# Patient Record
Sex: MALE | Race: WHITE | NOT HISPANIC OR LATINO | Employment: FULL TIME | URBAN - METROPOLITAN AREA
[De-identification: names, ages, dates, MRNs, and addresses within clinical notes are randomized per-mention and may not be internally consistent; named-entity substitution may affect disease eponyms.]

---

## 2017-01-06 ENCOUNTER — PATIENT OUTREACH (OUTPATIENT)
Dept: OTHER | Facility: OTHER | Age: 52
End: 2017-01-06

## 2017-01-11 ENCOUNTER — PATIENT MESSAGE (OUTPATIENT)
Dept: ADMINISTRATIVE | Facility: OTHER | Age: 52
End: 2017-01-11

## 2017-01-11 NOTE — PROGRESS NOTES
Mr. Jonas is doing well. He has been off his routine for the last couple of days 2/2 to a . He will get back on routine this week. Dry cough has improved since stopping lisinopril. He is tolerating valsartan without problems. Drinks about 6 16oz bottles of water per day plus/minus 2 diet cokes. He endorses low salt diet    Last 5 Patient Entered Readings                                                               Current 30 Day Average: 137/95     Recent Readings 1/10/2017 2017 2017 2017 2016    Systolic BP (mmHg) 144 137 136 144 127    Diastolic BP (mmHg) 98 95 94 102 93    Pulse 76 74 78 75 94      Systolic BP at goal  Diastolic BP elevated  Patient may be drinking excessive fluid - cut back to about 90oz per day  Continue monitoring

## 2017-01-19 ENCOUNTER — PATIENT MESSAGE (OUTPATIENT)
Dept: ADMINISTRATIVE | Facility: OTHER | Age: 52
End: 2017-01-19

## 2017-01-19 ENCOUNTER — PATIENT MESSAGE (OUTPATIENT)
Dept: CARDIOLOGY | Facility: CLINIC | Age: 52
End: 2017-01-19

## 2017-01-19 ENCOUNTER — PATIENT MESSAGE (OUTPATIENT)
Dept: OTHER | Facility: OTHER | Age: 52
End: 2017-01-19

## 2017-01-20 PROBLEM — R93.1 AGATSTON CAC SCORE 100-199: Status: ACTIVE | Noted: 2017-01-20

## 2017-01-25 ENCOUNTER — PATIENT OUTREACH (OUTPATIENT)
Dept: OTHER | Facility: OTHER | Age: 52
End: 2017-01-25

## 2017-01-25 DIAGNOSIS — I10 ESSENTIAL HYPERTENSION: Primary | ICD-10-CM

## 2017-02-02 RX ORDER — VALSARTAN AND HYDROCHLOROTHIAZIDE 160; 25 MG/1; MG/1
1 TABLET ORAL DAILY
Qty: 90 TABLET | Refills: 2 | Status: SHIPPED | OUTPATIENT
Start: 2017-02-02 | End: 2017-05-04 | Stop reason: DRUGHIGH

## 2017-02-02 NOTE — PROGRESS NOTES
Mr. Jonas is doing well. No questions or concerns. Continues to exercise and watch diet. He would like to refill combination pill. He has been cutting out alcohol Thursday through Sunday and he feels good. He will have a very strict Lent season (no meat, no alcohol). Wants to make sure BP does not get too low.     Last 5 Patient Entered Readings                                                               Current 30 Day Average: 121/83     Recent Readings 2/1/2017 1/26/2017 1/19/2017 1/18/2017 1/17/2017    Systolic BP (mmHg) 119 124 113 117 120    Diastolic BP (mmHg) 77 83 78 74 85    Pulse 83 71 74 67 67      BP at goal  Continue monitoring    Outpatient Hypertension Medications as of 2/2/2017             valsartan-hydrochlorothiazide (DIOVAN-HCT) 160-25 mg per tablet Take 1 tablet by mouth once daily.

## 2017-02-06 ENCOUNTER — PATIENT OUTREACH (OUTPATIENT)
Dept: OTHER | Facility: OTHER | Age: 52
End: 2017-02-06
Payer: COMMERCIAL

## 2017-02-21 RX ORDER — PANTOPRAZOLE SODIUM 40 MG/1
TABLET, DELAYED RELEASE ORAL
Qty: 180 TABLET | Refills: 0 | Status: SHIPPED | OUTPATIENT
Start: 2017-02-21 | End: 2017-03-06 | Stop reason: SDUPTHER

## 2017-02-21 NOTE — TELEPHONE ENCOUNTER
----- Message from Selena Allen sent at 2/21/2017  2:42 PM CST -----  Contact: [facility]/Ayde/ext.[phone]  Ayde said that she is calling in regards to needing to get an okay for a 90 day supply for pt's pantoprazole (PROTONIX) 40 MG tablet. Please call and advise            Thank you

## 2017-03-01 NOTE — PROGRESS NOTES
Last 5 Patient Entered Readings                                                               Current 30 Day Average: 131/86     Recent Readings 2/27/2017 2/26/2017 2/24/2017 2/20/2017 2/19/2017    Systolic BP (mmHg) 136 147 134 125 137    Diastolic BP (mmHg) 89 101 94 76 89    Pulse 82 75 69 90 79        LVM. Trending up.

## 2017-03-06 DIAGNOSIS — J30.9 ALLERGIC RHINITIS, UNSPECIFIED ALLERGIC RHINITIS TRIGGER, UNSPECIFIED RHINITIS SEASONALITY: ICD-10-CM

## 2017-03-06 RX ORDER — AZELASTINE HYDROCHLORIDE 0.5 MG/ML
1 SOLUTION/ DROPS OPHTHALMIC 2 TIMES DAILY
Qty: 18 ML | Refills: 3 | Status: SHIPPED | OUTPATIENT
Start: 2017-03-06 | End: 2023-02-16

## 2017-03-06 RX ORDER — PANTOPRAZOLE SODIUM 40 MG/1
TABLET, DELAYED RELEASE ORAL
Qty: 180 TABLET | Refills: 0 | Status: SHIPPED | OUTPATIENT
Start: 2017-03-06 | End: 2018-02-26 | Stop reason: SDUPTHER

## 2017-03-06 RX ORDER — FLUTICASONE PROPIONATE 50 MCG
2 SPRAY, SUSPENSION (ML) NASAL DAILY
Qty: 48 G | Refills: 4 | Status: SHIPPED | OUTPATIENT
Start: 2017-03-06 | End: 2021-01-07 | Stop reason: SDUPTHER

## 2017-03-06 RX ORDER — AZELASTINE HCL 205.5 UG/1
2 SPRAY NASAL 2 TIMES DAILY PRN
Qty: 30 ML | Refills: 1 | Status: SHIPPED | OUTPATIENT
Start: 2017-03-06 | End: 2018-03-06

## 2017-03-06 NOTE — TELEPHONE ENCOUNTER
Pharmacy would like a 90 day supply on    azelastine (OPTIVAR) 0.05 % ophthalmic solution     azelastine 0.15 % (205.5 mcg) Spry       pantoprazole (PROTONIX) 40 MG tablet

## 2017-03-14 ENCOUNTER — CLINICAL SUPPORT (OUTPATIENT)
Dept: INFECTIOUS DISEASES | Facility: CLINIC | Age: 52
End: 2017-03-14
Payer: COMMERCIAL

## 2017-03-14 PROCEDURE — 90632 HEPA VACCINE ADULT IM: CPT | Mod: PBBFAC

## 2017-03-29 ENCOUNTER — PATIENT OUTREACH (OUTPATIENT)
Dept: OTHER | Facility: OTHER | Age: 52
End: 2017-03-29
Payer: COMMERCIAL

## 2017-03-29 NOTE — PROGRESS NOTES
Last 5 Patient Entered Readings                                                               Current 30 Day Average: 119/79     Recent Readings 3/29/2017 3/28/2017 3/27/2017 3/25/2017 3/21/2017    Systolic BP (mmHg) 112 112 132 113 116    Diastolic BP (mmHg) 69 77 86 76 78    Pulse 105 68 85 73 67

## 2017-04-11 ENCOUNTER — PATIENT MESSAGE (OUTPATIENT)
Dept: OTHER | Facility: OTHER | Age: 52
End: 2017-04-11

## 2017-04-11 ENCOUNTER — PATIENT MESSAGE (OUTPATIENT)
Dept: ADMINISTRATIVE | Facility: OTHER | Age: 52
End: 2017-04-11

## 2017-05-03 ENCOUNTER — PATIENT OUTREACH (OUTPATIENT)
Dept: OTHER | Facility: OTHER | Age: 52
End: 2017-05-03
Payer: COMMERCIAL

## 2017-05-03 DIAGNOSIS — I10 ESSENTIAL HYPERTENSION: Primary | ICD-10-CM

## 2017-05-04 RX ORDER — VALSARTAN AND HYDROCHLOROTHIAZIDE 160; 12.5 MG/1; MG/1
1 TABLET, FILM COATED ORAL DAILY
Qty: 30 TABLET | Refills: 1 | Status: SHIPPED | OUTPATIENT
Start: 2017-05-04 | End: 2017-06-05 | Stop reason: SDUPTHER

## 2017-05-04 RX ORDER — ROSUVASTATIN CALCIUM 20 MG/1
20 TABLET, COATED ORAL NIGHTLY
Qty: 90 TABLET | Refills: 3 | Status: SHIPPED | OUTPATIENT
Start: 2017-05-04 | End: 2018-01-02 | Stop reason: SDUPTHER

## 2017-05-04 NOTE — PROGRESS NOTES
Mr. Jonas is doing well. He continues to exercise and watch diet closely. He has noticed some afternoon fatigue and thinks it may be due to BP medication. Would like to try and reduce dose.     Last 5 Patient Entered Readings                                                               Current 30 Day Average: 123/86     Recent Readings 4/29/2017 4/28/2017 4/27/2017 4/26/2017 4/20/2017    Systolic BP (mmHg) 120 125 126 124 114    Diastolic BP (mmHg) 81 86 92 84 80    Pulse 69 72 68 72 75      BP at goal  Decrease HCTZ to 12.5mg daily to improve fatigue    Hypertension Medications             valsartan-hydrochlorothiazide (DIOVAN-HCT) 160-12.5 mg per tablet Take 1 tablet by mouth once daily.

## 2017-05-05 RX ORDER — ROSUVASTATIN CALCIUM 20 MG/1
TABLET, FILM COATED ORAL
Qty: 90 TABLET | Refills: 0 | Status: SHIPPED | OUTPATIENT
Start: 2017-05-05 | End: 2017-11-02 | Stop reason: SDUPTHER

## 2017-05-08 ENCOUNTER — TELEPHONE (OUTPATIENT)
Dept: ALLERGY | Facility: CLINIC | Age: 52
End: 2017-05-08

## 2017-05-08 NOTE — PROGRESS NOTES
Last 5 Patient Entered Readings                                                               Current 30 Day Average: 124/86     Recent Readings 5/5/2017 4/29/2017 4/28/2017 4/27/2017 4/26/2017    Systolic BP (mmHg) 125 120 125 126 124    Diastolic BP (mmHg) 87 81 86 92 84    Pulse 66 69 72 68 72        Increased exercise but not seeing much weight changes. Would like to be 15lbs lighter. Will log food for 1 week to share and discuss in detail. Has reduced meds per Shaina. Feeling well. Pleased with BP.    Follow up with . Venkat Jonas completed. Patient is maintaining a low sodium diet and continuing his and her exercise regime. Patient did not have any further questions or concerns. I will follow up in a few weeks to see how he is doing and progressing.

## 2017-05-08 NOTE — TELEPHONE ENCOUNTER
----- Message from Laura Em sent at 5/8/2017  3:21 PM CDT -----  Good afternoon,    The prior authorization for Azelastine 0.15 % spray was approved for Mr. Robledo Sumi.  The authorization is effective from 05/06/17 to 05/05/18.    Laura Em, Certified Pharmacy Technician  [facility]  [address] [place] [address] 7012 [phone] (phone)  [phone] (fax)

## 2017-05-08 NOTE — TELEPHONE ENCOUNTER
----- Message from Laura Em sent at 5/8/2017 11:00 AM CDT -----  Good morning,    The prior authorization for Azelastine 0.15 spray was submitted for Mr. Venkat Jonas.  An update will be given once the insurance company has reached a decision.  Thank you.    Laura Em, Certified Pharmacy Technician  [facility]  [address] [place], [place] [id number] [phone] (phone)  [phone] (fax)

## 2017-05-19 ENCOUNTER — PATIENT OUTREACH (OUTPATIENT)
Dept: OTHER | Facility: OTHER | Age: 52
End: 2017-05-19
Payer: COMMERCIAL

## 2017-05-19 DIAGNOSIS — I10 ESSENTIAL HYPERTENSION: ICD-10-CM

## 2017-05-24 ENCOUNTER — PATIENT MESSAGE (OUTPATIENT)
Dept: CARDIOLOGY | Facility: CLINIC | Age: 52
End: 2017-05-24

## 2017-06-02 ENCOUNTER — LAB VISIT (OUTPATIENT)
Dept: LAB | Facility: HOSPITAL | Age: 52
End: 2017-06-02
Attending: INTERNAL MEDICINE
Payer: COMMERCIAL

## 2017-06-02 DIAGNOSIS — Z00.00 ROUTINE CHECK-UP: ICD-10-CM

## 2017-06-02 DIAGNOSIS — E78.2 MIXED HYPERLIPIDEMIA: ICD-10-CM

## 2017-06-02 DIAGNOSIS — I10 ESSENTIAL HYPERTENSION: ICD-10-CM

## 2017-06-02 LAB
CHOLEST/HDLC SERPL: 3.2 {RATIO}
HDL/CHOLESTEROL RATIO: 31.1 %
HDLC SERPL-MCNC: 212 MG/DL
HDLC SERPL-MCNC: 66 MG/DL
LDLC SERPL CALC-MCNC: 132.4 MG/DL
NONHDLC SERPL-MCNC: 146 MG/DL
TRIGL SERPL-MCNC: 68 MG/DL

## 2017-06-02 PROCEDURE — 36415 COLL VENOUS BLD VENIPUNCTURE: CPT

## 2017-06-02 PROCEDURE — 80061 LIPID PANEL: CPT

## 2017-06-05 ENCOUNTER — TELEPHONE (OUTPATIENT)
Dept: CARDIOLOGY | Facility: CLINIC | Age: 52
End: 2017-06-05

## 2017-06-05 ENCOUNTER — PATIENT MESSAGE (OUTPATIENT)
Dept: INTERNAL MEDICINE | Facility: CLINIC | Age: 52
End: 2017-06-05

## 2017-06-05 ENCOUNTER — PATIENT MESSAGE (OUTPATIENT)
Dept: ADMINISTRATIVE | Facility: OTHER | Age: 52
End: 2017-06-05

## 2017-06-05 ENCOUNTER — PATIENT MESSAGE (OUTPATIENT)
Dept: CARDIOLOGY | Facility: CLINIC | Age: 52
End: 2017-06-05

## 2017-06-05 ENCOUNTER — PATIENT OUTREACH (OUTPATIENT)
Dept: OTHER | Facility: OTHER | Age: 52
End: 2017-06-05
Payer: COMMERCIAL

## 2017-06-05 DIAGNOSIS — E78.00 PURE HYPERCHOLESTEROLEMIA: Primary | ICD-10-CM

## 2017-06-05 DIAGNOSIS — I10 ESSENTIAL HYPERTENSION: Chronic | ICD-10-CM

## 2017-06-05 RX ORDER — EZETIMIBE 10 MG/1
10 TABLET ORAL DAILY
Qty: 30 TABLET | Refills: 5 | Status: SHIPPED | OUTPATIENT
Start: 2017-06-05 | End: 2017-12-04 | Stop reason: SDUPTHER

## 2017-06-05 RX ORDER — VALSARTAN AND HYDROCHLOROTHIAZIDE 160; 12.5 MG/1; MG/1
1 TABLET, FILM COATED ORAL DAILY
Qty: 90 TABLET | Refills: 2 | Status: SHIPPED | OUTPATIENT
Start: 2017-06-05 | End: 2017-08-08 | Stop reason: SDUPTHER

## 2017-06-05 NOTE — TELEPHONE ENCOUNTER
Spoke with patient and an appt was scheduled for lab on 8/3/17 at 7:10 am.       MD Aure Garcia Staff 1 hour ago (1:35 PM)      Needs lipids and Chem-20 in 6-8 weeks. (Routing comment)       Vikash Looney MD 1 hour ago (1:30 PM)      LDL improved but still elevated (132).  Taking Crestor 20 mg.     Discussed results; will increase to 40 mg Crestor (two 20 mg tabs) and add Zetia 10 mg.     Recheck lipids in 2 months

## 2017-06-05 NOTE — PROGRESS NOTES
Last 5 Patient Entered Readings                                                               Current 30 Day Average: 129/89     Recent Readings 6/3/2017 6/2/2017 6/1/2017 6/1/2017 6/1/2017    Systolic BP (mmHg) 131 134 125 133 125    Diastolic BP (mmHg) 90 94 83 91 83    Pulse 81 74 69 68 72        Cholesterol levels also improving. Feeling great. Increased exercise. Would like to schedule time to talk more in depth about diet and exercise.    Follow up with Mr. Venkat Jonas completed. Patient is maintaining a low sodium diet and continuing his exercise regime. Patient did not have any further questions or concerns. I will follow up in a few weeks to see how he is doing and progressing.

## 2017-06-27 ENCOUNTER — PATIENT OUTREACH (OUTPATIENT)
Dept: OTHER | Facility: OTHER | Age: 52
End: 2017-06-27

## 2017-06-27 NOTE — PROGRESS NOTES
Called patient to review readings. LVM  BP at goal  Continue monitoring    Last 5 Patient Entered Readings                                                               Current 30 Day Average: 131/89     Recent Readings 6/25/2017 6/24/2017 6/23/2017 6/20/2017 6/16/2017    Systolic BP (mmHg) 126 128 138 150 119    Diastolic BP (mmHg) 83 83 102 98 81    Pulse 80 90 77 72 78

## 2017-07-03 ENCOUNTER — PATIENT OUTREACH (OUTPATIENT)
Dept: OTHER | Facility: OTHER | Age: 52
End: 2017-07-03

## 2017-07-03 NOTE — PROGRESS NOTES
Last 5 Patient Entered Readings                                                               Current 30 Day Average: 132/89     Recent Readings 6/25/2017 6/24/2017 6/23/2017 6/20/2017 6/16/2017    Systolic BP (mmHg) 126 128 138 150 119    Diastolic BP (mmHg) 83 83 102 98 81    Pulse 80 90 77 72 78        Has lost 4lbs.  Feeling great. Keeping up with healthy routines. Increased veggies, decreased starchy carbs.    Follow up with . Venkat Jonas completed. Patient is maintaining a low sodium diet and continuing his exercise regime. Patient did not have any further questions or concerns. I will follow up in a few weeks to see how he is doing and progressing.

## 2017-08-01 ENCOUNTER — PATIENT MESSAGE (OUTPATIENT)
Dept: OTHER | Facility: OTHER | Age: 52
End: 2017-08-01

## 2017-08-02 ENCOUNTER — PATIENT MESSAGE (OUTPATIENT)
Dept: CARDIOLOGY | Facility: CLINIC | Age: 52
End: 2017-08-02

## 2017-08-02 ENCOUNTER — LAB VISIT (OUTPATIENT)
Dept: LAB | Facility: HOSPITAL | Age: 52
End: 2017-08-02
Attending: INTERNAL MEDICINE
Payer: COMMERCIAL

## 2017-08-02 DIAGNOSIS — E78.00 PURE HYPERCHOLESTEROLEMIA: ICD-10-CM

## 2017-08-02 DIAGNOSIS — I10 ESSENTIAL HYPERTENSION: Chronic | ICD-10-CM

## 2017-08-02 LAB
ALBUMIN SERPL BCP-MCNC: 3.7 G/DL
ALP SERPL-CCNC: 81 U/L
ALT SERPL W/O P-5'-P-CCNC: 39 U/L
ANION GAP SERPL CALC-SCNC: 11 MMOL/L
AST SERPL-CCNC: 34 U/L
BILIRUB SERPL-MCNC: 0.4 MG/DL
BUN SERPL-MCNC: 15 MG/DL
CALCIUM SERPL-MCNC: 9.4 MG/DL
CHLORIDE SERPL-SCNC: 101 MMOL/L
CHOLEST/HDLC SERPL: 2.4 {RATIO}
CO2 SERPL-SCNC: 26 MMOL/L
CREAT SERPL-MCNC: 1 MG/DL
EST. GFR  (AFRICAN AMERICAN): >60 ML/MIN/1.73 M^2
EST. GFR  (NON AFRICAN AMERICAN): >60 ML/MIN/1.73 M^2
GLUCOSE SERPL-MCNC: 81 MG/DL
HDL/CHOLESTEROL RATIO: 42.2 %
HDLC SERPL-MCNC: 135 MG/DL
HDLC SERPL-MCNC: 57 MG/DL
LDLC SERPL CALC-MCNC: 65 MG/DL
NONHDLC SERPL-MCNC: 78 MG/DL
POTASSIUM SERPL-SCNC: 4.1 MMOL/L
PROT SERPL-MCNC: 7.3 G/DL
SODIUM SERPL-SCNC: 138 MMOL/L
TRIGL SERPL-MCNC: 65 MG/DL

## 2017-08-02 PROCEDURE — 80061 LIPID PANEL: CPT

## 2017-08-02 PROCEDURE — 36415 COLL VENOUS BLD VENIPUNCTURE: CPT

## 2017-08-02 PROCEDURE — 80053 COMPREHEN METABOLIC PANEL: CPT

## 2017-08-04 ENCOUNTER — PATIENT OUTREACH (OUTPATIENT)
Dept: OTHER | Facility: OTHER | Age: 52
End: 2017-08-04

## 2017-08-04 NOTE — PROGRESS NOTES
"Last 5 Patient Entered Redings Current 30 Day Average: 128/87     Recent Readings 8/1/2017 7/31/2017 7/29/2017 7/27/2017 7/25/2017    Systolic BP (mmHg) 132 128 119 125 123    Diastolic BP (mmHg) 81 89 80 82 89    Pulse 77 78 67 65 77        Mr Jonas would like to work more toward lower body fat percentage and increase muscle mass. Currently follows a Low carb diet, vegetarian some nights. Reviewed diet and exercise in [employer]. Will email suggestions to help reach goals.    Down 8-9lbs in last 4wks. Current weight: 189 and thinks "20-22% body fat" but will check at [facility] using [facility] device / Goal: 175lbs by the holidays. Increase muscle mass / decrease fat    Usually does workout routine at 5:30am. Tries to make breakfast largest meal and dinner smallest.    Ordered The Anatone Diet book rec by Dr Looney.    Recently had blood work done and cholesterol levels are back in a normal range.    Follow up with . Venkat Jonas completed. Patient is maintaining a low sodium diet and continuing his exercise regime. Patient did not have any further questions or concerns. I will follow up in a few weeks to see how he is doing and progressing.      "

## 2017-08-08 ENCOUNTER — PATIENT OUTREACH (OUTPATIENT)
Dept: OTHER | Facility: OTHER | Age: 52
End: 2017-08-08

## 2017-08-08 DIAGNOSIS — I10 ESSENTIAL HYPERTENSION: ICD-10-CM

## 2017-08-08 RX ORDER — VALSARTAN AND HYDROCHLOROTHIAZIDE 160; 12.5 MG/1; MG/1
1 TABLET, FILM COATED ORAL DAILY
Qty: 90 TABLET | Refills: 2 | Status: SHIPPED | OUTPATIENT
Start: 2017-08-08 | End: 2018-06-01 | Stop reason: SDUPTHER

## 2017-08-08 NOTE — PROGRESS NOTES
Reviewed BP readings, at goal.  Continue monitoring  RX refilled    Last 5 Patient Entered Redings Current 30 Day Average: 126/86     Recent Readings 8/5/2017 8/1/2017 7/31/2017 7/29/2017 7/27/2017    Systolic BP (mmHg) 123 132 128 119 125    Diastolic BP (mmHg) 83 81 89 80 82    Pulse 83 77 78 67 65

## 2017-09-01 ENCOUNTER — PATIENT OUTREACH (OUTPATIENT)
Dept: OTHER | Facility: OTHER | Age: 52
End: 2017-09-01

## 2017-09-01 NOTE — PROGRESS NOTES
Last 5 Patient Entered Redings Current 30 Day Average: 134/89     Recent Readings 8/26/2017 8/23/2017 8/19/2017 8/15/2017 8/5/2017    Systolic BP (mmHg) 132 134 150 131 123    Diastolic BP (mmHg) 91 92 87 94 83    Pulse 75 67 82 71 83        Hypertension Digital Medicine (HDMP) Health  Follow Up    LVM to follow up with Mr. Venkat Jonas.    Per 30 day average, blood pressure is well controlled 134/89 mmHg. Encouraged adherence to low sodium diet and physical activity guidelines. Advised patient to call or message with questions or concerns. WCB in 3 weeks.

## 2017-09-11 ENCOUNTER — PATIENT MESSAGE (OUTPATIENT)
Dept: INTERNAL MEDICINE | Facility: CLINIC | Age: 52
End: 2017-09-11

## 2017-09-25 NOTE — PROGRESS NOTES
Last 5 Patient Entered Redings Current 30 Day Average: 133/89     Recent Readings 9/20/2017 9/16/2017 9/15/2017 9/12/2017 9/8/2017    Systolic BP (mmHg) 129 145 145 132 121    Diastolic BP (mmHg) 78 95 99 90 80    Pulse 73 76 64 68 71          Hypertension Digital Medicine (HDMP) Health  Follow Up    LVM to follow up with Mr. Venkat Jonas.    Per 30 day average, blood pressure is  133/89 mmHg. Encouraged adherence to low sodium diet and physical activity guidelines. Advised patient to call or message with questions or concerns. WCB in 4 weeks.

## 2017-10-03 DIAGNOSIS — J45.990 BRONCHOSPASM, EXERCISE-INDUCED: ICD-10-CM

## 2017-10-03 DIAGNOSIS — J45.20 ASTHMA, MILD INTERMITTENT, WELL-CONTROLLED: ICD-10-CM

## 2017-10-03 RX ORDER — BUDESONIDE AND FORMOTEROL FUMARATE DIHYDRATE 80; 4.5 UG/1; UG/1
2 AEROSOL RESPIRATORY (INHALATION) 2 TIMES DAILY
Refills: 0 | COMMUNITY
Start: 2017-07-20 | End: 2017-11-02 | Stop reason: SDUPTHER

## 2017-10-03 RX ORDER — BUDESONIDE AND FORMOTEROL FUMARATE DIHYDRATE 80; 4.5 UG/1; UG/1
2 AEROSOL RESPIRATORY (INHALATION) 2 TIMES DAILY
Qty: 30.6 G | Refills: 3 | Status: SHIPPED | OUTPATIENT
Start: 2017-10-03 | End: 2018-12-09 | Stop reason: SDUPTHER

## 2017-10-05 RX ORDER — ALBUTEROL SULFATE 90 UG/1
2 AEROSOL, METERED RESPIRATORY (INHALATION) EVERY 4 HOURS PRN
Qty: 18 G | Refills: 3 | Status: SHIPPED | OUTPATIENT
Start: 2017-10-05 | End: 2018-12-09 | Stop reason: SDUPTHER

## 2017-10-18 ENCOUNTER — PATIENT OUTREACH (OUTPATIENT)
Dept: OTHER | Facility: OTHER | Age: 52
End: 2017-10-18

## 2017-10-18 NOTE — PROGRESS NOTES
"Venkat is tolerating BP medications without problems. He denies fatigue with lower valsartan-hctz dose. Has been adhering to balanced diet and intense exercise routine. Concerned about monthly BP "spikes". Reports he has been taking readings on the couch with arm sitting on his lap.     Last 5 Patient Entered Redings Current 30 Day Average: 123/82     Recent Readings 10/18/2017 10/17/2017 10/17/2017 10/11/2017 10/3/2017    Systolic BP (mmHg) 117 134 148 119 116    Diastolic BP (mmHg) 75 97 94 84 78    Pulse 88 74 71 78 68        BP at goal  Reviewed BP monitoring technique  Continue monitoring    Hypertension Medications             valsartan-hydrochlorothiazide (DIOVAN-HCT) 160-12.5 mg per tablet Take 1 tablet by mouth once daily.            "

## 2017-10-23 NOTE — PROGRESS NOTES
Last 5 Patient Entered Redings Current 30 Day Average: 122/82     Recent Readings 10/20/2017 10/18/2017 10/17/2017 10/17/2017 10/11/2017    Systolic BP (mmHg) 125 117 134 148 119    Diastolic BP (mmHg) 76 75 97 94 84    Pulse 79 88 74 71 78        LVM to follow up. Also sending [employer] message to check in.

## 2017-10-31 ENCOUNTER — OFFICE VISIT (OUTPATIENT)
Dept: OPTOMETRY | Facility: CLINIC | Age: 52
End: 2017-10-31
Payer: COMMERCIAL

## 2017-10-31 DIAGNOSIS — H52.03 HYPERMETROPIA OF BOTH EYES: ICD-10-CM

## 2017-10-31 DIAGNOSIS — H52.4 PRESBYOPIA OF BOTH EYES: ICD-10-CM

## 2017-10-31 DIAGNOSIS — Z01.00 EXAMINATION OF EYES AND VISION: Primary | ICD-10-CM

## 2017-10-31 PROCEDURE — 92004 COMPRE OPH EXAM NEW PT 1/>: CPT | Mod: S$GLB,,, | Performed by: OPTOMETRIST

## 2017-10-31 PROCEDURE — 99999 PR PBB SHADOW E&M-EST. PATIENT-LVL II: CPT | Mod: PBBFAC,,, | Performed by: OPTOMETRIST

## 2017-10-31 PROCEDURE — 92015 DETERMINE REFRACTIVE STATE: CPT | Mod: S$GLB,,, | Performed by: OPTOMETRIST

## 2017-10-31 NOTE — PATIENT INSTRUCTIONS
Good ocular health in both eyes.  Family history of glaucoma.  No evidence of glaucoma in either eye.     Slight hyperopia in each eye, with good correctable VA.  Presbyopia consistent with age.  New spectacle lens Rx issued for use as desired, but okay to continue with +1375 or +2.00 over the counter reading glasses if happy with near vision with those glasses, and if happy with unaided distance VA.    Recheck in two years - or prior if any problems noted in the interim.

## 2017-10-31 NOTE — PROGRESS NOTES
HPI     Concerns About Ocular Health    Additional comments: Eye exam - annual general eye examination.  Uses OTC reading glasses.  Using Optivar and artificial tears as needed - lubricatiing eye drops at   bedtime.  Family history of glaucoma: M. Grandmother.            Comments   Patient's age: 52 y.o. WM   Occupation: [employer]   Approximate date of last eye examination:  10/11/12  Name of last eye doctor seen: Dr. Pinky Olivares   City/State: [facility]   Wears glasses? Yes      If yes, wears  Full-time or part-time?  Part Time   Present glasses are: Bifocal, SV Distance, SV Reading?  otc readers   Approximate age of present glasses:     Got new glasses following last exam, or subsequently?:     Any problem with VA with glasses?  No  Wears CLs?: No  Headaches?  No  Eye pain/discomfort?  Dry eye occasionally                                                                                  Flashes?  No  Floaters?  No  Diplopia/Double vision?  No  Patient's Ocular History:         Any eye surgeries? No         Any eye injury?  No         Any treatment for eye disease?  No  Family history of eye disease?  Maternal grandmother- Glaucoma   Significant patient medical history:         1. Diabetes?  No       If yes, IDDM or NIDDM?    2. HBP?  Yes, managed with medication                 ! OTC eyedrops currently using:  Lubricant eye drop   ! Prescription eye meds currently using:  No   ! Any history of allergy/adverse reaction to any eye meds used   previously?  No    ! Any history of allergy/adverse reaction to eyedrops used during prior   eye exam(s)? No    ! Any history of allergy/adverse reaction to Novacaine or similar meds?   No   ! Any history of allergy/adverse reaction to Epinephrine or similar meds?   No    ! Patient okay with use of anesthetic eyedrops to check eye pressure?    Yes         ! Patient okay with use of eyedrops to dilate pupils today?  Yes    !  Allergies/Medications/Medical History/Family History reviewed  "today?    Yes       PD =   61/57  Desired reading distance =  15.25"                                                                    Last edited by Michael Doan, OD on 10/31/2017  4:31 PM. (History)            Assessment /Plan     For exam results, see Encounter Report.    1. Examination of eyes and vision     2. Hypermetropia of both eyes     3. Presbyopia of both eyes                Good ocular health in both eyes.  Family history of glaucoma.  No evidence of glaucoma in either eye.     Slight hyperopia in each eye, with good correctable VA.  Presbyopia consistent with age.  New spectacle lens Rx issued for use as desired, but okay to continue with +1375 or +2.00 over the counter reading glasses if happy with near vision with those glasses, and if happy with unaided distance VA.    Recheck in two years - or prior if any problems noted in the interim      "

## 2017-11-02 ENCOUNTER — TELEPHONE (OUTPATIENT)
Dept: INTERNAL MEDICINE | Facility: CLINIC | Age: 52
End: 2017-11-02

## 2017-11-02 DIAGNOSIS — Z00.00 ROUTINE GENERAL MEDICAL EXAMINATION AT A HEALTH CARE FACILITY: Primary | ICD-10-CM

## 2017-11-07 ENCOUNTER — OFFICE VISIT (OUTPATIENT)
Dept: DERMATOLOGY | Facility: CLINIC | Age: 52
End: 2017-11-07
Payer: COMMERCIAL

## 2017-11-07 DIAGNOSIS — L82.1 SK (SEBORRHEIC KERATOSIS): ICD-10-CM

## 2017-11-07 DIAGNOSIS — L82.0 INFLAMED SEBORRHEIC KERATOSIS: Primary | ICD-10-CM

## 2017-11-07 DIAGNOSIS — B07.8 OTHER VIRAL WARTS: ICD-10-CM

## 2017-11-07 DIAGNOSIS — L57.0 AK (ACTINIC KERATOSIS): ICD-10-CM

## 2017-11-07 PROCEDURE — 17110 DESTRUCTION B9 LES UP TO 14: CPT | Mod: S$GLB,,, | Performed by: DERMATOLOGY

## 2017-11-07 PROCEDURE — 99213 OFFICE O/P EST LOW 20 MIN: CPT | Mod: 25,S$GLB,, | Performed by: DERMATOLOGY

## 2017-11-07 PROCEDURE — 17000 DESTRUCT PREMALG LESION: CPT | Mod: 59,S$GLB,, | Performed by: DERMATOLOGY

## 2017-11-07 PROCEDURE — 99999 PR PBB SHADOW E&M-EST. PATIENT-LVL II: CPT | Mod: PBBFAC,,, | Performed by: DERMATOLOGY

## 2017-11-07 NOTE — PATIENT INSTRUCTIONS

## 2017-11-07 NOTE — PROGRESS NOTES
Subjective:       Patient ID:  Venkat Jonas is a 52 y.o. male who presents for   Chief Complaint   Patient presents with    Warts     left hand x few months, asymptomatic no prev tx      Noticed red scaling spots on right cheek x 2 months.     C/o red irritated growth on right neck x 1 month + irritated from shirt collar      Warts  - Initial  Affected locations: left hand  Duration: 3 months  Signs / symptoms: asymptomatic and spreading  Aggravated by: nothing  Relieving factors/Treatments tried: nothing        Review of Systems   Skin: Negative for itching and rash.   Hematologic/Lymphatic: Does not bruise/bleed easily.        Objective:    Physical Exam   Constitutional: He appears well-developed and well-nourished. No distress.   Neurological: He is alert and oriented to person, place, and time. He is not disoriented.   Psychiatric: He has a normal mood and affect.   Skin:   Areas Examined (abnormalities noted in diagram):   Head / Face Inspection Performed  Neck Inspection Performed  RUE Inspected  Nails and Digits Inspection Performed                  Diagram Legend     Erythematous scaling macule/papule c/w actinic keratosis       Vascular papule c/w angioma      Pigmented verrucoid papule/plaque c/w seborrheic keratosis      Yellow umbilicated papule c/w sebaceous hyperplasia      Irregularly shaped tan macule c/w lentigo     1-2 mm smooth white papules consistent with Milia      Movable subcutaneous cyst with punctum c/w epidermal inclusion cyst      Subcutaneous movable cyst c/w pilar cyst      Firm pink to brown papule c/w dermatofibroma      Pedunculated fleshy papule(s) c/w skin tag(s)      Evenly pigmented macule c/w junctional nevus     Mildly variegated pigmented, slightly irregular-bordered macule c/w mildly atypical nevus      Flesh colored to evenly pigmented papule c/w intradermal nevus       Pink pearly papule/plaque c/w basal cell carcinoma      Erythematous hyperkeratotic cursted  plaque c/w SCC      Surgical scar with no sign of skin cancer recurrence      Open and closed comedones      Inflammatory papules and pustules      Verrucoid papule consistent consistent with wart     Erythematous eczematous patches and plaques     Dystrophic onycholytic nail with subungual debris c/w onychomycosis     Umbilicated papule    Erythematous-base heme-crusted tan verrucoid plaque consistent with inflamed seborrheic keratosis     Erythematous Silvery Scaling Plaque c/w Psoriasis     See annotation      Assessment / Plan:        Inflamed seborrheic keratosis - right neck  Procedure note for destruction via hyfrecation and curettage:    Verbal consent obtained. Risks of recurrence and scarring discussed.   1 lesions cleaned with alcohol and anesthetized with 1% lidocaine with epinephrine. Areas then lightly hyfrecated and curetted to remove gross lesion. Hemostasis achieved with aluminum chloride application. No complications.   Areas dressed with Vaseline jelly and bandage. Wound care discussed.      SK (seborrheic keratosis) - left hand  Reassurance given to patient. No treatment is necessary.       Other viral warts - left hand  Procedure note for destruction via shave debulking:    Discussed risks of procedure including but not limited to infection, persistence of lesion, recurrence of lesion, and scar. Verbal consent obtained. Area cleaned with alcohol and anesthetized with 1% lidocaine with epinephrine. 1 lesion(s) shaved with sharp razor then base destroyed with hyfrecation. No complications.      AK (actinic keratosis)  Cryosurgery Procedure Note    Verbal consent from the patient is obtained and the patient is aware of the precancerous quality and need for treatment of these lesions. Liquid nitrogen cryosurgery is applied to the 1 actinic keratoses, as detailed in the physical exam, to produce a freeze injury. The patient is aware that blisters may form and is instructed on wound care with gentle  cleansing and use of vaseline ointment to keep moist until healed. The patient is supplied a handout on cryosurgery and is instructed to call if lesions do not completely resolve.             Return if symptoms worsen or fail to improve.

## 2017-11-16 ENCOUNTER — LAB VISIT (OUTPATIENT)
Dept: LAB | Facility: HOSPITAL | Age: 52
End: 2017-11-16
Payer: COMMERCIAL

## 2017-11-16 DIAGNOSIS — Z00.00 ROUTINE GENERAL MEDICAL EXAMINATION AT A HEALTH CARE FACILITY: ICD-10-CM

## 2017-11-16 LAB
BASOPHILS # BLD AUTO: 0.04 K/UL
BASOPHILS NFR BLD: 0.7 %
COMPLEXED PSA SERPL-MCNC: 0.59 NG/ML
DIFFERENTIAL METHOD: ABNORMAL
EOSINOPHIL # BLD AUTO: 0.2 K/UL
EOSINOPHIL NFR BLD: 2.7 %
ERYTHROCYTE [DISTWIDTH] IN BLOOD BY AUTOMATED COUNT: 12.3 %
HCT VFR BLD AUTO: 45.8 %
HGB BLD-MCNC: 15.5 G/DL
IMM GRANULOCYTES # BLD AUTO: 0.02 K/UL
IMM GRANULOCYTES NFR BLD AUTO: 0.3 %
LYMPHOCYTES # BLD AUTO: 1.2 K/UL
LYMPHOCYTES NFR BLD: 20.2 %
MCH RBC QN AUTO: 30.5 PG
MCHC RBC AUTO-ENTMCNC: 33.8 G/DL
MCV RBC AUTO: 90 FL
MONOCYTES # BLD AUTO: 0.4 K/UL
MONOCYTES NFR BLD: 6.5 %
NEUTROPHILS # BLD AUTO: 4.2 K/UL
NEUTROPHILS NFR BLD: 69.6 %
NRBC BLD-RTO: 0 /100 WBC
PLATELET # BLD AUTO: 277 K/UL
PMV BLD AUTO: 8.8 FL
RBC # BLD AUTO: 5.08 M/UL
WBC # BLD AUTO: 5.99 K/UL

## 2017-11-16 PROCEDURE — 85025 COMPLETE CBC W/AUTO DIFF WBC: CPT

## 2017-11-16 PROCEDURE — 36415 COLL VENOUS BLD VENIPUNCTURE: CPT

## 2017-11-16 PROCEDURE — 84153 ASSAY OF PSA TOTAL: CPT

## 2017-11-20 ENCOUNTER — OFFICE VISIT (OUTPATIENT)
Dept: INTERNAL MEDICINE | Facility: CLINIC | Age: 52
End: 2017-11-20
Payer: COMMERCIAL

## 2017-11-20 ENCOUNTER — PATIENT OUTREACH (OUTPATIENT)
Dept: OTHER | Facility: OTHER | Age: 52
End: 2017-11-20

## 2017-11-20 VITALS
WEIGHT: 199.31 LBS | OXYGEN SATURATION: 97 % | BODY MASS INDEX: 27.9 KG/M2 | HEART RATE: 85 BPM | SYSTOLIC BLOOD PRESSURE: 138 MMHG | HEIGHT: 71 IN | DIASTOLIC BLOOD PRESSURE: 90 MMHG

## 2017-11-20 DIAGNOSIS — Z00.00 ROUTINE GENERAL MEDICAL EXAMINATION AT A HEALTH CARE FACILITY: Primary | ICD-10-CM

## 2017-11-20 DIAGNOSIS — E78.00 PURE HYPERCHOLESTEROLEMIA: ICD-10-CM

## 2017-11-20 DIAGNOSIS — I10 ESSENTIAL HYPERTENSION: Chronic | ICD-10-CM

## 2017-11-20 PROCEDURE — 99999 PR PBB SHADOW E&M-EST. PATIENT-LVL III: CPT | Mod: PBBFAC,,, | Performed by: INTERNAL MEDICINE

## 2017-11-20 PROCEDURE — 99396 PREV VISIT EST AGE 40-64: CPT | Mod: S$GLB,,, | Performed by: INTERNAL MEDICINE

## 2017-11-20 NOTE — PROGRESS NOTES
Last 5 Patient Entered Readings Current 30 Day Average: 134/84     Recent Readings 11/18/2017 11/17/2017 11/16/2017 11/7/2017 11/3/2017    Systolic BP (mmHg) 130 137 147 128 126    Diastolic BP (mmHg) 80 84 99 84 76    Pulse 78 89 80 81 60          Hypertension Digital Medicine (HDMP) Health  Follow Up    LVM to follow up with Mr. Venkat Jonas.    Per 30 day average, blood pressure is 134/84 mmHg. Encouraged adherence to low sodium diet and physical activity guidelines. Advised patient to call or message with questions or concerns. WCB in 3 weeks.

## 2017-11-27 NOTE — PROGRESS NOTES
Subjective:       Patient ID: Venkat Jonas is a 52 y.o. male.    Chief Complaint: Annual Exam    Hypertension   This is a chronic problem. The problem has been gradually improving since onset. The problem is controlled. Pertinent negatives include no chest pain or shortness of breath. There are no known risk factors for coronary artery disease. The current treatment provides significant improvement. There are no compliance problems.    Hyperlipidemia   This is a chronic problem. Recent lipid tests were reviewed and are normal. Pertinent negatives include no chest pain or shortness of breath. Current antihyperlipidemic treatment includes statins. The current treatment provides significant improvement of lipids.     Review of Systems   Respiratory: Negative for shortness of breath.    Cardiovascular: Negative for chest pain.       Objective:      Physical Exam   Constitutional: He is oriented to person, place, and time. He appears well-developed and well-nourished. No distress.   HENT:   Head: Normocephalic and atraumatic.   Mouth/Throat: Oropharynx is clear and moist.   Eyes: Conjunctivae are normal. Pupils are equal, round, and reactive to light.   Neck: Normal range of motion. Neck supple.   Cardiovascular: Normal rate, regular rhythm and normal heart sounds.    Pulmonary/Chest: Effort normal and breath sounds normal. He has no wheezes.   Abdominal: Soft. Bowel sounds are normal. There is no tenderness.   Musculoskeletal: Normal range of motion. He exhibits no edema or tenderness.   Neurological: He is alert and oriented to person, place, and time. No cranial nerve deficit.   Skin: No erythema.   Psychiatric: He has a normal mood and affect.   Vitals reviewed.      Assessment:       1. Routine general medical examination at a health care facility    2. Essential hypertension    3. Pure hypercholesterolemia        Plan:       Venkat was seen today for annual exam.    Diagnoses and all orders for this  visit:    Routine general medical examination at a health care facility    Essential hypertension    Pure hypercholesterolemia    Discussed lifestyle and preventive issues    Return in about 1 year (around 11/20/2018) for PHYSICAL EXAM, WITH LAB BEFORE.

## 2017-12-04 DIAGNOSIS — E78.00 PURE HYPERCHOLESTEROLEMIA: ICD-10-CM

## 2017-12-04 RX ORDER — EZETIMIBE 10 MG/1
10 TABLET ORAL DAILY
Qty: 30 TABLET | Refills: 6 | Status: SHIPPED | OUTPATIENT
Start: 2017-12-04 | End: 2018-06-30 | Stop reason: SDUPTHER

## 2017-12-11 NOTE — PROGRESS NOTES
Last 5 Patient Entered Readings Current 30 Day Average: 133/85     Recent Readings 12/6/2017 12/1/2017 11/29/2017 11/18/2017 11/17/2017    Systolic BP (mmHg) 135 122 128 130 137    Diastolic BP (mmHg) 84 75 85 80 84    Pulse 68 86 82 78 89        Hypertension Digital Medicine (HDMP) Health  Follow Up    LVM to follow up with Mr. Venkat Jonas.    Per 30 day average, blood pressure is 133/85 mmHg. Encouraged adherence to low sodium diet and physical activity guidelines. Advised patient to call or message with questions or concerns. WCB in 4 weeks. Advised of new BP guideline goal.

## 2018-01-02 RX ORDER — ROSUVASTATIN CALCIUM 20 MG/1
20 TABLET, COATED ORAL NIGHTLY
Qty: 90 TABLET | Refills: 3 | Status: CANCELLED | OUTPATIENT
Start: 2018-01-02

## 2018-01-03 RX ORDER — ROSUVASTATIN CALCIUM 40 MG/1
40 TABLET, COATED ORAL NIGHTLY
Qty: 30 TABLET | Refills: 6 | Status: SHIPPED | OUTPATIENT
Start: 2018-01-03 | End: 2018-01-05 | Stop reason: SDUPTHER

## 2018-01-05 RX ORDER — ROSUVASTATIN CALCIUM 40 MG/1
40 TABLET, COATED ORAL NIGHTLY
Qty: 30 TABLET | Refills: 11 | Status: SHIPPED | OUTPATIENT
Start: 2018-01-05 | End: 2018-04-04 | Stop reason: SDUPTHER

## 2018-01-08 ENCOUNTER — PATIENT OUTREACH (OUTPATIENT)
Dept: OTHER | Facility: OTHER | Age: 53
End: 2018-01-08

## 2018-01-08 NOTE — PROGRESS NOTES
Last 5 Patient Entered Readings Current 30 Day Average: 131/88     Recent Readings 1/6/2018 1/4/2018 1/3/2018 1/3/2018 12/20/2017    SBP (mmHg) 128 135 136 146 135    DBP (mmHg) 83 84 103 93 87    Pulse 75 73 71 72 73        Hypertension Digital Medicine (HDMP) Health  Follow Up    LVM to follow up with  Venkat Ferrari Sumi.    Per 30 day average, blood pressure is 131/88 mmHg. Encouraged adherence to low sodium diet and physical activity guidelines. Advised patient to call or message with questions or concerns. WCB in 4 weeks.

## 2018-01-16 ENCOUNTER — PATIENT MESSAGE (OUTPATIENT)
Dept: OTHER | Facility: OTHER | Age: 53
End: 2018-01-16

## 2018-02-01 DIAGNOSIS — B00.9 HSV INFECTION: ICD-10-CM

## 2018-02-01 RX ORDER — VALACYCLOVIR HYDROCHLORIDE 1 G/1
TABLET, FILM COATED ORAL
Qty: 30 TABLET | Refills: 1 | Status: SHIPPED | OUTPATIENT
Start: 2018-02-01 | End: 2021-07-13 | Stop reason: SDUPTHER

## 2018-02-05 NOTE — PROGRESS NOTES
Last 5 Patient Entered Readings                                      Current 30 Day Average: 127/84     Recent Readings 1/31/2018 1/31/2018 1/24/2018 1/16/2018 1/16/2018    SBP (mmHg) 132 143 127 127 127    DBP (mmHg) 86 94 85 82 78    Pulse 77 77 92 68 68        Hypertension Digital Medicine (HDMP) Health  Follow Up    LVM to follow up with Mr. Venkat Ferrari Sumi.    Per 30 day average, blood pressure is well controlled 127/84 mmHg. Encouraged adherence to low sodium diet and physical activity guidelines. Advised patient to call or message with questions or concerns. WCB in 4 weeks.

## 2018-02-09 ENCOUNTER — PATIENT OUTREACH (OUTPATIENT)
Dept: OTHER | Facility: OTHER | Age: 53
End: 2018-02-09

## 2018-02-09 NOTE — PROGRESS NOTES
Called patient to review readings. Working with HC, monitoring diet. LVM.  Diastolic BP slightly above goal  Continue monitoring    Last 5 Patient Entered Readings                                      Current 30 Day Average: 126/84     Recent Readings 1/31/2018 1/31/2018 1/24/2018 1/16/2018 1/16/2018    SBP (mmHg) 132 143 127 127 127    DBP (mmHg) 86 94 85 82 78    Pulse 77 77 92 68 68

## 2018-02-23 ENCOUNTER — TELEPHONE (OUTPATIENT)
Dept: ALLERGY | Facility: CLINIC | Age: 53
End: 2018-02-23

## 2018-02-24 ENCOUNTER — PATIENT MESSAGE (OUTPATIENT)
Dept: INTERNAL MEDICINE | Facility: CLINIC | Age: 53
End: 2018-02-24

## 2018-02-26 ENCOUNTER — TELEPHONE (OUTPATIENT)
Dept: INTERNAL MEDICINE | Facility: CLINIC | Age: 53
End: 2018-02-26

## 2018-02-26 ENCOUNTER — PATIENT MESSAGE (OUTPATIENT)
Dept: INTERNAL MEDICINE | Facility: CLINIC | Age: 53
End: 2018-02-26

## 2018-02-26 RX ORDER — PANTOPRAZOLE SODIUM 40 MG/1
TABLET, DELAYED RELEASE ORAL
Qty: 60 TABLET | Refills: 12 | Status: SHIPPED | OUTPATIENT
Start: 2018-02-26 | End: 2019-02-28

## 2018-02-26 RX ORDER — PANTOPRAZOLE SODIUM 40 MG/1
TABLET, DELAYED RELEASE ORAL
Qty: 60 TABLET | Refills: 0 | Status: SHIPPED | OUTPATIENT
Start: 2018-02-26 | End: 2018-02-26 | Stop reason: SDUPTHER

## 2018-03-05 NOTE — PROGRESS NOTES
Last 5 Patient Entered Readings                                      Current 30 Day Average: 125/80     Recent Readings 2/28/2018 2/28/2018 2/27/2018 2/27/2018 2/26/2018    SBP (mmHg) 120 134 128 136 119    DBP (mmHg) 75 81 81 85 78    Pulse 69 68 65 63 74        Hypertension Digital Medicine (HDMP) Health  Follow Up    LVM to follow up with Mr. Venkat Jonas.    Per 30 day average, blood pressure is well controlled 125/80 mmHg. Encouraged adherence to low sodium diet and physical activity guidelines. Advised patient to call or message with questions or concerns. WCB in 4 weeks.

## 2018-03-18 ENCOUNTER — PATIENT MESSAGE (OUTPATIENT)
Dept: CARDIOLOGY | Facility: CLINIC | Age: 53
End: 2018-03-18

## 2018-03-19 DIAGNOSIS — R93.1 AGATSTON CAC SCORE 100-199: ICD-10-CM

## 2018-03-19 DIAGNOSIS — I10 ESSENTIAL HYPERTENSION: Chronic | ICD-10-CM

## 2018-03-19 DIAGNOSIS — E78.00 PURE HYPERCHOLESTEROLEMIA: Primary | ICD-10-CM

## 2018-03-28 ENCOUNTER — LAB VISIT (OUTPATIENT)
Dept: LAB | Facility: HOSPITAL | Age: 53
End: 2018-03-28
Attending: INTERNAL MEDICINE
Payer: COMMERCIAL

## 2018-03-28 DIAGNOSIS — I10 ESSENTIAL HYPERTENSION: Chronic | ICD-10-CM

## 2018-03-28 DIAGNOSIS — R93.1 AGATSTON CAC SCORE 100-199: ICD-10-CM

## 2018-03-28 DIAGNOSIS — E78.00 PURE HYPERCHOLESTEROLEMIA: ICD-10-CM

## 2018-03-28 LAB
ALBUMIN SERPL BCP-MCNC: 3.8 G/DL
ALP SERPL-CCNC: 87 U/L
ALT SERPL W/O P-5'-P-CCNC: 42 U/L
ANION GAP SERPL CALC-SCNC: 7 MMOL/L
AST SERPL-CCNC: 27 U/L
BILIRUB SERPL-MCNC: 0.5 MG/DL
BUN SERPL-MCNC: 12 MG/DL
CALCIUM SERPL-MCNC: 9.6 MG/DL
CHLORIDE SERPL-SCNC: 105 MMOL/L
CHOLEST SERPL-MCNC: 124 MG/DL
CHOLEST/HDLC SERPL: 2.6 {RATIO}
CO2 SERPL-SCNC: 28 MMOL/L
CREAT SERPL-MCNC: 1 MG/DL
EST. GFR  (AFRICAN AMERICAN): >60 ML/MIN/1.73 M^2
EST. GFR  (NON AFRICAN AMERICAN): >60 ML/MIN/1.73 M^2
GLUCOSE SERPL-MCNC: 99 MG/DL
HDLC SERPL-MCNC: 47 MG/DL
HDLC SERPL: 37.9 %
LDLC SERPL CALC-MCNC: 67.2 MG/DL
NONHDLC SERPL-MCNC: 77 MG/DL
POTASSIUM SERPL-SCNC: 3.9 MMOL/L
PROT SERPL-MCNC: 7.4 G/DL
SODIUM SERPL-SCNC: 140 MMOL/L
TRIGL SERPL-MCNC: 49 MG/DL

## 2018-03-28 PROCEDURE — 36415 COLL VENOUS BLD VENIPUNCTURE: CPT

## 2018-03-28 PROCEDURE — 80061 LIPID PANEL: CPT

## 2018-03-28 PROCEDURE — 80053 COMPREHEN METABOLIC PANEL: CPT

## 2018-04-02 NOTE — PROGRESS NOTES
Called patient to review readings and labs. LVM  BP at goal, <130/80 mmHg  BMP WNL  Continue monitoring    BMP  Lab Results   Component Value Date     03/28/2018    K 3.9 03/28/2018     03/28/2018    CO2 28 03/28/2018    BUN 12 03/28/2018    CREATININE 1.0 03/28/2018    CALCIUM 9.6 03/28/2018    ANIONGAP 7 (L) 03/28/2018    ESTGFRAFRICA >60.0 03/28/2018    EGFRNONAA >60.0 03/28/2018         Last 5 Patient Entered Readings                                      Current 30 Day Average: 119/78     Recent Readings 3/28/2018 3/28/2018 3/27/2018 3/27/2018 3/23/2018    SBP (mmHg) 121 103 106 107 112    DBP (mmHg) 75 71 75 74 78    Pulse 67 70 66 70 71          Hypertension Medications             valsartan-hydrochlorothiazide (DIOVAN-HCT) 160-12.5 mg per tablet Take 1 tablet by mouth once daily.

## 2018-04-02 NOTE — PROGRESS NOTES
Last 5 Patient Entered Readings                                      Current 30 Day Average: 119/78     Recent Readings 3/28/2018 3/28/2018 3/27/2018 3/27/2018 3/23/2018    SBP (mmHg) 121 103 106 107 112    DBP (mmHg) 75 71 75 74 78    Pulse 67 70 66 70 71        Hypertension Digital Medicine (HDMP) Health  Follow Up    LVM to follow up with Mr. Venkat Jonas.    Per 30 day average, blood pressure is well controlled 119/78 mmHg. Encouraged adherence to low sodium diet and physical activity guidelines. Advised patient to call or message with questions or concerns. WCB in 3 weeks.

## 2018-04-04 RX ORDER — ROSUVASTATIN CALCIUM 40 MG/1
40 TABLET, COATED ORAL NIGHTLY
Qty: 30 TABLET | Refills: 11 | Status: SHIPPED | OUTPATIENT
Start: 2018-04-04 | End: 2018-12-26 | Stop reason: SDUPTHER

## 2018-04-23 ENCOUNTER — PATIENT OUTREACH (OUTPATIENT)
Dept: OTHER | Facility: OTHER | Age: 53
End: 2018-04-23

## 2018-04-23 NOTE — PROGRESS NOTES
Last 5 Patient Entered Readings                                      Current 30 Day Average: 123/79     Recent Readings 4/20/2018 4/20/2018 4/15/2018 4/12/2018 4/3/2018    SBP (mmHg) 133 131 126 125 126    DBP (mmHg) 88 81 78 81 76    Pulse 76 77 74 70 76        Hypertension Digital Medicine (HDMP) Health  Follow Up    LVM to follow up with Mr. Venkat Jonas.    Per 30 day average, blood pressure is well controlled 123/79 mmHg. Encouraged adherence to low sodium diet and physical activity guidelines. Advised patient to call or message with questions or concerns. WCB in 3-4 weeks.

## 2018-05-10 NOTE — PROGRESS NOTES
Called patient to review readings. LVM.   BP at goal, <130/80 mmHg  Continue monitoring      Last 5 Patient Entered Readings                                      Current 30 Day Average: 122/81     Recent Readings 5/3/2018 4/26/2018 4/20/2018 4/20/2018 4/15/2018    SBP (mmHg) 114 112 133 131 126    DBP (mmHg) 76 84 88 81 78    Pulse 71 78 76 77 74

## 2018-05-21 NOTE — PROGRESS NOTES
Last 5 Patient Entered Readings                                      Current 30 Day Average: 117/79     Recent Readings 5/18/2018 5/18/2018 5/12/2018 5/12/2018 5/11/2018    SBP (mmHg) 110 127 131 133 116    DBP (mmHg) 77 82 81 92 77    Pulse 72 73 78 78 74          Digital Medicine: Health  Follow Up    Left voicemail to follow up with Mr. Venkat Jonas.  Current BP average 117/79 mmHg is at goal.

## 2018-06-01 DIAGNOSIS — I10 ESSENTIAL HYPERTENSION: ICD-10-CM

## 2018-06-01 RX ORDER — VALSARTAN AND HYDROCHLOROTHIAZIDE 160; 12.5 MG/1; MG/1
1 TABLET, FILM COATED ORAL DAILY
Qty: 90 TABLET | Refills: 3 | Status: SHIPPED | OUTPATIENT
Start: 2018-06-01 | End: 2019-03-11

## 2018-06-18 NOTE — PROGRESS NOTES
Last 5 Patient Entered Readings                                      Current 30 Day Average: 119/76     Recent Readings 6/16/2018 6/15/2018 6/13/2018 6/7/2018 6/6/2018    SBP (mmHg) 120 117 127 120 114    DBP (mmHg) 81 78 81 78 75    Pulse 71 71 80 66 66          Digital Medicine: Health  Follow Up    Left voicemail to follow up with Mr. Venkat Jonas.  Current BP average 119/76 mmHg is at goal.

## 2018-06-29 NOTE — PROGRESS NOTES
Reviewed BP readings. BP actively monitored, goal <130/80 mmHg  Continue monitoring      Last 5 Patient Entered Readings                                      Current 30 Day Average: 118/75     Recent Readings 6/29/2018 6/26/2018 6/26/2018 6/26/2018 6/22/2018    SBP (mmHg) 118 114 116 100 119    DBP (mmHg) 67 65 80 69 78    Pulse 64 63 74 68 63

## 2018-07-02 RX ORDER — FLUTICASONE PROPIONATE 50 MCG
2 SPRAY, SUSPENSION (ML) NASAL DAILY
Qty: 48 G | Refills: 4 | Status: CANCELLED | OUTPATIENT
Start: 2018-07-02

## 2018-07-03 RX ORDER — FLUTICASONE PROPIONATE 50 MCG
SPRAY, SUSPENSION (ML) NASAL DAILY
Qty: 16 G | Refills: 4 | Status: SHIPPED | OUTPATIENT
Start: 2018-07-03 | End: 2018-12-10

## 2018-07-16 NOTE — PROGRESS NOTES
Last 5 Patient Entered Readings                                      Current 30 Day Average: 120/75     Recent Readings 7/14/2018 6/29/2018 6/26/2018 6/26/2018 6/26/2018    SBP (mmHg) 129 118 114 116 100    DBP (mmHg) 76 67 65 80 69    Pulse 79 64 63 74 68          Digital Medicine: Health  Follow Up    Unable to leave voicemail to follow up with Mr. Venkat Jonas.  Current BP average 120/75 mmHg is at goal.  Sending Qustreet message.

## 2018-08-13 NOTE — PROGRESS NOTES
Last 5 Patient Entered Readings                                      Current 30 Day Average: 119/77     Recent Readings 8/10/2018 8/9/2018 8/8/2018 8/7/2018 8/1/2018    SBP (mmHg) 108 118 117 121 111    DBP (mmHg) 83 72 81 77 67    Pulse 64 66 77 67 68          Digital Medicine: Health  Follow Up    Left voicemail to follow up with Mr. Venkat Jonas.  Current BP average 119/77 mmHg is at goal.

## 2018-09-01 RX ORDER — PREDNISONE 20 MG/1
20 TABLET ORAL DAILY
Qty: 3 TABLET | Refills: 0 | Status: SHIPPED | OUTPATIENT
Start: 2018-09-01 | End: 2018-09-05

## 2018-09-01 RX ORDER — AZITHROMYCIN 250 MG/1
TABLET, FILM COATED ORAL
Qty: 6 TABLET | Refills: 0 | Status: SHIPPED | OUTPATIENT
Start: 2018-09-01 | End: 2018-09-05

## 2018-09-05 ENCOUNTER — OFFICE VISIT (OUTPATIENT)
Dept: DERMATOLOGY | Facility: CLINIC | Age: 53
End: 2018-09-05
Payer: COMMERCIAL

## 2018-09-05 DIAGNOSIS — L57.0 AK (ACTINIC KERATOSIS): Primary | ICD-10-CM

## 2018-09-05 DIAGNOSIS — L73.8 SEBACEOUS GLAND HYPERPLASIA: ICD-10-CM

## 2018-09-05 DIAGNOSIS — L81.4 LENTIGO: ICD-10-CM

## 2018-09-05 PROCEDURE — 99999 PR PBB SHADOW E&M-EST. PATIENT-LVL II: CPT | Mod: PBBFAC,,, | Performed by: DERMATOLOGY

## 2018-09-05 PROCEDURE — 99213 OFFICE O/P EST LOW 20 MIN: CPT | Mod: S$GLB,,, | Performed by: DERMATOLOGY

## 2018-09-05 NOTE — PATIENT INSTRUCTIONS

## 2018-09-05 NOTE — PROGRESS NOTES
"  Subjective:       Patient ID:  Venkat Jonas is a 53 y.o. male who presents for   Chief Complaint   Patient presents with    Lesion     follow up efudex     Also with concerns of dark spots on face  Uses unknown retin a qam - make face "greasy"      Actinic Keratosis  - Follow-up  Symptom course: resolved  Currently usin2017 efudex bid x 2 weeks right upper cheek.  Affected locations: currently clear  Signs / symptoms: asymptomatic        Review of Systems   Skin: Positive for activity-related sunscreen use. Negative for itching, rash, daily sunscreen use and recent sunburn.   Hematologic/Lymphatic: Does not bruise/bleed easily.        Objective:    Physical Exam   Constitutional: He appears well-developed and well-nourished. No distress.   Neurological: He is alert and oriented to person, place, and time. He is not disoriented.   Psychiatric: He has a normal mood and affect.   Skin:   Areas Examined (abnormalities noted in diagram):   Head / Face Inspection Performed              Diagram Legend     Erythematous scaling macule/papule c/w actinic keratosis       Vascular papule c/w angioma      Pigmented verrucoid papule/plaque c/w seborrheic keratosis      Yellow umbilicated papule c/w sebaceous hyperplasia      Irregularly shaped tan macule c/w lentigo     1-2 mm smooth white papules consistent with Milia      Movable subcutaneous cyst with punctum c/w epidermal inclusion cyst      Subcutaneous movable cyst c/w pilar cyst      Firm pink to brown papule c/w dermatofibroma      Pedunculated fleshy papule(s) c/w skin tag(s)      Evenly pigmented macule c/w junctional nevus     Mildly variegated pigmented, slightly irregular-bordered macule c/w mildly atypical nevus      Flesh colored to evenly pigmented papule c/w intradermal nevus       Pink pearly papule/plaque c/w basal cell carcinoma      Erythematous hyperkeratotic cursted plaque c/w SCC      Surgical scar with no sign of skin cancer recurrence    "   Open and closed comedones      Inflammatory papules and pustules      Verrucoid papule consistent consistent with wart     Erythematous eczematous patches and plaques     Dystrophic onycholytic nail with subungual debris c/w onychomycosis     Umbilicated papule    Erythematous-base heme-crusted tan verrucoid plaque consistent with inflamed seborrheic keratosis     Erythematous Silvery Scaling Plaque c/w Psoriasis     See annotation      Assessment / Plan:        Sebaceous gland hyperplasia  This is a common condition representing benign enlargement of the sebaceous lobule. It typically occurs in adulthood. Reassurance given to patient.   Discussed therapeutic option of laser treatment.       Lentigo  This is a benign hyperpigmented sun induced lesion. Daily sun protection will reduce the number of new lesions. Treatment of these benign lesions are considered cosmetic.  Discussed therapeutic option of laser treatment.         AK (actinic keratosis)  Today's Plan:      Looks great. Rec daily sun protection - elta md uv clear      Follow-up in about 1 year (around 9/5/2019).

## 2018-09-10 ENCOUNTER — PATIENT OUTREACH (OUTPATIENT)
Dept: OTHER | Facility: OTHER | Age: 53
End: 2018-09-10

## 2018-09-10 NOTE — PROGRESS NOTES
Last 5 Patient Entered Readings                                      Current 30 Day Average: 119/75     Recent Readings 9/7/2018 9/6/2018 8/31/2018 8/30/2018 8/25/2018    SBP (mmHg) 127 129 112 123 113    DBP (mmHg) 79 76 72 78 69    Pulse 80 71 76 79 65        Digital Medicine: Health  Follow Up    Lifestyle Modifications:    1.Dietary Modifications (Sodium intake <2,000mg/day, food labels, dining out): continuing to work on weight loss goals. Goal is to get to 175lbs. He's giving himself 12 weeks to accomplish his goals. Has a body mass analysis done in the fitness center.    2.Physical Activity: working out 5x/wk and states he burns 700+ kcal/session.    3.Medication Therapy: Patient has been compliant with the medication regimen.    4.Patient has the following medication side effects/concerns:   (Frequency/Alleviating factors/Precipitating factors, etc.)     Follow up with Mr. Venkat Jonas completed. No further questions or concerns. Will continue follow up to achieve health goals.

## 2018-09-20 ENCOUNTER — PATIENT OUTREACH (OUTPATIENT)
Dept: OTHER | Facility: OTHER | Age: 53
End: 2018-09-20

## 2018-10-10 ENCOUNTER — TELEPHONE (OUTPATIENT)
Dept: INTERNAL MEDICINE | Facility: CLINIC | Age: 53
End: 2018-10-10

## 2018-10-10 DIAGNOSIS — Z12.5 SCREENING FOR PROSTATE CANCER: Primary | ICD-10-CM

## 2018-10-30 ENCOUNTER — PATIENT OUTREACH (OUTPATIENT)
Dept: OTHER | Facility: OTHER | Age: 53
End: 2018-10-30

## 2018-10-31 ENCOUNTER — OFFICE VISIT (OUTPATIENT)
Dept: INTERNAL MEDICINE | Facility: CLINIC | Age: 53
End: 2018-10-31
Payer: COMMERCIAL

## 2018-10-31 VITALS
SYSTOLIC BLOOD PRESSURE: 126 MMHG | HEIGHT: 71 IN | HEART RATE: 80 BPM | WEIGHT: 199.31 LBS | DIASTOLIC BLOOD PRESSURE: 84 MMHG | BODY MASS INDEX: 27.9 KG/M2

## 2018-10-31 DIAGNOSIS — I10 ESSENTIAL HYPERTENSION: Primary | Chronic | ICD-10-CM

## 2018-10-31 DIAGNOSIS — E78.00 PURE HYPERCHOLESTEROLEMIA: ICD-10-CM

## 2018-10-31 DIAGNOSIS — Z12.5 SCREENING FOR PROSTATE CANCER: ICD-10-CM

## 2018-10-31 PROCEDURE — 99396 PREV VISIT EST AGE 40-64: CPT | Mod: 25,S$GLB,, | Performed by: INTERNAL MEDICINE

## 2018-10-31 PROCEDURE — 99999 PR PBB SHADOW E&M-EST. PATIENT-LVL III: CPT | Mod: PBBFAC,,, | Performed by: INTERNAL MEDICINE

## 2018-10-31 PROCEDURE — 3079F DIAST BP 80-89 MM HG: CPT | Mod: CPTII,S$GLB,, | Performed by: INTERNAL MEDICINE

## 2018-10-31 PROCEDURE — 3008F BODY MASS INDEX DOCD: CPT | Mod: CPTII,S$GLB,, | Performed by: INTERNAL MEDICINE

## 2018-10-31 PROCEDURE — 82270 OCCULT BLOOD FECES: CPT | Mod: S$GLB,,, | Performed by: INTERNAL MEDICINE

## 2018-10-31 PROCEDURE — 3074F SYST BP LT 130 MM HG: CPT | Mod: CPTII,S$GLB,, | Performed by: INTERNAL MEDICINE

## 2018-11-05 NOTE — PROGRESS NOTES
Subjective:       Patient ID: Venkat Jonas is a 53 y.o. male.    Chief Complaint: Annual Exam    Hypertension   This is a chronic problem. The problem is unchanged. The problem is controlled. Pertinent negatives include no chest pain, headaches, neck pain, palpitations or shortness of breath. Past treatments include lifestyle changes. The current treatment provides significant improvement. There are no compliance problems.      Review of Systems   Constitutional: Negative for activity change and unexpected weight change.   HENT: Negative for hearing loss, rhinorrhea and trouble swallowing.    Eyes: Negative for discharge and visual disturbance.   Respiratory: Negative for chest tightness, shortness of breath and wheezing.    Cardiovascular: Negative for chest pain and palpitations.   Gastrointestinal: Negative for blood in stool, constipation, diarrhea and vomiting.   Endocrine: Negative for polydipsia and polyuria.   Genitourinary: Negative for difficulty urinating, hematuria and urgency.   Musculoskeletal: Positive for arthralgias (toe pain). Negative for joint swelling and neck pain.   Neurological: Negative for weakness and headaches.   Psychiatric/Behavioral: Negative for confusion and dysphoric mood.       Objective:      Physical Exam   Constitutional: He is oriented to person, place, and time. He appears well-developed and well-nourished. No distress.   HENT:   Head: Normocephalic and atraumatic.   Mouth/Throat: Oropharynx is clear and moist.   Eyes: Conjunctivae are normal. Pupils are equal, round, and reactive to light.   Neck: Normal range of motion. Neck supple.   Cardiovascular: Normal rate, regular rhythm and normal heart sounds.   Pulmonary/Chest: Effort normal and breath sounds normal. He has no wheezes.   Abdominal: Soft. Bowel sounds are normal. There is no tenderness.   Musculoskeletal: Normal range of motion. He exhibits no edema or tenderness.   Neurological: He is alert and oriented to  person, place, and time. No cranial nerve deficit.   Skin: No erythema.   Psychiatric: He has a normal mood and affect.   Vitals reviewed.      Assessment:       1. Essential hypertension    2. Pure hypercholesterolemia    3. Screening for prostate cancer        Plan:       Venkat was seen today for annual exam.    Diagnoses and all orders for this visit:    Essential hypertension  -     CBC auto differential; Future  -     Comprehensive metabolic panel; Future  -     Lipid panel; Future    Pure hypercholesterolemia    Screening for prostate cancer  -     PSA, Screening; Future        Follow-up in about 6 months (around 4/30/2019) for F/U APPOINTMENT WITH ME, WITH LAB BEFORE, PHYSICAL EXAM.

## 2018-11-25 NOTE — PROGRESS NOTES
Reviewed BP readings. BP well managed, goal <130/80 mmHg  Continue monitoring      Last 5 Patient Entered Readings                                      Current 30 Day Average: 120/76     Recent Readings 11/14/2018 11/9/2018 11/7/2018 11/1/2018 10/31/2018    SBP (mmHg) 114 123 125 114 124    DBP (mmHg) 70 79 77 74 81    Pulse 64 73 78 81 71          Hypertension Medications             valsartan-hydrochlorothiazide (DIOVAN-HCT) 160-12.5 mg per tablet Take 1 tablet by mouth once daily.

## 2018-12-09 DIAGNOSIS — J45.20 ASTHMA, MILD INTERMITTENT, WELL-CONTROLLED: ICD-10-CM

## 2018-12-09 DIAGNOSIS — J45.990 BRONCHOSPASM, EXERCISE-INDUCED: ICD-10-CM

## 2018-12-10 RX ORDER — FLUTICASONE PROPIONATE 50 MCG
SPRAY, SUSPENSION (ML) NASAL DAILY
Qty: 16 G | Refills: 4 | Status: SHIPPED | OUTPATIENT
Start: 2018-12-10 | End: 2019-05-17 | Stop reason: CLARIF

## 2018-12-10 RX ORDER — ALBUTEROL SULFATE 90 UG/1
2 AEROSOL, METERED RESPIRATORY (INHALATION) EVERY 4 HOURS PRN
Qty: 18 G | Refills: 3 | Status: SHIPPED | OUTPATIENT
Start: 2018-12-10 | End: 2021-03-15 | Stop reason: SDUPTHER

## 2018-12-10 RX ORDER — BUDESONIDE AND FORMOTEROL FUMARATE DIHYDRATE 80; 4.5 UG/1; UG/1
2 AEROSOL RESPIRATORY (INHALATION) 2 TIMES DAILY
Qty: 30.6 G | Refills: 3 | Status: SHIPPED | OUTPATIENT
Start: 2018-12-10 | End: 2021-01-25 | Stop reason: SDUPTHER

## 2018-12-11 ENCOUNTER — PATIENT OUTREACH (OUTPATIENT)
Dept: OTHER | Facility: OTHER | Age: 53
End: 2018-12-11

## 2018-12-11 NOTE — PROGRESS NOTES
Last 5 Patient Entered Readings                                      Current 30 Day Average: 119/75     Recent Readings 12/4/2018 12/1/2018 11/14/2018 11/9/2018 11/7/2018    SBP (mmHg) 119 123 114 123 125    DBP (mmHg) 78 78 70 79 77    Pulse 65 66 64 73 78        Digital Medicine: Health  Follow Up    Left voicemail to follow up with Mr. Venkat Jonas.  Current BP average 119/75 mmHg is at goal.

## 2018-12-28 RX ORDER — ROSUVASTATIN CALCIUM 40 MG/1
40 TABLET, COATED ORAL NIGHTLY
Qty: 30 TABLET | Refills: 11 | Status: SHIPPED | OUTPATIENT
Start: 2018-12-28 | End: 2019-12-02 | Stop reason: SDUPTHER

## 2019-01-18 DIAGNOSIS — B00.9 HSV INFECTION: ICD-10-CM

## 2019-01-21 RX ORDER — VALACYCLOVIR HYDROCHLORIDE 1 G/1
TABLET, FILM COATED ORAL
Qty: 30 TABLET | Refills: 1 | Status: SHIPPED | OUTPATIENT
Start: 2019-01-21 | End: 2020-09-30 | Stop reason: CLARIF

## 2019-01-22 NOTE — PROGRESS NOTES
Reviewed BP readings. BP well managed, goal <130/80 mmHg  Continue monitoring      Last 5 Patient Entered Readings                                      Current 30 Day Average: 124/82     Recent Readings 1/18/2019 1/17/2019 1/9/2019 1/9/2019 1/9/2019    SBP (mmHg) 119 116 122 112 133    DBP (mmHg) 77 72 84 70 87    Pulse 71 71 63 64 63

## 2019-02-07 NOTE — PROGRESS NOTES
Last 5 Patient Entered Readings                                      Current 30 Day Average: 120/78     Recent Readings 2/7/2019 1/18/2019 1/17/2019 1/9/2019 1/9/2019    SBP (mmHg) 121 119 116 122 112    DBP (mmHg) 75 77 72 84 70    Pulse 75 71 71 63 64        Digital Medicine: Health  Follow Up    Left voicemail to follow up with Mr. Venkat Jonas.  Current BP average 120/78 mmHg is at goal.    Has not read [employer] message.

## 2019-02-28 RX ORDER — PANTOPRAZOLE SODIUM 40 MG/1
TABLET, DELAYED RELEASE ORAL
Qty: 60 TABLET | Refills: 12 | Status: SHIPPED | OUTPATIENT
Start: 2019-02-28 | End: 2020-07-30 | Stop reason: SDUPTHER

## 2019-03-11 ENCOUNTER — PATIENT MESSAGE (OUTPATIENT)
Dept: CARDIOLOGY | Facility: CLINIC | Age: 54
End: 2019-03-11

## 2019-03-11 DIAGNOSIS — I10 ESSENTIAL HYPERTENSION: ICD-10-CM

## 2019-03-11 RX ORDER — VALSARTAN AND HYDROCHLOROTHIAZIDE 160; 12.5 MG/1; MG/1
1 TABLET, FILM COATED ORAL DAILY
Qty: 90 TABLET | Refills: 3 | Status: SHIPPED | OUTPATIENT
Start: 2019-03-11 | End: 2020-05-22 | Stop reason: SDUPTHER

## 2019-03-11 RX ORDER — EZETIMIBE 10 MG/1
10 TABLET ORAL DAILY
Qty: 90 TABLET | Refills: 3 | Status: CANCELLED | OUTPATIENT
Start: 2019-03-11

## 2019-03-11 RX ORDER — VALSARTAN AND HYDROCHLOROTHIAZIDE 160; 12.5 MG/1; MG/1
1 TABLET, FILM COATED ORAL DAILY
Qty: 90 TABLET | Refills: 3 | Status: CANCELLED | OUTPATIENT
Start: 2019-03-11 | End: 2020-03-10

## 2019-03-11 RX ORDER — EZETIMIBE 10 MG/1
10 TABLET ORAL DAILY
Qty: 90 TABLET | Refills: 3 | Status: SHIPPED | OUTPATIENT
Start: 2019-03-11 | End: 2020-05-22 | Stop reason: SDUPTHER

## 2019-03-12 ENCOUNTER — TELEPHONE (OUTPATIENT)
Dept: INTERNAL MEDICINE | Facility: CLINIC | Age: 54
End: 2019-03-12

## 2019-03-12 RX ORDER — CODEINE PHOSPHATE AND GUAIFENESIN 10; 100 MG/5ML; MG/5ML
5 SOLUTION ORAL 3 TIMES DAILY PRN
Qty: 118 ML | Refills: 0 | Status: SHIPPED | OUTPATIENT
Start: 2019-03-12 | End: 2019-03-22

## 2019-03-15 RX ORDER — PREDNISONE 20 MG/1
20 TABLET ORAL DAILY
Qty: 10 TABLET | Refills: 0 | Status: SHIPPED | OUTPATIENT
Start: 2019-03-15 | End: 2019-03-25

## 2019-03-15 RX ORDER — AZITHROMYCIN 250 MG/1
TABLET, FILM COATED ORAL
Qty: 6 TABLET | Refills: 0 | Status: SHIPPED | OUTPATIENT
Start: 2019-03-15 | End: 2019-03-20

## 2019-04-11 NOTE — PROGRESS NOTES
Last 5 Patient Entered Readings                                      Current 30 Day Average: 119/78     Recent Readings 4/9/2019 4/8/2019 4/5/2019 3/31/2019 3/30/2019    SBP (mmHg) 116 118 129 123 107    DBP (mmHg) 77 80 73 75 76    Pulse 67 90 71 68 62        Digital Medicine: Health  Follow Up    Feeling well.    Lifestyle Modifications:    1.Dietary Modifications: Started MediWeight Loss program on Monday, 8wks. Goal is 20lbs. Week one is protein focused, week 2 can incorporate more fresh produce. Monitoring sodium intake. Encouraged him to add blueberries to diet once he's able based on recent study.    2.Physical Activity: [facility] performance training 3x/wk and after burn class a couple other days.    3.Medication Therapy: Patient has been compliant with the medication regimen.    4.Patient has the following medication side effects/concerns:   (Frequency/Alleviating factors/Precipitating factors, etc.)     Follow up with Mr. Venkat Jonas completed. No further questions or concerns. Will continue to follow up to achieve health goals.

## 2019-04-26 ENCOUNTER — TELEPHONE (OUTPATIENT)
Dept: INTERNAL MEDICINE | Facility: CLINIC | Age: 54
End: 2019-04-26

## 2019-04-26 DIAGNOSIS — F51.02 ADJUSTMENT INSOMNIA: Primary | ICD-10-CM

## 2019-04-26 RX ORDER — ZOLPIDEM TARTRATE 10 MG/1
10 TABLET ORAL NIGHTLY PRN
Qty: 30 TABLET | Refills: 3 | Status: SHIPPED | OUTPATIENT
Start: 2019-04-26 | End: 2020-01-31 | Stop reason: SDUPTHER

## 2019-05-06 DIAGNOSIS — I10 ESSENTIAL HYPERTENSION: Primary | ICD-10-CM

## 2019-05-08 ENCOUNTER — PATIENT MESSAGE (OUTPATIENT)
Dept: CARDIOLOGY | Facility: CLINIC | Age: 54
End: 2019-05-08

## 2019-05-17 ENCOUNTER — OFFICE VISIT (OUTPATIENT)
Dept: PODIATRY | Facility: CLINIC | Age: 54
End: 2019-05-17
Payer: COMMERCIAL

## 2019-05-17 VITALS
SYSTOLIC BLOOD PRESSURE: 130 MMHG | BODY MASS INDEX: 27.8 KG/M2 | HEART RATE: 80 BPM | HEIGHT: 71 IN | DIASTOLIC BLOOD PRESSURE: 84 MMHG

## 2019-05-17 DIAGNOSIS — M77.8 TOE TENDONITIS: Primary | ICD-10-CM

## 2019-05-17 PROCEDURE — 99999 PR PBB SHADOW E&M-EST. PATIENT-LVL III: ICD-10-PCS | Mod: PBBFAC,,, | Performed by: PODIATRIST

## 2019-05-17 PROCEDURE — 3008F BODY MASS INDEX DOCD: CPT | Mod: CPTII,S$GLB,, | Performed by: PODIATRIST

## 2019-05-17 PROCEDURE — 3075F SYST BP GE 130 - 139MM HG: CPT | Mod: CPTII,S$GLB,, | Performed by: PODIATRIST

## 2019-05-17 PROCEDURE — 99999 PR PBB SHADOW E&M-EST. PATIENT-LVL III: CPT | Mod: PBBFAC,,, | Performed by: PODIATRIST

## 2019-05-17 PROCEDURE — 3079F PR MOST RECENT DIASTOLIC BLOOD PRESSURE 80-89 MM HG: ICD-10-PCS | Mod: CPTII,S$GLB,, | Performed by: PODIATRIST

## 2019-05-17 PROCEDURE — 3075F PR MOST RECENT SYSTOLIC BLOOD PRESS GE 130-139MM HG: ICD-10-PCS | Mod: CPTII,S$GLB,, | Performed by: PODIATRIST

## 2019-05-17 PROCEDURE — 99203 PR OFFICE/OUTPT VISIT, NEW, LEVL III, 30-44 MIN: ICD-10-PCS | Mod: S$GLB,,, | Performed by: PODIATRIST

## 2019-05-17 PROCEDURE — 3008F PR BODY MASS INDEX (BMI) DOCUMENTED: ICD-10-PCS | Mod: CPTII,S$GLB,, | Performed by: PODIATRIST

## 2019-05-17 PROCEDURE — 3079F DIAST BP 80-89 MM HG: CPT | Mod: CPTII,S$GLB,, | Performed by: PODIATRIST

## 2019-05-17 PROCEDURE — 99203 OFFICE O/P NEW LOW 30 MIN: CPT | Mod: S$GLB,,, | Performed by: PODIATRIST

## 2019-05-17 RX ORDER — DICLOFENAC SODIUM 10 MG/G
2 GEL TOPICAL 4 TIMES DAILY
Qty: 1 TUBE | Refills: 2 | Status: SHIPPED | OUTPATIENT
Start: 2019-05-17 | End: 2020-09-30 | Stop reason: CLARIF

## 2019-05-17 NOTE — LETTER
May 17, 2019      Latoya Rogers DPM  [address]  [facility] [address]           [facility]  [address]  [facility] [address]  Phone: [phone]          Patient: Venkat Jonas   MR Number: [id number]   YOB: 1965   Date of Visit: 5/17/2019       Dear Dr. Latoya Rogers:    Thank you for referring Venkat Jonas to me for evaluation. Attached you will find relevant portions of my assessment and plan of care.    If you have questions, please do not hesitate to call me. I look forward to following Venkat Jonas along with you.    Sincerely,    Roderick Glass, SPENCER    Enclosure  CC:  No Recipients    If you would like to receive this communication electronically, please contact [email or web address] or [phone] to request more information on [employer] access.    For providers and/or their staff who would like to refer a patient to [facility], please contact us through our one-stop-shop provider referral line, [facility], at [phone].    If you feel you have received this communication in error or would no longer like to receive these types of communications, please e-mail [email or web address]

## 2019-05-20 NOTE — PROGRESS NOTES
Subjective:      Patient ID: Venkat Jonas is a 53 y.o. male.    Chief Complaint: Foot Problem (right ft., 4th toe) and Toe Pain    Venkat is a 53 y.o. male who presents to the podiatry clinic  with complaint of  right foot pain. Onset of the symptoms was several weeks ago. Precipitating event: inversion injury to foot. Current symptoms include: ability to bear weight, but with some pain. Aggravating factors: any weight bearing. Symptoms have stabilized. Patient has had no prior foot problems. Evaluation to date: none. Treatment to date: none. Patients rates pain 3/10 on pain scale.        Review of Systems   Constitution: Negative for chills and fever.   HENT: Negative for congestion and tinnitus.    Eyes: Negative for double vision and visual disturbance.   Cardiovascular: Negative for chest pain and claudication.   Respiratory: Negative for hemoptysis and shortness of breath.    Endocrine: Negative for cold intolerance and heat intolerance.   Hematologic/Lymphatic: Negative for adenopathy and bleeding problem.   Skin: Negative for color change and nail changes.   Musculoskeletal: Positive for joint pain. Negative for myalgias and stiffness.   Gastrointestinal: Negative for nausea and vomiting.   Genitourinary: Negative for dysuria and hematuria.   Neurological: Negative for numbness and paresthesias.   Psychiatric/Behavioral: Negative for altered mental status and suicidal ideas.   Allergic/Immunologic: Negative for environmental allergies and persistent infections.           Objective:      Physical Exam   Constitutional: He is oriented to person, place, and time. Vital signs are normal. He appears well-developed and well-nourished.   Cardiovascular:   Pulses:       Dorsalis pedis pulses are 2+ on the right side, and 2+ on the left side.        Posterior tibial pulses are 2+ on the right side, and 2+ on the left side.   Musculoskeletal:        Right foot: There is normal range of motion and no deformity.         Left foot: There is normal range of motion and no deformity.   Inspection and palpation of the muscles joints and bones of both lower extremities reveal that muscle strength for the anterior, lateral, and posterior muscle groups and intrinsic muscle groups of the foot are all 5 over 5 symmetrical. Ankle, subtalar, midtarsal, and digital joint range of motion  are within normal limits, nonpainful, without crepitus or effusion. Patient exhibits a normal angle and base of gait. Palpation of the tendons reveal no defects.  Minimal tenderness noted to the right 4th flexor tendon slip which is elicited with flexion.     Feet:   Right Foot:   Skin Integrity: Negative for skin breakdown or erythema.   Left Foot:   Skin Integrity: Negative for skin breakdown or erythema.   Neurological: He is oriented to person, place, and time. He has normal strength. No sensory deficit.   Reflex Scores:       Patellar reflexes are 2+ on the right side and 2+ on the left side.       Achilles reflexes are 2+ on the right side and 2+ on the left side.  Sharp, dull, light touch, vibratory, and proprioceptive sensation are intact bilaterally. Deep tendon reflexes to patellar and Achilles tendon are symmetrical, 2/4 bilaterally. No ankle clonus or Babinski reflexes noted bilaterally. Coordination is normal to both feet and lower extremities.   Skin: Skin is warm, dry and intact. No cyanosis. Nails show no clubbing.   Skin turgor is normal bilaterally. Skin texture is well hydrated to both lower extremities. No lesions or rashes or wounds appreciated bilaterally. Nail plates 1 through 5 bilaterally are within normal limits for length and thickness. No nail clubbing or incurvation noted.             Assessment:       Encounter Diagnosis   Name Primary?    Toe tendonitis Yes         Plan:       Venkat was seen today for foot problem and toe pain.    Diagnoses and all orders for this visit:    Toe tendonitis    Other orders  -     diclofenac  sodium (VOLTAREN) 1 % Gel; Apply 2 g topically 4 (four) times daily.      I counseled the patient on his conditions, their implications and medical management.      We discussed this most likely being a overuse tissue/flexor tendinitis.  We discussed rest, topical anti-inflammatory medication, appropriate shoes and inserts as well as possible cross-training to avoid certain activities that will exacerbate this issue.  He will follow up as needed.  .

## 2019-05-20 NOTE — PROGRESS NOTES
Subjective:      Patient ID: Venkat Jonas is a 53 y.o. male.    Chief Complaint: Foot Problem (right ft., 4th toe) and Toe Pain    {POD HPI BLOCKS:98719}    ROS        Objective:      Physical Exam          Assessment:       Encounter Diagnosis   Name Primary?    Toe tendonitis Yes         Plan:       Venkat was seen today for foot problem and toe pain.    Diagnoses and all orders for this visit:    Toe tendonitis    Other orders  -     diclofenac sodium (VOLTAREN) 1 % Gel; Apply 2 g topically 4 (four) times daily.      I counseled the patient on his conditions, their implications and medical management.        {PROCEDURES:78865}

## 2019-05-24 ENCOUNTER — LAB VISIT (OUTPATIENT)
Dept: LAB | Facility: HOSPITAL | Age: 54
End: 2019-05-24
Attending: INTERNAL MEDICINE
Payer: COMMERCIAL

## 2019-05-24 DIAGNOSIS — I10 ESSENTIAL HYPERTENSION: ICD-10-CM

## 2019-05-24 LAB
ALBUMIN SERPL BCP-MCNC: 4.2 G/DL (ref 3.5–5.2)
ALP SERPL-CCNC: 106 U/L (ref 55–135)
ALT SERPL W/O P-5'-P-CCNC: 30 U/L (ref 10–44)
ANION GAP SERPL CALC-SCNC: 10 MMOL/L (ref 8–16)
AST SERPL-CCNC: 30 U/L (ref 10–40)
BILIRUB SERPL-MCNC: 0.5 MG/DL (ref 0.1–1)
BUN SERPL-MCNC: 20 MG/DL (ref 6–20)
CALCIUM SERPL-MCNC: 10.7 MG/DL (ref 8.7–10.5)
CHLORIDE SERPL-SCNC: 103 MMOL/L (ref 95–110)
CHOLEST SERPL-MCNC: 148 MG/DL (ref 120–199)
CHOLEST/HDLC SERPL: 2.5 {RATIO} (ref 2–5)
CO2 SERPL-SCNC: 29 MMOL/L (ref 23–29)
CREAT SERPL-MCNC: 1 MG/DL (ref 0.5–1.4)
EST. GFR  (AFRICAN AMERICAN): >60 ML/MIN/1.73 M^2
EST. GFR  (NON AFRICAN AMERICAN): >60 ML/MIN/1.73 M^2
GLUCOSE SERPL-MCNC: 101 MG/DL (ref 70–110)
HDLC SERPL-MCNC: 60 MG/DL (ref 40–75)
HDLC SERPL: 40.5 % (ref 20–50)
LDLC SERPL CALC-MCNC: 78.2 MG/DL (ref 63–159)
NONHDLC SERPL-MCNC: 88 MG/DL
POTASSIUM SERPL-SCNC: 4.9 MMOL/L (ref 3.5–5.1)
PROT SERPL-MCNC: 8.1 G/DL (ref 6–8.4)
SODIUM SERPL-SCNC: 142 MMOL/L (ref 136–145)
TRIGL SERPL-MCNC: 49 MG/DL (ref 30–150)

## 2019-05-24 PROCEDURE — 36415 COLL VENOUS BLD VENIPUNCTURE: CPT

## 2019-05-24 PROCEDURE — 80061 LIPID PANEL: CPT

## 2019-05-24 PROCEDURE — 80053 COMPREHEN METABOLIC PANEL: CPT

## 2019-05-26 ENCOUNTER — PATIENT MESSAGE (OUTPATIENT)
Dept: CARDIOLOGY | Facility: CLINIC | Age: 54
End: 2019-05-26

## 2019-05-26 DIAGNOSIS — E83.52 HYPERCALCEMIA: Primary | ICD-10-CM

## 2019-05-27 ENCOUNTER — PATIENT MESSAGE (OUTPATIENT)
Dept: CARDIOLOGY | Facility: CLINIC | Age: 54
End: 2019-05-27

## 2019-05-27 ENCOUNTER — TELEPHONE (OUTPATIENT)
Dept: CARDIOLOGY | Facility: CLINIC | Age: 54
End: 2019-05-27

## 2019-05-27 NOTE — TELEPHONE ENCOUNTER
----- Message from Vikash Looney MD sent at 5/26/2019  4:41 PM CDT -----  Let's get a non-fasting calcium in a week.

## 2019-05-27 NOTE — TELEPHONE ENCOUNTER
[facility]

## 2019-06-06 ENCOUNTER — PATIENT OUTREACH (OUTPATIENT)
Dept: OTHER | Facility: OTHER | Age: 54
End: 2019-06-06

## 2019-06-06 NOTE — PROGRESS NOTES
Last 5 Patient Entered Readings                                      Current 30 Day Average: 120/80     Recent Readings 5/28/2019 5/28/2019 5/28/2019 5/28/2019 5/23/2019    SBP (mmHg) 130 136 134 125 126    DBP (mmHg) 82 82 64 80 77    Pulse 71 75 85 80 70        Digital Medicine: Health  Follow Up    Left voicemail to follow up with Mr. Venkat Jonas.  Current BP average 120/80 mmHg is at goal.

## 2019-06-24 NOTE — PROGRESS NOTES
Last 5 Patient Entered Readings                                      Current 30 Day Average: 128/77     Recent Readings 6/15/2019 5/28/2019 5/28/2019 5/28/2019 5/28/2019    SBP (mmHg) 125 130 136 134 125    DBP (mmHg) 75 82 82 64 80    Pulse 81 71 75 85 80          Digital Medicine: Health  Follow Up    Sending [employer] message to follow up with Mr. Venkat Jonas.  Current BP average 128/77 mmHg is at goal.

## 2019-07-23 NOTE — PROGRESS NOTES
Last 5 Patient Entered Readings                                      Current 30 Day Average: 116/77     Recent Readings 7/23/2019 7/23/2019 7/23/2019 7/19/2019 7/19/2019    SBP (mmHg) 116 117 113 107 120    DBP (mmHg) 70 78 79 76 80    Pulse 68 72 68 63 65          Digital Medicine: Health  Follow Up    Left voicemail to follow up with Mr. Venkat Jonas.  Current BP average 116/77 mmHg is at goal.

## 2019-10-01 RX ORDER — FLUTICASONE PROPIONATE 50 MCG
2 SPRAY, SUSPENSION (ML) NASAL DAILY
Qty: 48 G | Refills: 4 | Status: CANCELLED | OUTPATIENT
Start: 2019-10-01

## 2019-11-21 ENCOUNTER — PATIENT MESSAGE (OUTPATIENT)
Dept: CARDIOLOGY | Facility: CLINIC | Age: 54
End: 2019-11-21

## 2019-11-21 ENCOUNTER — LAB VISIT (OUTPATIENT)
Dept: LAB | Facility: HOSPITAL | Age: 54
End: 2019-11-21
Attending: INTERNAL MEDICINE
Payer: COMMERCIAL

## 2019-11-21 DIAGNOSIS — I10 ESSENTIAL HYPERTENSION: Chronic | ICD-10-CM

## 2019-11-21 DIAGNOSIS — Z12.5 SCREENING FOR PROSTATE CANCER: ICD-10-CM

## 2019-11-21 LAB
ALBUMIN SERPL BCP-MCNC: 3.9 G/DL (ref 3.5–5.2)
ALP SERPL-CCNC: 99 U/L (ref 55–135)
ALT SERPL W/O P-5'-P-CCNC: 29 U/L (ref 10–44)
ANION GAP SERPL CALC-SCNC: 9 MMOL/L (ref 8–16)
AST SERPL-CCNC: 25 U/L (ref 10–40)
BASOPHILS # BLD AUTO: 0.05 K/UL (ref 0–0.2)
BASOPHILS NFR BLD: 0.9 % (ref 0–1.9)
BILIRUB SERPL-MCNC: 0.6 MG/DL (ref 0.1–1)
BUN SERPL-MCNC: 14 MG/DL (ref 6–20)
CALCIUM SERPL-MCNC: 10 MG/DL (ref 8.7–10.5)
CHLORIDE SERPL-SCNC: 102 MMOL/L (ref 95–110)
CHOLEST SERPL-MCNC: 156 MG/DL (ref 120–199)
CHOLEST/HDLC SERPL: 2.4 {RATIO} (ref 2–5)
CO2 SERPL-SCNC: 30 MMOL/L (ref 23–29)
COMPLEXED PSA SERPL-MCNC: 1.3 NG/ML (ref 0–4)
CREAT SERPL-MCNC: 1.1 MG/DL (ref 0.5–1.4)
DIFFERENTIAL METHOD: ABNORMAL
EOSINOPHIL # BLD AUTO: 0.3 K/UL (ref 0–0.5)
EOSINOPHIL NFR BLD: 5 % (ref 0–8)
ERYTHROCYTE [DISTWIDTH] IN BLOOD BY AUTOMATED COUNT: 12.4 % (ref 11.5–14.5)
EST. GFR  (AFRICAN AMERICAN): >60 ML/MIN/1.73 M^2
EST. GFR  (NON AFRICAN AMERICAN): >60 ML/MIN/1.73 M^2
GLUCOSE SERPL-MCNC: 98 MG/DL (ref 70–110)
HCT VFR BLD AUTO: 48.1 % (ref 40–54)
HDLC SERPL-MCNC: 66 MG/DL (ref 40–75)
HDLC SERPL: 42.3 % (ref 20–50)
HGB BLD-MCNC: 15.6 G/DL (ref 14–18)
IMM GRANULOCYTES # BLD AUTO: 0.02 K/UL (ref 0–0.04)
IMM GRANULOCYTES NFR BLD AUTO: 0.4 % (ref 0–0.5)
LDLC SERPL CALC-MCNC: 76.8 MG/DL (ref 63–159)
LYMPHOCYTES # BLD AUTO: 1.4 K/UL (ref 1–4.8)
LYMPHOCYTES NFR BLD: 26.4 % (ref 18–48)
MCH RBC QN AUTO: 29.7 PG (ref 27–31)
MCHC RBC AUTO-ENTMCNC: 32.4 G/DL (ref 32–36)
MCV RBC AUTO: 92 FL (ref 82–98)
MONOCYTES # BLD AUTO: 0.4 K/UL (ref 0.3–1)
MONOCYTES NFR BLD: 7.8 % (ref 4–15)
NEUTROPHILS # BLD AUTO: 3.2 K/UL (ref 1.8–7.7)
NEUTROPHILS NFR BLD: 59.5 % (ref 38–73)
NONHDLC SERPL-MCNC: 90 MG/DL
NRBC BLD-RTO: 0 /100 WBC
PLATELET # BLD AUTO: 313 K/UL (ref 150–350)
PMV BLD AUTO: 8.8 FL (ref 9.2–12.9)
POTASSIUM SERPL-SCNC: 4 MMOL/L (ref 3.5–5.1)
PROT SERPL-MCNC: 8 G/DL (ref 6–8.4)
RBC # BLD AUTO: 5.25 M/UL (ref 4.6–6.2)
SODIUM SERPL-SCNC: 141 MMOL/L (ref 136–145)
TRIGL SERPL-MCNC: 66 MG/DL (ref 30–150)
WBC # BLD AUTO: 5.37 K/UL (ref 3.9–12.7)

## 2019-11-21 PROCEDURE — 80061 LIPID PANEL: CPT

## 2019-11-21 PROCEDURE — 36415 COLL VENOUS BLD VENIPUNCTURE: CPT

## 2019-11-21 PROCEDURE — 80053 COMPREHEN METABOLIC PANEL: CPT

## 2019-11-21 PROCEDURE — 84153 ASSAY OF PSA TOTAL: CPT

## 2019-11-21 PROCEDURE — 85025 COMPLETE CBC W/AUTO DIFF WBC: CPT

## 2019-11-25 ENCOUNTER — PATIENT MESSAGE (OUTPATIENT)
Dept: OTHER | Facility: OTHER | Age: 54
End: 2019-11-25

## 2019-11-26 ENCOUNTER — OFFICE VISIT (OUTPATIENT)
Dept: INTERNAL MEDICINE | Facility: CLINIC | Age: 54
End: 2019-11-26
Payer: COMMERCIAL

## 2019-11-26 VITALS
DIASTOLIC BLOOD PRESSURE: 86 MMHG | HEIGHT: 71 IN | HEART RATE: 80 BPM | SYSTOLIC BLOOD PRESSURE: 134 MMHG | WEIGHT: 194 LBS | BODY MASS INDEX: 27.16 KG/M2

## 2019-11-26 DIAGNOSIS — Z12.5 SCREENING FOR PROSTATE CANCER: ICD-10-CM

## 2019-11-26 DIAGNOSIS — E78.00 PURE HYPERCHOLESTEROLEMIA: ICD-10-CM

## 2019-11-26 DIAGNOSIS — I10 ESSENTIAL HYPERTENSION: Primary | Chronic | ICD-10-CM

## 2019-11-26 DIAGNOSIS — R97.20 INCREASED PROSTATE SPECIFIC ANTIGEN (PSA) VELOCITY: ICD-10-CM

## 2019-11-26 PROCEDURE — 99999 PR PBB SHADOW E&M-EST. PATIENT-LVL III: ICD-10-PCS | Mod: PBBFAC,,, | Performed by: INTERNAL MEDICINE

## 2019-11-26 PROCEDURE — 99999 PR PBB SHADOW E&M-EST. PATIENT-LVL III: CPT | Mod: PBBFAC,,, | Performed by: INTERNAL MEDICINE

## 2019-11-26 PROCEDURE — 99396 PR PREVENTIVE VISIT,EST,40-64: ICD-10-PCS | Mod: S$GLB,,, | Performed by: INTERNAL MEDICINE

## 2019-11-26 PROCEDURE — 3079F DIAST BP 80-89 MM HG: CPT | Mod: CPTII,S$GLB,, | Performed by: INTERNAL MEDICINE

## 2019-11-26 PROCEDURE — 3079F PR MOST RECENT DIASTOLIC BLOOD PRESSURE 80-89 MM HG: ICD-10-PCS | Mod: CPTII,S$GLB,, | Performed by: INTERNAL MEDICINE

## 2019-11-26 PROCEDURE — 3008F PR BODY MASS INDEX (BMI) DOCUMENTED: ICD-10-PCS | Mod: CPTII,S$GLB,, | Performed by: INTERNAL MEDICINE

## 2019-11-26 PROCEDURE — 99396 PREV VISIT EST AGE 40-64: CPT | Mod: S$GLB,,, | Performed by: INTERNAL MEDICINE

## 2019-11-26 PROCEDURE — 3075F PR MOST RECENT SYSTOLIC BLOOD PRESS GE 130-139MM HG: ICD-10-PCS | Mod: CPTII,S$GLB,, | Performed by: INTERNAL MEDICINE

## 2019-11-26 PROCEDURE — 3075F SYST BP GE 130 - 139MM HG: CPT | Mod: CPTII,S$GLB,, | Performed by: INTERNAL MEDICINE

## 2019-11-26 PROCEDURE — 3008F BODY MASS INDEX DOCD: CPT | Mod: CPTII,S$GLB,, | Performed by: INTERNAL MEDICINE

## 2019-11-29 RX ORDER — ROSUVASTATIN CALCIUM 40 MG/1
40 TABLET, COATED ORAL NIGHTLY
Qty: 30 TABLET | Refills: 11 | Status: CANCELLED | OUTPATIENT
Start: 2019-11-29

## 2019-12-02 NOTE — TELEPHONE ENCOUNTER
----- Message from Alexandre Griffiths MD sent at 12/2/2019  3:00 PM CST -----  pls arrange PSA at 6 mos

## 2019-12-02 NOTE — PROGRESS NOTES
Subjective:       Patient ID: Venkat Jonas is a 54 y.o. male.    Chief Complaint: Annual Exam    Hypertension   This is a chronic problem. The problem is unchanged. The problem is controlled. Pertinent negatives include no chest pain, palpitations or shortness of breath. The current treatment provides significant improvement. There are no compliance problems.      Review of Systems   Constitutional: Negative for fatigue.   HENT: Negative for sore throat.    Eyes: Negative for visual disturbance.   Respiratory: Negative for shortness of breath.    Cardiovascular: Negative for chest pain and palpitations.   Gastrointestinal: Negative for abdominal pain.   Genitourinary: Negative for dysuria, frequency and hematuria.   Musculoskeletal: Negative for joint swelling.   Skin: Negative for rash.   Neurological: Negative for speech difficulty and weakness.   Psychiatric/Behavioral: Negative for dysphoric mood.       Objective:      Physical Exam   Constitutional: He is oriented to person, place, and time. He appears well-developed and well-nourished. No distress.   HENT:   Head: Normocephalic and atraumatic.   Mouth/Throat: Oropharynx is clear and moist.   Eyes: Pupils are equal, round, and reactive to light. Conjunctivae are normal.   Neck: Normal range of motion. Neck supple.   Cardiovascular: Normal rate, regular rhythm and normal heart sounds.   Pulmonary/Chest: Effort normal and breath sounds normal. He has no wheezes.   Abdominal: Soft. Bowel sounds are normal. There is no tenderness.   Genitourinary: Prostate normal. Rectal exam shows guaiac negative stool.   Musculoskeletal: Normal range of motion. He exhibits no edema or tenderness.   Neurological: He is alert and oriented to person, place, and time. No cranial nerve deficit.   Skin: No erythema.   Psychiatric: He has a normal mood and affect.   Vitals reviewed.      Assessment:       1. Essential hypertension    2. Pure hypercholesterolemia    3. Screening  for prostate cancer    4. Increased prostate specific antigen (PSA) velocity        Plan:       Venkat was seen today for annual exam.    Diagnoses and all orders for this visit:    Essential hypertension  -     CBC auto differential; Future  -     Comprehensive metabolic panel; Future  -     Lipid panel; Future    Pure hypercholesterolemia    Screening for prostate cancer  -     PSA, Screening; Future    Increased prostate specific antigen (PSA) velocity  Comments:  will recheck at 6 mos to assure plateau        Follow up in about 1 year (around 11/26/2020) for PHYSICAL EXAM, WITH LAB BEFORE.

## 2019-12-04 NOTE — PROGRESS NOTES
Refill Authorization Note     is requesting a refill authorization.    Brief assessment and rationale for refill: APPROVE: prr  Name and strength of medication: rosuvastatin (CRESTOR) 40 MG Tab            Medication reconciliation completed: No              How patient will take medication: t1t po qd           Comments:   Requested Prescriptions   Pending Prescriptions Disp Refills    rosuvastatin (CRESTOR) 40 MG Tab 90 tablet 3     Sig: Take 1 tablet (40 mg total) by mouth every evening.       Cardiovascular:  Antilipid - Statins Passed - 12/4/2019  4:20 PM        Passed - Patient is at least 18 years old        Passed - Office visit in past 12 months or future 90 days     Recent Outpatient Visits            1 week ago Essential hypertension    [facility] - Internal Medicine Alexandre Griffiths MD    6 months ago Toe tendonitis    [facility] - Podiatry Roderick Glass DPM    1 year ago Essential hypertension    [facility] - Internal Medicine Alexandre Griffiths MD    1 year ago AK (actinic keratosis)    [facility] - Dermatology Noni Ríos MD    2 years ago Routine general medical examination at a health care facility    [facility] Medicine Alexandre Griffiths MD                    Passed - Lipid Panel completed in last 360 days     Lab Results   Component Value Date    CHOL 156 11/21/2019    HDL 66 11/21/2019    LDLCALC 76.8 11/21/2019    TRIG 66 11/21/2019             Passed - ALT is 94 or below and within 360 days     ALT   Date Value Ref Range Status   11/21/2019 29 10 - 44 U/L Final   05/24/2019 30 10 - 44 U/L Final   03/28/2018 42 10 - 44 U/L Final              Passed - AST is 54 or below and within 360 days     AST   Date Value Ref Range Status   11/21/2019 25 10 - 40 U/L Final   05/24/2019 30 10 - 40 U/L Final   03/28/2018 27 10 - 40 U/L Final                Appointments  past 18m or future 3m with PCP    Date Provider   Last Visit   11/26/2019 Alexandre Griffiths MD   Next Visit   Visit date not found Alexandre  MD Aye

## 2019-12-05 RX ORDER — ROSUVASTATIN CALCIUM 40 MG/1
40 TABLET, COATED ORAL NIGHTLY
Qty: 90 TABLET | Refills: 3 | Status: SHIPPED | OUTPATIENT
Start: 2019-12-05 | End: 2020-11-23 | Stop reason: SDUPTHER

## 2019-12-05 NOTE — TELEPHONE ENCOUNTER
Please see the following assessment. This refill request still requires some action on your part.     PCP listed as Dr. Ellis in Epic. Appears pt is following with you. Rosuvastatin has not previously been prescribed by you - outside refill center protocol to renew. Have pended a 12-month supply for your review. Defer refill request to you.      Thank you.

## 2020-01-06 ENCOUNTER — PATIENT OUTREACH (OUTPATIENT)
Dept: OTHER | Facility: OTHER | Age: 55
End: 2020-01-06

## 2020-01-06 NOTE — PROGRESS NOTES
Digital Medicine: Health  Follow-Up    Has been travelling lately so has missed readings. Saw Dr at end of November and Dr said everything was good. His BP then was 134/86. Avg BP as of Jan 6, 2020 is 115/85. Stated he needs to get back into his diet and exercise routine but that everything was going well. Will follow up in 8 weeks. He stated he may call me in a week or so to discuss some diets.     The history is provided by the patient.     Follow Up  Follow-up reason(s): routine education and goal follow-up          INTERVENTION(S)  encouragement/support    PLAN  patient verbalizes understanding      There are no preventive care reminders to display for this patient.    Last 5 Patient Entered Readings                                      Current 30 Day Average: 115/85     Recent Readings 12/13/2019 12/13/2019 12/13/2019 11/26/2019 11/26/2019    SBP (mmHg) 115 126 131 126 128    DBP (mmHg) 84 85 86 81 86    Pulse 76 78 87 70 70                      Diet Screening   No change to diet.      Physical Activity Screening   No change to exercise routine.          SDOH

## 2020-01-31 DIAGNOSIS — F51.02 ADJUSTMENT INSOMNIA: ICD-10-CM

## 2020-01-31 RX ORDER — ZOLPIDEM TARTRATE 10 MG/1
10 TABLET ORAL NIGHTLY PRN
Qty: 30 TABLET | Refills: 5 | Status: CANCELLED | OUTPATIENT
Start: 2020-01-31 | End: 2020-03-01

## 2020-01-31 RX ORDER — ZOLPIDEM TARTRATE 10 MG/1
10 TABLET ORAL NIGHTLY PRN
Qty: 30 TABLET | Refills: 5 | Status: SHIPPED | OUTPATIENT
Start: 2020-01-31 | End: 2020-09-30 | Stop reason: SDUPTHER

## 2020-01-31 RX ORDER — ZOLPIDEM TARTRATE 10 MG/1
10 TABLET ORAL NIGHTLY PRN
Qty: 30 TABLET | Refills: 5 | OUTPATIENT
Start: 2020-01-31 | End: 2020-03-01

## 2020-02-12 ENCOUNTER — OFFICE VISIT (OUTPATIENT)
Dept: URGENT CARE | Facility: CLINIC | Age: 55
End: 2020-02-12
Payer: COMMERCIAL

## 2020-02-12 VITALS
HEART RATE: 98 BPM | OXYGEN SATURATION: 99 % | HEIGHT: 70 IN | SYSTOLIC BLOOD PRESSURE: 122 MMHG | TEMPERATURE: 98 F | DIASTOLIC BLOOD PRESSURE: 93 MMHG | RESPIRATION RATE: 19 BRPM | WEIGHT: 190 LBS | BODY MASS INDEX: 27.2 KG/M2

## 2020-02-12 DIAGNOSIS — J20.9 ACUTE INFECTIVE TRACHEOBRONCHITIS: ICD-10-CM

## 2020-02-12 DIAGNOSIS — J04.0 ACUTE LARYNGITIS: Primary | ICD-10-CM

## 2020-02-12 DIAGNOSIS — J98.01 ACUTE BRONCHOSPASM: ICD-10-CM

## 2020-02-12 PROCEDURE — 99214 OFFICE O/P EST MOD 30 MIN: CPT | Mod: 25,S$GLB,, | Performed by: INTERNAL MEDICINE

## 2020-02-12 PROCEDURE — 96372 PR INJECTION,THERAP/PROPH/DIAG2ST, IM OR SUBCUT: ICD-10-PCS | Mod: S$GLB,,, | Performed by: INTERNAL MEDICINE

## 2020-02-12 PROCEDURE — 99214 PR OFFICE/OUTPT VISIT, EST, LEVL IV, 30-39 MIN: ICD-10-PCS | Mod: 25,S$GLB,, | Performed by: INTERNAL MEDICINE

## 2020-02-12 PROCEDURE — 96372 THER/PROPH/DIAG INJ SC/IM: CPT | Mod: S$GLB,,, | Performed by: INTERNAL MEDICINE

## 2020-02-12 RX ORDER — BETAMETHASONE SODIUM PHOSPHATE AND BETAMETHASONE ACETATE 3; 3 MG/ML; MG/ML
9 INJECTION, SUSPENSION INTRA-ARTICULAR; INTRALESIONAL; INTRAMUSCULAR; SOFT TISSUE ONCE
Status: COMPLETED | OUTPATIENT
Start: 2020-02-12 | End: 2020-02-12

## 2020-02-12 RX ORDER — BENZONATATE 100 MG/1
100 CAPSULE ORAL EVERY 6 HOURS PRN
Qty: 30 CAPSULE | Refills: 1 | Status: SHIPPED | OUTPATIENT
Start: 2020-02-12 | End: 2020-09-30 | Stop reason: CLARIF

## 2020-02-12 RX ORDER — AZITHROMYCIN 250 MG/1
TABLET, FILM COATED ORAL
Qty: 6 TABLET | Refills: 0 | Status: SHIPPED | OUTPATIENT
Start: 2020-02-12 | End: 2020-09-30 | Stop reason: CLARIF

## 2020-02-12 RX ORDER — CODEINE PHOSPHATE AND GUAIFENESIN 10; 100 MG/5ML; MG/5ML
5 SOLUTION ORAL EVERY 4 HOURS PRN
Qty: 118 ML | Refills: 0 | Status: SHIPPED | OUTPATIENT
Start: 2020-02-12 | End: 2020-02-22

## 2020-02-12 RX ADMIN — BETAMETHASONE SODIUM PHOSPHATE AND BETAMETHASONE ACETATE 9 MG: 3; 3 INJECTION, SUSPENSION INTRA-ARTICULAR; INTRALESIONAL; INTRAMUSCULAR; SOFT TISSUE at 09:02

## 2020-02-12 NOTE — PROGRESS NOTES
"       Patient ID: Venkat Jonas is a 54 y.o. male.    Vitals:  height is 5' 10" (1.778 m) and weight is 86.2 kg (190 lb). His oral temperature is 97.8 °F (36.6 °C). His blood pressure is 122/93 (abnormal) and his pulse is 98. His respiration is 19 and oxygen saturation is 99%.     Chief Complaint: URI    URI    This is a new problem. The current episode started in the past 7 days (saturday ). The problem has been gradually worsening. There has been no fever. Associated symptoms include congestion, coughing, headaches, rhinorrhea, sinus pain and a sore throat. Pertinent negatives include no abdominal pain, chest pain, diarrhea, dysuria, ear pain, joint pain, joint swelling, nausea, neck pain, plugged ear sensation, rash, sneezing, swollen glands, vomiting or wheezing. Treatments tried: nasal sparay, rinses, mucinex-d. The treatment provided no relief.       Constitution: Negative for chills, sweating, fatigue and fever.   HENT: Positive for congestion, sinus pain, sinus pressure and sore throat. Negative for ear pain and voice change.    Neck: Negative for neck pain and painful lymph nodes.   Cardiovascular: Negative for chest pain.   Eyes: Negative for eye redness.   Respiratory: Positive for cough. Negative for chest tightness, sputum production, bloody sputum, COPD, shortness of breath, stridor, wheezing and asthma.    Gastrointestinal: Negative for abdominal pain, nausea, vomiting and diarrhea.   Genitourinary: Negative for dysuria.   Musculoskeletal: Negative for muscle ache.   Skin: Negative for rash.   Allergic/Immunologic: Negative for seasonal allergies, asthma and sneezing.   Neurological: Positive for headaches.   Hematologic/Lymphatic: Negative for swollen lymph nodes.       Objective:      Physical Exam   Constitutional: He appears well-developed and well-nourished.   HENT:   Head: Normocephalic and atraumatic.   Eyes: Pupils are equal, round, and reactive to light. Conjunctivae and EOM are " normal.   Neck: Normal range of motion. Neck supple.   Hoarseness of voice   Cardiovascular: Normal rate and regular rhythm.   Pulmonary/Chest: Effort normal. He has wheezes.   Nursing note and vitals reviewed.        Assessment:       1. Acute laryngitis    2. Acute bronchospasm    3. Acute infective tracheobronchitis        Plan:         Acute laryngitis    Acute bronchospasm  -     betamethasone acetate-betamethasone sodium phosphate injection 9 mg    Acute infective tracheobronchitis  -     azithromycin (ZITHROMAX Z-HALI) 250 MG tablet; Take 2 tablets (500 mg) on  Day 1,  followed by 1 tablet (250 mg) once daily on Days 2 through 5.  Dispense: 6 tablet; Refill: 0  -     benzonatate (TESSALON PERLES) 100 MG capsule; Take 1 capsule (100 mg total) by mouth every 6 (six) hours as needed for Cough.  Dispense: 30 capsule; Refill: 1  -     guaifenesin-codeine 100-10 mg/5 ml (CHERATUSSIN AC)  mg/5 mL syrup; Take 5 mLs by mouth every 4 (four) hours as needed for Cough.  Dispense: 118 mL; Refill: 0

## 2020-03-02 ENCOUNTER — PATIENT OUTREACH (OUTPATIENT)
Dept: OTHER | Facility: OTHER | Age: 55
End: 2020-03-02

## 2020-03-02 NOTE — PROGRESS NOTES
Digital Medicine: Health  Follow-Up    Stated everything was going well. Started a running and SPIN program back up. Going pescatarian for Lent. Not travelling as much with the coronavirus which puts him back in charge of his diet and exercise.     The history is provided by the patient.     Follow Up  Follow-up reason(s): routine education and goal follow-up      Routine Education Topics: eating patterns and physical activity        INTERVENTION(S)  encouragement/support, goal setting, denied resources and denied questions    PLAN  patient verbalizes understanding      There are no preventive care reminders to display for this patient.    Last 5 Patient Entered Readings                                      Current 30 Day Average:      Recent Readings 12/13/2019 12/13/2019 12/13/2019 11/26/2019 11/26/2019    SBP (mmHg) 115 126 131 126 128    DBP (mmHg) 84 85 86 81 86    Pulse 76 78 87 70 70                      Diet Screening   He has the following dietary restrictions: low sodium diet and vegetarian    Hasn't been travelling as much for work lately so is taking more time to get back on track and get control of diet. Going pescatarian for Lent.     Assigning the following patient goals: maintain low sodium diet    Physical Activity Screening   When asked if exercising, patient responded: yes    Patient participates in the following activities: running and biking    Got back into his running and Spin routine now that Deonte Bird is over.     Intervention(s): goal setting and goal tracking     Assigning the following patient goal(s): 150 minutes of exercise per week and participate in exercise weekly      SDOH

## 2020-03-13 RX ORDER — FLUTICASONE PROPIONATE 44 UG/1
1 AEROSOL, METERED RESPIRATORY (INHALATION) 2 TIMES DAILY
Qty: 10.6 G | Refills: 1 | Status: CANCELLED | OUTPATIENT
Start: 2020-03-13 | End: 2021-03-13

## 2020-03-13 RX ORDER — FLUTICASONE PROPIONATE 44 UG/1
1 AEROSOL, METERED RESPIRATORY (INHALATION) 2 TIMES DAILY
Qty: 10.6 G | Refills: 1 | Status: SHIPPED | OUTPATIENT
Start: 2020-03-13 | End: 2020-09-30 | Stop reason: CLARIF

## 2020-03-16 ENCOUNTER — TELEPHONE (OUTPATIENT)
Dept: INTERNAL MEDICINE | Facility: CLINIC | Age: 55
End: 2020-03-16

## 2020-03-16 RX ORDER — PREDNISONE 20 MG/1
20 TABLET ORAL DAILY
Qty: 7 TABLET | Refills: 0
Start: 2020-03-16 | End: 2020-03-23

## 2020-04-20 DIAGNOSIS — Z00.00 PREVENTATIVE HEALTH CARE: Primary | ICD-10-CM

## 2020-04-21 ENCOUNTER — LAB VISIT (OUTPATIENT)
Dept: LAB | Facility: HOSPITAL | Age: 55
End: 2020-04-21
Payer: COMMERCIAL

## 2020-04-21 ENCOUNTER — PATIENT MESSAGE (OUTPATIENT)
Dept: ADMINISTRATIVE | Facility: OTHER | Age: 55
End: 2020-04-21

## 2020-04-21 DIAGNOSIS — R97.20 INCREASED PROSTATE SPECIFIC ANTIGEN (PSA) VELOCITY: ICD-10-CM

## 2020-04-21 LAB — COMPLEXED PSA SERPL-MCNC: 1.6 NG/ML (ref 0–4)

## 2020-04-21 PROCEDURE — 84153 ASSAY OF PSA TOTAL: CPT

## 2020-04-27 ENCOUNTER — PATIENT OUTREACH (OUTPATIENT)
Dept: OTHER | Facility: OTHER | Age: 55
End: 2020-04-27

## 2020-05-22 DIAGNOSIS — I10 ESSENTIAL HYPERTENSION: ICD-10-CM

## 2020-05-22 RX ORDER — VALSARTAN AND HYDROCHLOROTHIAZIDE 160; 12.5 MG/1; MG/1
1 TABLET, FILM COATED ORAL DAILY
Qty: 90 TABLET | Refills: 0 | Status: SHIPPED | OUTPATIENT
Start: 2020-05-22 | End: 2020-07-31

## 2020-05-22 RX ORDER — EZETIMIBE 10 MG/1
10 TABLET ORAL DAILY
Qty: 90 TABLET | Refills: 0 | Status: SHIPPED | OUTPATIENT
Start: 2020-05-22 | End: 2020-07-31

## 2020-06-25 ENCOUNTER — PATIENT OUTREACH (OUTPATIENT)
Dept: OTHER | Facility: OTHER | Age: 55
End: 2020-06-25

## 2020-07-15 DIAGNOSIS — Z03.818 ENCOUNTER FOR OBSERVATION FOR SUSPECTED EXPOSURE TO OTHER BIOLOGICAL AGENTS RULED OUT: ICD-10-CM

## 2020-07-22 DIAGNOSIS — Z03.818 ENCOUNTER FOR OBSERVATION FOR SUSPECTED EXPOSURE TO OTHER BIOLOGICAL AGENTS RULED OUT: ICD-10-CM

## 2020-07-26 RX ORDER — PANTOPRAZOLE SODIUM 40 MG/1
TABLET, DELAYED RELEASE ORAL
Qty: 60 TABLET | Refills: 12 | Status: CANCELLED | OUTPATIENT
Start: 2020-07-26

## 2020-07-30 DIAGNOSIS — I10 ESSENTIAL HYPERTENSION: ICD-10-CM

## 2020-07-30 RX ORDER — PANTOPRAZOLE SODIUM 40 MG/1
TABLET, DELAYED RELEASE ORAL
Qty: 60 TABLET | Refills: 3 | Status: SHIPPED | OUTPATIENT
Start: 2020-07-30 | End: 2021-03-15 | Stop reason: SDUPTHER

## 2020-07-30 RX ORDER — PANTOPRAZOLE SODIUM 40 MG/1
TABLET, DELAYED RELEASE ORAL
Qty: 60 TABLET | Refills: 12 | Status: CANCELLED | OUTPATIENT
Start: 2020-07-30

## 2020-07-30 NOTE — TELEPHONE ENCOUNTER
Care Due:                  Date            Visit Type   Department     Provider  --------------------------------------------------------------------------------                                PHYSICAL -                              ESTABLISHED   [facility]  Last Visit: 11-      PATIENT      MEDICINE       Alexandre TAM  Next Visit: None Scheduled  None         None Found                                                            Last  Test          Frequency    Reason                     Performed    Due Date  --------------------------------------------------------------------------------    Cr..........  6 months...  valsartan-hydrochlorothia  11- 05-                             ivande.....................    K...........  6 months...  valsartan-hydrochlorothia  11- 05-                             ivande.....................    Na..........  6 months...  valsartan-hydrochlorothia  11- 05-                             herber.....................    Powered by [employer]. Reference number: [id number]. 7/30/2020 7:52:52 AM CDT

## 2020-07-30 NOTE — TELEPHONE ENCOUNTER
No new care gaps identified.  Powered by "[employer] Reference number: [id number]. 7/30/2020 8:09:00 AM CDT

## 2020-07-31 DIAGNOSIS — I10 ESSENTIAL HYPERTENSION: ICD-10-CM

## 2020-07-31 RX ORDER — EZETIMIBE 10 MG/1
10 TABLET ORAL DAILY
Qty: 90 TABLET | Refills: 1 | Status: CANCELLED | OUTPATIENT
Start: 2020-07-31

## 2020-07-31 RX ORDER — EZETIMIBE 10 MG/1
10 TABLET ORAL DAILY
Qty: 90 TABLET | Refills: 0 | Status: CANCELLED | OUTPATIENT
Start: 2020-07-31

## 2020-07-31 RX ORDER — VALSARTAN AND HYDROCHLOROTHIAZIDE 160; 12.5 MG/1; MG/1
1 TABLET, FILM COATED ORAL DAILY
Qty: 90 TABLET | Refills: 0 | Status: CANCELLED | OUTPATIENT
Start: 2020-07-31 | End: 2021-07-31

## 2020-07-31 NOTE — PROGRESS NOTES
Encounter details (provider/department) have been updated by OR staff.   Of note, HF details may not display.     As of this time CDM: Does not populate or display     Updated: Provider    Will resend refill request encounter to P Centralized Refill Staff Pool.     [facility]     Note composed:10:57 AM 07/31/2020

## 2020-07-31 NOTE — TELEPHONE ENCOUNTER
----- Message from Ganesh Shultz sent at 7/31/2020 10:18 AM CDT -----  Contact: [facility]- [phone] (Phone)  Prescription refill request.  RX name and strength:   1.valsartan-hydrochlorothiazide (DIOVAN-HCT) 160-12.5 mg per tablet  2.ezetimibe (ZETIA) 10 mg tablet    Directions:   1.Take 1 tablet by mouth once daily. - Oral  2. Take 1 tablet (10 mg total) by mouth once daily. - Oral    Is this a 30 day or 90 day RX:  30 day     Local pharmacy or mail order pharmacy:  Local     Pharmacy name and phone #: [facility]   [phone] (Phone)  [phone] (Fax)    Additional information:   Pharmacist stated that they sent over a request yesterday but today the pt will run out. Please call to advise.

## 2020-07-31 NOTE — TELEPHONE ENCOUNTER
No new care gaps identified.  Powered by [employer]. Reference number: [id number]. 7/31/2020 1:37:20 PM CDT

## 2020-07-31 NOTE — PROGRESS NOTES
Refill Routing Note   Medication(s) are not appropriate for processing by [facility]:       - Patient has been seen in the Emergency Room/Department since the last PCP visit     Medication-related problems identified: Requires labs  Medication Therapy Plan: CDMF. NTBO(SCr/Na/K); Recent ED visit for Covid-19 Virus; Defer to you   Medication reconciliation completed: No      Automatic Epic Generated Protocol Data:    Orders Placed This Encounter    Creatinine, serum    Potassium    Sodium      Requested Prescriptions   Pending Prescriptions Disp Refills    ezetimibe (ZETIA) 10 mg tablet 90 tablet 1     Sig: Take 1 tablet (10 mg total) by mouth once daily.       Cardiovascular:  Antilipid - Sterol Transport Inhibitors Passed - 7/31/2020 10:57 AM        Passed - Patient is at least 18 years old        Passed - Office visit in past 12 months or future 90 days.     Recent Outpatient Visits            5 months ago Acute laryngitis    [facility] - Raisa Dominguez MD    8 months ago Essential hypertension    [facility] - Internal Medicine Alexandre Griffiths MD    1 year ago Toe tendonitis    [facility] - Podiatry Roderick Glass DPM    1 year ago Essential hypertension    [facility] - Internal Medicine Alexandre Griffiths MD    1 year ago AK (actinic keratosis)    [facility] - Dermatology Noni Ríos MD          Future Appointments              In 3 days SPECIMEN, [facility]                Passed - Lipid Panel completed in last 360 days     Lab Results   Component Value Date    CHOL 156 11/21/2019    HDL 66 11/21/2019    LDLCALC 76.8 11/21/2019    TRIG 66 11/21/2019               valsartan-hydrochlorothiazide (DIOVAN-HCT) 160-12.5 mg per tablet 90 tablet 0     Sig: Take 1 tablet by mouth once daily.       There is no refill protocol information for this order           Appointments  past 12m or future 3m with PCP    Date Provider   Last Visit    11/26/2019 Alexandre Griffiths MD   Next Visit   Visit date not found Alexandre Griffiths MD   ED visits in past 90 days: 1     Note composed:11:14 AM 07/31/2020

## 2020-09-11 ENCOUNTER — PATIENT OUTREACH (OUTPATIENT)
Dept: OTHER | Facility: OTHER | Age: 55
End: 2020-09-11

## 2020-09-27 DIAGNOSIS — C68.9 MALIGNANT NEOPLASM OF URINARY ORGAN, UNSPECIFIED: ICD-10-CM

## 2020-09-28 ENCOUNTER — OFFICE VISIT (OUTPATIENT)
Dept: UROLOGY | Facility: CLINIC | Age: 55
End: 2020-09-28
Payer: COMMERCIAL

## 2020-09-28 ENCOUNTER — HOSPITAL ENCOUNTER (OUTPATIENT)
Dept: RADIOLOGY | Facility: HOSPITAL | Age: 55
Discharge: HOME | End: 2020-09-28
Attending: UROLOGY
Payer: COMMERCIAL

## 2020-09-28 VITALS — HEART RATE: 92 BPM | DIASTOLIC BLOOD PRESSURE: 102 MMHG | SYSTOLIC BLOOD PRESSURE: 184 MMHG

## 2020-09-28 DIAGNOSIS — N28.89 LEFT RENAL MASS: Primary | ICD-10-CM

## 2020-09-28 DIAGNOSIS — N28.89 LEFT RENAL MASS: ICD-10-CM

## 2020-09-28 DIAGNOSIS — C68.9 MALIGNANT NEOPLASM OF URINARY ORGAN, UNSPECIFIED: Primary | ICD-10-CM

## 2020-09-28 DIAGNOSIS — Z12.5 SCREENING PSA (PROSTATE SPECIFIC ANTIGEN): ICD-10-CM

## 2020-09-28 LAB
BACTERIA #/AREA URNS AUTO: ABNORMAL /HPF
BILIRUB UR QL STRIP: NEGATIVE
CLARITY UR REFRACT.AUTO: CLEAR
COLOR UR AUTO: YELLOW
GLUCOSE UR QL STRIP: NEGATIVE
HGB UR QL STRIP: ABNORMAL
KETONES UR QL STRIP: NEGATIVE
LEUKOCYTE ESTERASE UR QL STRIP: NEGATIVE
MICROSCOPIC COMMENT: ABNORMAL
NITRITE UR QL STRIP: NEGATIVE
PH UR STRIP: 6 [PH] (ref 5–8)
PROT UR QL STRIP: NEGATIVE
RBC #/AREA URNS AUTO: >100 /HPF (ref 0–4)
SP GR UR STRIP: >1.03 (ref 1–1.03)
URN SPEC COLLECT METH UR: ABNORMAL

## 2020-09-28 PROCEDURE — 88112 CYTOPATH CELL ENHANCE TECH: CPT | Performed by: PATHOLOGY

## 2020-09-28 PROCEDURE — 74178 CT ABDOMEN PELVIS W WO CONTRAST: ICD-10-PCS | Mod: 26,,, | Performed by: RADIOLOGY

## 2020-09-28 PROCEDURE — 3080F DIAST BP >= 90 MM HG: CPT | Mod: CPTII,S$GLB,, | Performed by: UROLOGY

## 2020-09-28 PROCEDURE — 71250 CT THORAX DX C-: CPT | Mod: TC

## 2020-09-28 PROCEDURE — 99999 PR PBB SHADOW E&M-EST. PATIENT-LVL IV: CPT | Mod: PBBFAC,,, | Performed by: UROLOGY

## 2020-09-28 PROCEDURE — 3080F PR MOST RECENT DIASTOLIC BLOOD PRESSURE >= 90 MM HG: ICD-10-PCS | Mod: CPTII,S$GLB,, | Performed by: UROLOGY

## 2020-09-28 PROCEDURE — 81001 URINALYSIS AUTO W/SCOPE: CPT

## 2020-09-28 PROCEDURE — 71250 CT CHEST WITHOUT CONTRAST: ICD-10-PCS | Mod: 26,,, | Performed by: RADIOLOGY

## 2020-09-28 PROCEDURE — 99205 PR OFFICE/OUTPT VISIT, NEW, LEVL V, 60-74 MIN: ICD-10-PCS | Mod: S$GLB,,, | Performed by: UROLOGY

## 2020-09-28 PROCEDURE — 99999 PR PBB SHADOW E&M-EST. PATIENT-LVL IV: ICD-10-PCS | Mod: PBBFAC,,, | Performed by: UROLOGY

## 2020-09-28 PROCEDURE — 3077F SYST BP >= 140 MM HG: CPT | Mod: CPTII,S$GLB,, | Performed by: UROLOGY

## 2020-09-28 PROCEDURE — 99205 OFFICE O/P NEW HI 60 MIN: CPT | Mod: S$GLB,,, | Performed by: UROLOGY

## 2020-09-28 PROCEDURE — 88112 PR  CYTOPATH, CELL ENHANCE TECH: ICD-10-PCS | Mod: 26,,, | Performed by: PATHOLOGY

## 2020-09-28 PROCEDURE — 25500020 PHARM REV CODE 255: Performed by: UROLOGY

## 2020-09-28 PROCEDURE — 88112 CYTOPATH CELL ENHANCE TECH: CPT | Mod: 26,,, | Performed by: PATHOLOGY

## 2020-09-28 PROCEDURE — 74178 CT ABD&PLV WO CNTR FLWD CNTR: CPT | Mod: TC

## 2020-09-28 PROCEDURE — 74178 CT ABD&PLV WO CNTR FLWD CNTR: CPT | Mod: 26,,, | Performed by: RADIOLOGY

## 2020-09-28 PROCEDURE — 71250 CT THORAX DX C-: CPT | Mod: 26,,, | Performed by: RADIOLOGY

## 2020-09-28 PROCEDURE — 87086 URINE CULTURE/COLONY COUNT: CPT

## 2020-09-28 PROCEDURE — 3077F PR MOST RECENT SYSTOLIC BLOOD PRESSURE >= 140 MM HG: ICD-10-PCS | Mod: CPTII,S$GLB,, | Performed by: UROLOGY

## 2020-09-28 RX ORDER — OXYCODONE AND ACETAMINOPHEN 5; 325 MG/1; MG/1
1 TABLET ORAL EVERY 4 HOURS PRN
Qty: 20 TABLET | Refills: 0 | Status: SHIPPED | OUTPATIENT
Start: 2020-11-28 | End: 2020-11-18

## 2020-09-28 RX ORDER — OXYCODONE AND ACETAMINOPHEN 5; 325 MG/1; MG/1
TABLET ORAL
Status: ON HOLD | COMMUNITY
Start: 2020-09-27 | End: 2020-10-01 | Stop reason: SDUPTHER

## 2020-09-28 RX ADMIN — IOHEXOL 75 ML: 350 INJECTION, SOLUTION INTRAVENOUS at 03:09

## 2020-09-28 NOTE — H&P (VIEW-ONLY)
Subjective:       Patient ID: Venkat Jonas is a 55 y.o. male.    Chief Complaint:  Renal mass.      History of Present Illness  HPI  Patient is a 55 y.o. male who is new to our clinic and referred by their PCP, Dr. Griffiths for evaluation of a left renal mass.     The patient was out of town on vacation when he experienced acute onset of left-sided flank pain.  Shortly after flank pain, the patient developed gross hematuria with passage of blood clots.  He has never had this issue before.  Since that time he has had intermittent gross hematuria of varying severity.  The patient was advised by his primary care physician to seek emergency care in the ED.   With the presumptive diagnosis of kidney stones, the patient underwent a CT renal stone study without IV contrast.  This showed a large left-sided renal mass.    The patient returns today having had repeat imaging including a CT chest abdomen and pelvis with and without IV contrast.  He currently is not in pain.  He has required the Percocet prescribed by the emergency department on multiple occasions but not currently.    Prior to this, there is no other symptoms.  No bone pain or unexpected weight loss.    He has noticed that since the diagnosis, his blood pressure has been elevated.  He does take blood pressure medication but reports a good control normally.        Review of Systems  Review of Systems  All other systems reviewed and negative except pertinent positives noted in HPI.       Objective:     Physical Exam   Constitutional: He is oriented to person, place, and time. He appears well-developed. No distress.   HENT:   Head: Normocephalic and atraumatic.   Eyes: No scleral icterus.   Neck: No tracheal deviation present.   Pulmonary/Chest: Effort normal. No respiratory distress.   Neurological: He is alert and oriented to person, place, and time.   Psychiatric: His behavior is normal. Judgment and thought content normal.       Lab Review  Lab  "Results   Component Value Date    COLORU Yellow 09/08/2014    SPECGRAV 1.020 09/08/2014    PHUR 7.0 09/08/2014    NITRITE Negative 09/08/2014    KETONESU Negative 09/08/2014    UROBILINOGEN Negative 09/08/2014         Assessment:        1. Malignant neoplasm of urinary organ, unspecified            Plan:     Malignant neoplasm of urinary organ, unspecified  -     Ambulatory referral/consult to Urology  -     Cytology, Urine  -     Urinalysis  -     Urine culture    Other orders  -     oxyCODONE-acetaminophen (PERCOCET) 5-325 mg per tablet; Take 1 tablet by mouth every 4 (four) hours as needed for Pain.  Dispense: 20 tablet; Refill: 0    -The patient will be set up for a hematuria workup to include a urine cytology, a cystoscopy with RPG and possible left ureteroscopy (particularly given the CT scan finding of "subtle ureteral enhancement") and a urine culture. The risks and benefits of cystoscopy were discussed with the patient.     -pain medication refilled as a rescue medication in the event of acute clot colic from passage of blood clots  -will review imaging with radiology; labs show no alk phos elevation but there is a question of an 8mm bone lytic lesion---unclear if this is clinically significant, but will review with rads and plan a bone scan if concerning for mets.     -will likely require a radical nephrectomy, robotic likely with possible open conversion.  If ureteral pathology found, would be more concerning for urothelial and would need heme/onc referral for neoadjuvant chemo followed by consolidative nephroureterectomy.  Based on imaging, favor RCC rather than urothelial carcinoma in a young non-smoker.    -HTN---BP today very elevated.  Patient asymptomatic.  Likely related to anxiety of the diagnosis.  I encouraged follow-up and will copy his primary care physician to this note.    I spent 60 minutes with the patient of which more than half was spent in direct consultation with the patient in regards " to our treatment and plan.

## 2020-09-28 NOTE — LETTER
September 28, 2020      Alexandre Griffiths MD  [address]  [facility] [address]           [facility] - Urology 2nd Fl  [address]  [facility] [address]  Phone: [phone]          Patient: Venkat Jonas   MR Number: [id number]   YOB: 1965   Date of Visit: 9/28/2020       Dear Dr. Alexandre Griffiths:    Thank you for referring Venkat Jonas to me for evaluation. Attached you will find relevant portions of my assessment and plan of care.    If you have questions, please do not hesitate to call me. I look forward to following Venkat Jonas along with you.    Sincerely,    Vega Sears MD    Enclosure  CC:  No Recipients    If you would like to receive this communication electronically, please contact [email or web address] or [phone] to request more information on [facility] Link access.    For providers and/or their staff who would like to refer a patient to [facility], please contact us through our one-stop-shop provider referral line, [facility] , at [phone].    If you feel you have received this communication in error or would no longer like to receive these types of communications, please e-mail [email or web address]

## 2020-09-29 ENCOUNTER — TELEPHONE (OUTPATIENT)
Dept: UROLOGY | Facility: CLINIC | Age: 55
End: 2020-09-29

## 2020-09-29 ENCOUNTER — DOCUMENTATION ONLY (OUTPATIENT)
Dept: HEMATOLOGY/ONCOLOGY | Facility: CLINIC | Age: 55
End: 2020-09-29

## 2020-09-29 DIAGNOSIS — N28.89 LEFT RENAL MASS: Primary | ICD-10-CM

## 2020-09-29 LAB — BACTERIA UR CULT: NO GROWTH

## 2020-09-29 NOTE — PROGRESS NOTES
Received referral from Ayde Bowden RN, Navigator, re: patient requesting to speak with Oncology Social Worker. Called patient this afternoon and he asked if we could talk on tomorrow as he has a work meeting this afternoon. Provided him with my direct phone number, as well as my email address, as he also asked about emailing me the times he would be available tomorrow. I will call him at his available time to answer his questions and offer assistance going forward.

## 2020-09-30 ENCOUNTER — TELEPHONE (OUTPATIENT)
Dept: PSYCHIATRY | Facility: CLINIC | Age: 55
End: 2020-09-30

## 2020-09-30 ENCOUNTER — TELEPHONE (OUTPATIENT)
Dept: UROLOGY | Facility: CLINIC | Age: 55
End: 2020-09-30

## 2020-09-30 ENCOUNTER — ANESTHESIA EVENT (OUTPATIENT)
Dept: SURGERY | Facility: HOSPITAL | Age: 55
End: 2020-09-30
Payer: COMMERCIAL

## 2020-09-30 ENCOUNTER — TELEPHONE (OUTPATIENT)
Dept: INTERNAL MEDICINE | Facility: CLINIC | Age: 55
End: 2020-09-30

## 2020-09-30 ENCOUNTER — PATIENT MESSAGE (OUTPATIENT)
Dept: UROLOGY | Facility: CLINIC | Age: 55
End: 2020-09-30

## 2020-09-30 DIAGNOSIS — F51.02 ADJUSTMENT INSOMNIA: ICD-10-CM

## 2020-09-30 DIAGNOSIS — N28.89 LEFT RENAL MASS: Primary | ICD-10-CM

## 2020-09-30 DIAGNOSIS — Z01.818 PRE-OP TESTING: Primary | ICD-10-CM

## 2020-09-30 LAB — FINAL PATHOLOGIC DIAGNOSIS: ABNORMAL

## 2020-09-30 RX ORDER — ZOLPIDEM TARTRATE 10 MG/1
10 TABLET ORAL NIGHTLY PRN
Qty: 30 TABLET | Refills: 5 | Status: SHIPPED | OUTPATIENT
Start: 2020-09-30 | End: 2020-09-30 | Stop reason: SDUPTHER

## 2020-09-30 RX ORDER — ZOLPIDEM TARTRATE 10 MG/1
10 TABLET ORAL NIGHTLY PRN
Qty: 30 TABLET | Refills: 5 | Status: SHIPPED | OUTPATIENT
Start: 2020-09-30 | End: 2021-08-23 | Stop reason: SDUPTHER

## 2020-09-30 NOTE — PRE-PROCEDURE INSTRUCTIONS
Reviewed meds // instructions with patient. Had labs on 9/28 -- covid negative// messaged Dr. Griffiths for clearance for upcoming nephrectomy also messaged regarding  K+ 5.4  Pt. Is planning on flying to [place] for work at 3pm [facility]-- advised to discuss with Dr. Sears.

## 2020-09-30 NOTE — TELEPHONE ENCOUNTER
----- Message from Angela Rosario RN sent at 9/30/2020  3:37 PM CDT -----  Pt states scheduled for Nephrectomy 10/7 with Dr. Sears  Request medical clearance please  Please note labs done 9/28 K+ was 5.4

## 2020-09-30 NOTE — TELEPHONE ENCOUNTER
Pt seen recently.  Medically stable.  Recent labs OK.      Patient is medically stable and should be considered clear for anesthesia and surgery from a medical standpoint.

## 2020-09-30 NOTE — ANESTHESIA PAT ROS NOTE
09/30/2020  Venkat Jonas is a 55 y.o., male.      Pre-op Assessment          Review of Systems  Anesthesia Hx:  No problems with previous Anesthesia  History of prior surgery of interest to airway management or planning: Previous anesthesia: MAC  10/1/2020 Cysto with retrograde pyelogram with MAC.  Procedure performed at an [facility]. Denies Family Hx of Anesthesia complications.   Denies Personal Hx of Anesthesia complications.   Social:  Non-Smoker    Hematology/Oncology:  Hematology Normal   Oncology Normal     EENT/Dental:EENT/Dental Normal   Cardiovascular:   Denies MI.   Denies CABG/stent.   Denies Angina. hyperlipidemia  Functional Capacity good / => 4 METS  Hypertension    Pulmonary:   Asthma mild Denies Shortness of breath.    Renal/:   Left renal mass   Hepatic/GI:  Hepatic/GI Normal    Musculoskeletal:  Musculoskeletal Normal    Neurological:   Denies CVA. Denies Seizures.    Endocrine:   Denies Diabetes.    Dermatological:  Skin Normal    Psych:  Psychiatric Normal              Anesthesia Assessment: Preoperative EQUATION    Planned Procedure: Procedure(s) (LRB):  XI ROBOTIC NEPHRECTOMY (Left)  Requested Anesthesia Type:General  Surgeon: Vega Sears MD  Service: Urology  Known or anticipated Date of Surgery:10/7/2020    Surgeon notes: reviewed    Previous anesthesia records:GETA and No problems   12/2016 Colonoscopy  Airway/Jaw/Neck:  Airway Findings: Mouth Opening: Normal Tongue: Normal  General Airway Assessment: Adult  Mallampati: II  Improves to I with phonation.  TM Distance: Normal, at least 6 cm       Last PCP note: 6-12 months ago , within [facility]   Subspecialty notes: Urology    Other important co-morbidities: Per Epic: HLD and HTN      Tests already available:  Available tests,  within 1 month , within [facility] . 9/28/2020 CMP, CBC             Instructions given.  (See in Nurse's note)    Optimization:  Anesthesia Preop Clinic Assessment  Indicated.        Plan:    Testing:  EKG and T&S   Pre-anesthesia  visit       Visit focus: concerns in complex and/or prolonged anesthesia, position other than supine    Patient  has previously scheduled Medical Appointment: N/A    Navigation: Tests Scheduled.              Consults scheduled.             Results will be tracked by Preop Clinic.    10/5/2020  POC visit waived per Dr. Leopold and GOOD Heck RN.  Lab/EKG changed to AM of surgery.

## 2020-09-30 NOTE — TELEPHONE ENCOUNTER
Brief conversation to discuss coping resources. Patient will contact as needed.    IASTU Sorto, PhD

## 2020-09-30 NOTE — TELEPHONE ENCOUNTER
----- Message from Angela Rosario RN sent at 9/30/2020  3:48 PM CDT -----  We are repeating potassium level on arrival in am for vane

## 2020-10-01 ENCOUNTER — PATIENT MESSAGE (OUTPATIENT)
Dept: HEMATOLOGY/ONCOLOGY | Facility: CLINIC | Age: 55
End: 2020-10-01

## 2020-10-01 ENCOUNTER — HOSPITAL ENCOUNTER (OUTPATIENT)
Facility: HOSPITAL | Age: 55
Discharge: HOME | End: 2020-10-01
Attending: UROLOGY | Admitting: UROLOGY
Payer: COMMERCIAL

## 2020-10-01 ENCOUNTER — ANESTHESIA (OUTPATIENT)
Dept: SURGERY | Facility: HOSPITAL | Age: 55
End: 2020-10-01
Payer: COMMERCIAL

## 2020-10-01 VITALS
BODY MASS INDEX: 26.6 KG/M2 | HEIGHT: 71 IN | RESPIRATION RATE: 18 BRPM | HEART RATE: 75 BPM | SYSTOLIC BLOOD PRESSURE: 131 MMHG | DIASTOLIC BLOOD PRESSURE: 85 MMHG | TEMPERATURE: 98 F | OXYGEN SATURATION: 96 % | WEIGHT: 190 LBS

## 2020-10-01 DIAGNOSIS — Z01.818 PRE-OP TESTING: ICD-10-CM

## 2020-10-01 DIAGNOSIS — N28.89 LEFT RENAL MASS: Primary | ICD-10-CM

## 2020-10-01 DIAGNOSIS — N28.9 KIDNEY DISORDER: ICD-10-CM

## 2020-10-01 LAB — POTASSIUM SERPL-SCNC: 3.8 MMOL/L (ref 3.5–5.1)

## 2020-10-01 PROCEDURE — 63600175 PHARM REV CODE 636 W HCPCS: Performed by: ANESTHESIOLOGY

## 2020-10-01 PROCEDURE — D9220A PRA ANESTHESIA: Mod: ANES,,, | Performed by: ANESTHESIOLOGY

## 2020-10-01 PROCEDURE — 71000044 HC DOSC ROUTINE RECOVERY FIRST HOUR: Performed by: UROLOGY

## 2020-10-01 PROCEDURE — 71000016 HC POSTOP RECOV ADDL HR: Performed by: UROLOGY

## 2020-10-01 PROCEDURE — 36000706: Performed by: UROLOGY

## 2020-10-01 PROCEDURE — 63600175 PHARM REV CODE 636 W HCPCS: Performed by: STUDENT IN AN ORGANIZED HEALTH CARE EDUCATION/TRAINING PROGRAM

## 2020-10-01 PROCEDURE — 63600175 PHARM REV CODE 636 W HCPCS

## 2020-10-01 PROCEDURE — C1758 CATHETER, URETERAL: HCPCS | Performed by: UROLOGY

## 2020-10-01 PROCEDURE — 63600175 PHARM REV CODE 636 W HCPCS: Performed by: NURSE ANESTHETIST, CERTIFIED REGISTERED

## 2020-10-01 PROCEDURE — D9220A PRA ANESTHESIA: ICD-10-PCS | Mod: ANES,,, | Performed by: ANESTHESIOLOGY

## 2020-10-01 PROCEDURE — 74420 UROGRAPHY RTRGR +-KUB: CPT | Mod: 26,,, | Performed by: UROLOGY

## 2020-10-01 PROCEDURE — 37000008 HC ANESTHESIA 1ST 15 MINUTES: Performed by: UROLOGY

## 2020-10-01 PROCEDURE — 84132 ASSAY OF SERUM POTASSIUM: CPT

## 2020-10-01 PROCEDURE — 71000015 HC POSTOP RECOV 1ST HR: Performed by: UROLOGY

## 2020-10-01 PROCEDURE — D9220A PRA ANESTHESIA: ICD-10-PCS | Mod: CRNA,,, | Performed by: NURSE ANESTHETIST, CERTIFIED REGISTERED

## 2020-10-01 PROCEDURE — 36000707: Performed by: UROLOGY

## 2020-10-01 PROCEDURE — 52005 CYSTO W/URTRL CATHJ: CPT | Mod: ,,, | Performed by: UROLOGY

## 2020-10-01 PROCEDURE — 74420 PR  X-RAY RETROGRADE PYELOGRAM: ICD-10-PCS | Mod: 26,,, | Performed by: UROLOGY

## 2020-10-01 PROCEDURE — D9220A PRA ANESTHESIA: Mod: CRNA,,, | Performed by: NURSE ANESTHETIST, CERTIFIED REGISTERED

## 2020-10-01 PROCEDURE — C1769 GUIDE WIRE: HCPCS | Performed by: UROLOGY

## 2020-10-01 PROCEDURE — 37000009 HC ANESTHESIA EA ADD 15 MINS: Performed by: UROLOGY

## 2020-10-01 PROCEDURE — 25500020 PHARM REV CODE 255: Performed by: UROLOGY

## 2020-10-01 PROCEDURE — 52005 PR CYSTOURETHROSCOPY,URETER CATHETER: ICD-10-PCS | Mod: ,,, | Performed by: UROLOGY

## 2020-10-01 PROCEDURE — 25000003 PHARM REV CODE 250: Performed by: STUDENT IN AN ORGANIZED HEALTH CARE EDUCATION/TRAINING PROGRAM

## 2020-10-01 RX ORDER — PROPOFOL 10 MG/ML
VIAL (ML) INTRAVENOUS
Status: DISCONTINUED | OUTPATIENT
Start: 2020-10-01 | End: 2020-10-01

## 2020-10-01 RX ORDER — ONDANSETRON 2 MG/ML
INJECTION INTRAMUSCULAR; INTRAVENOUS
Status: DISCONTINUED | OUTPATIENT
Start: 2020-10-01 | End: 2020-10-01

## 2020-10-01 RX ORDER — OXYCODONE AND ACETAMINOPHEN 5; 325 MG/1; MG/1
1 TABLET ORAL EVERY 4 HOURS PRN
Qty: 18 TABLET | Refills: 0 | Status: SHIPPED | OUTPATIENT
Start: 2020-10-01 | End: 2020-10-04

## 2020-10-01 RX ORDER — ONDANSETRON 2 MG/ML
4 INJECTION INTRAMUSCULAR; INTRAVENOUS ONCE AS NEEDED
Status: DISCONTINUED | OUTPATIENT
Start: 2020-10-01 | End: 2020-10-01 | Stop reason: HOSPADM

## 2020-10-01 RX ORDER — KETOROLAC TROMETHAMINE 30 MG/ML
30 INJECTION, SOLUTION INTRAMUSCULAR; INTRAVENOUS ONCE
Status: COMPLETED | OUTPATIENT
Start: 2020-10-01 | End: 2020-10-01

## 2020-10-01 RX ORDER — LIDOCAINE HYDROCHLORIDE 20 MG/ML
INJECTION INTRAVENOUS
Status: DISCONTINUED | OUTPATIENT
Start: 2020-10-01 | End: 2020-10-01

## 2020-10-01 RX ORDER — TAMSULOSIN HYDROCHLORIDE 0.4 MG/1
0.4 CAPSULE ORAL DAILY
Qty: 30 CAPSULE | Refills: 0 | Status: SHIPPED | OUTPATIENT
Start: 2020-10-01 | End: 2020-10-21 | Stop reason: SDUPTHER

## 2020-10-01 RX ORDER — KETOROLAC TROMETHAMINE 30 MG/ML
INJECTION, SOLUTION INTRAMUSCULAR; INTRAVENOUS
Status: COMPLETED
Start: 2020-10-01 | End: 2020-10-01

## 2020-10-01 RX ORDER — PROPOFOL 10 MG/ML
VIAL (ML) INTRAVENOUS CONTINUOUS PRN
Status: DISCONTINUED | OUTPATIENT
Start: 2020-10-01 | End: 2020-10-01

## 2020-10-01 RX ORDER — FENTANYL CITRATE 50 UG/ML
INJECTION, SOLUTION INTRAMUSCULAR; INTRAVENOUS
Status: COMPLETED
Start: 2020-10-01 | End: 2020-10-01

## 2020-10-01 RX ORDER — CEFAZOLIN SODIUM 1 G/3ML
2 INJECTION, POWDER, FOR SOLUTION INTRAMUSCULAR; INTRAVENOUS
Status: COMPLETED | OUTPATIENT
Start: 2020-10-01 | End: 2020-10-01

## 2020-10-01 RX ORDER — FENTANYL CITRATE 50 UG/ML
25 INJECTION, SOLUTION INTRAMUSCULAR; INTRAVENOUS EVERY 5 MIN PRN
Status: DISCONTINUED | OUTPATIENT
Start: 2020-10-01 | End: 2020-10-01 | Stop reason: HOSPADM

## 2020-10-01 RX ORDER — SENNOSIDES 8.6 MG/1
8.6 TABLET ORAL ONCE
Status: DISCONTINUED | OUTPATIENT
Start: 2020-10-01 | End: 2020-10-01 | Stop reason: HOSPADM

## 2020-10-01 RX ORDER — FENTANYL CITRATE 50 UG/ML
25 INJECTION, SOLUTION INTRAMUSCULAR; INTRAVENOUS EVERY 5 MIN PRN
Status: COMPLETED | OUTPATIENT
Start: 2020-10-01 | End: 2020-10-01

## 2020-10-01 RX ORDER — SODIUM CHLORIDE 9 MG/ML
INJECTION, SOLUTION INTRAVENOUS CONTINUOUS
Status: DISCONTINUED | OUTPATIENT
Start: 2020-10-01 | End: 2020-10-01 | Stop reason: HOSPADM

## 2020-10-01 RX ORDER — MIDAZOLAM HYDROCHLORIDE 1 MG/ML
INJECTION, SOLUTION INTRAMUSCULAR; INTRAVENOUS
Status: DISCONTINUED | OUTPATIENT
Start: 2020-10-01 | End: 2020-10-01

## 2020-10-01 RX ORDER — LIDOCAINE HYDROCHLORIDE 10 MG/ML
1 INJECTION, SOLUTION EPIDURAL; INFILTRATION; INTRACAUDAL; PERINEURAL ONCE
Status: DISCONTINUED | OUTPATIENT
Start: 2020-10-01 | End: 2020-10-01 | Stop reason: HOSPADM

## 2020-10-01 RX ORDER — SODIUM CHLORIDE 0.9 % (FLUSH) 0.9 %
3 SYRINGE (ML) INJECTION
Status: DISCONTINUED | OUTPATIENT
Start: 2020-10-01 | End: 2020-10-01 | Stop reason: HOSPADM

## 2020-10-01 RX ORDER — DEXAMETHASONE SODIUM PHOSPHATE 4 MG/ML
INJECTION, SOLUTION INTRA-ARTICULAR; INTRALESIONAL; INTRAMUSCULAR; INTRAVENOUS; SOFT TISSUE
Status: DISCONTINUED | OUTPATIENT
Start: 2020-10-01 | End: 2020-10-01

## 2020-10-01 RX ORDER — DOCUSATE SODIUM 100 MG/1
100 CAPSULE, LIQUID FILLED ORAL 2 TIMES DAILY
Qty: 60 CAPSULE | Refills: 0 | Status: SHIPPED | OUTPATIENT
Start: 2020-10-01 | End: 2020-10-31

## 2020-10-01 RX ADMIN — ONDANSETRON 4 MG: 2 INJECTION, SOLUTION INTRAMUSCULAR; INTRAVENOUS at 06:10

## 2020-10-01 RX ADMIN — FENTANYL CITRATE 25 MCG: 50 INJECTION, SOLUTION INTRAMUSCULAR; INTRAVENOUS at 08:10

## 2020-10-01 RX ADMIN — DEXAMETHASONE SODIUM PHOSPHATE 8 MG: 4 INJECTION, SOLUTION INTRAMUSCULAR; INTRAVENOUS at 07:10

## 2020-10-01 RX ADMIN — KETOROLAC TROMETHAMINE 30 MG: 30 INJECTION, SOLUTION INTRAMUSCULAR; INTRAVENOUS at 10:10

## 2020-10-01 RX ADMIN — FENTANYL CITRATE 25 MCG: 50 INJECTION INTRAMUSCULAR; INTRAVENOUS at 06:10

## 2020-10-01 RX ADMIN — PROPOFOL 150 MCG/KG/MIN: 10 INJECTION, EMULSION INTRAVENOUS at 07:10

## 2020-10-01 RX ADMIN — SODIUM CHLORIDE: 0.9 INJECTION, SOLUTION INTRAVENOUS at 06:10

## 2020-10-01 RX ADMIN — KETOROLAC TROMETHAMINE 30 MG: 30 INJECTION, SOLUTION INTRAMUSCULAR at 10:10

## 2020-10-01 RX ADMIN — MIDAZOLAM HYDROCHLORIDE 2 MG: 1 INJECTION, SOLUTION INTRAMUSCULAR; INTRAVENOUS at 06:10

## 2020-10-01 RX ADMIN — PROPOFOL 30 MG: 10 INJECTION, EMULSION INTRAVENOUS at 07:10

## 2020-10-01 RX ADMIN — PROPOFOL 20 MG: 10 INJECTION, EMULSION INTRAVENOUS at 07:10

## 2020-10-01 RX ADMIN — FENTANYL CITRATE 25 MCG: 50 INJECTION INTRAMUSCULAR; INTRAVENOUS at 08:10

## 2020-10-01 RX ADMIN — CEFAZOLIN 2 G: 330 INJECTION, POWDER, FOR SOLUTION INTRAMUSCULAR; INTRAVENOUS at 07:10

## 2020-10-01 RX ADMIN — PROPOFOL 50 MG: 10 INJECTION, EMULSION INTRAVENOUS at 07:10

## 2020-10-01 RX ADMIN — LIDOCAINE HYDROCHLORIDE 50 MG: 20 INJECTION, SOLUTION INTRAVENOUS at 07:10

## 2020-10-01 NOTE — ANESTHESIA POSTPROCEDURE EVALUATION
Anesthesia Post Evaluation    Patient: Venkat Jonas    Procedure(s) Performed: Procedure(s) (LRB):  CYSTOSCOPY, WITH RETROGRADE PYELOGRAM (Left)    Final Anesthesia Type: general    Patient location during evaluation: PACU  Patient participation: Yes- Able to Participate  Level of consciousness: awake and alert and oriented  Post-procedure vital signs: reviewed and stable  Pain management: adequate  Airway patency: patent    PONV status at discharge: No PONV  Anesthetic complications: no      Cardiovascular status: blood pressure returned to baseline and hemodynamically stable  Respiratory status: unassisted, spontaneous ventilation and room air  Hydration status: euvolemic  Follow-up not needed.          Vitals Value Taken Time   /87 10/01/20 0831   Temp 36.7 °C (98.1 °F) 10/01/20 0728   Pulse 58 10/01/20 0831   Resp 29 10/01/20 0831   SpO2 97 % 10/01/20 0831   Vitals shown include unvalidated device data.      No case tracking events are documented in the log.      Pain/Laura Score: Pain Rating Prior to Med Admin: 6 (10/1/2020  8:04 AM)  Laura Score: 10 (10/1/2020  8:00 AM)

## 2020-10-01 NOTE — DISCHARGE SUMMARY
[facility]  Discharge Note  Short Stay    Admit Date: 10/1/2020    Discharge Date and Time: 10/01/2020 7:38 AM      Attending Physician: Vega Sears MD     Discharge Provider: Lolis Oliva MD    Diagnoses:  Active Hospital Problems    Diagnosis  POA    *Left renal mass [N28.89]  Yes      Resolved Hospital Problems   No resolved problems to display.       Discharged Condition: stable    Hospital Course: Patient was admitted for cystoscopy and left RPG  and tolerated the procedure well with no complications. He was discharged home in good condition on the same day.       Final Diagnoses: Same as principal problem.    Disposition: Home or Self Care    Follow up/Patient Instructions:    Medications:  Reconciled Home Medications:   Current Discharge Medication List      START taking these medications    Details   docusate sodium (COLACE) 50 MG capsule Take 1 capsule (50 mg total) by mouth 2 (two) times daily as needed for Constipation.  Qty: 30 capsule, Refills: 0      tamsulosin (FLOMAX) 0.4 mg Cap Take 1 capsule (0.4 mg total) by mouth once daily.  Qty: 30 capsule, Refills: 0         CONTINUE these medications which have NOT CHANGED    Details   aspirin 81 MG Chew Take 81 mg by mouth once daily.      ezetimibe (ZETIA) 10 mg tablet Take 1 tablet by mouth daily  Qty: 90 tablet, Refills: 3      !! oxyCODONE-acetaminophen (PERCOCET) 5-325 mg per tablet   1 tabs, Oral, q4hr, as needed for pain, prn acute pain not to exceed 6 tablets/day, X 3 days, # 18 tabs, 0 Refill(s), Pharmacy: [id number]    Comments: Quantity prescribed more than 7 day supply? Press F2 and select one:[id number]        pantoprazole (PROTONIX) 40 MG tablet TAKE ONE TABLET BY MOUTH TWICE A DAY ON AN EMPTY STOMACH  Qty: 60 tablet, Refills: 3      rosuvastatin (CRESTOR) 40 MG Tab Take 1 tablet (40 mg total) by mouth every evening.  Qty: 90 tablet, Refills: 3      valsartan-hydrochlorothiazide (DIOVAN-HCT) 160-12.5 mg per tablet  Take 1 tablet by mouth daily  Qty: 90 tablet, Refills: 3    Comments: .      zolpidem (AMBIEN) 10 mg Tab Take 1 tablet (10 mg total) by mouth nightly as needed.  Qty: 30 tablet, Refills: 5    Associated Diagnoses: Adjustment insomnia      albuterol (PROVENTIL/VENTOLIN HFA) 90 mcg/actuation inhaler Inhale 2 puffs into the lungs every 4 (four) hours as needed.  Qty: 18 g, Refills: 3    Associated Diagnoses: Bronchospasm, exercise-induced      azelastine (OPTIVAR) 0.05 % ophthalmic solution Place 1 drop into both eyes 2 (two) times daily.  Qty: 18 mL, Refills: 3    Associated Diagnoses: Allergic rhinitis, unspecified allergic rhinitis trigger, unspecified rhinitis seasonality      dextromethorphan-guaifenesin (MUCINEX DM) 60-1,200 mg TM12 Take 1 tablet by mouth every 12 (twelve) hours.  Qty: 28 tablet, Refills: 1      fluticasone (FLONASE) 50 mcg/actuation nasal spray 2 sprays by Each Nare route once daily.  Qty: 48 g, Refills: 4      !! oxyCODONE-acetaminophen (PERCOCET) 5-325 mg per tablet Take 1 tablet by mouth every 4 (four) hours as needed for Pain.  Qty: 20 tablet, Refills: 0    Comments: Quantity prescribed more than 7 day supply? No      SYMBICORT 80-4.5 mcg/actuation HFAA Inhale 2 puffs into the lungs 2 (two) times daily.  Qty: 30.6 g, Refills: 3    Associated Diagnoses: Asthma, mild intermittent, well-controlled      varicella-zoster [id number], PF, (SHINGRIX) 50 mcg/0.5 mL injection Inject into the muscle as directed  Qty: 0.5 mL, Refills: 0       !! - Potential duplicate medications found. Please discuss with provider.        Discharge Procedure Orders   Notify your health care provider if you experience any of the following:  persistent nausea and vomiting or diarrhea     Notify your health care provider if you experience any of the following:  severe uncontrolled pain     Notify your health care provider if you experience any of the following:  temperature >100.4     Activity as tolerated

## 2020-10-01 NOTE — DISCHARGE INSTRUCTIONS
Post Cystoscopy Instructions  Do not strain to have a bowel movement  No strenuous exercise x 7 days  No driving while you are on narcotic pain medications or if your pina  catheter is in place    You can expect:  To pass stone fragments if you had a stone procedure  Have pain when you void from your stent if you have a stent in place  See blood in your urine if you have a stent in place    If you have a catheter, please return to the ER if your catheter stops draining or you are having abdominal pain.    Call the doctor if:   Temperature is greater than 101F   Persistent vomiting and inability to keep food down   Inability to void if you do not have a catheter

## 2020-10-01 NOTE — ANESTHESIA PREPROCEDURE EVALUATION
10/01/2020  Venkat Jonas is a 55 y.o., male.    Anesthesia Evaluation    I have reviewed the Patient Summary Reports.     I have reviewed the Nursing Notes. I have reviewed the NPO Status.   I have reviewed the Medications.     Review of Systems  Anesthesia Hx:  No problems with previous Anesthesia Denies Hx of Anesthetic complications  History of prior surgery of interest to airway management or planning:  Denies Personal Hx of Anesthesia complications.   Social:  Non-Smoker    Hematology/Oncology:  Hematology Normal        EENT/Dental:EENT/Dental Normal   Cardiovascular:   Hypertension  Denies Angina. hyperlipidemia        Pulmonary:   Asthma mild Denies Shortness of breath.    Renal/:   Left renal mass   Hepatic/GI:  Hepatic/GI Normal    Musculoskeletal:  Musculoskeletal Normal    Neurological:  Neurology Normal    Endocrine:  Endocrine Normal    Dermatological:  Skin Normal    Psych:  Psychiatric Normal           Physical Exam  General:  Well nourished    Airway/Jaw/Neck:  Airway Findings: Mouth Opening: Normal Tongue: Normal  General Airway Assessment: Adult  Mallampati: II  TM Distance: Normal, at least 6 cm  Jaw/Neck Findings:  Neck ROM: Normal ROM      Dental:  Dental Findings: In tact   Chest/Lungs:  Chest/Lungs Findings: Clear to auscultation, Normal Respiratory Rate     Heart/Vascular:  Heart Findings: Rate: Normal  Rhythm: Regular Rhythm  Sounds: Normal        Mental Status:  Mental Status Findings:  Cooperative, Alert and Oriented         Anesthesia Plan  Type of Anesthesia, risks & benefits discussed:  Anesthesia Type:  general, MAC  Patient's Preference:   Intra-op Monitoring Plan: standard ASA monitors  Intra-op Monitoring Plan Comments:   Post Op Pain Control Plan: multimodal analgesia  Post Op Pain Control Plan Comments:   Induction:   IV  Beta Blocker:  Patient is not currently on  a Beta-Blocker (No further documentation required).       Informed Consent: Patient understands risks and agrees with Anesthesia plan.  Questions answered. Anesthesia consent signed with patient.  ASA Score: 2     Day of Surgery Review of History & Physical:    H&P update referred to the surgeon.         Ready For Surgery From Anesthesia Perspective.

## 2020-10-01 NOTE — TELEPHONE ENCOUNTER
----- Message from Angela Rosario RN sent at 10/1/2020  9:16 AM CDT -----  Potassium 3.8 this am

## 2020-10-01 NOTE — TRANSFER OF CARE
"Anesthesia Transfer of Care Note    Patient: Venkat Jonas    Procedure(s) Performed: Procedure(s) (LRB):  CYSTOSCOPY, WITH RETROGRADE PYELOGRAM (Left)    Patient location: PACU    Anesthesia Type: MAC    Transport from OR: Transported from OR on 6-10 L/min O2 by face mask with adequate spontaneous ventilation    Post pain: adequate analgesia    Post assessment: no apparent anesthetic complications and tolerated procedure well    Post vital signs: stable    Level of consciousness: sedated    Nausea/Vomiting: no nausea/vomiting    Complications: none    Transfer of care protocol was followed      Last vitals:   Visit Vitals  BP (!) 151/88   Pulse 82   Temp 36.5 °C (97.7 °F) (Skin)   Resp 16   Ht 5' 11" (1.803 m)   Wt 86.2 kg (190 lb)   SpO2 97%   BMI 26.50 kg/m²     "

## 2020-10-01 NOTE — PLAN OF CARE
Discharge instructions given to and explained to patient and his spouse. All questions answered and pt and family member verbalized understanding. IV discontinued with cannula intact. Vital signs stable and pt AOx4. Discharge prescriptions delivered by [facility].

## 2020-10-01 NOTE — PROGRESS NOTES
Pt complaining of pain and asking for pain medicine, No consents are done yet. Paged Dr. Sears resident, awaiting call back. Dr. Ayala is not here yet, no one from [facility] available to do consent at this time, when they arrive will come down to get one.

## 2020-10-01 NOTE — INTERVAL H&P NOTE
The patient has been examined and the H&P has been reviewed:    I concur with the findings and no changes have occurred since H&P was written.    Surgery risks, benefits and alternative options discussed and understood by patient/family.    UA negative for all components except blood.       Active Hospital Problems    Diagnosis  POA    Left renal mass [N28.89]  Yes      Resolved Hospital Problems   No resolved problems to display.

## 2020-10-01 NOTE — OP NOTE
[facility]  Operative Note    Date: 10/01/2020    Pre-Op Diagnosis: left renal mass    Patient Active Problem List    Diagnosis Date Noted    Left renal mass 09/28/2020    AK (actinic keratosis) 09/05/2018    Agatston CAC score 100-199 01/20/2017    Essential hypertension 10/26/2016    AR (allergic rhinitis) 02/19/2013    Sinusitis 02/19/2013    Verruca plantaris 10/25/2012    Hyperlipidemia 10/03/2012       Post-Op Diagnosis: same    Procedure(s) Performed:   1.  Cystoscopy with left retrograde pyelogram  2.  Fluoroscopy < 1 hour  3.  Intra-operative interpretation of radiographic images    Specimen(s): none    Staff Surgeon: MD Orion    Assistant Surgeon: Lolis Oliva MD    Anesthesia: Monitored Local Anesthesia with Sedation    Indications: Venkat Jonas is a 55 y.o. male with left renal mass.    Findings:   1. Normal cystourethroscopy  2. Left RPG showing no hydronephrosis or filling defects. Left ureter of normal course and caliber.     Estimated Blood Loss: min    Drains: none    Procedure in Detail:  After risks, benefits and possible complications of cystoscopy were explained, the patient elected to undergo the procedure and informed consent was obtained. All questions were answered in the mauro-operative area. The patient was transferred to the cystoscopy suite and placed in the supine position.  SCDs were applied and working.  MAC anesthesia was administered.  Once adequately sedated, the patient was placed in the dorsal lithotomy position and prepped and draped in the usual sterile fashion.  Time out was performed, mauro-procedural antibiotics were confirmed.     A rigid cystoscope in a 22 Fr sheath was introduced into the bladder per urethra. This passed easily. The entire urethra was visualized which showed no masses or strictures.  The right and left ureteral orifices were identified in the normal anatomic position.  Formal cystoscopy was performed which revealed no  masses or lesions suspicious for malignancy, no trabeculations, no bladder stones and no bladder diverticula.     The left UO was identified and cannulated with a 5 Fr open-ended ureteral catheter. Using fluoroscopy, a RPG was performed which showed the above findings.   The bladder was drained, and the patient was removed from lithotomy.     The patient tolerated the procedure well and was transferred to recovery in stable condition.    Disposition:  The patient will follow up with Dr. Sears next week for a robotic nephrectomy for the left renal mass.    Lolis Oliva MD

## 2020-10-02 ENCOUNTER — PATIENT MESSAGE (OUTPATIENT)
Dept: UROLOGY | Facility: CLINIC | Age: 55
End: 2020-10-02

## 2020-10-06 ENCOUNTER — ANESTHESIA EVENT (OUTPATIENT)
Dept: SURGERY | Facility: HOSPITAL | Age: 55
End: 2020-10-06
Payer: COMMERCIAL

## 2020-10-06 NOTE — ANESTHESIA PREPROCEDURE EVALUATION
[facility]  Anesthesia Pre-Operative Evaluation       Patient Name: Venkat Jonas  YOB: 1965  MRN: [id number]  [facility]: [id number]      Code Status: No Order   Date of Procedure: 10/7/2020  Anesthesia: General Procedure: Procedure(s) (LRB):  XI ROBOTIC NEPHRECTOMY (Left)  Pre-Operative Diagnosis: Left renal mass [N28.89]  Proceduralist: Surgeon(s) and Role:     * Vega Sears MD - Primary        SUBJECTIVE:   Venkat Jonas is a 55 y.o. male who  has a past medical history of Allergic sinusitis, Exercise-induced asthma, Hyperlipidemia, and Hypertension.  Previous anesthesia: MAC  10/1/2020 Cysto with retrograde pyelogram with MAC.  Procedure performed at an [facility]. Denies Family Hx of Anesthesia complications.   Denies Personal Hx of Anesthesia complications.   Revised cardiac risk index (RCRI) score is 0.    he has a current medication list which includes the following long-term medication(s): aspirin, azelastine, ezetimibe, fluticasone propionate, pantoprazole, rosuvastatin, symbicort, tamsulosin, valsartan-hydrochlorothiazide, and zolpidem.     ALLERGIES:   Review of patient's allergies indicates:  No Known Allergies  LDA:      Lines/Drains/Airways     None                Anesthesia Evaluation      No history of anesthetic complications   Airway   Mallampati: II  TM distance: Normal, at least 6 cm  Neck ROM: Normal ROM  Dental    (+) In tact    Pulmonary    (+) asthma,   (-) shortness of breath  Cardiovascular   (+) hypertension,   (-) angina, IRWIN    Rate: Normal    Neuro/Psych      GI/Hepatic/Renal      Endo/Other    Abdominal                     MEDICATIONS:     Antibiotics (From admission, onward)    None        VTE Risk Mitigation (From admission, onward)    None        No current facility-administered medications for this encounter.      Current Outpatient Medications   Medication Sig Dispense Refill    albuterol  (PROVENTIL/VENTOLIN HFA) 90 mcg/actuation inhaler Inhale 2 puffs into the lungs every 4 (four) hours as needed. 18 g 3    aspirin 81 MG Chew Take 81 mg by mouth once daily.      azelastine (OPTIVAR) 0.05 % ophthalmic solution Place 1 drop into both eyes 2 (two) times daily. (Patient taking differently: Place 1 drop into both eyes 2 (two) times daily. prn) 18 mL 3    dextromethorphan-guaifenesin (MUCINEX DM) 60-1,200 mg TM12 Take 1 tablet by mouth every 12 (twelve) hours. (Patient taking differently: Take 1 tablet by mouth every 12 (twelve) hours. Not using at present) 28 tablet 1    docusate sodium (COLACE) 100 MG capsule Take 1 capsule (100 mg total) by mouth 2 (two) times daily. 60 capsule 0    ezetimibe (ZETIA) 10 mg tablet Take 1 tablet by mouth daily 90 tablet 3    fluticasone (FLONASE) 50 mcg/actuation nasal spray 2 sprays by Each Nare route once daily. (Patient taking differently: 2 sprays by Each Nostril route once daily. prn) 48 g 4    [START ON 11/28/2020] oxyCODONE-acetaminophen (PERCOCET) 5-325 mg per tablet Take 1 tablet by mouth every 4 (four) hours as needed for Pain. 20 tablet 0    pantoprazole (PROTONIX) 40 MG tablet TAKE ONE TABLET BY MOUTH TWICE A DAY ON AN EMPTY STOMACH 60 tablet 3    rosuvastatin (CRESTOR) 40 MG Tab Take 1 tablet (40 mg total) by mouth every evening. (Patient taking differently: Take 40 mg by mouth once daily. ) 90 tablet 3    SYMBICORT 80-4.5 mcg/actuation HFAA Inhale 2 puffs into the lungs 2 (two) times daily. (Patient taking differently: Inhale 2 puffs into the lungs 2 (two) times daily. prn) 30.6 g 3    tamsulosin (FLOMAX) 0.4 mg Cap Take 1 capsule (0.4 mg total) by mouth once daily. 30 capsule 0    valsartan-hydrochlorothiazide (DIOVAN-HCT) 160-12.5 mg per tablet Take 1 tablet by mouth daily 90 tablet 3    varicella-zoster gE-AS01B, PF, (SHINGRIX) 50 mcg/0.5 mL injection Inject into the muscle as directed 0.5 mL 0    zolpidem (AMBIEN) 10 mg Tab Take 1 tablet  (10 mg total) by mouth nightly as needed. 30 tablet 5          History:   There are no hospital problems to display for this patient.    Surgical History:    has a past surgical history that includes Sinus surgery; Colonoscopy (N/A, 12/5/2016); and Cystoscopy w/ retrogrades (Left, 10/1/2020).   Social History:    has no history on file for sexual activity.  reports that he has never smoked. He has never used smokeless tobacco. He reports current alcohol use of about 4.0 standard drinks of alcohol per week. He reports that he does not use drugs.     OBJECTIVE:     Vital Signs (Most Recent):    Vital Signs Range (Last 24H):          There is no height or weight on file to calculate BMI.   Wt Readings from Last 4 Encounters:   10/01/20 86.2 kg (190 lb)   02/12/20 86.2 kg (190 lb)   11/26/19 88 kg (194 lb)   10/31/18 90.4 kg (199 lb 4.7 oz)       Significant Labs:  Lab Results   Component Value Date    WBC 6.95 09/28/2020    HGB 13.9 (L) 09/28/2020    HCT 45.7 09/28/2020     09/28/2020     09/28/2020    K 3.8 10/01/2020     09/28/2020    CREATININE 1.1 09/28/2020    BUN 12 09/28/2020    CO2 30 (H) 09/28/2020    GLU 98 09/28/2020    CALCIUM 9.8 09/28/2020    ALKPHOS 104 09/28/2020    ALT 24 09/28/2020    AST 38 09/28/2020    ALBUMIN 3.6 09/28/2020    HGBA1C 5.2 05/24/2019     Recent Results (from the past 72 hour(s))   COVID-19 Routine Screening    Collection Time: 10/05/20  8:30 AM   Result Value Ref Range    SARS-CoV2 (COVID-19) Qualitative PCR Not Detected Not Detected     EKG:   No results found for this or any previous visit.    TTE:  No results found for this or any previous visit.     Stress Test  EKG Conclusions:     1. The EKG portion of this study is negative for ischemia at a high workload, and peak heart rate of 171 bpm (100% of predicted).   2. Exercise capacity is excellent.   3. Blood pressure response to exercise was hypertensive (Presenting BP: 154/92 Peak BP: 215/89).   4. No  significant arrhythmias were present.   5. There were no symptoms of chest discomfort or significant dyspnea throughout the protocol.   6. The Duke treadmill score was 12 suggesting a low probability for future cardiovascular events.     This document has been electronically    SIGNED BY: Robin Alas MD On: 09/08/2014     Impression:     8.5 cm left upper pole renal solid mass which abuts the spleen.  1.2 cm left lower pole hypodense renal mass.  Few surrounding subcentimeter lymph nodes.  These findings are concerning for malignant process.  There is mild dilatation of the left renal collecting system with subtle enhancement of the ureter wall as well as delayed excretion of contrast.  These findings could be related to a ureteral lesion or abnormal left renal function.     0.8 cm lucency within the right iliac bone which is nonspecific.     Electronically signed by resident: Elizabeth Dodson MD  Date:                                            09/28/2020  Time:                                           15:51    ASSESSMENT/PLAN:       Pre-op Assessment    I have reviewed the Patient Summary Reports.    I have reviewed the Nursing Notes. I have reviewed the NPO Status.   I have reviewed the Medications.     Review of Systems  Anesthesia Hx:  No problems with previous Anesthesia Denies Hx of Anesthetic complications  History of prior surgery of interest to airway management or planning:  Denies Personal Hx of Anesthesia complications.   Social:  Non-Smoker    Hematology/Oncology:  Hematology Normal        EENT/Dental:EENT/Dental Normal   Cardiovascular:   Hypertension  Denies Angina. hyperlipidemia        Pulmonary:   Asthma mild Denies Shortness of breath.    Renal/:   Left renal mass   Hepatic/GI:  Hepatic/GI Normal    Musculoskeletal:  Musculoskeletal Normal    Neurological:  Neurology Normal    Endocrine:  Endocrine Normal    Dermatological:  Skin Normal    Psych:  Psychiatric Normal           Physical  Exam  General:  Well nourished    Airway/Jaw/Neck:  Airway Findings: Mouth Opening: Normal Tongue: Normal  General Airway Assessment: Adult  Mallampati: II  TM Distance: Normal, at least 6 cm  Jaw/Neck Findings:  Neck ROM: Normal ROM      Dental:  Dental Findings: In tact   Chest/Lungs:  Chest/Lungs Findings: Clear to auscultation, Normal Respiratory Rate     Heart/Vascular:  Heart Findings: Rate: Normal  Rhythm: Regular Rhythm  Sounds: Normal        Mental Status:  Mental Status Findings:  Cooperative, Alert and Oriented         Anesthesia Plan  Type of Anesthesia, risks & benefits discussed:  Anesthesia Type:  general, MAC, regional  Patient's Preference:   Intra-op Monitoring Plan: standard ASA monitors  Intra-op Monitoring Plan Comments:   Post Op Pain Control Plan: multimodal analgesia  Post Op Pain Control Plan Comments:   Induction:   IV  Beta Blocker:  Patient is not currently on a Beta-Blocker (No further documentation required).       Informed Consent: Patient understands risks and agrees with Anesthesia plan.  Questions answered. Anesthesia consent signed with patient.  ASA Score: 2     Day of Surgery Review of History & Physical:    H&P update referred to the surgeon.         Ready For Surgery From Anesthesia Perspective.

## 2020-10-07 ENCOUNTER — HOSPITAL ENCOUNTER (OUTPATIENT)
Facility: HOSPITAL | Age: 55
LOS: 1 days | Discharge: HOME | End: 2020-10-08
Attending: UROLOGY | Admitting: UROLOGY
Payer: COMMERCIAL

## 2020-10-07 ENCOUNTER — ANESTHESIA (OUTPATIENT)
Dept: SURGERY | Facility: HOSPITAL | Age: 55
End: 2020-10-07
Payer: COMMERCIAL

## 2020-10-07 DIAGNOSIS — N28.89 LEFT RENAL MASS: Primary | ICD-10-CM

## 2020-10-07 DIAGNOSIS — I10 HYPERTENSION: ICD-10-CM

## 2020-10-07 DIAGNOSIS — Z01.818 PREOPERATIVE TESTING: ICD-10-CM

## 2020-10-07 LAB
ABO + RH BLD: NORMAL
ANION GAP SERPL CALC-SCNC: 9 MMOL/L (ref 8–16)
BASOPHILS # BLD AUTO: 0.02 K/UL (ref 0–0.2)
BASOPHILS NFR BLD: 0.2 % (ref 0–1.9)
BLD GP AB SCN CELLS X3 SERPL QL: NORMAL
BUN SERPL-MCNC: 16 MG/DL (ref 6–20)
CALCIUM SERPL-MCNC: 7.1 MG/DL (ref 8.7–10.5)
CHLORIDE SERPL-SCNC: 104 MMOL/L (ref 95–110)
CO2 SERPL-SCNC: 23 MMOL/L (ref 23–29)
CREAT SERPL-MCNC: 0.9 MG/DL (ref 0.5–1.4)
DIFFERENTIAL METHOD: ABNORMAL
EOSINOPHIL # BLD AUTO: 0.1 K/UL (ref 0–0.5)
EOSINOPHIL NFR BLD: 0.5 % (ref 0–8)
ERYTHROCYTE [DISTWIDTH] IN BLOOD BY AUTOMATED COUNT: 13.2 % (ref 11.5–14.5)
EST. GFR  (AFRICAN AMERICAN): >60 ML/MIN/1.73 M^2
EST. GFR  (NON AFRICAN AMERICAN): >60 ML/MIN/1.73 M^2
GLUCOSE SERPL-MCNC: 197 MG/DL (ref 70–110)
HCT VFR BLD AUTO: 35.1 % (ref 40–54)
HGB BLD-MCNC: 11.1 G/DL (ref 14–18)
IMM GRANULOCYTES # BLD AUTO: 0.04 K/UL (ref 0–0.04)
IMM GRANULOCYTES NFR BLD AUTO: 0.4 % (ref 0–0.5)
LYMPHOCYTES # BLD AUTO: 0.6 K/UL (ref 1–4.8)
LYMPHOCYTES NFR BLD: 6.8 % (ref 18–48)
MCH RBC QN AUTO: 28.5 PG (ref 27–31)
MCHC RBC AUTO-ENTMCNC: 31.6 G/DL (ref 32–36)
MCV RBC AUTO: 90 FL (ref 82–98)
MONOCYTES # BLD AUTO: 0.2 K/UL (ref 0.3–1)
MONOCYTES NFR BLD: 2.4 % (ref 4–15)
NEUTROPHILS # BLD AUTO: 8.4 K/UL (ref 1.8–7.7)
NEUTROPHILS NFR BLD: 89.7 % (ref 38–73)
NRBC BLD-RTO: 0 /100 WBC
PLATELET # BLD AUTO: 302 K/UL (ref 150–350)
PMV BLD AUTO: 8.9 FL (ref 9.2–12.9)
POTASSIUM SERPL-SCNC: 5.2 MMOL/L (ref 3.5–5.1)
RBC # BLD AUTO: 3.89 M/UL (ref 4.6–6.2)
SODIUM SERPL-SCNC: 136 MMOL/L (ref 136–145)
WBC # BLD AUTO: 9.41 K/UL (ref 3.9–12.7)

## 2020-10-07 PROCEDURE — 71000016 HC POSTOP RECOV ADDL HR: Performed by: UROLOGY

## 2020-10-07 PROCEDURE — 63600175 PHARM REV CODE 636 W HCPCS: Performed by: ANESTHESIOLOGY

## 2020-10-07 PROCEDURE — 36000713 HC OR TIME LEV V EA ADD 15 MIN: Performed by: UROLOGY

## 2020-10-07 PROCEDURE — 88305 TISSUE EXAM BY PATHOLOGIST: CPT | Mod: 26,,, | Performed by: PATHOLOGY

## 2020-10-07 PROCEDURE — 25000003 PHARM REV CODE 250: Performed by: STUDENT IN AN ORGANIZED HEALTH CARE EDUCATION/TRAINING PROGRAM

## 2020-10-07 PROCEDURE — 36000712 HC OR TIME LEV V 1ST 15 MIN: Performed by: UROLOGY

## 2020-10-07 PROCEDURE — 88305 TISSUE EXAM BY PATHOLOGIST: CPT | Performed by: PATHOLOGY

## 2020-10-07 PROCEDURE — 64461: ICD-10-PCS | Mod: 59,LT,, | Performed by: ANESTHESIOLOGY

## 2020-10-07 PROCEDURE — 36620 ARTERIAL: ICD-10-PCS | Mod: 59,,, | Performed by: ANESTHESIOLOGY

## 2020-10-07 PROCEDURE — 64461 PVB THORACIC SINGLE INJ SITE: CPT | Performed by: STUDENT IN AN ORGANIZED HEALTH CARE EDUCATION/TRAINING PROGRAM

## 2020-10-07 PROCEDURE — 88341 IMHCHEM/IMCYTCHM EA ADD ANTB: CPT | Mod: 59 | Performed by: PATHOLOGY

## 2020-10-07 PROCEDURE — 88307 TISSUE EXAM BY PATHOLOGIST: CPT | Performed by: PATHOLOGY

## 2020-10-07 PROCEDURE — D9220A PRA ANESTHESIA: Mod: ,,, | Performed by: ANESTHESIOLOGY

## 2020-10-07 PROCEDURE — 63600175 PHARM REV CODE 636 W HCPCS: Performed by: STUDENT IN AN ORGANIZED HEALTH CARE EDUCATION/TRAINING PROGRAM

## 2020-10-07 PROCEDURE — 88341 PR IHC OR ICC EACH ADD'L SINGLE ANTIBODY  STAINPR: ICD-10-PCS | Mod: 26,,, | Performed by: PATHOLOGY

## 2020-10-07 PROCEDURE — 88307 TISSUE EXAM BY PATHOLOGIST: CPT | Mod: 26,,, | Performed by: PATHOLOGY

## 2020-10-07 PROCEDURE — 88342 IMHCHEM/IMCYTCHM 1ST ANTB: CPT | Mod: 91 | Performed by: PATHOLOGY

## 2020-10-07 PROCEDURE — 50545 PR LAP, RADICAL NEPHRECTOMY: ICD-10-PCS | Mod: LT,,, | Performed by: UROLOGY

## 2020-10-07 PROCEDURE — D9220A PRA ANESTHESIA: ICD-10-PCS | Mod: ,,, | Performed by: ANESTHESIOLOGY

## 2020-10-07 PROCEDURE — 37000009 HC ANESTHESIA EA ADD 15 MINS: Performed by: UROLOGY

## 2020-10-07 PROCEDURE — 93010 ELECTROCARDIOGRAM REPORT: CPT | Mod: ,,, | Performed by: INTERNAL MEDICINE

## 2020-10-07 PROCEDURE — 88342 CHG IMMUNOCYTOCHEMISTRY: ICD-10-PCS | Mod: 26,,, | Performed by: PATHOLOGY

## 2020-10-07 PROCEDURE — 71000015 HC POSTOP RECOV 1ST HR: Performed by: UROLOGY

## 2020-10-07 PROCEDURE — 93005 ELECTROCARDIOGRAM TRACING: CPT

## 2020-10-07 PROCEDURE — 76942 ECHO GUIDE FOR BIOPSY: CPT | Performed by: STUDENT IN AN ORGANIZED HEALTH CARE EDUCATION/TRAINING PROGRAM

## 2020-10-07 PROCEDURE — 27201423 OPTIME MED/SURG SUP & DEVICES STERILE SUPPLY: Performed by: UROLOGY

## 2020-10-07 PROCEDURE — 94761 N-INVAS EAR/PLS OXIMETRY MLT: CPT

## 2020-10-07 PROCEDURE — 88342 IMHCHEM/IMCYTCHM 1ST ANTB: CPT | Performed by: PATHOLOGY

## 2020-10-07 PROCEDURE — 88307 PR  SURG PATH,LEVEL V: ICD-10-PCS | Mod: 26,,, | Performed by: PATHOLOGY

## 2020-10-07 PROCEDURE — 85025 COMPLETE CBC W/AUTO DIFF WBC: CPT

## 2020-10-07 PROCEDURE — 50545 LAPARO RADICAL NEPHRECTOMY: CPT | Mod: LT,,, | Performed by: UROLOGY

## 2020-10-07 PROCEDURE — 27200750 HC INSULATED NEEDLE/ STIMUPLEX: Performed by: STUDENT IN AN ORGANIZED HEALTH CARE EDUCATION/TRAINING PROGRAM

## 2020-10-07 PROCEDURE — 36415 COLL VENOUS BLD VENIPUNCTURE: CPT

## 2020-10-07 PROCEDURE — 64461 PVB THORACIC SINGLE INJ SITE: CPT | Mod: 59,LT,, | Performed by: ANESTHESIOLOGY

## 2020-10-07 PROCEDURE — 86850 RBC ANTIBODY SCREEN: CPT

## 2020-10-07 PROCEDURE — 88271 CYTOGENETICS DNA PROBE: CPT | Performed by: PATHOLOGY

## 2020-10-07 PROCEDURE — 80048 BASIC METABOLIC PNL TOTAL CA: CPT

## 2020-10-07 PROCEDURE — 71000033 HC RECOVERY, INTIAL HOUR: Performed by: UROLOGY

## 2020-10-07 PROCEDURE — 37000008 HC ANESTHESIA 1ST 15 MINUTES: Performed by: UROLOGY

## 2020-10-07 PROCEDURE — 36620 INSERTION CATHETER ARTERY: CPT | Mod: 59,,, | Performed by: ANESTHESIOLOGY

## 2020-10-07 PROCEDURE — 93010 EKG 12-LEAD: ICD-10-PCS | Mod: ,,, | Performed by: INTERNAL MEDICINE

## 2020-10-07 PROCEDURE — 25000003 PHARM REV CODE 250: Performed by: ANESTHESIOLOGY

## 2020-10-07 PROCEDURE — 88305 TISSUE EXAM BY PATHOLOGIST: ICD-10-PCS | Mod: 26,,, | Performed by: PATHOLOGY

## 2020-10-07 PROCEDURE — 27201037 HC PRESSURE MONITORING SET UP

## 2020-10-07 PROCEDURE — 88341 IMHCHEM/IMCYTCHM EA ADD ANTB: CPT | Mod: 26,,, | Performed by: PATHOLOGY

## 2020-10-07 PROCEDURE — 88342 IMHCHEM/IMCYTCHM 1ST ANTB: CPT | Mod: 26,,, | Performed by: PATHOLOGY

## 2020-10-07 RX ORDER — FENTANYL CITRATE 50 UG/ML
25 INJECTION, SOLUTION INTRAMUSCULAR; INTRAVENOUS EVERY 5 MIN PRN
Status: DISCONTINUED | OUTPATIENT
Start: 2020-10-07 | End: 2020-10-07 | Stop reason: HOSPADM

## 2020-10-07 RX ORDER — LIDOCAINE HYDROCHLORIDE 10 MG/ML
1 INJECTION, SOLUTION EPIDURAL; INFILTRATION; INTRACAUDAL; PERINEURAL ONCE
Status: COMPLETED | OUTPATIENT
Start: 2020-10-07 | End: 2020-10-07

## 2020-10-07 RX ORDER — SODIUM CHLORIDE 9 MG/ML
INJECTION, SOLUTION INTRAVENOUS CONTINUOUS
Status: ACTIVE | OUTPATIENT
Start: 2020-10-07 | End: 2020-10-08

## 2020-10-07 RX ORDER — KETOROLAC TROMETHAMINE 30 MG/ML
15 INJECTION, SOLUTION INTRAMUSCULAR; INTRAVENOUS EVERY 8 HOURS PRN
Status: DISCONTINUED | OUTPATIENT
Start: 2020-10-07 | End: 2020-10-07 | Stop reason: HOSPADM

## 2020-10-07 RX ORDER — OXYCODONE AND ACETAMINOPHEN 5; 325 MG/1; MG/1
1 TABLET ORAL
Status: DISCONTINUED | OUTPATIENT
Start: 2020-10-07 | End: 2020-10-07 | Stop reason: HOSPADM

## 2020-10-07 RX ORDER — DEXMEDETOMIDINE HYDROCHLORIDE 100 UG/ML
INJECTION, SOLUTION INTRAVENOUS
Status: DISCONTINUED | OUTPATIENT
Start: 2020-10-07 | End: 2020-10-07

## 2020-10-07 RX ORDER — CEFAZOLIN SODIUM 1 G/3ML
2 INJECTION, POWDER, FOR SOLUTION INTRAMUSCULAR; INTRAVENOUS
Status: COMPLETED | OUTPATIENT
Start: 2020-10-07 | End: 2020-10-07

## 2020-10-07 RX ORDER — ERYTHROMYCIN 5 MG/G
OINTMENT OPHTHALMIC EVERY 4 HOURS
Status: DISCONTINUED | OUTPATIENT
Start: 2020-10-07 | End: 2020-10-08 | Stop reason: HOSPADM

## 2020-10-07 RX ORDER — ONDANSETRON 2 MG/ML
INJECTION INTRAMUSCULAR; INTRAVENOUS
Status: DISCONTINUED | OUTPATIENT
Start: 2020-10-07 | End: 2020-10-07

## 2020-10-07 RX ORDER — ACETAMINOPHEN 500 MG
1000 TABLET ORAL
Status: COMPLETED | OUTPATIENT
Start: 2020-10-07 | End: 2020-10-07

## 2020-10-07 RX ORDER — LIDOCAINE HYDROCHLORIDE 20 MG/ML
INJECTION, SOLUTION EPIDURAL; INFILTRATION; INTRACAUDAL; PERINEURAL
Status: DISCONTINUED | OUTPATIENT
Start: 2020-10-07 | End: 2020-10-07

## 2020-10-07 RX ORDER — PREGABALIN 150 MG/1
150 CAPSULE ORAL NIGHTLY
Status: DISCONTINUED | OUTPATIENT
Start: 2020-10-07 | End: 2020-10-08 | Stop reason: HOSPADM

## 2020-10-07 RX ORDER — ONDANSETRON 2 MG/ML
8 INJECTION INTRAMUSCULAR; INTRAVENOUS EVERY 6 HOURS PRN
Status: DISCONTINUED | OUTPATIENT
Start: 2020-10-07 | End: 2020-10-08 | Stop reason: HOSPADM

## 2020-10-07 RX ORDER — NEOSTIGMINE METHYLSULFATE 0.5 MG/ML
INJECTION, SOLUTION INTRAVENOUS
Status: DISCONTINUED | OUTPATIENT
Start: 2020-10-07 | End: 2020-10-07

## 2020-10-07 RX ORDER — ROSUVASTATIN CALCIUM 10 MG/1
40 TABLET, COATED ORAL NIGHTLY
Status: DISCONTINUED | OUTPATIENT
Start: 2020-10-07 | End: 2020-10-08 | Stop reason: HOSPADM

## 2020-10-07 RX ORDER — HEPARIN SODIUM 5000 [USP'U]/ML
5000 INJECTION, SOLUTION INTRAVENOUS; SUBCUTANEOUS EVERY 8 HOURS
Status: DISCONTINUED | OUTPATIENT
Start: 2020-10-07 | End: 2020-10-07

## 2020-10-07 RX ORDER — ONDANSETRON 2 MG/ML
4 INJECTION INTRAMUSCULAR; INTRAVENOUS ONCE AS NEEDED
Status: DISCONTINUED | OUTPATIENT
Start: 2020-10-07 | End: 2020-10-07 | Stop reason: HOSPADM

## 2020-10-07 RX ORDER — OXYCODONE HYDROCHLORIDE 10 MG/1
10 TABLET ORAL EVERY 4 HOURS PRN
Status: DISCONTINUED | OUTPATIENT
Start: 2020-10-07 | End: 2020-10-08 | Stop reason: HOSPADM

## 2020-10-07 RX ORDER — PROPOFOL 10 MG/ML
VIAL (ML) INTRAVENOUS
Status: DISCONTINUED | OUTPATIENT
Start: 2020-10-07 | End: 2020-10-07

## 2020-10-07 RX ORDER — DEXAMETHASONE SODIUM PHOSPHATE 4 MG/ML
INJECTION, SOLUTION INTRA-ARTICULAR; INTRALESIONAL; INTRAMUSCULAR; INTRAVENOUS; SOFT TISSUE
Status: DISCONTINUED | OUTPATIENT
Start: 2020-10-07 | End: 2020-10-07

## 2020-10-07 RX ORDER — KETOROLAC TROMETHAMINE 30 MG/ML
15 INJECTION, SOLUTION INTRAMUSCULAR; INTRAVENOUS
Status: COMPLETED | OUTPATIENT
Start: 2020-10-07 | End: 2020-10-07

## 2020-10-07 RX ORDER — MIDAZOLAM HYDROCHLORIDE 1 MG/ML
0.5 INJECTION INTRAMUSCULAR; INTRAVENOUS
Status: DISCONTINUED | OUTPATIENT
Start: 2020-10-07 | End: 2020-10-07

## 2020-10-07 RX ORDER — KETAMINE HCL IN 0.9 % NACL 50 MG/5 ML
SYRINGE (ML) INTRAVENOUS
Status: DISCONTINUED | OUTPATIENT
Start: 2020-10-07 | End: 2020-10-07

## 2020-10-07 RX ORDER — ZOLPIDEM TARTRATE 5 MG/1
10 TABLET ORAL NIGHTLY PRN
Status: DISCONTINUED | OUTPATIENT
Start: 2020-10-07 | End: 2020-10-08 | Stop reason: HOSPADM

## 2020-10-07 RX ORDER — ENOXAPARIN SODIUM 100 MG/ML
40 INJECTION SUBCUTANEOUS EVERY 24 HOURS
Status: DISCONTINUED | OUTPATIENT
Start: 2020-10-07 | End: 2020-10-08 | Stop reason: HOSPADM

## 2020-10-07 RX ORDER — ROCURONIUM BROMIDE 10 MG/ML
INJECTION, SOLUTION INTRAVENOUS
Status: DISCONTINUED | OUTPATIENT
Start: 2020-10-07 | End: 2020-10-07

## 2020-10-07 RX ORDER — PHENYLEPHRINE HCL IN 0.9% NACL 1 MG/10 ML
SYRINGE (ML) INTRAVENOUS
Status: DISCONTINUED | OUTPATIENT
Start: 2020-10-07 | End: 2020-10-07

## 2020-10-07 RX ORDER — HALOPERIDOL 5 MG/ML
0.5 INJECTION INTRAMUSCULAR
Status: DISCONTINUED | OUTPATIENT
Start: 2020-10-07 | End: 2020-10-07

## 2020-10-07 RX ORDER — PREGABALIN 150 MG/1
150 CAPSULE ORAL
Status: COMPLETED | OUTPATIENT
Start: 2020-10-07 | End: 2020-10-07

## 2020-10-07 RX ORDER — ACETAMINOPHEN 500 MG
1000 TABLET ORAL EVERY 6 HOURS
Status: DISCONTINUED | OUTPATIENT
Start: 2020-10-07 | End: 2020-10-08 | Stop reason: HOSPADM

## 2020-10-07 RX ORDER — OXYCODONE HYDROCHLORIDE 5 MG/1
5 TABLET ORAL EVERY 4 HOURS PRN
Status: DISCONTINUED | OUTPATIENT
Start: 2020-10-07 | End: 2020-10-08 | Stop reason: HOSPADM

## 2020-10-07 RX ORDER — PANTOPRAZOLE SODIUM 40 MG/1
40 TABLET, DELAYED RELEASE ORAL DAILY
Status: DISCONTINUED | OUTPATIENT
Start: 2020-10-08 | End: 2020-10-08 | Stop reason: HOSPADM

## 2020-10-07 RX ORDER — ALBUTEROL SULFATE 90 UG/1
2 AEROSOL, METERED RESPIRATORY (INHALATION) EVERY 4 HOURS PRN
Status: DISCONTINUED | OUTPATIENT
Start: 2020-10-07 | End: 2020-10-08 | Stop reason: HOSPADM

## 2020-10-07 RX ORDER — VASOPRESSIN 20 [USP'U]/ML
INJECTION, SOLUTION INTRAMUSCULAR; SUBCUTANEOUS
Status: DISCONTINUED | OUTPATIENT
Start: 2020-10-07 | End: 2020-10-07

## 2020-10-07 RX ORDER — TAMSULOSIN HYDROCHLORIDE 0.4 MG/1
0.4 CAPSULE ORAL DAILY
Status: DISCONTINUED | OUTPATIENT
Start: 2020-10-07 | End: 2020-10-08 | Stop reason: HOSPADM

## 2020-10-07 RX ORDER — HYDROMORPHONE HYDROCHLORIDE 1 MG/ML
0.2 INJECTION, SOLUTION INTRAMUSCULAR; INTRAVENOUS; SUBCUTANEOUS EVERY 5 MIN PRN
Status: DISCONTINUED | OUTPATIENT
Start: 2020-10-07 | End: 2020-10-07 | Stop reason: HOSPADM

## 2020-10-07 RX ADMIN — VASOPRESSIN 1 UNITS: 20 INJECTION INTRAVENOUS at 11:10

## 2020-10-07 RX ADMIN — OXYCODONE 10 MG: 5 TABLET ORAL at 01:10

## 2020-10-07 RX ADMIN — PROPOFOL 200 MG: 10 INJECTION, EMULSION INTRAVENOUS at 08:10

## 2020-10-07 RX ADMIN — HYDROMORPHONE HYDROCHLORIDE 0.2 MG: 1 INJECTION, SOLUTION INTRAMUSCULAR; INTRAVENOUS; SUBCUTANEOUS at 01:10

## 2020-10-07 RX ADMIN — GLYCOPYRROLATE 0.2 MG: 0.2 INJECTION INTRAMUSCULAR; INTRAVENOUS at 12:10

## 2020-10-07 RX ADMIN — Medication 200 MCG: at 11:10

## 2020-10-07 RX ADMIN — KETOROLAC TROMETHAMINE 15 MG: 30 INJECTION, SOLUTION INTRAMUSCULAR at 06:10

## 2020-10-07 RX ADMIN — TAMSULOSIN HYDROCHLORIDE 0.4 MG: 0.4 CAPSULE ORAL at 01:10

## 2020-10-07 RX ADMIN — LIDOCAINE HYDROCHLORIDE 100 MG: 20 INJECTION, SOLUTION EPIDURAL; INFILTRATION; INTRACAUDAL at 08:10

## 2020-10-07 RX ADMIN — CALCIUM GLUCONATE 1000 MG: 98 INJECTION, SOLUTION INTRAVENOUS at 05:10

## 2020-10-07 RX ADMIN — DEXMEDETOMIDINE HYDROCHLORIDE 12 MCG: 100 INJECTION, SOLUTION INTRAVENOUS at 11:10

## 2020-10-07 RX ADMIN — ENOXAPARIN SODIUM 40 MG: 40 INJECTION SUBCUTANEOUS at 05:10

## 2020-10-07 RX ADMIN — PREGABALIN 150 MG: 150 CAPSULE ORAL at 09:10

## 2020-10-07 RX ADMIN — HYPROMELLOSE 2910 1 DROP: 5 SOLUTION OPHTHALMIC at 06:10

## 2020-10-07 RX ADMIN — SODIUM CHLORIDE, SODIUM GLUCONATE, SODIUM ACETATE, POTASSIUM CHLORIDE, MAGNESIUM CHLORIDE, SODIUM PHOSPHATE, DIBASIC, AND POTASSIUM PHOSPHATE: .53; .5; .37; .037; .03; .012; .00082 INJECTION, SOLUTION INTRAVENOUS at 08:10

## 2020-10-07 RX ADMIN — HEPARIN SODIUM 5000 UNITS: 5000 INJECTION INTRAVENOUS; SUBCUTANEOUS at 06:10

## 2020-10-07 RX ADMIN — FENTANYL CITRATE 100 MCG: 50 INJECTION INTRAMUSCULAR; INTRAVENOUS at 07:10

## 2020-10-07 RX ADMIN — VASOPRESSIN 2 UNITS: 20 INJECTION INTRAVENOUS at 11:10

## 2020-10-07 RX ADMIN — Medication 50 MCG: at 11:10

## 2020-10-07 RX ADMIN — ERYTHROMYCIN: 5 OINTMENT OPHTHALMIC at 10:10

## 2020-10-07 RX ADMIN — DEXAMETHASONE SODIUM PHOSPHATE 8 MG: 4 INJECTION, SOLUTION INTRA-ARTICULAR; INTRALESIONAL; INTRAMUSCULAR; INTRAVENOUS; SOFT TISSUE at 10:10

## 2020-10-07 RX ADMIN — VASOPRESSIN 1 UNITS: 20 INJECTION INTRAVENOUS at 08:10

## 2020-10-07 RX ADMIN — LIDOCAINE HYDROCHLORIDE 10 MG: 10 INJECTION, SOLUTION EPIDURAL; INFILTRATION; INTRACAUDAL at 06:10

## 2020-10-07 RX ADMIN — FENTANYL CITRATE 25 MCG: 50 INJECTION INTRAMUSCULAR; INTRAVENOUS at 11:10

## 2020-10-07 RX ADMIN — GLYCOPYRROLATE 0.2 MG: 0.2 INJECTION INTRAMUSCULAR; INTRAVENOUS at 11:10

## 2020-10-07 RX ADMIN — Medication 100 MCG: at 08:10

## 2020-10-07 RX ADMIN — MIDAZOLAM 2 MG: 1 INJECTION INTRAMUSCULAR; INTRAVENOUS at 07:10

## 2020-10-07 RX ADMIN — ACETAMINOPHEN 1000 MG: 500 TABLET ORAL at 06:10

## 2020-10-07 RX ADMIN — GLYCOPYRROLATE 0.4 MG: 0.2 INJECTION INTRAMUSCULAR; INTRAVENOUS at 12:10

## 2020-10-07 RX ADMIN — ROCURONIUM BROMIDE 30 MG: 10 INJECTION, SOLUTION INTRAVENOUS at 09:10

## 2020-10-07 RX ADMIN — ROCURONIUM BROMIDE 50 MG: 10 INJECTION, SOLUTION INTRAVENOUS at 08:10

## 2020-10-07 RX ADMIN — Medication 100 MCG: at 11:10

## 2020-10-07 RX ADMIN — PREGABALIN 150 MG: 150 CAPSULE ORAL at 06:10

## 2020-10-07 RX ADMIN — ACETAMINOPHEN 1000 MG: 500 TABLET ORAL at 05:10

## 2020-10-07 RX ADMIN — ROCURONIUM BROMIDE 30 MG: 10 INJECTION, SOLUTION INTRAVENOUS at 10:10

## 2020-10-07 RX ADMIN — ACETAMINOPHEN 1000 MG: 500 TABLET ORAL at 01:10

## 2020-10-07 RX ADMIN — SODIUM CHLORIDE: 0.9 INJECTION, SOLUTION INTRAVENOUS at 12:10

## 2020-10-07 RX ADMIN — DEXMEDETOMIDINE HYDROCHLORIDE 12 MCG: 100 INJECTION, SOLUTION INTRAVENOUS at 12:10

## 2020-10-07 RX ADMIN — Medication 10 MG: at 09:10

## 2020-10-07 RX ADMIN — Medication 30 MG: at 08:10

## 2020-10-07 RX ADMIN — CEFAZOLIN 2 G: 330 INJECTION, POWDER, FOR SOLUTION INTRAMUSCULAR; INTRAVENOUS at 08:10

## 2020-10-07 RX ADMIN — NEOSTIGMINE METHYLSULFATE 5 MG: 0.5 INJECTION INTRAVENOUS at 12:10

## 2020-10-07 RX ADMIN — ONDANSETRON 4 MG: 2 INJECTION, SOLUTION INTRAMUSCULAR; INTRAVENOUS at 11:10

## 2020-10-07 RX ADMIN — Medication 10 MG: at 10:10

## 2020-10-07 RX ADMIN — ERYTHROMYCIN: 5 OINTMENT OPHTHALMIC at 07:10

## 2020-10-07 NOTE — NURSING TRANSFER
Nursing Transfer Note      10/7/2020     Transfer To: 556A    Transfer via stretcher    Transfer with IVF infussing    Transported by Pt transport    Medicines sent: None    Chart send with patient: Yes    Notified: spouse    Patient reassessed at: 10/7/20 1330    Upon arrival to floor: patient oriented to room, call bell in reach and bed in lowest position

## 2020-10-07 NOTE — ANESTHESIA PROCEDURE NOTES
Erector Spinae Plane Single Injection    Patient location during procedure: pre-op   Block not for primary anesthetic.  Reason for block: at surgeon's request and post-op pain management   Post-op Pain Location: Left abdominal wall pain  Start time: 10/7/2020 7:10 AM  Timeout: 10/7/2020 7:10 AM   End time: 10/7/2020 7:20 AM    Staffing  Authorizing Provider: Forrest Solorzano MD  Performing Provider: Dennis Farias MD    Preanesthetic Checklist  Completed: patient identified, site marked, surgical consent, pre-op evaluation, timeout performed, IV checked, risks and benefits discussed and monitors and equipment checked  Peripheral Block  Patient position: supine  Prep: ChloraPrep  Patient monitoring: cardiac monitor, heart rate, continuous pulse ox, continuous capnometry and frequent blood pressure checks  Block type: erector spinae plane  Laterality: left  Injection technique: single shot  Location: T7-8  Needle  Needle type: Stimuplex   Needle gauge: 22 G  Needle length: 4 in  Needle localization: ultrasound guidance   -ultrasound image captured on disc.  Assessment  Injection assessment: negative aspiration, negative parasthesia and local visualized surrounding nerve  Paresthesia pain: none  Heart rate change: no  Slow fractionated injection: yes  Additional Notes  VSS.  [facility] RN monitoring vitals throughout procedure.  Patient tolerated procedure well.    30 cc's of 0.375 % bupiv w/ epi and additives administered under ultrasound guidance.

## 2020-10-07 NOTE — TRANSFER OF CARE
"Anesthesia Transfer of Care Note    Patient: Venkat Jonas    Procedure(s) Performed: Procedure(s) (LRB):  XI ROBOTIC NEPHRECTOMY (Left)    Patient location: PACU    Anesthesia Type: general    Transport from OR: Transported from OR on 6-10 L/min O2 by face mask with adequate spontaneous ventilation    Post pain: adequate analgesia    Post assessment: no apparent anesthetic complications    Post vital signs: stable    Level of consciousness: awake and oriented    Nausea/Vomiting: no nausea/vomiting    Complications: none    Transfer of care protocol was followed      Last vitals:   Visit Vitals  BP (!) 105/58 (BP Location: Right arm, Patient Position: Lying)   Pulse (!) 57   Temp 36.1 °C (97 °F) (Temporal)   Resp 20   Ht 5' 11" (1.803 m)   Wt 86.2 kg (190 lb)   SpO2 100%   BMI 26.50 kg/m²     "

## 2020-10-07 NOTE — OP NOTE
[facility] Operative Note    [facility]  Operative Note    Date: 10/07/2020    Pre-Op Diagnosis:   1. left renal masses suspicious for renal cell carcinoma  2. Retroperitoneal lymphadenopathy (para-aortic)  Patient Active Problem List    Diagnosis Date Noted    Left renal mass 09/28/2020    AK (actinic keratosis) 09/05/2018    Agatston CAC score 100-199 01/20/2017    Essential hypertension 10/26/2016    AR (allergic rhinitis) 02/19/2013    Sinusitis 02/19/2013    Verruca plantaris 10/25/2012    Hyperlipidemia 10/03/2012         Post-Op Diagnosis: same    Procedure(s) Performed:   1. Robotic left radical nephrectomy  2. Regional retroperitoneal lymph node dissection    Specimen(s):   1. Left kidney and adrenal gland  2. Para-aortic lymph nodes    Staff Surgeon: Vega Sears MD    Assistant Surgeon: James Guadarrama MD    Anesthesia: General endotracheal anesthesia    Indications: Venkat Jonas is a 55 y.o. male with an 8.5cm left upper pole renal mass as well as a 1.2cm left lower pole mass concerning for renal cell carcinoma.  In addition, on preoperative imaging studies, the patient had some questionably enlarged lymph nodes in the para-aortic distribution.      Findings:   -left kidney and adrenal gland removed en bloc  -para-aortic lymph nodes packet removed      Estimated Blood Loss: min (20cc)    Drains: 16fr pina catheter      OPERATIVE PROCEDURE:The patient was brought to the operating room, and after identification by name and number, was placed supine on the operating table. General endotracheal anesthesia was administered, and a Pina catheter was placed, and clear yellow urine was left to gravity drainage. The patient was then moved into the modified right lateral decubitus position in order to allow access to the left flank. The patient was prepped and draped in the usual sterile manner. A Veress needle was used to obtain entry into the peritoneum, and the  peritoneal cavity was insufflated without difficulty. Four robotic ports were then placed in a straight line/mid-clavicular configuration. A 12 mm assistant port was placed in the midline .     Dissection was begun by reflecting the colon medially. The gonadal vein was then  away from the lateral fascial tissue, and the ureter was identified. It was followed up into the renal pelvis. The renal hilum was visualized and dissected from its fascia. 1 artery and 1 vein was noted.  There was a lumbar vein draped over the renal artery which was dissected away and ligated with a robotic hemolock clip as well as a silk tie.  In addition, the gonadal vein was ligated with silk ties to allow access to the takeoff of the renal artery from the aorta.    The robotic stapling device was used to 1st transect the renal artery.  Following this, the renal vein appeared to collapse completely indicating that this was the sole blood supply to the kidney.  This was consistent with preoperative imaging.  Subsequently a separate robotic stapler load was used to ligate the renal vein.  There was excellent hemostasis.    The dissection was carried superiorly and posteriorly such that the adrenal gland was taken along with the kidney.  In addition, the posterior and superior dissection was carried out along the aorta and up along the left zhen of the diaphragm.  The spleen was  away from the medial aspect of the kidney and upper pole tumor.  There was no adherence or direct invasion into the spleen noted.    Subsequently the lateral attachments were divided as was the ureter after hemolock clips were applied.  This freed the kidney completely.  Our attention was then turned towards the lymph node dissection.    Just inferior to the renal artery stump, the aorta was identified and the tissue lateral and posterior to the aorta for approximately 5 cm caudad was  and bipolar cautery as well as hemo lock clips were used  to occlude or ligate any lymphatic channels.  The anterior and lateral aspects of the aorta were completely skeletonized in the area in which preoperative imaging suggested an abnormal lymph node.  The lymph nodes were removed through the assistant port.  Again the nephrectomy bed was inspected and there appeared to be good hemostasis.    In addition, given the dissection was intimate with the left zhen of the diaphragm, the left upper quadrant was filled with irrigant and a deep breath was delivered by anesthesia in order to evaluate for any diaphragmatic injury.  There was none identified.    The kidney was then placed in an Endo-Catch bag.  The robot was undocked.   Removal of the kidney was then accomplished by removing the endocatch bag directly through the assistant port in the midline.  The assistant port had to be extended to approximately 4-5 cm in order to facilitate specimen extraction. It was sent to pathology for further analysis.     The extraction site was closed in 3 layers with PDS suture for the fascia.  Vicryl was used in the sub cutaneous layers and a 400 running subcuticular Monocryl was used to close the skin incisions.  The remaining incisions were closed at the skin level with subcuticular 4-0 monocryl suture.  Dermabond was applied.     The patient was returned to the supine position and emerged from general anesthesia without incident. The patient was taken to the PACU in stable condition.    Disposition: The patient will remain on the urology service overnight for observation.

## 2020-10-07 NOTE — ANESTHESIA PROCEDURE NOTES
Arterial    Diagnosis: Kidney Mass    Patient location during procedure: done in OR  Procedure start time: 10/7/2020 8:21 AM  Timeout: 10/7/2020 8:21 AM  Procedure end time: 10/7/2020 8:24 AM    Staffing  Authorizing Provider: Raina El MD  Performing Provider: Raina El MD    Anesthesiologist was present at the time of the procedure.    Preanesthetic Checklist  Completed: patient identified, site marked, surgical consent, pre-op evaluation, timeout performed, IV checked, risks and benefits discussed, monitors and equipment checked and anesthesia consent givenArterial  Skin Prep: chlorhexidine gluconate  Local Infiltration: none  Orientation: left  Location: radial  Catheter Size: 20 G  Catheter placement by Anatomical landmarks. Heme positive aspiration all ports.Insertion Attempts: 1  Assessment  Dressing: secured with tape and tegaderm  Patient: Tolerated well

## 2020-10-07 NOTE — INTERVAL H&P NOTE
The patient has been examined and the H&P has been reviewed:    I concur with the findings and no changes have occurred since H&P was written.    Surgery risks, benefits and alternative options discussed and understood by patient/family.    -I have explained the risk, benefits, and alternatives of the procedure in detail.  The risks including but not limited to bleeding, injury to adjacent structures including the spleen, liver, lung, pancreas, colon and intestines, blood vessels in the abdomen including the renal artery or renal vein, or need for conversion to open nephrectomy were explained to the patient in depth. The patient voices understanding and all questions have been answered. The patient agrees to proceed as planned with a left robotic nephrectomy, possible conversion to open, possible splenectomy.            Active Hospital Problems    Diagnosis  POA    Left renal mass [N28.89]  Yes      Resolved Hospital Problems   No resolved problems to display.

## 2020-10-07 NOTE — PROGRESS NOTES
Pt c/o eye pain to right eye. Pt has tried Systane lubricating eye drops and Blink tears. Neither have given him any relief. Pt would like MD's to be aware. Team paged.    MD Israel placed an order for artifical tears. Will admin med and follow up.

## 2020-10-07 NOTE — PLAN OF CARE
Patient oriented to new room. VSS. IV fluids infusing. Incision site CDI, closed with dermabond. Pain controlled with PRN pain meds. IS at bedside and teaching completed. SCDs on. Will admit pt and continue to monitor.

## 2020-10-07 NOTE — ANESTHESIA POSTPROCEDURE EVALUATION
Anesthesia Post Evaluation    Patient: Venkat Jonas    Procedure(s) Performed: Procedure(s) (LRB):  XI ROBOTIC NEPHRECTOMY (Left)    Final Anesthesia Type: general    Patient location during evaluation: PACU  Patient participation: Yes- Able to Participate  Level of consciousness: awake  Post-procedure vital signs: reviewed and stable  Pain management: adequate  Airway patency: patent    PONV status at discharge: No PONV  Anesthetic complications: no      Cardiovascular status: blood pressure returned to baseline  Respiratory status: unassisted  Hydration status: euvolemic  Follow-up not needed.          Vitals Value Taken Time   /56 10/07/20 1300   Temp 36.1 °C (97 °F) 10/07/20 1234   Pulse 49 10/07/20 1306   Resp 10 10/07/20 1306   SpO2 100 % 10/07/20 1306   Vitals shown include unvalidated device data.      No case tracking events are documented in the log.      Pain/Laura Score: Pain Rating Prior to Med Admin: 0 (10/7/2020  7:45 AM)  Pain Rating Post Med Admin: 0 (10/7/2020  7:45 AM)  Laura Score: 8 (10/7/2020 12:34 PM)

## 2020-10-07 NOTE — ANESTHESIA PROCEDURE NOTES
Intubation  Performed by: Raina El MD  Authorized by: Raina El MD     Intubation:     Induction:  Intravenous    Intubated:  Postinduction    Mask Ventilation:  Easy with oral airway    Attempts:  2    Attempted By:  Staff anesthesiologist    Method of Intubation:  Direct    Blade:  Bryant 2    Laryngeal View Grade: Grade III - only epiglottis visible      Attempted By (2nd Attempt):  Staff anesthesiologist    Method of Intubation (2nd Attempt):  Direct    Blade (2nd Attempt):  Homa 3    Laryngeal View Grade (2nd Attempt): Grade IIb - only the arytenoids and epiglottis seen      Difficult Airway Encountered?: No      Complications:  None    Airway Device:  Oral endotracheal tube    Airway Device Size:  7.5    Style/Cuff Inflation:  Cuffed    Inflation Amount (mL):  9    Tube secured:  22    Secured at:  The lips    Placement Verified By:  Capnometry    Complicating Factors:  Anterior larynx    Findings Post-Intubation:  BS equal bilateral

## 2020-10-08 VITALS
HEART RATE: 70 BPM | HEIGHT: 71 IN | TEMPERATURE: 97 F | SYSTOLIC BLOOD PRESSURE: 142 MMHG | WEIGHT: 190 LBS | BODY MASS INDEX: 26.6 KG/M2 | OXYGEN SATURATION: 95 % | DIASTOLIC BLOOD PRESSURE: 74 MMHG | RESPIRATION RATE: 17 BRPM

## 2020-10-08 LAB
ANION GAP SERPL CALC-SCNC: 10 MMOL/L (ref 8–16)
ANION GAP SERPL CALC-SCNC: 11 MMOL/L (ref 8–16)
BASOPHILS # BLD AUTO: 0.02 K/UL (ref 0–0.2)
BASOPHILS # BLD AUTO: 0.02 K/UL (ref 0–0.2)
BASOPHILS NFR BLD: 0.2 % (ref 0–1.9)
BASOPHILS NFR BLD: 0.2 % (ref 0–1.9)
BUN SERPL-MCNC: 15 MG/DL (ref 6–20)
BUN SERPL-MCNC: 16 MG/DL (ref 6–20)
CALCIUM SERPL-MCNC: 8 MG/DL (ref 8.7–10.5)
CALCIUM SERPL-MCNC: 8.1 MG/DL (ref 8.7–10.5)
CHLORIDE SERPL-SCNC: 105 MMOL/L (ref 95–110)
CHLORIDE SERPL-SCNC: 106 MMOL/L (ref 95–110)
CO2 SERPL-SCNC: 21 MMOL/L (ref 23–29)
CO2 SERPL-SCNC: 22 MMOL/L (ref 23–29)
CREAT SERPL-MCNC: 1.3 MG/DL (ref 0.5–1.4)
CREAT SERPL-MCNC: 1.3 MG/DL (ref 0.5–1.4)
DIFFERENTIAL METHOD: ABNORMAL
DIFFERENTIAL METHOD: ABNORMAL
EOSINOPHIL # BLD AUTO: 0 K/UL (ref 0–0.5)
EOSINOPHIL # BLD AUTO: 0 K/UL (ref 0–0.5)
EOSINOPHIL NFR BLD: 0.1 % (ref 0–8)
EOSINOPHIL NFR BLD: 0.2 % (ref 0–8)
ERYTHROCYTE [DISTWIDTH] IN BLOOD BY AUTOMATED COUNT: 13 % (ref 11.5–14.5)
ERYTHROCYTE [DISTWIDTH] IN BLOOD BY AUTOMATED COUNT: 13 % (ref 11.5–14.5)
EST. GFR  (AFRICAN AMERICAN): >60 ML/MIN/1.73 M^2
EST. GFR  (AFRICAN AMERICAN): >60 ML/MIN/1.73 M^2
EST. GFR  (NON AFRICAN AMERICAN): >60 ML/MIN/1.73 M^2
EST. GFR  (NON AFRICAN AMERICAN): >60 ML/MIN/1.73 M^2
GLUCOSE SERPL-MCNC: 129 MG/DL (ref 70–110)
GLUCOSE SERPL-MCNC: 132 MG/DL (ref 70–110)
HCT VFR BLD AUTO: 36.9 % (ref 40–54)
HCT VFR BLD AUTO: 36.9 % (ref 40–54)
HGB BLD-MCNC: 11.7 G/DL (ref 14–18)
HGB BLD-MCNC: 11.7 G/DL (ref 14–18)
IMM GRANULOCYTES # BLD AUTO: 0.05 K/UL (ref 0–0.04)
IMM GRANULOCYTES # BLD AUTO: 0.06 K/UL (ref 0–0.04)
IMM GRANULOCYTES NFR BLD AUTO: 0.5 % (ref 0–0.5)
IMM GRANULOCYTES NFR BLD AUTO: 0.6 % (ref 0–0.5)
LYMPHOCYTES # BLD AUTO: 1.3 K/UL (ref 1–4.8)
LYMPHOCYTES # BLD AUTO: 1.3 K/UL (ref 1–4.8)
LYMPHOCYTES NFR BLD: 12 % (ref 18–48)
LYMPHOCYTES NFR BLD: 12.7 % (ref 18–48)
MCH RBC QN AUTO: 28.7 PG (ref 27–31)
MCH RBC QN AUTO: 28.7 PG (ref 27–31)
MCHC RBC AUTO-ENTMCNC: 31.7 G/DL (ref 32–36)
MCHC RBC AUTO-ENTMCNC: 31.7 G/DL (ref 32–36)
MCV RBC AUTO: 91 FL (ref 82–98)
MCV RBC AUTO: 91 FL (ref 82–98)
MONOCYTES # BLD AUTO: 0.7 K/UL (ref 0.3–1)
MONOCYTES # BLD AUTO: 0.7 K/UL (ref 0.3–1)
MONOCYTES NFR BLD: 6.6 % (ref 4–15)
MONOCYTES NFR BLD: 6.9 % (ref 4–15)
NEUTROPHILS # BLD AUTO: 8.4 K/UL (ref 1.8–7.7)
NEUTROPHILS # BLD AUTO: 8.4 K/UL (ref 1.8–7.7)
NEUTROPHILS NFR BLD: 79.4 % (ref 38–73)
NEUTROPHILS NFR BLD: 80.6 % (ref 38–73)
NRBC BLD-RTO: 0 /100 WBC
NRBC BLD-RTO: 0 /100 WBC
PLATELET # BLD AUTO: 316 K/UL (ref 150–350)
PLATELET # BLD AUTO: 317 K/UL (ref 150–350)
PMV BLD AUTO: 9 FL (ref 9.2–12.9)
PMV BLD AUTO: 9.1 FL (ref 9.2–12.9)
POTASSIUM SERPL-SCNC: 3.9 MMOL/L (ref 3.5–5.1)
POTASSIUM SERPL-SCNC: 4 MMOL/L (ref 3.5–5.1)
RBC # BLD AUTO: 4.07 M/UL (ref 4.6–6.2)
RBC # BLD AUTO: 4.07 M/UL (ref 4.6–6.2)
SODIUM SERPL-SCNC: 137 MMOL/L (ref 136–145)
SODIUM SERPL-SCNC: 138 MMOL/L (ref 136–145)
WBC # BLD AUTO: 10.39 K/UL (ref 3.9–12.7)
WBC # BLD AUTO: 10.5 K/UL (ref 3.9–12.7)

## 2020-10-08 PROCEDURE — 85025 COMPLETE CBC W/AUTO DIFF WBC: CPT | Mod: 91

## 2020-10-08 PROCEDURE — 25000003 PHARM REV CODE 250: Performed by: STUDENT IN AN ORGANIZED HEALTH CARE EDUCATION/TRAINING PROGRAM

## 2020-10-08 PROCEDURE — 80048 BASIC METABOLIC PNL TOTAL CA: CPT

## 2020-10-08 PROCEDURE — 36415 COLL VENOUS BLD VENIPUNCTURE: CPT

## 2020-10-08 RX ORDER — ERYTHROMYCIN 5 MG/G
OINTMENT OPHTHALMIC 3 TIMES DAILY
Qty: 1 BOTTLE | Refills: 0 | Status: SHIPPED | OUTPATIENT
Start: 2020-10-08 | End: 2020-11-18

## 2020-10-08 RX ORDER — OXYCODONE HYDROCHLORIDE 5 MG/1
5 TABLET ORAL EVERY 4 HOURS PRN
Qty: 10 TABLET | Refills: 0 | Status: SHIPPED | OUTPATIENT
Start: 2020-10-08 | End: 2020-11-18

## 2020-10-08 RX ORDER — ACETAMINOPHEN 500 MG
500 TABLET ORAL EVERY 6 HOURS
Qty: 28 TABLET | Refills: 0 | Status: SHIPPED | OUTPATIENT
Start: 2020-10-08 | End: 2020-10-15

## 2020-10-08 RX ORDER — POLYETHYLENE GLYCOL 3350 17 G/17G
17 POWDER, FOR SOLUTION ORAL DAILY
Qty: 14 EACH | Refills: 0 | Status: SHIPPED | OUTPATIENT
Start: 2020-10-08 | End: 2020-10-22

## 2020-10-08 RX ORDER — IBUPROFEN 600 MG/1
600 TABLET ORAL 3 TIMES DAILY
Qty: 30 TABLET | Refills: 0 | Status: SHIPPED | OUTPATIENT
Start: 2020-10-08 | End: 2020-11-18

## 2020-10-08 RX ADMIN — ACETAMINOPHEN 1000 MG: 500 TABLET ORAL at 12:10

## 2020-10-08 RX ADMIN — ACETAMINOPHEN 1000 MG: 500 TABLET ORAL at 06:10

## 2020-10-08 RX ADMIN — ERYTHROMYCIN 1 INCH: 5 OINTMENT OPHTHALMIC at 06:10

## 2020-10-08 NOTE — PROGRESS NOTES
[facility]  Urology  Progress Note    Patient Name: Venkat Jonas  MRN: [id number]  Admission Date: 10/7/2020  Hospital Length of Stay: 1 days  Code Status: No Order   Attending Provider: Vega Sears MD   Primary Care Physician: Alexandre Griffiths MD    Subjective:     HPI:  56 yo male with left renal mass now s/p left nephrectomy on 10/7/20. Doing well.      Interval History: NAEON. AFVSS.  Some eye irritation that was relieved with eye drops.  Pain controlled.  Tolerating diet without N/V.  Ambulated halls well.  Pina with clear yellow urine. Good UOP.  Awaiting AM labs.    Objective:     Temp:  [97 °F (36.1 °C)-98.8 °F (37.1 °C)] 98 °F (36.7 °C)  Pulse:  [50-83] 68  Resp:  [10-20] 18  SpO2:  [94 %-100 %] 97 %  BP: ()/(50-86) 108/64     Body mass index is 26.5 kg/m².           Drains     Drain                 Urethral Catheter 10/07/20 0819 Non-latex;Straight-tip 16 Fr. less than 1 day                Physical Exam   Constitutional: He is oriented to person, place, and time. He appears well-developed. No distress.   Cardiovascular: Normal rate.    Pulmonary/Chest: Effort normal. No respiratory distress.   Abdominal: Soft. He exhibits no distension. There is no abdominal tenderness.   Incisions c/d/i with dermabond.   Genitourinary:    Genitourinary Comments: 16 fr pina with clear yellow urine.     Musculoskeletal: Normal range of motion. No tenderness.   Neurological: He is alert and oriented to person, place, and time.   Skin: Skin is warm and dry. No rash noted.     Psychiatric: Judgment normal.       Significant Labs:    BMP:  Recent Labs   Lab 10/07/20  1240      K 5.2*      CO2 23   BUN 16   CREATININE 0.9   CALCIUM 7.1*       CBC:   Recent Labs   Lab 10/07/20  1240 10/08/20  0530   WBC 9.41 10.50  10.39   HGB 11.1* 11.7*  11.7*   HCT 35.1* 36.9*  36.9*    316  317       Significant Imaging:  All pertinent imaging results/findings from the past 24 hours  have been reviewed.                  Assessment/Plan:     * Left renal mass  S/p left nephrectomy on 10/7. Doing well.    -Diet: regular  -Encourage ambulation  -Pain controlled  -Eye drops with good relief of eye pain  -Gardner removed on AM rounds, f/u VT  -f/u AM labs    Dispo: Plan for home today after VT and labs result        VTE Risk Mitigation (From admission, onward)         Ordered     enoxaparin injection 40 mg  Every 24 hours      10/07/20 1319     Place sequential compression device  Until discontinued      10/07/20 1234                Sher Velasquez MD  Urology  [facility]

## 2020-10-08 NOTE — SUBJECTIVE & OBJECTIVE
Interval History: NAEON. AFVSS.  Some eye irritation that was relieved with eye drops.  Pain controlled.  Tolerating diet without N/V.  Ambulated halls well.  Pina with clear yellow urine. Good UOP.  Awaiting AM labs.    Objective:     Temp:  [97 °F (36.1 °C)-98.8 °F (37.1 °C)] 98 °F (36.7 °C)  Pulse:  [50-83] 68  Resp:  [10-20] 18  SpO2:  [94 %-100 %] 97 %  BP: ()/(50-86) 108/64     Body mass index is 26.5 kg/m².           Drains     Drain                 Urethral Catheter 10/07/20 0819 Non-latex;Straight-tip 16 Fr. less than 1 day                Physical Exam   Constitutional: He is oriented to person, place, and time. He appears well-developed. No distress.   Cardiovascular: Normal rate.    Pulmonary/Chest: Effort normal. No respiratory distress.   Abdominal: Soft. He exhibits no distension. There is no abdominal tenderness.   Incisions c/d/i with dermabond.   Genitourinary:    Genitourinary Comments: 16 fr pina with clear yellow urine.     Musculoskeletal: Normal range of motion. No tenderness.   Neurological: He is alert and oriented to person, place, and time.   Skin: Skin is warm and dry. No rash noted.     Psychiatric: Judgment normal.       Significant Labs:    BMP:  Recent Labs   Lab 10/07/20  1240      K 5.2*      CO2 23   BUN 16   CREATININE 0.9   CALCIUM 7.1*       CBC:   Recent Labs   Lab 10/07/20  1240 10/08/20  0530   WBC 9.41 10.50  10.39   HGB 11.1* 11.7*  11.7*   HCT 35.1* 36.9*  36.9*    316  317       Significant Imaging:  All pertinent imaging results/findings from the past 24 hours have been reviewed.

## 2020-10-08 NOTE — DISCHARGE SUMMARY
[facility]  Urology  Discharge Summary      Patient Name: Francoise Mae  MRN: [id number]  Admission Date: 10/7/2020  Hospital Length of Stay: 1 days  Discharge Date and Time:  10/08/2020 6:58 AM  Attending Physician: Vega Sears MD   Discharging Provider: Sher Velasquez MD  Primary Care Physician: Alexandre Griffiths MD    HPI:   54 yo male with left renal mass now s/p left nephrectomy on 10/7/20. Doing well.      Procedure(s) (LRB):  XI ROBOTIC NEPHRECTOMY (Left)     Indwelling Lines/Drains at time of discharge:   Lines/Drains/Airways     Drain                 Urethral Catheter 10/07/20 0819 Non-latex;Straight-tip 16 Fr. less than 1 day          Arterial Line            Arterial Line 10/07/20 0821 Left Radial less than 1 day                Hospital Course: FRANCOISE MAE 55 y.o.male underwent: Procedure(s) (LRB):  XI ROBOTIC NEPHRECTOMY (Left). The patient tolerated the procedure well, was transferred to recovery post-op, and then transferred to the floor for continuation of medical care. The patient's clinical condition progressively improved. By the time of discharge, he was tolerating a diet without nausea or vomiting, pain was well controlled with oral medications, and he was ambulating without difficulty. On POD 1 the patient was discharged to home. On discharge, the patient's incisions were c/d/i and the surgical site was soft and appropriately tender to palpation. The patient will follow up in [facility] clinic in 2 weeks.        Consults:     Significant Diagnostic Studies:    Pending Diagnostic Studies:     Procedure Component Value Units Date/Time    Basic Metabolic Panel [[id number]] Collected: 10/08/20 0530    Order Status: Sent Lab Status: In process Updated: 10/08/20 0607    Specimen: Blood     Specimen to Pathology, Surgery Urology [[id number]] Collected: 10/07/20 1152    Order Status: Sent Lab Status: In process Updated: 10/07/20 1415          Final Active Diagnoses:     Diagnosis Date Noted POA    PRINCIPAL PROBLEM:  Left renal mass [N28.89] 09/28/2020 Yes    Essential hypertension [I10] 10/26/2016 Yes     Chronic    Hyperlipidemia [E78.5] 10/03/2012 Yes      Problems Resolved During this Admission:         Discharged Condition: good    Disposition: Home or Self Care    Follow Up:  Follow-up Information     Vega Sears MD On 10/28/2020.    Specialty: Urology  Why: post op nephrectomy  Contact information:  [address]  [facility] [address]  [phone]                 Patient Instructions:      Diet Adult Regular     Notify your health care provider if you experience any of the following:  temperature >100.4     Notify your health care provider if you experience any of the following:  persistent nausea and vomiting or diarrhea     Notify your health care provider if you experience any of the following:  severe uncontrolled pain     Notify your health care provider if you experience any of the following:  redness, tenderness, or signs of infection (pain, swelling, redness, odor or green/yellow discharge around incision site)     Notify your health care provider if you experience any of the following:  difficulty breathing or increased cough     Notify your health care provider if you experience any of the following:  severe persistent headache     Notify your health care provider if you experience any of the following:  persistent dizziness, light-headedness, or visual disturbances     Notify your health care provider if you experience any of the following:  increased confusion or weakness     EKG 12-lead   Standing Status: Future Standing Exp. Date: 09/30/21     Order Specific Question Answer Comments   Diagnosis Hypertension [616788]      Type & Screen   Standing Status: Future Standing Exp. Date: 11/29/21     Activity as tolerated     Medications:  Reconciled Home Medications:      Medication List      START taking these medications    acetaminophen 500 MG  tablet  Commonly known as: TYLENOL  Take 1 tablet (500 mg total) by mouth every 6 (six) hours. for 7 days     erythromycin ophthalmic ointment  Commonly known as: ROMYCIN  Place into both eyes 3 (three) times daily.     ibuprofen 600 MG tablet  Commonly known as: ADVIL,MOTRIN  Take 1 tablet (600 mg total) by mouth 3 (three) times daily.     oxyCODONE 5 MG immediate release tablet  Commonly known as: ROXICODONE  Take 1 tablet (5 mg total) by mouth every 4 (four) hours as needed for Pain.     polyethylene glycol 17 gram Pwpk  Commonly known as: GLYCOLAX  Take 17 g by mouth once daily. for 14 days        CHANGE how you take these medications    azelastine 0.05 % ophthalmic solution  Commonly known as: OPTIVAR  Place 1 drop into both eyes 2 (two) times daily.  What changed: additional instructions     fluticasone propionate 50 mcg/actuation nasal spray  Commonly known as: FLONASE  2 sprays by Each Nare route once daily.  What changed: additional instructions     rosuvastatin 40 MG Tab  Commonly known as: CRESTOR  Take 1 tablet (40 mg total) by mouth every evening.  What changed: when to take this     SYMBICORT 80-4.5 mcg/actuation Hfaa  Generic drug: budesonide-formoterol 80-4.5 mcg  Inhale 2 puffs into the lungs 2 (two) times daily.  What changed: additional instructions        CONTINUE taking these medications    aspirin 81 MG Chew  Take 81 mg by mouth once daily.     ezetimibe 10 mg tablet  Commonly known as: ZETIA  Take 1 tablet by mouth daily     oxyCODONE-acetaminophen 5-325 mg per tablet  Commonly known as: PERCOCET  Take 1 tablet by mouth every 4 (four) hours as needed for Pain.  Start taking on: November 28, 2020     pantoprazole 40 MG tablet  Commonly known as: PROTONIX  TAKE ONE TABLET BY MOUTH TWICE A DAY ON AN EMPTY STOMACH     SHINGRIX (PF) 50 mcg/0.5 mL injection  Generic drug: varicella-zoster gE-AS01B (PF)  Inject into the muscle as directed     STOOL SOFTENER 100 MG capsule  Generic drug: docusate  sodium  Take 1 capsule (100 mg total) by mouth 2 (two) times daily.     tamsulosin 0.4 mg Cap  Commonly known as: FLOMAX  Take 1 capsule (0.4 mg total) by mouth once daily.     valsartan-hydrochlorothiazide 160-12.5 mg per tablet  Commonly known as: DIOVAN-HCT  Take 1 tablet by mouth daily     VENTOLIN HFA 90 mcg/actuation inhaler  Generic drug: albuterol  Inhale 2 puffs into the lungs every 4 (four) hours as needed.     zolpidem 10 mg Tab  Commonly known as: AMBIEN  Take 1 tablet (10 mg total) by mouth nightly as needed.            Time spent on the discharge of patient: 15 minutes    Sher Velasquez MD  Urology  [facility]

## 2020-10-08 NOTE — ASSESSMENT & PLAN NOTE
S/p left nephrectomy on 10/7. Doing well.    -Diet: regular  -Encourage ambulation  -Pain controlled  -Eye drops with good relief of eye pain  -Gardner removed on AM rounds, f/u VT  -f/u AM labs    Dispo: Plan for home today after VT and labs result

## 2020-10-08 NOTE — PLAN OF CARE
Future Appointments   Date Time Provider Department Center   10/12/2020  7:05 AM SPE[facility]IMEN[facility] SPECLAB [facility]   10/15/2020  9:00 AM Chandana Carrasco MD [facility] HEMPCTP [address] [facility]   10/19/2020  7:05 AM [facility]   10/26/2020  7:05 AM [facility], [facility]   10/28/2020  8:30 AM Vega Sears MD [facility] UROLOGC [address]   11/20/2020  1:00 PM Alexandre Griffiths MD [facility] IM [address] [facility]     Patient discharged home to care of self on 10/8/20.   10/08/20 1152   Final Note   Assessment Type Final Discharge Note   Anticipated Discharge Disposition Home   What phone number can be called within the next 1-3 days to see how you are doing after discharge?   ([phone])   Hospital Follow Up  Appt(s) scheduled? Yes   Discharge plans and expectations educations in teach back method with documentation complete? Yes   Right Care Referral Info   Post Acute Recommendation No Care

## 2020-10-08 NOTE — PLAN OF CARE
Problem: Adult Inpatient Plan of Care  Goal: Absence of Hospital-Acquired Illness or Injury  Intervention: Identify and Manage Fall Risk  Flowsheets (Taken 10/8/2020 0622)  Safety Promotion/Fall Prevention:   assistive device/personal item within reach   Fall Risk reviewed with patient/family   family to remain at bedside   medications reviewed   lighting adjusted   nonskid shoes/socks when out of bed   side rails raised x 2   instructed to call staff for mobility  Intervention: Prevent VTE (venous thromboembolism)  Flowsheets (Taken 10/8/2020 0622)  VTE Prevention/Management:   remove, assess skin and reapply sequential compression device   fluids promoted   dorsiflexion/plantar flexion performed   remove, assess skin and reapply compression stockings  Goal: Optimal Comfort and Wellbeing  Intervention: Provide Person-Centered Care  Flowsheets (Taken 10/8/2020 0622)  Trust Relationship/Rapport:   care explained   choices provided   emotional support provided   empathic listening provided   questions answered   questions encouraged   thoughts/feelings acknowledged   reassurance provided     Problem: Fall Injury Risk  Goal: Absence of Fall and Fall-Related Injury  Intervention: Identify and Manage Contributors to Fall Injury Risk  Flowsheets (Taken 10/8/2020 0622)  Medication Review/Management: medications reviewed  Intervention: Promote Injury-Free Environment  Flowsheets (Taken 10/8/2020 0622)  Safety Promotion/Fall Prevention:   assistive device/personal item within reach   Fall Risk reviewed with patient/family   family to remain at bedside   medications reviewed   lighting adjusted   nonskid shoes/socks when out of bed   side rails raised x 2   instructed to call staff for mobility  Environmental Safety Modification:   assistive device/personal items within reach   lighting adjusted   clutter free environment maintained   room organization consistent     Problem: Pain Acute  Goal: Optimal Pain  Control  Intervention: Develop Pain Management Plan  Flowsheets (Taken 10/8/2020 0622)  Pain Management Interventions:   breathing exercises   care clustered   medication offered but refused   pain management plan reviewed with patient/caregiver   pillow support provided   quiet environment facilitated   prescribed exercises encouraged  Intervention: Prevent or Manage Pain  Flowsheets (Taken 10/8/2020 0622)  Sleep/Rest Enhancement:   awakenings minimized   noise level reduced   family presence promoted  Intervention: Optimize Psychosocial Wellbeing  Flowsheets (Taken 10/8/2020 0622)  Supportive Measures:   positive reinforcement provided   active listening utilized   counseling provided   decision-making supported   Pt AAOX4, vital signs documented. room air, no complaints of pain, Scds in place, ivs intact patent, saline locked. Gardner in place, out put documented. Pt ambulated in hallway did extremely well. Family at bedside. Safety measures in place, call bell with in reach. No falls at this time.

## 2020-10-08 NOTE — PLAN OF CARE
10/08/20 0933   Discharge Assessment   Assessment Type Discharge Planning Assessment   Confirmed/corrected address and phone number on facesheet? Yes   Assessment information obtained from? Patient   Expected Length of Stay (days) 1   Discharge Plan A Home with family   Discharge Plan B Home with family   DME Needed Upon Discharge  none   Patient/Family in Agreement with Plan yes

## 2020-10-08 NOTE — NURSING
Patient discharged home via wheelchair with wife.  Medication education, meds, aftercare instructions and follow up appointment provided.  Pt verbalized understanding.

## 2020-10-09 ENCOUNTER — PATIENT MESSAGE (OUTPATIENT)
Dept: CARDIOLOGY | Facility: CLINIC | Age: 55
End: 2020-10-09

## 2020-10-09 ENCOUNTER — PATIENT MESSAGE (OUTPATIENT)
Dept: ADMINISTRATIVE | Facility: OTHER | Age: 55
End: 2020-10-09

## 2020-10-09 NOTE — PROGRESS NOTES
Digital Medicine: Health  Follow-Up    10/9/20- Patient is recovering from surgery, will defer outreach 2 weeks    The history is provided by the patient.             Reason for review: Blood pressure at goal        Topics Covered on Call: physical activity, Diet and medical    Additional Follow-up details: Avg BP as of Oct 20, 2020 is 115/84.     Had a recent dx of kidney cancer (stage 3), left kidney was removed. Think all of the cancer is removed as of now.     He is watching his BP closely. They want him to relax for a couple weeks and not focus so much on BP though. He is wondering why his DBP is higher than he would like.     Eliminated red meat, limited amount of chicken, primarily fish and vegetables. Lost weight in the past few weeks. Has been paying attention more to eliminating more Na in foods. At home it's easier for him, at work it's a lot harder.     Had a question about vitamins. Having some slight stomach cramps. Thinks it from increase in fruit intake. Also asked about lowering cholesterol            Diet-Change      Dietary Improvements:Has eliminated red meat. Limiting his chicken intake. Doing a lot more fish, vegetables, and fruit now.               Physical Activity-Change      Additional physical activity details: Trying to walk more. Walked 3 miles this morning.       Medication Adherence-Medication adherence was assessed.        Substance, Sleep, Stress-change  stress-assessed  Details:stress on the body from recent surgery and medical dx  Intervention(s):    Sleep-  Details:  Intervention(s):    Alcohol -  Details:  Intervention(s):    Tobacco-  Details:  Intervention(s):          Additional monitoring needed.  Continue current diet/physical activity routine.  Provided patient education. Dietary resources       Patient verbalizes understanding.      Explained the importance of self-monitoring and medication adherence. Encouraged the patient to communicate with their health  for  lifestyle modifications to help improve or maintain a healthy lifestyle.               There are no preventive care reminders to display for this patient.      Last 5 Patient Entered Readings                                      Current 30 Day Average: 116/84     Recent Readings 10/6/2020 10/6/2020 10/6/2020 10/5/2020 10/5/2020    SBP (mmHg) 113 107 114 119 127    DBP (mmHg) 84 75 94 87 85    Pulse 86 78 77 70 72

## 2020-10-14 ENCOUNTER — OFFICE VISIT (OUTPATIENT)
Dept: HEMATOLOGY/ONCOLOGY | Facility: CLINIC | Age: 55
End: 2020-10-14
Payer: COMMERCIAL

## 2020-10-14 VITALS
RESPIRATION RATE: 18 BRPM | BODY MASS INDEX: 26.89 KG/M2 | HEART RATE: 83 BPM | TEMPERATURE: 98 F | DIASTOLIC BLOOD PRESSURE: 79 MMHG | HEIGHT: 70 IN | SYSTOLIC BLOOD PRESSURE: 115 MMHG | WEIGHT: 187.81 LBS

## 2020-10-14 DIAGNOSIS — C64.9 RENAL CELL CARCINOMA, UNSPECIFIED LATERALITY: Primary | ICD-10-CM

## 2020-10-14 PROCEDURE — 3008F BODY MASS INDEX DOCD: CPT | Mod: CPTII,S$GLB,, | Performed by: INTERNAL MEDICINE

## 2020-10-14 PROCEDURE — 3078F DIAST BP <80 MM HG: CPT | Mod: CPTII,S$GLB,, | Performed by: INTERNAL MEDICINE

## 2020-10-14 PROCEDURE — 99204 PR OFFICE/OUTPT VISIT, NEW, LEVL IV, 45-59 MIN: ICD-10-PCS | Mod: S$GLB,,, | Performed by: INTERNAL MEDICINE

## 2020-10-14 PROCEDURE — 3074F PR MOST RECENT SYSTOLIC BLOOD PRESSURE < 130 MM HG: ICD-10-PCS | Mod: CPTII,S$GLB,, | Performed by: INTERNAL MEDICINE

## 2020-10-14 PROCEDURE — 3078F PR MOST RECENT DIASTOLIC BLOOD PRESSURE < 80 MM HG: ICD-10-PCS | Mod: CPTII,S$GLB,, | Performed by: INTERNAL MEDICINE

## 2020-10-14 PROCEDURE — 99204 OFFICE O/P NEW MOD 45 MIN: CPT | Mod: S$GLB,,, | Performed by: INTERNAL MEDICINE

## 2020-10-14 PROCEDURE — 3074F SYST BP LT 130 MM HG: CPT | Mod: CPTII,S$GLB,, | Performed by: INTERNAL MEDICINE

## 2020-10-14 PROCEDURE — 3008F PR BODY MASS INDEX (BMI) DOCUMENTED: ICD-10-PCS | Mod: CPTII,S$GLB,, | Performed by: INTERNAL MEDICINE

## 2020-10-14 PROCEDURE — 99999 PR PBB SHADOW E&M-EST. PATIENT-LVL IV: ICD-10-PCS | Mod: PBBFAC,,, | Performed by: INTERNAL MEDICINE

## 2020-10-14 PROCEDURE — 99999 PR PBB SHADOW E&M-EST. PATIENT-LVL IV: CPT | Mod: PBBFAC,,, | Performed by: INTERNAL MEDICINE

## 2020-10-14 NOTE — LETTER
October 16, 2020      Vega Sears MD  [address]  [facility] [address]           [facility]  [address]  [facility] [address]  Phone: [phone]  Fax: [phone]          Patient: Venkat Jonas   MR Number: [id number]   YOB: 1965   Date of Visit: 10/14/2020       Dear Dr. Vega Sears:    Thank you for referring Venkat Jonas to me for evaluation. Attached you will find relevant portions of my assessment and plan of care.    If you have questions, please do not hesitate to call me. I look forward to following Venkat Jonas along with you.    Sincerely,    Chandana Carrasco MD    Enclosure  CC:  No Recipients    If you would like to receive this communication electronically, please contact [email or web address] or [phone] to request more information on [employer] access.    For providers and/or their staff who would like to refer a patient to [facility], please contact us through our one-stop-shop provider referral line, [facility], at [phone].    If you feel you have received this communication in error or would no longer like to receive these types of communications, please e-mail [email or web address]

## 2020-10-15 ENCOUNTER — OFFICE VISIT (OUTPATIENT)
Dept: PSYCHIATRY | Facility: CLINIC | Age: 55
End: 2020-10-15
Payer: COMMERCIAL

## 2020-10-15 DIAGNOSIS — R45.89 ANXIETY ABOUT HEALTH: Primary | ICD-10-CM

## 2020-10-15 PROCEDURE — 90791 PR PSYCHIATRIC DIAGNOSTIC EVALUATION: ICD-10-PCS | Mod: S$GLB,,, | Performed by: PSYCHOLOGIST

## 2020-10-15 PROCEDURE — 99999 PR PBB SHADOW E&M-EST. PATIENT-LVL II: ICD-10-PCS | Mod: PBBFAC,,, | Performed by: PSYCHOLOGIST

## 2020-10-15 PROCEDURE — 99999 PR PBB SHADOW E&M-EST. PATIENT-LVL II: CPT | Mod: PBBFAC,,, | Performed by: PSYCHOLOGIST

## 2020-10-15 PROCEDURE — 90791 PSYCH DIAGNOSTIC EVALUATION: CPT | Mod: S$GLB,,, | Performed by: PSYCHOLOGIST

## 2020-10-15 NOTE — LETTER
October 18, 2020        Chandana Carrasco MD  [address] [facility] [address]             [facility]  [address] [facility] [address]  Phone: [phone]  Fax: [phone]   Patient: Venkat Jonas   MR Number: [id number]   YOB: 1965   Date of Visit: 10/15/2020       Dear Dr. Carrasco:    Thank you for referring Venkat Jonas to me for evaluation. Below are the relevant portions of my assessment and plan of care.     Venkat Jonas is a 55 y.o. male self-referred for psychological evaluation and treatment.  Mr. Jonas appears to be coping with understandable anxiety due to his diagnosis and surgery for RCC. Cancer coping strategies were discussed. He was encouraged to continue engaging in his many pro-health habits.  Patient will alert medical team if mood/anxiety symptoms develop. There are no recommendations for psychological treatment at this time. The patient is aware of resources available should  his needs change in the future. He was given information about family support services.     If you have questions, please do not hesitate to call me. I look forward to following Venkat along with you.    Sincerely,      Al Sorto, PhD           CC  No Recipients

## 2020-10-15 NOTE — PROGRESS NOTES
Subjective:       Patient ID: Venkat Jonas    Chief Complaint: renal cell carcinoma (consult)    HPI     Venkat Jonas is a 55 y.o. male, referred by Vega Sears MD, to clinic for evaluation and management of RCC    Oncologic History:    Developed hematuria recently and on imaging was found to have a renal mass c/w RCC.  Questionable bone lesion.  Recently underwent nephrectomy with below pathology.  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       Recovering from surgery remarkably well.  No major issues.      PATHOLOGY:    1. Lymph nodes, aortic (regional resection):  - 3 lymph nodes negative for tumor (0/3)    2. Left kidney and adrenal gland (radical nephrectomy):  - Renal cell carcinoma, see synoptic report below  - Additional ancillary testing for tumor classification is ordered and results will be issued in a supplemental report    Kidney carcinoma synoptic report  - Procedure: Radical nephrectomy  - Specimen laterality: Left  - Tumor site: Upper pole  - Tumor size: 8.9 x 8.1 x 7.7 cm  - Tumor focality: Renal cell carcinoma, unclassified (see microscopic section)  - Sarcomatoid features: Not identified  - Rhabdoid features: Present  - Histologic grade: WHO/ISUP Nuclear grade 4  - Tumor necrosis: Present, approximately 20% of the tumor  - Tumor extension: Tumor extends into renal vein  - Margins:  - Uninvolved by invasive carcinoma  - Regional lymph nodes:  - Number of lymph nodes involved: 0  - Number of lymph nodes examined: 4 (specimen 1 and specimen 2)  - Pathologic staging: pT3a N0 MX  - Other findings: Separate sclerotic and calcified nodule  - Tumor block for potential studies: 2D, 2E, 2F 2H, 2I, 2J, 2K  - Nontumor block: 2N  - Immunostain panel in microscopic section  - MMR IHC: No loss of expression (see microscopic section)  Sections show an eosinophilic renal cell carcinoma with nuclear inclusions ,cytoplasmic  inclusions , and  extracellular eosinophillic material. Several renal cell carcinoma subtypes are in the differential including  high grade chromophobe, eosinophilic clear cell, SDH deficient, and translocation associated. Subtyping  will be attempted with ancillary testing and results issued in a supplemental report. Selected slides  reviewed by additional pathologists.    Immunostain results:  Positive stains: AMACR, keratin AE1/AE3, CD10,  (weak), vimentin  Negative stains: Keratin 7, keratin 20, HMB45, Mart 1, desmin    Immunohistochemistry (IHC) Testing for Mismatch Repair (MMR) Proteins:  MLH1 - Intact nuclear expression  MSH2 - Intact nuclear expression  MSH6 - Intact nuclear expression  PMS2 - Intact nuclear expression    Review of Systems   Constitutional: Negative for activity change, appetite change, chills, fatigue, fever and unexpected weight change.   HENT: Negative for congestion, hearing loss, mouth sores, sore throat and trouble swallowing.    Eyes: Negative for pain and visual disturbance.   Respiratory: Negative for cough, shortness of breath and wheezing.    Cardiovascular: Negative for chest pain, palpitations and leg swelling.   Gastrointestinal: Negative for abdominal pain, constipation, diarrhea, nausea and vomiting.   Endocrine: Negative for cold intolerance and heat intolerance.   Genitourinary: Negative for difficulty urinating, discharge, dysuria, enuresis, frequency, hematuria, scrotal swelling and testicular pain.   Musculoskeletal: Negative for arthralgias and myalgias.   Skin: Negative for color change, rash and wound.   Allergic/Immunologic: Negative for environmental allergies and food allergies.   Neurological: Negative for weakness, numbness and headaches.   Hematological: Negative for adenopathy. Does not bruise/bleed easily.   Psychiatric/Behavioral: Negative for confusion, hallucinations and sleep disturbance. The patient is not nervous/anxious.    All other systems  reviewed and are negative.        Allergies:  Review of patient's allergies indicates:  No Known Allergies    Medications:  Current Outpatient Medications   Medication Sig Dispense Refill    ezetimibe (ZETIA) 10 mg tablet Take 1 tablet by mouth daily 90 tablet 3    pantoprazole (PROTONIX) 40 MG tablet TAKE ONE TABLET BY MOUTH TWICE A DAY ON AN EMPTY STOMACH 60 tablet 3    rosuvastatin (CRESTOR) 40 MG Tab Take 1 tablet (40 mg total) by mouth every evening. (Patient taking differently: Take 40 mg by mouth once daily. ) 90 tablet 3    valsartan-hydrochlorothiazide (DIOVAN-HCT) 160-12.5 mg per tablet Take 1 tablet by mouth daily 90 tablet 3    acetaminophen (TYLENOL) 500 MG tablet Take 1 tablet (500 mg total) by mouth every 6 (six) hours. for 7 days (Patient not taking: Reported on 10/14/2020) 28 tablet 0    albuterol (PROVENTIL/VENTOLIN HFA) 90 mcg/actuation inhaler Inhale 2 puffs into the lungs every 4 (four) hours as needed. (Patient not taking: Reported on 10/14/2020) 18 g 3    aspirin 81 MG Chew Take 81 mg by mouth once daily.      azelastine (OPTIVAR) 0.05 % ophthalmic solution Place 1 drop into both eyes 2 (two) times daily. (Patient not taking: Reported on 10/14/2020) 18 mL 3    docusate sodium (COLACE) 100 MG capsule Take 1 capsule (100 mg total) by mouth 2 (two) times daily. 60 capsule 0    erythromycin (ROMYCIN) ophthalmic ointment Place into both eyes 3 (three) times daily. 1 Bottle 0    fluticasone (FLONASE) 50 mcg/actuation nasal spray 2 sprays by Each Nare route once daily. (Patient not taking: Reported on 10/14/2020) 48 g 4    ibuprofen (ADVIL,MOTRIN) 600 MG tablet Take 1 tablet (600 mg total) by mouth 3 (three) times daily. 30 tablet 0    oxyCODONE (ROXICODONE) 5 MG immediate release tablet Take 1 tablet (5 mg total) by mouth every 4 (four) hours as needed for Pain. (Patient not taking: Reported on 10/14/2020) 10 tablet 0    [START ON 11/28/2020] oxyCODONE-acetaminophen (PERCOCET) 5-325 mg  per tablet Take 1 tablet by mouth every 4 (four) hours as needed for Pain. (Patient not taking: Reported on 10/14/2020) 20 tablet 0    polyethylene glycol (GLYCOLAX) 17 gram PwPk Mix 1 packet (17 g) with liquid and take by mouth once daily. for 14 days 14 each 0    SYMBICORT 80-4.5 mcg/actuation HFAA Inhale 2 puffs into the lungs 2 (two) times daily. (Patient taking differently: Inhale 2 puffs into the lungs 2 (two) times daily. prn) 30.6 g 3    tamsulosin (FLOMAX) 0.4 mg Cap Take 1 capsule (0.4 mg total) by mouth once daily. 30 capsule 0    varicella-zoster gE-AS01B, PF, (SHINGRIX) 50 mcg/0.5 mL injection Inject into the muscle as directed 0.5 mL 0    zolpidem (AMBIEN) 10 mg Tab Take 1 tablet (10 mg total) by mouth nightly as needed. (Patient not taking: Reported on 10/14/2020) 30 tablet 5     No current facility-administered medications for this visit.        PMH:  Past Medical History:   Diagnosis Date    Allergic sinusitis     Exercise-induced asthma     Hyperlipidemia     Hypertension     Renal cell carcinoma 10/14/2020       PSH:  Past Surgical History:   Procedure Laterality Date    COLONOSCOPY N/A 12/5/2016    Procedure: COLONOSCOPY;  Surgeon: Toni Rivera MD;  [facility]  Service: Endoscopy;  Laterality: N/A;  VIP    CYSTOSCOPY W/ RETROGRADES Left 10/1/2020    Procedure: CYSTOSCOPY, WITH RETROGRADE PYELOGRAM;  Surgeon: Vega Sears MD;  [address];  Service: Urology;  Laterality: Left;    LAPAROSCOPIC ROBOT-ASSISTED SURGICAL REMOVAL OF KIDNEY USING DA SONIDO XI Left 10/7/2020    Procedure: XI ROBOTIC NEPHRECTOMY;  Surgeon: Vega Sears MD;  [facility] 2ND FLR;  Service: Urology;  Laterality: Left;  gen with regional/ 5hrs    SINUS SURGERY         FamHx:  Family History   Problem Relation Age of Onset    Hyperlipidemia Father     Hyperlipidemia Brother     Heart attack Maternal Grandmother     Cancer Maternal Grandfather          throat    Melanoma Neg Hx     Psoriasis Neg Hx     Lupus Neg Hx     Eczema Neg Hx     Acne Neg Hx     Heart disease Neg Hx        SocHx:  Social History     Socioeconomic History    Marital status:      Spouse name: Not on file    Number of children: Not on file    Years of education: Not on file    Highest education level: Not on file   Occupational History    Occupation:  Marketing     Employer: [employer]   Social Needs    Financial resource strain: Not on file    Food insecurity     Worry: Not on file     Inability: Not on file    Transportation needs     Medical: Not on file     Non-medical: Not on file   Tobacco Use    Smoking status: Never Smoker    Smokeless tobacco: Never Used    Tobacco comment: The patient exercises regularly at [employer].  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   Substance and Sexual Activity    Alcohol use: Yes     Alcohol/week: 4.0 standard drinks     Types: 4 Shots of liquor per week    Drug use: No    Sexual activity: Not on file   Lifestyle    Physical activity     Days per week: Not on file     Minutes per session: Not on file    Stress: Not on file   Relationships    Social connections     Talks on phone: Not on file     Gets together: Not on file     Attends Judaism service: Not on file     Active member of club or organization: Not on file     Attends meetings of clubs or organizations: Not on file     Relationship status: Not on file   Other Topics Concern    Not on file   Social History Narrative    Not on file       Distress Score    Distress Score: 2        Practical Problems Physical Problems   : No Appearance: No   Housing: No Bathing / Dressing: No   Insurance / Financial: No Breathing: No    Transportation: No  Changes in Urination: No    Work / School: No  Constipation: No   Treatment Decisions: Yes  Diarrhea: No     Eating: No    Family Problems Fatigue: No    Dealing with  Children: No Feeling Swollen: No    Dealing with Partner: No Fevers: No    Ability to Have Children: No  Getting Around: No    Family Health Issues: No  Indigestion: No     Memory / Concentration: No   Emotional Problems Mouth Sores: No    Depression: No  Nausea: No    Fears: No  Nose Dry / Congested: No    Nervousness: No  Pain: No    Sadness: No Sexual: No    Worry: Yes Skin Dry / Itchy: No    Loss of Interest in Usual Activities: No Sleep: No     Substance Abuse: No    Spiritual/Religions Concerns Tingling in Hands / Feet: No   Spritual / Amish Concerns: No         Other Problems                Objective:      Physical Exam  Constitutional:       General: He is not in acute distress.     Appearance: Normal appearance. He is normal weight. He is not ill-appearing, toxic-appearing or diaphoretic.   HENT:      Head: Normocephalic and atraumatic.      Nose: Nose normal.   Eyes:      General: No scleral icterus.        Right eye: No discharge.         Left eye: No discharge.      Conjunctiva/sclera: Conjunctivae normal.   Skin:     General: Skin is warm and dry.      Coloration: Skin is not jaundiced or pale.   Neurological:      General: No focal deficit present.      Mental Status: He is alert and oriented to person, place, and time. Mental status is at baseline.   Psychiatric:         Mood and Affect: Mood normal.         Behavior: Behavior normal.         Thought Content: Thought content normal.         Judgment: Judgment normal.           LABS:  WBC   Date Value Ref Range Status   10/08/2020 10.50 3.90 - 12.70 K/uL Final   10/08/2020 10.39 3.90 - 12.70 K/uL Final     Hemoglobin   Date Value Ref Range Status   10/08/2020 11.7 (L) 14.0 - 18.0 g/dL Final   10/08/2020 11.7 (L) 14.0 - 18.0 g/dL Final     Hematocrit   Date Value Ref Range Status   10/08/2020 36.9 (L) 40.0 - 54.0 % Final   10/08/2020 36.9 (L) 40.0 - 54.0 % Final     Platelets   Date Value Ref Range Status   10/08/2020 316 150 - 350 K/uL Final    10/08/2020 317 150 - 350 K/uL Final       Chemistry        Component Value Date/Time     10/08/2020 0530     10/08/2020 0530    K 3.9 10/08/2020 0530    K 4.0 10/08/2020 0530     10/08/2020 0530     10/08/2020 0530    CO2 22 (L) 10/08/2020 0530    CO2 21 (L) 10/08/2020 0530    BUN 15 10/08/2020 0530    BUN 16 10/08/2020 0530    CREATININE 1.3 10/08/2020 0530    CREATININE 1.3 10/08/2020 0530     (H) 10/08/2020 0530     (H) 10/08/2020 0530        Component Value Date/Time    CALCIUM 8.0 (L) 10/08/2020 0530    CALCIUM 8.1 (L) 10/08/2020 0530    ALKPHOS 104 09/28/2020 1039    AST 38 09/28/2020 1039    ALT 24 09/28/2020 1039    BILITOT 0.5 09/28/2020 1039    ESTGFRAFRICA >60.0 10/08/2020 0530    ESTGFRAFRICA >60.0 10/08/2020 0530    EGFRNONAA >60.0 10/08/2020 0530    EGFRNONAA >60.0 10/08/2020 0530              Assessment:       1. Renal cell carcinoma, unspecified laterality          Plan:         1. RCC:    Reviewed pathology.  I am happy that lymph nodes were clear, and margins were negative.  The fact that there was renal vein involvement, pushes this to a stage III.  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    We are assuming that the questionable bone lesion is benign, but will need to watch this carefully.      At this point, we could monitor this closely or could consider adjuvant Sutent.  I reviewed poss    RTC 2 mos with CT CAP, CBC, CMP and to see me.     The patient agrees with the plan, and all questions have been answered to their satisfaction.      More than 45 mins were spent during this encounter, greater than 50% was spent in direct counseling and/or coordination of care.     Chandana Carrasco M.D., M.S., F.A.C.P.  Hematology and Oncology Attending  [facility]

## 2020-10-18 ENCOUNTER — PATIENT MESSAGE (OUTPATIENT)
Dept: ADMINISTRATIVE | Facility: OTHER | Age: 55
End: 2020-10-18

## 2020-10-18 PROBLEM — F41.8 ANXIETY ABOUT HEALTH: Status: ACTIVE | Noted: 2020-10-18

## 2020-10-18 PROBLEM — R45.89 ANXIETY ABOUT HEALTH: Status: ACTIVE | Noted: 2020-10-18

## 2020-10-18 NOTE — PROGRESS NOTES
PSYCHO-ONCOLOGY INTAKE    Diagnostic Interview - CPT 59420    Date: 10/15/2020  Site: [facility]     Evaluation Length (direct face-to-face time):  1 hour     Referral Source: Oncologist: [place]   PCP: Alexandre Griffiths MD    Clinical status of patient: Outpatient    Venkat Jonas, a 55 y.o. male, seen for initial evaluation visit.  Met with patient.    Chief complaint/reason for encounter: adjustment to illness, anxiety    Medical/Surgical History:    Patient Active Problem List   Diagnosis    Hyperlipidemia    Verruca plantaris    AR (allergic rhinitis)    Sinusitis    Essential hypertension    Agatston CAC score 100-199    AK (actinic keratosis)    Left renal mass    Anxiety about health       Health Behaviors:       ETOH Use: Yes (social and within NIAAA healthy use limits for age and gender)       Tobacco Use: No   Illicit Drug Use:  No     Prescription Misuse:No   Caffeine: minimal   Exercise:The patient maintains a regular, healthy exercise program.     Past Psychiatric History:   Inpatient treatment: No      Suicide Attempts: No     Psychotropic Medications:  Current: Ambien       Past: none    Current medications as per below, allergies reviewed in chart.    Current Outpatient Medications   Medication    albuterol (PROVENTIL/VENTOLIN HFA) 90 mcg/actuation inhaler    aspirin 81 MG Chew    azelastine (OPTIVAR) 0.05 % ophthalmic solution    docusate sodium (COLACE) 100 MG capsule    erythromycin (ROMYCIN) ophthalmic ointment    ezetimibe (ZETIA) 10 mg tablet    fluticasone (FLONASE) 50 mcg/actuation nasal spray    ibuprofen (ADVIL,MOTRIN) 600 MG tablet    oxyCODONE (ROXICODONE) 5 MG immediate release tablet    [START ON 11/28/2020] oxyCODONE-acetaminophen (PERCOCET) 5-325 mg per tablet    pantoprazole (PROTONIX) 40 MG tablet    polyethylene glycol (GLYCOLAX) 17 gram PwPk    rosuvastatin (CRESTOR) 40 MG Tab    SYMBICORT 80-4.5 mcg/actuation HFAA    tamsulosin (FLOMAX) 0.4 mg  Cap    valsartan-hydrochlorothiazide (DIOVAN-HCT) 160-12.5 mg per tablet    varicella-zoster gE-AS01B, PF, (SHINGRIX) 50 mcg/0.5 mL injection    zolpidem (AMBIEN) 10 mg Tab     No current facility-administered medications for this visit.         CAM Therapies: None     Screening: Depression: Denies  (Standardized depression screening used- PHQ-9= does not meet screener)     Ina: Denies Psychosis: Denies    Generalized anxiety: Denies        Social situation/Stressors: Venkat Jonas lives with his family in [place], [place].  He is a full-time  at [employer] [place]  He has been in his job for 10 years. He previously worked in private [employer]. 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  He feels very supported by his peers and supervisees.  Venkat Jonas has been  1x (Kamila) and has 3 children. His oldest son lives elsewhere (, age 23). His younger son and daughter still live at home (18, 17). His spouse is coping well with his illness.   The patient reports excellent social support from many close friends and contacts.  Venkat Jonas is an active member of the [employer].  Venkat Jonas's hobbies include riding Peloton daily.  Additional stressors: none reported    Strengths:Steady employment, financial stability, Housing stability, Able to vocalize needs, Values and traditions, Motivation, readiness for change, Setting and pursuing goals, hopes, dreams, aspirations, Resources - social, interpersonal, monetary, Vocational interests, hobbies and/or talents, Interpersonal relationships and supports available - family, relatives, friends and Cultural/spiritual/Zoroastrian and community involvement  Liabilities: Complicated medical illness    Current Evaluation:     Mental Status Exam: Venkat Jonas arrived promptly for the assessment session. The patient was fully cooperative throughout the interview and was an adequate historian    Appearance: age appropriate, casually  dressed, well groomed  Behavior/Cooperation: friendly and cooperative  Speech: normal in rate, volume, and tone and appropriate quality, quantity and organization of sentences  Mood: anxious  Affect: anxious  Thought Process: goal-directed, logical  Thought Content: normal, no suicidality, no homicidality, delusions, or paranoia;did not appear to be responding to internal stimuli during the interview.   Orientation: grossly intact  Memory: Grossly intact  Attention Span/Concentration: Attends to interview without distraction; reports no difficulty  Fund of Knowledge: average  Estimate of Intelligence: average from verbal skills and history  Cognition: grossly intact  Insight: patient has awareness of illness; good insight into own behavior and behavior of others  Judgment: the patient's behavior is adequate to circumstances    Distress Score    Distress Score: 5        Practical Problems Physical Problems                                                   Family Problems                                         Emotional Problems              Fears: Yes                      Worry: Yes                   Spiritual/Religions Concerns               Other Problems            MMSE:     Pain: 0    History of present illness:  As per Dr. Carrasco:    Developed hematuria recently and on imaging was found to have a renal mass c/w RCC.  Questionable bone lesion.  Recently underwent nephrectomy with below pathology.  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        Recovering from surgery remarkably well.  No major issues.       PATHOLOGY:     1. Lymph nodes, aortic (regional resection):  - 3 lymph nodes negative for tumor (0/3)     2. Left kidney and adrenal gland (radical nephrectomy):  - Renal cell carcinoma, see synoptic report below  - Additional ancillary testing for tumor classification is ordered and results will be  "issued in a supplemental report     Kidney carcinoma synoptic report  - Procedure: Radical nephrectomy  - Specimen laterality: Left  - Tumor site: Upper pole  - Tumor size: 8.9 x 8.1 x 7.7 cm  - Tumor focality: Renal cell carcinoma, unclassified (see microscopic section)  - Sarcomatoid features: Not identified  - Rhabdoid features: Present  - Histologic grade: WHO/ISUP Nuclear grade 4  - Tumor necrosis: Present, approximately 20% of the tumor  - Tumor extension: Tumor extends into renal vein  - Margins:  - Uninvolved by invasive carcinoma  - Regional lymph nodes:  - Number of lymph nodes involved: 0  - Number of lymph nodes examined: 4 (specimen 1 and specimen 2)  - Pathologic staging: pT3a N0 MX      Venkat Jonas has adjusted to illness fairly well primarily through active coping strategies, focus on alternative activities, focus on work, focus on family, prayer and exercise.  He is somewhat overwhelmed by the speed of illness to diagnosis to surgery transition. He feels "fortunate and blessed" by his process and prognosis, so far. His major concerns are recurrence fears and survival rates. He finds himself wondering if he will "make it 5 years."  Most of the time he is feeling confident and optimistic, largely based on his wily ("I know I will win.").  He reports having been "at peace" prior to surgery ("one of my finer moments").  He has engaged in appropriate information gathering.  The patient has excellent family/friend support.  His support system is coping very well with the diagnosis/treatment/prognosis. He reports no illness-related psychosocial stressors. The patient has an excellent partnership with [place] [facility]. The patient reports the following barriers to cancer care:none.   Symptoms:   · Mood: depressed mood, insomnia and poor concentration;  no prior; no SI/HI; PHQ-9=3  · Anxiety: Feeling nervous, anxious, or on edge, Uncontrollable worry (about health, prognosis), " "Difficulty relaxing, Irritability and Fear of unknown; no prior;  MISA-7=8  · Substance abuse: denied  · Cognitive functioning: denied  · Health behaviors: noncontributory  · Sleep: trouble sleeping night before cystoscope, hx of occasional insomnia, meditation prior to bed "is working better than Ambien,"restful sleep  and no concerns , no sleep onset difficulty  and no sleep maintenance difficulty, no EDS  and no reported apneic events   · Pain: Mr. Jonas reports 0/10 pain.     Assessment - Diagnosis - Goals:       ICD-10-CM ICD-9-CM   1. Anxiety about health  F41.8 300.09         Plan:individual psychotherapy (as needed)    Summary and Recommendations  Venkat Jonas is a 55 y.o. male self-referred for psychological evaluation and treatment.  Mr. Jonas appears to be coping with understandable anxiety due to his diagnosis and surgery for RCC. Cancer coping strategies were discussed. He was encouraged to continue engaging in his many pro-health habits.  Patient will alert medical team if mood/anxiety symptoms develop. There are no recommendations for psychological treatment at this time. The patient is aware of resources available should  his needs change in the future. He was given information about family support services.    GOALS:   Continue exercise  Meditation nightly  PES ("add value" to life)    Al Sprague, PhD  Clinical Psychologist  [place] License #[id number]          "

## 2020-10-19 ENCOUNTER — TELEPHONE (OUTPATIENT)
Dept: UROLOGY | Facility: CLINIC | Age: 55
End: 2020-10-19

## 2020-10-21 ENCOUNTER — PATIENT MESSAGE (OUTPATIENT)
Dept: ADMINISTRATIVE | Facility: OTHER | Age: 55
End: 2020-10-21

## 2020-10-21 RX ORDER — TAMSULOSIN HYDROCHLORIDE 0.4 MG/1
0.4 CAPSULE ORAL DAILY
Qty: 30 CAPSULE | Refills: 0 | Status: SHIPPED | OUTPATIENT
Start: 2020-10-21 | End: 2020-11-18

## 2020-10-22 ENCOUNTER — PATIENT MESSAGE (OUTPATIENT)
Dept: GASTROENTEROLOGY | Facility: HOSPITAL | Age: 55
End: 2020-10-22

## 2020-10-27 ENCOUNTER — TELEPHONE (OUTPATIENT)
Dept: ENDOSCOPY | Facility: HOSPITAL | Age: 55
End: 2020-10-27

## 2020-10-27 NOTE — TELEPHONE ENCOUNTER
Contacted patient to schedule EGD and colonoscopy.  Offered next and only available date through the end of the year: 12/24/20.  He did not want to schedule on that date.  He informed me that he would speak to his physician during his appointment this week to see if it could be postponed until January.  Direct line phone number provided for any questions.

## 2020-10-28 ENCOUNTER — PATIENT MESSAGE (OUTPATIENT)
Dept: ADMINISTRATIVE | Facility: OTHER | Age: 55
End: 2020-10-28

## 2020-10-28 ENCOUNTER — OFFICE VISIT (OUTPATIENT)
Dept: UROLOGY | Facility: CLINIC | Age: 55
End: 2020-10-28
Payer: COMMERCIAL

## 2020-10-28 VITALS
WEIGHT: 187.81 LBS | DIASTOLIC BLOOD PRESSURE: 86 MMHG | BODY MASS INDEX: 26.89 KG/M2 | HEIGHT: 70 IN | HEART RATE: 83 BPM | SYSTOLIC BLOOD PRESSURE: 121 MMHG

## 2020-10-28 DIAGNOSIS — C64.2 RENAL CELL CARCINOMA OF LEFT KIDNEY: Primary | ICD-10-CM

## 2020-10-28 PROCEDURE — 99024 PR POST-OP FOLLOW-UP VISIT: ICD-10-PCS | Mod: S$GLB,,, | Performed by: UROLOGY

## 2020-10-28 PROCEDURE — 99024 POSTOP FOLLOW-UP VISIT: CPT | Mod: S$GLB,,, | Performed by: UROLOGY

## 2020-10-28 PROCEDURE — 99999 PR PBB SHADOW E&M-EST. PATIENT-LVL IV: CPT | Mod: PBBFAC,,, | Performed by: UROLOGY

## 2020-10-28 PROCEDURE — 99999 PR PBB SHADOW E&M-EST. PATIENT-LVL IV: ICD-10-PCS | Mod: PBBFAC,,, | Performed by: UROLOGY

## 2020-10-28 NOTE — PROGRESS NOTES
Subjective:       Patient ID: Venkat Jonas is a 55 y.o. male.    Chief Complaint:  Renal mass.      History of Present Illness  HPI  Patient is a 55 y.o. male who is established to our clinic and referred by their PCP, Dr. Griffiths for evaluation of a left renal mass.     The patient was out of town on vacation when he experienced acute onset of left-sided flank pain.  Shortly after flank pain, the patient developed gross hematuria with passage of blood clots.  He underwent CT scan showing a large left renal mass.  Subsequent cystoscopy and left retrograde pyelogram revealed likely renal origin of the mass and not urothelial.    He underwent a left robotic nephrectomy with para aortic lymph node dissection on October 7, 2020.    Pathology shown below.  He is recovering well.      Kidney carcinoma synoptic report   - Procedure:  Radical nephrectomy   - Specimen laterality:  Left   - Tumor site: Upper pole   - Tumor size: 8.9 x 8.1 x 7.7 cm   - Tumor focality:  Renal cell carcinoma, unclassified (see microscopic   section)   - Sarcomatoid features:  Not identified   - Rhabdoid features:  Present   - Histologic grade: WHO/ISUP Nuclear grade 4   - Tumor necrosis:  Present, approximately 20% of the tumor   - Tumor extension:  Tumor extends into  renal vein   - Margins:   - Uninvolved by invasive carcinoma   - Regional lymph nodes:   - Number of lymph nodes involved:  0   - Number of lymph nodes examined:  4 (specimen 1 and specimen 2)   - Pathologic staging:  pT3a N0 MX   - Other findings: Separate sclerotic and calcified nodule   - Tumor block for potential studies:  2D, 2E,  2F 2H, 2I, 2J,  2K   - Nontumor block:  2N   - Immunostain panel in microscopic section   - MMR IHC: No loss of expression (see microscopic section)       Review of Systems  Review of Systems  All other systems reviewed and negative except pertinent positives noted in HPI.       Objective:     Physical Exam  Constitutional:       General:  He is not in acute distress.     Appearance: He is well-developed.   HENT:      Head: Normocephalic and atraumatic.   Eyes:      General: No scleral icterus.  Neck:      Trachea: No tracheal deviation.   Pulmonary:      Effort: Pulmonary effort is normal. No respiratory distress.   Neurological:      Mental Status: He is alert and oriented to person, place, and time.   Psychiatric:         Behavior: Behavior normal.         Thought Content: Thought content normal.         Judgment: Judgment normal.         Lab Review  Lab Results   Component Value Date    COLORU Yellow 09/28/2020    SPECGRAV >1.030 (A) 09/28/2020    PHUR 6.0 09/28/2020    NITRITE Negative 09/28/2020    KETONESU Negative 09/28/2020    UROBILINOGEN Negative 09/08/2014         Assessment:        1. Renal cell carcinoma of left kidney            Plan:     Renal cell carcinoma of left kidney    -pathology reviewed extensively.  Still waiting on FISH analysis.  Discussed the case with Dr. Castillo yesterday.   -patient has met with Dr. Carrasco and discussed adjuvant Sutent.  At this time, the patient is leaning towards observation.  He has plans for postop imaging in December for close surveillance.    Awaiting Mullen confirmation and sub typing of renal cell carcinoma pathology

## 2020-10-29 DIAGNOSIS — Z12.11 SPECIAL SCREENING FOR MALIGNANT NEOPLASMS, COLON: Primary | ICD-10-CM

## 2020-10-29 RX ORDER — SODIUM, POTASSIUM,MAG SULFATES 17.5-3.13G
1 SOLUTION, RECONSTITUTED, ORAL ORAL DAILY
Qty: 1 KIT | Refills: 0 | Status: SHIPPED | OUTPATIENT
Start: 2020-10-29 | End: 2020-11-04

## 2020-11-03 ENCOUNTER — HOSPITAL ENCOUNTER (OUTPATIENT)
Facility: HOSPITAL | Age: 55
Discharge: HOME | End: 2020-11-03
Attending: INTERNAL MEDICINE | Admitting: INTERNAL MEDICINE
Payer: COMMERCIAL

## 2020-11-03 ENCOUNTER — ANESTHESIA (OUTPATIENT)
Dept: ENDOSCOPY | Facility: HOSPITAL | Age: 55
End: 2020-11-03
Payer: COMMERCIAL

## 2020-11-03 ENCOUNTER — ANESTHESIA EVENT (OUTPATIENT)
Dept: ENDOSCOPY | Facility: HOSPITAL | Age: 55
End: 2020-11-03
Payer: COMMERCIAL

## 2020-11-03 VITALS
DIASTOLIC BLOOD PRESSURE: 71 MMHG | OXYGEN SATURATION: 99 % | BODY MASS INDEX: 26.2 KG/M2 | RESPIRATION RATE: 16 BRPM | WEIGHT: 183 LBS | HEART RATE: 71 BPM | TEMPERATURE: 98 F | HEIGHT: 70 IN | SYSTOLIC BLOOD PRESSURE: 103 MMHG

## 2020-11-03 DIAGNOSIS — D12.6 COLON ADENOMAS: ICD-10-CM

## 2020-11-03 PROCEDURE — D9220A PRA ANESTHESIA: Mod: 33,CRNA,, | Performed by: NURSE ANESTHETIST, CERTIFIED REGISTERED

## 2020-11-03 PROCEDURE — 27201012 HC FORCEPS, HOT/COLD, DISP: Performed by: INTERNAL MEDICINE

## 2020-11-03 PROCEDURE — 88342 IMHCHEM/IMCYTCHM 1ST ANTB: CPT | Performed by: PATHOLOGY

## 2020-11-03 PROCEDURE — 88342 IMHCHEM/IMCYTCHM 1ST ANTB: CPT | Mod: 26,,, | Performed by: PATHOLOGY

## 2020-11-03 PROCEDURE — 43239 EGD BIOPSY SINGLE/MULTIPLE: CPT | Mod: 51,,, | Performed by: INTERNAL MEDICINE

## 2020-11-03 PROCEDURE — 43239 EGD BIOPSY SINGLE/MULTIPLE: CPT | Performed by: INTERNAL MEDICINE

## 2020-11-03 PROCEDURE — G0105 COLORECTAL SCRN; HI RISK IND: ICD-10-PCS | Mod: ,,, | Performed by: INTERNAL MEDICINE

## 2020-11-03 PROCEDURE — 43239 PR EGD, FLEX, W/BIOPSY, SGL/MULTI: ICD-10-PCS | Mod: 51,,, | Performed by: INTERNAL MEDICINE

## 2020-11-03 PROCEDURE — 37000009 HC ANESTHESIA EA ADD 15 MINS: Performed by: INTERNAL MEDICINE

## 2020-11-03 PROCEDURE — 88342 CHG IMMUNOCYTOCHEMISTRY: ICD-10-PCS | Mod: 26,,, | Performed by: PATHOLOGY

## 2020-11-03 PROCEDURE — 25000003 PHARM REV CODE 250: Performed by: NURSE ANESTHETIST, CERTIFIED REGISTERED

## 2020-11-03 PROCEDURE — 88305 TISSUE EXAM BY PATHOLOGIST: CPT | Mod: 59 | Performed by: PATHOLOGY

## 2020-11-03 PROCEDURE — 88305 TISSUE EXAM BY PATHOLOGIST: ICD-10-PCS | Mod: 26,,, | Performed by: PATHOLOGY

## 2020-11-03 PROCEDURE — G0105 COLORECTAL SCRN; HI RISK IND: HCPCS | Mod: ,,, | Performed by: INTERNAL MEDICINE

## 2020-11-03 PROCEDURE — D9220A PRA ANESTHESIA: ICD-10-PCS | Mod: 33,ANES,, | Performed by: ANESTHESIOLOGY

## 2020-11-03 PROCEDURE — D9220A PRA ANESTHESIA: ICD-10-PCS | Mod: 33,CRNA,, | Performed by: NURSE ANESTHETIST, CERTIFIED REGISTERED

## 2020-11-03 PROCEDURE — 88305 TISSUE EXAM BY PATHOLOGIST: CPT | Mod: 26,,, | Performed by: PATHOLOGY

## 2020-11-03 PROCEDURE — G0105 COLORECTAL SCRN; HI RISK IND: HCPCS | Performed by: INTERNAL MEDICINE

## 2020-11-03 PROCEDURE — 63600175 PHARM REV CODE 636 W HCPCS: Performed by: NURSE ANESTHETIST, CERTIFIED REGISTERED

## 2020-11-03 PROCEDURE — D9220A PRA ANESTHESIA: Mod: 33,ANES,, | Performed by: ANESTHESIOLOGY

## 2020-11-03 PROCEDURE — 37000008 HC ANESTHESIA 1ST 15 MINUTES: Performed by: INTERNAL MEDICINE

## 2020-11-03 RX ORDER — PROPOFOL 10 MG/ML
VIAL (ML) INTRAVENOUS CONTINUOUS PRN
Status: DISCONTINUED | OUTPATIENT
Start: 2020-11-03 | End: 2020-11-03

## 2020-11-03 RX ORDER — PROPOFOL 10 MG/ML
INJECTION, EMULSION INTRAVENOUS
Status: COMPLETED
Start: 2020-11-03 | End: 2020-11-03

## 2020-11-03 RX ORDER — GLYCOPYRROLATE 0.2 MG/ML
INJECTION INTRAMUSCULAR; INTRAVENOUS
Status: COMPLETED
Start: 2020-11-03 | End: 2020-11-03

## 2020-11-03 RX ORDER — LIDOCAINE HYDROCHLORIDE 20 MG/ML
INJECTION INTRAVENOUS
Status: DISCONTINUED | OUTPATIENT
Start: 2020-11-03 | End: 2020-11-03

## 2020-11-03 RX ORDER — PHENYLEPHRINE HYDROCHLORIDE 10 MG/ML
INJECTION INTRAVENOUS
Status: DISCONTINUED | OUTPATIENT
Start: 2020-11-03 | End: 2020-11-03

## 2020-11-03 RX ORDER — SODIUM CHLORIDE 0.9 % (FLUSH) 0.9 %
10 SYRINGE (ML) INJECTION
Status: DISCONTINUED | OUTPATIENT
Start: 2020-11-03 | End: 2020-11-03 | Stop reason: HOSPADM

## 2020-11-03 RX ORDER — FENTANYL CITRATE 50 UG/ML
INJECTION, SOLUTION INTRAMUSCULAR; INTRAVENOUS
Status: DISCONTINUED | OUTPATIENT
Start: 2020-11-03 | End: 2020-11-03

## 2020-11-03 RX ORDER — PROPOFOL 10 MG/ML
VIAL (ML) INTRAVENOUS
Status: DISCONTINUED | OUTPATIENT
Start: 2020-11-03 | End: 2020-11-03

## 2020-11-03 RX ORDER — SODIUM CHLORIDE 9 MG/ML
INJECTION, SOLUTION INTRAVENOUS CONTINUOUS PRN
Status: DISCONTINUED | OUTPATIENT
Start: 2020-11-03 | End: 2020-11-03

## 2020-11-03 RX ORDER — SODIUM CHLORIDE 9 MG/ML
INJECTION, SOLUTION INTRAVENOUS CONTINUOUS
Status: DISCONTINUED | OUTPATIENT
Start: 2020-11-03 | End: 2020-11-03 | Stop reason: HOSPADM

## 2020-11-03 RX ORDER — FENTANYL CITRATE 50 UG/ML
INJECTION, SOLUTION INTRAMUSCULAR; INTRAVENOUS
Status: COMPLETED
Start: 2020-11-03 | End: 2020-11-03

## 2020-11-03 RX ORDER — LIDOCAINE HYDROCHLORIDE 20 MG/ML
INJECTION, SOLUTION EPIDURAL; INFILTRATION; INTRACAUDAL; PERINEURAL
Status: DISCONTINUED
Start: 2020-11-03 | End: 2020-11-03 | Stop reason: HOSPADM

## 2020-11-03 RX ADMIN — PROPOFOL 200 MCG/KG/MIN: 10 INJECTION, EMULSION INTRAVENOUS at 11:11

## 2020-11-03 RX ADMIN — FENTANYL CITRATE 25 MCG: 50 INJECTION, SOLUTION INTRAMUSCULAR; INTRAVENOUS at 11:11

## 2020-11-03 RX ADMIN — SODIUM CHLORIDE: 9 INJECTION, SOLUTION INTRAVENOUS at 11:11

## 2020-11-03 RX ADMIN — PHENYLEPHRINE HYDROCHLORIDE 100 MCG: 10 INJECTION INTRAVENOUS at 11:11

## 2020-11-03 RX ADMIN — LIDOCAINE HYDROCHLORIDE 100 MG: 20 INJECTION, SOLUTION INTRAVENOUS at 11:11

## 2020-11-03 RX ADMIN — PROPOFOL 100 MG: 10 INJECTION, EMULSION INTRAVENOUS at 11:11

## 2020-11-03 RX ADMIN — PROPOFOL 50 MG: 10 INJECTION, EMULSION INTRAVENOUS at 11:11

## 2020-11-03 NOTE — PROVATION PATIENT INSTRUCTIONS
Discharge Summary/Instructions after an Endoscopic Procedure  Patient Name: Venkat Jonas  Patient MRN: [id number]  Patient YOB: 1965  Tuesday, November 3, 2020  Toni Rivera MD  RESTRICTIONS:  During your procedure today, you received medications for sedation.  These   medications may affect your judgment, balance and coordination.  Therefore,   for 24 hours, you have the following restrictions:   - DO NOT drive a car, operate machinery, make legal/financial decisions,   sign important papers or drink alcohol.    ACTIVITY:  Today: no heavy lifting, straining or running due to procedural   sedation/anesthesia.  The following day: return to full activity including work.  DIET:  Eat and drink normally unless instructed otherwise.     TREATMENT FOR COMMON SIDE EFFECTS:  - Mild abdominal pain, nausea, belching, bloating or excessive gas:  rest,   eat lightly and use a heating pad.  - Sore Throat: treat with throat lozenges and/or gargle with warm salt   water.  - Because air was used during the procedure, expelling large amounts of air   from your rectum or belching is normal.  - If a bowel prep was taken, you may not have a bowel movement for 1-3 days.    This is normal.  SYMPTOMS TO WATCH FOR AND REPORT TO YOUR PHYSICIAN:  1. Abdominal pain or bloating, other than gas cramps.  2. Chest pain.  3. Back pain.  4. Signs of infection such as: chills or fever occurring within 24 hours   after the procedure.  5. Rectal bleeding, which would show as bright red, maroon, or black stools.   (A tablespoon of blood from the rectum is not serious, especially if   hemorrhoids are present.)  6. Vomiting.  7. Weakness or dizziness.  GO DIRECTLY TO THE NEAREST EMERGENCY ROOM IF YOU HAVE ANY OF THE FOLLOWING:      Difficulty breathing              Chills and/or fever over 101 F   Persistent vomiting and/or vomiting blood   Severe abdominal pain   Severe chest pain   Black, tarry stools   Bleeding- more than one  tablespoon   Any other symptom or condition that you feel may need urgent attention  Your doctor recommends these additional instructions:  If any biopsies were taken, your doctors clinic will contact you in 1 to 2   weeks with any results.  - Discharge patient to home.   - Follow an antireflux regimen.   - Await pathology results.   - Telephone endoscopist for pathology results in 2 weeks.   - Repeat upper endoscopy in 3 years for surveillance based on pathology   results.   - Return to primary care physician.   - The findings and recommendations were discussed with the patient.  For questions, problems or results please call your physician - Toni Rivera MD at Work:  [phone].  [facility], EMERGENCY ROOM PHONE NUMBER: [phone]  IF A COMPLICATION OR EMERGENCY SITUATION ARISES AND YOU ARE UNABLE TO REACH   YOUR PHYSICIAN - GO DIRECTLY TO THE EMERGENCY ROOM.  Toni Rivera MD  11/3/2020 12:20:01 PM  This report has been verified and signed electronically.  [employer]

## 2020-11-03 NOTE — PROVATION PATIENT INSTRUCTIONS
Discharge Summary/Instructions after an Endoscopic Procedure  Patient Name: Venkat Jonas  Patient MRN: [id number]  Patient YOB: 1965  Tuesday, November 3, 2020  Toni Rivera MD  RESTRICTIONS:  During your procedure today, you received medications for sedation.  These   medications may affect your judgment, balance and coordination.  Therefore,   for 24 hours, you have the following restrictions:   - DO NOT drive a car, operate machinery, make legal/financial decisions,   sign important papers or drink alcohol.    ACTIVITY:  Today: no heavy lifting, straining or running due to procedural   sedation/anesthesia.  The following day: return to full activity including work.  DIET:  Eat and drink normally unless instructed otherwise.     TREATMENT FOR COMMON SIDE EFFECTS:  - Mild abdominal pain, nausea, belching, bloating or excessive gas:  rest,   eat lightly and use a heating pad.  - Sore Throat: treat with throat lozenges and/or gargle with warm salt   water.  - Because air was used during the procedure, expelling large amounts of air   from your rectum or belching is normal.  - If a bowel prep was taken, you may not have a bowel movement for 1-3 days.    This is normal.  SYMPTOMS TO WATCH FOR AND REPORT TO YOUR PHYSICIAN:  1. Abdominal pain or bloating, other than gas cramps.  2. Chest pain.  3. Back pain.  4. Signs of infection such as: chills or fever occurring within 24 hours   after the procedure.  5. Rectal bleeding, which would show as bright red, maroon, or black stools.   (A tablespoon of blood from the rectum is not serious, especially if   hemorrhoids are present.)  6. Vomiting.  7. Weakness or dizziness.  GO DIRECTLY TO THE NEAREST EMERGENCY ROOM IF YOU HAVE ANY OF THE FOLLOWING:      Difficulty breathing              Chills and/or fever over 101 F   Persistent vomiting and/or vomiting blood   Severe abdominal pain   Severe chest pain   Black, tarry stools   Bleeding- more than one  tablespoon   Any other symptom or condition that you feel may need urgent attention  Your doctor recommends these additional instructions:  If any biopsies were taken, your doctors clinic will contact you in 1 to 2   weeks with any results.  - Discharge patient to home.   - Repeat colonoscopy in 5 years for surveillance.   - Return to primary care physician.   - The findings and recommendations were discussed with the patient.  For questions, problems or results please call your physician - Toni Rivera MD at Work:  [phone].  [facility], EMERGENCY ROOM PHONE NUMBER: [phone]  IF A COMPLICATION OR EMERGENCY SITUATION ARISES AND YOU ARE UNABLE TO REACH   YOUR PHYSICIAN - GO DIRECTLY TO THE EMERGENCY ROOM.  Toni Rivera MD  11/3/2020 12:18:59 PM  This report has been verified and signed electronically.  [employer]

## 2020-11-03 NOTE — ANESTHESIA POSTPROCEDURE EVALUATION
Anesthesia Post Evaluation    Patient: Venkat Jonas    Procedure(s) Performed: Procedure(s) (LRB):  EGD (ESOPHAGOGASTRODUODENOSCOPY) (N/A)  COLONOSCOPY (N/A)    Final Anesthesia Type: general    Patient location during evaluation: [facility]  Patient participation: Yes- Able to Participate  Level of consciousness: awake and alert  Post-procedure vital signs: reviewed and stable  Pain management: adequate  Airway patency: patent    PONV status at discharge: No PONV  Anesthetic complications: no      Cardiovascular status: hemodynamically stable  Respiratory status: unassisted, spontaneous ventilation and room air  Hydration status: euvolemic  Follow-up not needed.          Vitals Value Taken Time   /71 11/03/20 1317   Temp 36.7 °C (98 °F) 11/03/20 1315   Pulse 78 11/03/20 1318   Resp 16 11/03/20 1315   SpO2 99 % 11/03/20 1318   Vitals shown include unvalidated device data.      No case tracking events are documented in the log.      Pain/Laura Score: Laura Score: 10 (11/3/2020  1:20 PM)

## 2020-11-03 NOTE — DISCHARGE INSTRUCTIONS

## 2020-11-03 NOTE — H&P
[facility]  History & Physical    Subjective:      Chief Complaint/Reason for Admission:     EGD and colonoscopy    Venkat Jonas is a 55 y.o. male.    Past Medical History:   Diagnosis Date    Allergic sinusitis     Exercise-induced asthma     Hyperlipidemia     Hypertension     Renal cell carcinoma 10/14/2020     Past Surgical History:   Procedure Laterality Date    COLONOSCOPY N/A 12/5/2016    Procedure: COLONOSCOPY;  Surgeon: Toni Rivera MD;  Location: [facility] (4TH FLR);  Service: Endoscopy;  Laterality: N/A;  [facility]    CYSTOSCOPY W/ RETROGRADES Left 10/1/2020    Procedure: CYSTOSCOPY, WITH RETROGRADE PYELOGRAM;  Surgeon: Vega Sears MD;  Location: [facility] OR 1ST FLR;  Service: Urology;  Laterality: Left;    LAPAROSCOPIC ROBOT-ASSISTED SURGICAL REMOVAL OF KIDNEY USING DA SONIDO XI Left 10/7/2020    Procedure: XI ROBOTIC NEPHRECTOMY;  Surgeon: Vega Sears MD;  Location: [facility] OR 2ND FLR;  Service: Urology;  Laterality: Left;  gen with regional/ 5hrs    SINUS SURGERY       Family History   Problem Relation Age of Onset    Hyperlipidemia Father     Hyperlipidemia Brother     Heart attack Maternal Grandmother     Cancer Maternal Grandfather         throat    Melanoma Neg Hx     Psoriasis Neg Hx     Lupus Neg Hx     Eczema Neg Hx     Acne Neg Hx     Heart disease Neg Hx      Social History     Tobacco Use    Smoking status: Never Smoker    Smokeless tobacco: Never Used    Tobacco comment: The patient exercises regularly at [employer].  He works as a  at [employer] over [employer] services.   Substance Use Topics    Alcohol use: Yes     Alcohol/week: 4.0 standard drinks     Types: 4 Shots of liquor per week     Comment: socially    Drug use: No       PTA Medications   Medication Sig    azelastine (OPTIVAR) 0.05 % ophthalmic solution Place 1 drop into both eyes 2 (two) times daily.    erythromycin (ROMYCIN) ophthalmic ointment Place into both eyes 3  (three) times daily.    ezetimibe (ZETIA) 10 mg tablet Take 1 tablet by mouth daily    pantoprazole (PROTONIX) 40 MG tablet TAKE ONE TABLET BY MOUTH TWICE A DAY ON AN EMPTY STOMACH    rosuvastatin (CRESTOR) 40 MG Tab Take 1 tablet (40 mg total) by mouth every evening. (Patient taking differently: Take 40 mg by mouth once daily. )    sodium,potassium,mag sulfates (SUPREP BOWEL PREP KIT) 17.5-3.13-1.6 gram SolR Take 177 mLs by mouth once daily    valsartan-hydrochlorothiazide (DIOVAN-HCT) 160-12.5 mg per tablet Take 1 tablet by mouth daily    albuterol (PROVENTIL/VENTOLIN HFA) 90 mcg/actuation inhaler Inhale 2 puffs into the lungs every 4 (four) hours as needed.    aspirin 81 MG Chew Take 81 mg by mouth once daily.    fluticasone (FLONASE) 50 mcg/actuation nasal spray 2 sprays by Each Nare route once daily.    ibuprofen (ADVIL,MOTRIN) 600 MG tablet Take 1 tablet (600 mg total) by mouth 3 (three) times daily.    oxyCODONE (ROXICODONE) 5 MG immediate release tablet Take 1 tablet (5 mg total) by mouth every 4 (four) hours as needed for Pain. (Patient not taking: Reported on 10/14/2020)    [START ON 11/28/2020] oxyCODONE-acetaminophen (PERCOCET) 5-325 mg per tablet Take 1 tablet by mouth every 4 (four) hours as needed for Pain. (Patient not taking: Reported on 10/14/2020)    SYMBICORT 80-4.5 mcg/actuation HFAA Inhale 2 puffs into the lungs 2 (two) times daily. (Patient taking differently: Inhale 2 puffs into the lungs 2 (two) times daily. prn)    tamsulosin (FLOMAX) 0.4 mg Cap Take 1 capsule (0.4 mg total) by mouth once daily.    varicella-zoster gE-AS01B, PF, (SHINGRIX) 50 mcg/0.5 mL injection Inject into the muscle as directed    zolpidem (AMBIEN) 10 mg Tab Take 1 tablet (10 mg total) by mouth nightly as needed.     Review of patient's allergies indicates:  No Known Allergies     Review of Systems   Constitutional: Negative for chills and fever.   Respiratory: Negative for shortness of breath.     Cardiovascular: Negative for chest pain.   Gastrointestinal: Positive for abdominal pain and heartburn. Negative for blood in stool, constipation, diarrhea and melena.       Objective:      Vital Signs (Most Recent)  Temp: 98.2 °F (36.8 °C) (11/03/20 1047)  Pulse: 99 (11/03/20 1047)  Resp: 16 (11/03/20 1047)  BP: (!) 121/91 (11/03/20 1047)  SpO2: 99 % (11/03/20 1047)    Vital Signs Range (Last 24H):  Temp:  [98.2 °F (36.8 °C)]   Pulse:  [99]   Resp:  [16]   BP: (121)/(91)   SpO2:  [99 %]     Physical Exam  Constitutional:       Appearance: Normal appearance.   Cardiovascular:      Rate and Rhythm: Normal rate.   Pulmonary:      Effort: Pulmonary effort is normal.   Abdominal:      General: There is no distension.   Neurological:      Mental Status: He is alert and oriented to person, place, and time.           Assessment:      Active Hospital Problems    Diagnosis  POA    Colon adenomas [D12.6]  Yes      Resolved Hospital Problems   No resolved problems to display.       Plan:    EGD for slight anemia and suspected GERD symptoms improved on PPI and colonoscopy for history of colon adenoma.

## 2020-11-03 NOTE — ANESTHESIA PREPROCEDURE EVALUATION
11/03/2020  Venkat Jonas is a 55 y.o., male here for screening c-scope.  Past Medical History:   Diagnosis Date    Allergic sinusitis     Exercise-induced asthma     Hyperlipidemia     Hypertension     Renal cell carcinoma 10/14/2020     Past Surgical History:   Procedure Laterality Date    COLONOSCOPY N/A 12/5/2016    Procedure: COLONOSCOPY;  Surgeon: Toni Rivera MD;  Location: [facility] (4TH FLR);  Service: Endoscopy;  Laterality: N/A;  [facility]IP    CYSTOSCOPY W/ RETROGRADES Left 10/1/2020    Procedure: CYSTOSCOPY, WITH RETROGRADE PYELOGRAM;  Surgeon: Vega Sears MD;  Location: [facility] OR 1ST FLR;  Service: Urology;  Laterality: Left;    LAPAROSCOPIC ROBOT-ASSISTED SURGICAL REMOVAL OF KIDNEY USING DA SONIDO XI Left 10/7/2020    Procedure: XI ROBOTIC NEPHRECTOMY;  Surgeon: Vega Sears MD;  Location: [facility] OR 2ND FLR;  Service: Urology;  Laterality: Left;  gen with regional/ 5hrs    SINUS SURGERY           Anesthesia Evaluation    I have reviewed the Patient Summary Reports.    I have reviewed the Nursing Notes. I have reviewed the NPO Status.      Review of Systems  Anesthesia Hx:  No problems with previous Anesthesia  History of prior surgery of interest to airway management or planning: Previous anesthesia: General Denies Family Hx of Anesthesia complications.   Denies Personal Hx of Anesthesia complications.   Social:  Non-Smoker    Cardiovascular:   Exercise tolerance: good Hypertension Denies MI.          Pulmonary:   Asthma Exercise induced asthma, no recent inhaler use   Renal/:   Chronic Renal Disease RCC s/p nephrectomy 10/20   Hepatic/GI:   Bowel Prep. GERD, well controlled    Endocrine:  Endocrine Normal        Physical Exam  General:  Well nourished    Airway/Jaw/Neck:  Airway Findings: Mouth Opening: Normal Tongue: Normal  General Airway Assessment: Adult  Mallampati:  I  TM Distance: 4 - 6 cm      Dental:  Dental Findings: In tact   Chest/Lungs:  Chest/Lungs Findings: Normal Respiratory Rate     Heart/Vascular:  Heart Findings: Rate: Normal  Rhythm: Regular Rhythm        Mental Status:  Mental Status Findings:  Cooperative, Alert and Oriented         Anesthesia Plan  Type of Anesthesia, risks & benefits discussed:  Anesthesia Type:  general  Patient's Preference:   Intra-op Monitoring Plan: standard ASA monitors  Intra-op Monitoring Plan Comments:   Post Op Pain Control Plan:   Post Op Pain Control Plan Comments:   Induction:   IV  Beta Blocker:         Informed Consent: Patient understands risks and agrees with Anesthesia plan.  Questions answered. Anesthesia consent signed with patient.  ASA Score: 3     Day of Surgery Review of History & Physical:    H&P update referred to the surgeon.         Ready For Surgery From Anesthesia Perspective.

## 2020-11-03 NOTE — TRANSFER OF CARE
"Anesthesia Transfer of Care Note    Patient: Venkat Jonas    Procedure(s) Performed: Procedure(s) (LRB):  EGD (ESOPHAGOGASTRODUODENOSCOPY) (N/A)  COLONOSCOPY (N/A)    Patient location: [facility]    Anesthesia Type: general    Transport from OR: Transported from OR on room air with adequate spontaneous ventilation    Post pain: adequate analgesia    Post assessment: no apparent anesthetic complications and tolerated procedure well    Post vital signs: stable    Level of consciousness: awake, alert and oriented    Nausea/Vomiting: no nausea/vomiting    Complications: none    Transfer of care protocol was followed      Last vitals:   Visit Vitals  /62 (BP Location: Left arm, Patient Position: Lying)   Pulse 99   Temp 36.8 °C (98.2 °F) (Temporal)   Resp 16   Ht 5' 10" (1.778 m)   Wt 83 kg (183 lb)   SpO2 99%   BMI 26.26 kg/m²     "

## 2020-11-03 NOTE — PLAN OF CARE
Discharged instructions reviewed with pt at bedside. Understanding verbalized. No complaints of pain reported. Pt able to tolerate po intake. Awaiting MD Rivera to bedside to address findings and POC. To be transported to car by PCT.

## 2020-11-06 ENCOUNTER — OFFICE VISIT (OUTPATIENT)
Dept: HEMATOLOGY/ONCOLOGY | Facility: CLINIC | Age: 55
End: 2020-11-06
Payer: COMMERCIAL

## 2020-11-06 VITALS
HEIGHT: 70 IN | SYSTOLIC BLOOD PRESSURE: 113 MMHG | RESPIRATION RATE: 16 BRPM | DIASTOLIC BLOOD PRESSURE: 76 MMHG | HEART RATE: 91 BPM | WEIGHT: 187.38 LBS | BODY MASS INDEX: 26.82 KG/M2

## 2020-11-06 DIAGNOSIS — C64.9 RENAL CELL CARCINOMA, UNSPECIFIED LATERALITY: Primary | ICD-10-CM

## 2020-11-06 LAB
FINAL PATHOLOGIC DIAGNOSIS: NORMAL
GROSS: NORMAL
Lab: NORMAL

## 2020-11-06 PROCEDURE — 99214 PR OFFICE/OUTPT VISIT, EST, LEVL IV, 30-39 MIN: ICD-10-PCS | Mod: S$GLB,,, | Performed by: INTERNAL MEDICINE

## 2020-11-06 PROCEDURE — 99214 OFFICE O/P EST MOD 30 MIN: CPT | Mod: S$GLB,,, | Performed by: INTERNAL MEDICINE

## 2020-11-06 PROCEDURE — 3078F DIAST BP <80 MM HG: CPT | Mod: CPTII,S$GLB,, | Performed by: INTERNAL MEDICINE

## 2020-11-06 PROCEDURE — 3008F PR BODY MASS INDEX (BMI) DOCUMENTED: ICD-10-PCS | Mod: CPTII,S$GLB,, | Performed by: INTERNAL MEDICINE

## 2020-11-06 PROCEDURE — 3008F BODY MASS INDEX DOCD: CPT | Mod: CPTII,S$GLB,, | Performed by: INTERNAL MEDICINE

## 2020-11-06 PROCEDURE — 3078F PR MOST RECENT DIASTOLIC BLOOD PRESSURE < 80 MM HG: ICD-10-PCS | Mod: CPTII,S$GLB,, | Performed by: INTERNAL MEDICINE

## 2020-11-06 PROCEDURE — 99999 PR PBB SHADOW E&M-EST. PATIENT-LVL IV: CPT | Mod: PBBFAC,,, | Performed by: INTERNAL MEDICINE

## 2020-11-06 PROCEDURE — 99999 PR PBB SHADOW E&M-EST. PATIENT-LVL IV: ICD-10-PCS | Mod: PBBFAC,,, | Performed by: INTERNAL MEDICINE

## 2020-11-06 PROCEDURE — 3074F PR MOST RECENT SYSTOLIC BLOOD PRESSURE < 130 MM HG: ICD-10-PCS | Mod: CPTII,S$GLB,, | Performed by: INTERNAL MEDICINE

## 2020-11-06 PROCEDURE — 3074F SYST BP LT 130 MM HG: CPT | Mod: CPTII,S$GLB,, | Performed by: INTERNAL MEDICINE

## 2020-11-06 NOTE — PROGRESS NOTES
Subjective:       Patient ID: Venkat Jonas    Chief Complaint: Plan of care discussion    HPI     Venkat Jonas is a 55 y.o. male,  to clinic for evaluation and management of RCC    Oncologic History:    Developed hematuria recently and on imaging was found to have a renal mass c/w RCC.  Questionable bone lesion.  Recently underwent nephrectomy with below pathology.  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       Recovering from surgery remarkably well.  No major issues.      PATHOLOGY:    1. Lymph nodes, aortic (regional resection):  - 3 lymph nodes negative for tumor (0/3)    2. Left kidney and adrenal gland (radical nephrectomy):  - Renal cell carcinoma, see synoptic report below  - Additional ancillary testing for tumor classification is ordered and results will be issued in a supplemental report    Kidney carcinoma synoptic report  - Procedure: Radical nephrectomy  - Specimen laterality: Left  - Tumor site: Upper pole  - Tumor size: 8.9 x 8.1 x 7.7 cm  - Tumor focality: Renal cell carcinoma, unclassified (see microscopic section)  - Sarcomatoid features: Not identified  - Rhabdoid features: Present  - Histologic grade: WHO/ISUP Nuclear grade 4  - Tumor necrosis: Present, approximately 20% of the tumor  - Tumor extension: Tumor extends into renal vein  - Margins:  - Uninvolved by invasive carcinoma  - Regional lymph nodes:  - Number of lymph nodes involved: 0  - Number of lymph nodes examined: 4 (specimen 1 and specimen 2)  - Pathologic staging: pT3a N0 MX  - Other findings: Separate sclerotic and calcified nodule  - Tumor block for potential studies: 2D, 2E, 2F 2H, 2I, 2J, 2K  - Nontumor block: 2N  - Immunostain panel in microscopic section  - MMR IHC: No loss of expression (see microscopic section)  Sections show an eosinophilic renal cell carcinoma with nuclear inclusions ,cytoplasmic inclusions , and  extracellular eosinophillic  material. Several renal cell carcinoma subtypes are in the differential including  high grade chromophobe, eosinophilic clear cell, SDH deficient, and translocation associated. Subtyping  will be attempted with ancillary testing and results issued in a supplemental report. Selected slides  reviewed by additional pathologists.    Immunostain results:  Positive stains: AMACR, keratin AE1/AE3, CD10,  (weak), vimentin  Negative stains: Keratin 7, keratin 20, HMB45, Mart 1, desmin    Immunohistochemistry (IHC) Testing for Mismatch Repair (MMR) Proteins:  MLH1 - Intact nuclear expression  MSH2 - Intact nuclear expression  MSH6 - Intact nuclear expression  PMS2 - Intact nuclear expression    Review of Systems   Constitutional: Negative for activity change, appetite change, chills, fatigue, fever and unexpected weight change.   HENT: Negative for congestion, hearing loss, mouth sores, sore throat and trouble swallowing.    Eyes: Negative for pain and visual disturbance.   Respiratory: Negative for cough, shortness of breath and wheezing.    Cardiovascular: Negative for chest pain, palpitations and leg swelling.   Gastrointestinal: Negative for abdominal pain, constipation, diarrhea, nausea and vomiting.   Endocrine: Negative for cold intolerance and heat intolerance.   Genitourinary: Negative for difficulty urinating, discharge, dysuria, enuresis, frequency, hematuria, scrotal swelling and testicular pain.   Musculoskeletal: Negative for arthralgias, back pain and myalgias.   Skin: Negative for color change, rash and wound.   Allergic/Immunologic: Negative for environmental allergies and food allergies.   Neurological: Negative for weakness, numbness and headaches.   Hematological: Negative for adenopathy. Does not bruise/bleed easily.   Psychiatric/Behavioral: Negative for confusion, hallucinations and sleep disturbance. The patient is not nervous/anxious.    All other systems reviewed and are negative.         Allergies:  Review of patient's allergies indicates:  No Known Allergies    Medications:  Current Outpatient Medications   Medication Sig Dispense Refill    aspirin 81 MG Chew Take 81 mg by mouth once daily.      azelastine (OPTIVAR) 0.05 % ophthalmic solution Place 1 drop into both eyes 2 (two) times daily. 18 mL 3    ezetimibe (ZETIA) 10 mg tablet Take 1 tablet by mouth daily 90 tablet 3    fluticasone (FLONASE) 50 mcg/actuation nasal spray 2 sprays by Each Nare route once daily. 48 g 4    pantoprazole (PROTONIX) 40 MG tablet TAKE ONE TABLET BY MOUTH TWICE A DAY ON AN EMPTY STOMACH 60 tablet 3    rosuvastatin (CRESTOR) 40 MG Tab Take 1 tablet (40 mg total) by mouth every evening. (Patient taking differently: Take 40 mg by mouth once daily. ) 90 tablet 3    valsartan-hydrochlorothiazide (DIOVAN-HCT) 160-12.5 mg per tablet Take 1 tablet by mouth daily 90 tablet 3    zolpidem (AMBIEN) 10 mg Tab Take 1 tablet (10 mg total) by mouth nightly as needed. 30 tablet 5    albuterol (PROVENTIL/VENTOLIN HFA) 90 mcg/actuation inhaler Inhale 2 puffs into the lungs every 4 (four) hours as needed. (Patient not taking: Reported on 11/6/2020) 18 g 3    erythromycin (ROMYCIN) ophthalmic ointment Place into both eyes 3 (three) times daily. 1 Bottle 0    ibuprofen (ADVIL,MOTRIN) 600 MG tablet Take 1 tablet (600 mg total) by mouth 3 (three) times daily. 30 tablet 0    oxyCODONE (ROXICODONE) 5 MG immediate release tablet Take 1 tablet (5 mg total) by mouth every 4 (four) hours as needed for Pain. (Patient not taking: Reported on 10/14/2020) 10 tablet 0    [START ON 11/28/2020] oxyCODONE-acetaminophen (PERCOCET) 5-325 mg per tablet Take 1 tablet by mouth every 4 (four) hours as needed for Pain. (Patient not taking: Reported on 10/14/2020) 20 tablet 0    SYMBICORT 80-4.5 mcg/actuation HFAA Inhale 2 puffs into the lungs 2 (two) times daily. (Patient taking differently: Inhale 2 puffs into the lungs 2 (two) times daily.  prn) 30.6 g 3    tamsulosin (FLOMAX) 0.4 mg Cap Take 1 capsule (0.4 mg total) by mouth once daily. 30 capsule 0    varicella-zoster gE-AS01B, PF, (SHINGRIX) 50 mcg/0.5 mL injection Inject into the muscle as directed 0.5 mL 0     No current facility-administered medications for this visit.        PMH:  Past Medical History:   Diagnosis Date    Allergic sinusitis     Exercise-induced asthma     Hyperlipidemia     Hypertension     Renal cell carcinoma 10/14/2020       PSH:  Past Surgical History:   Procedure Laterality Date    COLONOSCOPY N/A 12/5/2016    Procedure: COLONOSCOPY;  Surgeon: Toni Rivera MD;  Location: [facility] (4TH FLR);  Service: Endoscopy;  Laterality: N/A;  [facility]    COLONOSCOPY N/A 11/3/2020    Procedure: COLONOSCOPY;  Surgeon: Toni Rivera MD;  Location: [facility] (2ND FLR);  Service: Endoscopy;  Laterality: N/A;  Schedule patient EGD colonoscopy with me next available but as soon as possible    CYSTOSCOPY W/ RETROGRADES Left 10/1/2020    Procedure: CYSTOSCOPY, WITH RETROGRADE PYELOGRAM;  Surgeon: Vega Sears MD;  Location: [facility] OR 1ST FLR;  Service: Urology;  Laterality: Left;    ESOPHAGOGASTRODUODENOSCOPY N/A 11/3/2020    Procedure: EGD (ESOPHAGOGASTRODUODENOSCOPY);  Surgeon: Toni Rivera MD;  Location: [facility] (2ND FLR);  Service: Endoscopy;  Laterality: N/A;  pt will get rapid on 11/2-MS    LAPAROSCOPIC ROBOT-ASSISTED SURGICAL REMOVAL OF KIDNEY USING DA SONIDO XI Left 10/7/2020    Procedure: XI ROBOTIC NEPHRECTOMY;  Surgeon: Vega Sears MD;  Location: [facility] OR 2ND FLR;  Service: Urology;  Laterality: Left;  gen with regional/ 5hrs    SINUS SURGERY         FamHx:  Family History   Problem Relation Age of Onset    Hyperlipidemia Father     Hyperlipidemia Brother     Heart attack Maternal Grandmother     Cancer Maternal Grandfather         throat    Melanoma Neg Hx     Psoriasis Neg Hx     Lupus Neg Hx     Eczema Neg Hx     Acne Neg Hx     Heart  disease Neg Hx        SocHx:  Social History     Socioeconomic History    Marital status:      Spouse name: Kamila    Number of children: 3    Years of education: Not on file    Highest education level: Not on file   Occupational History    Occupation:  Marketing     Employer: [employer] ([employer])   Social Needs    Financial resource strain: Not on file    Food insecurity     Worry: Not on file     Inability: Not on file    Transportation needs     Medical: Not on file     Non-medical: Not on file   Tobacco Use    Smoking status: Never Smoker    Smokeless tobacco: Never Used    Tobacco comment: The patient exercises regularly at [place].  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   Substance and Sexual Activity    Alcohol use: Yes     Alcohol/week: 4.0 standard drinks     Types: 4 Shots of liquor per week     Comment: socially    Drug use: No    Sexual activity: Yes     Partners: Female   Lifestyle    Physical activity     Days per week: Not on file     Minutes per session: Not on file    Stress: Not on file   Relationships    Social connections     Talks on phone: Not on file     Gets together: Not on file     Attends Cheondoism service: Not on file     Active member of club or organization: Not on file     Attends meetings of clubs or organizations: Not on file     Relationship status: Not on file   Other Topics Concern    Patient feels they ought to cut down on drinking/drug use Not Asked    Patient annoyed by others criticizing their drinking/drug use Not Asked    Patient has felt bad or guilty about drinking/drug use Not Asked    Patient has had a drink/used drugs as an eye opener in the AM Not Asked   Social History Narrative    Administration at [employer]    , 3 kids (23, 18, 17)       Distress Score    Distress Score: 1        Practical Problems Physical Problems   : No Appearance: No   Housing: No Bathing / Dressing: No   Insurance  / Financial: No Breathing: No    Transportation: No  Changes in Urination: No    Work / School: No  Constipation: No   Treatment Decisions: No  Diarrhea: No     Eating: No    Family Problems Fatigue: No    Dealing with Children: No Feeling Swollen: No    Dealing with Partner: No Fevers: No    Ability to Have Children: No  Getting Around: No    Family Health Issues: No  Indigestion: No     Memory / Concentration: No   Emotional Problems Mouth Sores: No    Depression: No  Nausea: No    Fears: No  Nose Dry / Congested: No    Nervousness: No  Pain: No    Sadness: No Sexual: No    Worry: No Skin Dry / Itchy: No    Loss of Interest in Usual Activities: No Sleep: No     Substance Abuse: No    Spiritual/Religions Concerns Tingling in Hands / Feet: No   Spritual / Baptism Concerns: No         Other Problems                Objective:      Physical Exam  Constitutional:       General: He is not in acute distress.     Appearance: Normal appearance. He is normal weight. He is not ill-appearing, toxic-appearing or diaphoretic.   HENT:      Head: Normocephalic and atraumatic.      Nose: Nose normal.   Eyes:      General: No scleral icterus.        Right eye: No discharge.         Left eye: No discharge.      Conjunctiva/sclera: Conjunctivae normal.   Skin:     General: Skin is warm and dry.      Coloration: Skin is not jaundiced or pale.   Neurological:      General: No focal deficit present.      Mental Status: He is alert and oriented to person, place, and time. Mental status is at baseline.   Psychiatric:         Mood and Affect: Mood normal.         Behavior: Behavior normal.         Thought Content: Thought content normal.         Judgment: Judgment normal.           LABS:  WBC   Date Value Ref Range Status   10/08/2020 10.50 3.90 - 12.70 K/uL Final   10/08/2020 10.39 3.90 - 12.70 K/uL Final     Hemoglobin   Date Value Ref Range Status   10/08/2020 11.7 (L) 14.0 - 18.0 g/dL Final   10/08/2020 11.7 (L) 14.0 - 18.0 g/dL Final      Hematocrit   Date Value Ref Range Status   10/08/2020 36.9 (L) 40.0 - 54.0 % Final   10/08/2020 36.9 (L) 40.0 - 54.0 % Final     Platelets   Date Value Ref Range Status   10/08/2020 316 150 - 350 K/uL Final   10/08/2020 317 150 - 350 K/uL Final       Chemistry        Component Value Date/Time     10/08/2020 0530     10/08/2020 0530    K 3.9 10/08/2020 0530    K 4.0 10/08/2020 0530     10/08/2020 0530     10/08/2020 0530    CO2 22 (L) 10/08/2020 0530    CO2 21 (L) 10/08/2020 0530    BUN 15 10/08/2020 0530    BUN 16 10/08/2020 0530    CREATININE 1.3 10/08/2020 0530    CREATININE 1.3 10/08/2020 0530     (H) 10/08/2020 0530     (H) 10/08/2020 0530        Component Value Date/Time    CALCIUM 8.0 (L) 10/08/2020 0530    CALCIUM 8.1 (L) 10/08/2020 0530    ALKPHOS 104 09/28/2020 1039    AST 38 09/28/2020 1039    ALT 24 09/28/2020 1039    BILITOT 0.5 09/28/2020 1039    ESTGFRAFRICA >60.0 10/08/2020 0530    ESTGFRAFRICA >60.0 10/08/2020 0530    EGFRNONAA >60.0 10/08/2020 0530    EGFRNONAA >60.0 10/08/2020 0530              Assessment:       1. Renal cell carcinoma, unspecified laterality          Plan:         1. RCC:    Reviewed pathology.  I am happy that lymph nodes were clear, and margins were negative.  The fact that there was renal vein involvement, pushes this to a stage III.  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    We are assuming that the questionable bone lesion is benign, but will need to watch this carefully.      At this point, we discussed monitor this closely or considering adjuvant Sutent.     After careful consideration, the patient has decided to monitor this through routine surveillance, which I fully support.    He will follow-up as previously scheduled with scans in December.    We discussed the importance of healthy  living, good nutrition, staying physically active, focusing on mind fullness and sleep, and reducing stress as much as possible.  He is interested in beginning a support group for patients on surveillance after surgery.    He knows to call us if there are issues or questions in the meantime.    The patient agrees with the plan, and all questions have been answered to their satisfaction.      More than 25 mins were spent during this encounter, greater than 50% was spent in direct counseling and/or coordination of care.     Chandana Carrasco M.D., M.S., F.A.C.P.  Hematology and Oncology Attending  [facility]

## 2020-11-09 ENCOUNTER — PATIENT MESSAGE (OUTPATIENT)
Dept: GASTROENTEROLOGY | Facility: CLINIC | Age: 55
End: 2020-11-09

## 2020-11-10 ENCOUNTER — PATIENT MESSAGE (OUTPATIENT)
Dept: ADMINISTRATIVE | Facility: OTHER | Age: 55
End: 2020-11-10

## 2020-11-16 ENCOUNTER — PATIENT OUTREACH (OUTPATIENT)
Dept: OTHER | Facility: OTHER | Age: 55
End: 2020-11-16

## 2020-11-16 NOTE — PROGRESS NOTES
Digital Medicine: Health  Follow-Up    The history is provided by the patient.             Reason for review: Blood pressure at goal        Topics Covered on Call: physical activity and Diet    Additional Follow-up details: Really trying to watch his Na intake lately.     Has lost 10-12 lbs and still trying to lose another 7-8 lbs. Fish mostly, some chicken, very few deli meats. A lot more fruit (fuji apples, oranges, bananas), has cut back on some acidic foods.     Not doing a lot of carbs, doing keto waffles. Trying to follow Mediterranean diet. Getting fat from Greek yogurt.     Last week was his first week back at work and he noticed he was unusually hungry. Working out an hour in the morning.     We discussed different higher fats, more filling snack ideas. He's been using My Fitness Pal.             Diet-Change    Patient reports eating or drinking the following: Doing a low carb high protein diet but not too high.       Physical Activity-no change to routine  No change to exercise routine.       Additional physical activity details: Still wokring out one hour in the mornings.       Medication Adherence-Medication Adherence not addressed.        Substance, Sleep, Stress-No change  stress-  Details:  Intervention(s):    Sleep-  Details:  Intervention(s):    Alcohol -  Details:  Intervention(s):    Tobacco-  Details:  Intervention(s):          Continue current diet/physical activity routine.       Patient verbalizes understanding.      Explained the importance of self-monitoring and medication adherence. Encouraged the patient to communicate with their health  for lifestyle modifications to help improve or maintain a healthy lifestyle.               There are no preventive care reminders to display for this patient.      Last 5 Patient Entered Readings                                      Current 30 Day Average: 114/81     Recent Readings 11/13/2020 11/13/2020 11/13/2020 11/12/2020 11/12/2020    SBP  (mmHg) 103 108 107 107 100    DBP (mmHg) 74 80 81 72 70    Pulse 64 65 59 60 60

## 2020-11-20 ENCOUNTER — TELEPHONE (OUTPATIENT)
Dept: INTERNAL MEDICINE | Facility: CLINIC | Age: 55
End: 2020-11-20

## 2020-11-20 ENCOUNTER — OFFICE VISIT (OUTPATIENT)
Dept: INTERNAL MEDICINE | Facility: CLINIC | Age: 55
End: 2020-11-20
Payer: COMMERCIAL

## 2020-11-20 VITALS
WEIGHT: 190.06 LBS | HEIGHT: 70 IN | BODY MASS INDEX: 27.21 KG/M2 | SYSTOLIC BLOOD PRESSURE: 112 MMHG | HEART RATE: 82 BPM | DIASTOLIC BLOOD PRESSURE: 78 MMHG

## 2020-11-20 DIAGNOSIS — Z11.4 SCREENING FOR HIV (HUMAN IMMUNODEFICIENCY VIRUS): Primary | ICD-10-CM

## 2020-11-20 DIAGNOSIS — C64.2 RENAL CELL CANCER, LEFT: ICD-10-CM

## 2020-11-20 DIAGNOSIS — I10 ESSENTIAL HYPERTENSION: Primary | Chronic | ICD-10-CM

## 2020-11-20 DIAGNOSIS — Z90.5 HISTORY OF LEFT NEPHRECTOMY: ICD-10-CM

## 2020-11-20 DIAGNOSIS — E78.00 PURE HYPERCHOLESTEROLEMIA: ICD-10-CM

## 2020-11-20 PROCEDURE — 99214 OFFICE O/P EST MOD 30 MIN: CPT | Mod: S$GLB,,, | Performed by: INTERNAL MEDICINE

## 2020-11-20 PROCEDURE — 1126F AMNT PAIN NOTED NONE PRSNT: CPT | Mod: S$GLB,,, | Performed by: INTERNAL MEDICINE

## 2020-11-20 PROCEDURE — 3078F PR MOST RECENT DIASTOLIC BLOOD PRESSURE < 80 MM HG: ICD-10-PCS | Mod: CPTII,S$GLB,, | Performed by: INTERNAL MEDICINE

## 2020-11-20 PROCEDURE — 1126F PR PAIN SEVERITY QUANTIFIED, NO PAIN PRESENT: ICD-10-PCS | Mod: S$GLB,,, | Performed by: INTERNAL MEDICINE

## 2020-11-20 PROCEDURE — 99999 PR PBB SHADOW E&M-EST. PATIENT-LVL III: ICD-10-PCS | Mod: PBBFAC,,, | Performed by: INTERNAL MEDICINE

## 2020-11-20 PROCEDURE — 3074F SYST BP LT 130 MM HG: CPT | Mod: CPTII,S$GLB,, | Performed by: INTERNAL MEDICINE

## 2020-11-20 PROCEDURE — 3074F PR MOST RECENT SYSTOLIC BLOOD PRESSURE < 130 MM HG: ICD-10-PCS | Mod: CPTII,S$GLB,, | Performed by: INTERNAL MEDICINE

## 2020-11-20 PROCEDURE — 3078F DIAST BP <80 MM HG: CPT | Mod: CPTII,S$GLB,, | Performed by: INTERNAL MEDICINE

## 2020-11-20 PROCEDURE — 3008F PR BODY MASS INDEX (BMI) DOCUMENTED: ICD-10-PCS | Mod: CPTII,S$GLB,, | Performed by: INTERNAL MEDICINE

## 2020-11-20 PROCEDURE — 99999 PR PBB SHADOW E&M-EST. PATIENT-LVL III: CPT | Mod: PBBFAC,,, | Performed by: INTERNAL MEDICINE

## 2020-11-20 PROCEDURE — 99214 PR OFFICE/OUTPT VISIT, EST, LEVL IV, 30-39 MIN: ICD-10-PCS | Mod: S$GLB,,, | Performed by: INTERNAL MEDICINE

## 2020-11-20 PROCEDURE — 3008F BODY MASS INDEX DOCD: CPT | Mod: CPTII,S$GLB,, | Performed by: INTERNAL MEDICINE

## 2020-11-23 PROBLEM — Z90.5 HISTORY OF LEFT NEPHRECTOMY: Status: ACTIVE | Noted: 2020-11-23

## 2020-11-23 PROBLEM — C64.2 RENAL CELL CANCER, LEFT: Status: ACTIVE | Noted: 2020-09-28

## 2020-11-23 RX ORDER — ROSUVASTATIN CALCIUM 40 MG/1
40 TABLET, COATED ORAL NIGHTLY
Qty: 90 TABLET | Refills: 0 | Status: SHIPPED | OUTPATIENT
Start: 2020-11-23 | End: 2021-03-15 | Stop reason: SDUPTHER

## 2020-11-23 NOTE — TELEPHONE ENCOUNTER
No new care gaps identified.  Powered by [employer]. Reference number: [id number]. 11/23/2020 8:16:56 AM   CST

## 2020-11-23 NOTE — PROGRESS NOTES
Refill Authorization Note   Venkat Jonas is requesting a refill authorization.  Brief assessment and rationale for refill: Approve     Medication Therapy Plan: CDMR. FLOS    Medication reconciliation completed: No   Comments:   Orders Placed This Encounter    rosuvastatin (CRESTOR) 40 MG Tab      Requested Prescriptions   Signed Prescriptions Disp Refills    rosuvastatin (CRESTOR) 40 MG Tab 90 tablet 0     Sig: Take 1 tablet (40 mg total) by mouth every evening.       Cardiovascular:  Antilipid - Statins Failed - 11/23/2020  8:16 AM        Failed - Total Cholesterol within 360 days     Cholesterol   Date Value Ref Range Status   11/21/2019 156 120 - 199 mg/dL Final     Comment:     The National Cholesterol Education Program (NCEP) has set the  following guidelines (reference ranges) for Cholesterol:  Optimal.....................<200 mg/dL  Borderline High.............200-239 mg/dL  High........................> or = 240 mg/dL     05/24/2019 148 120 - 199 mg/dL Final     Comment:     The National Cholesterol Education Program (NCEP) has set the  following guidelines (reference ranges) for Cholesterol:  Optimal.....................<200 mg/dL  Borderline High.............200-239 mg/dL  High........................> or = 240 mg/dL     03/28/2018 124 120 - 199 mg/dL Final     Comment:     The National Cholesterol Education Program (NCEP) has set the  following guidelines (reference ranges) for Cholesterol:  Optimal.....................<200 mg/dL  Borderline High.............200-239 mg/dL  High........................> or = 240 mg/dL                Failed - LDL within 360 days     LDL Cholesterol   Date Value Ref Range Status   11/21/2019 76.8 63.0 - 159.0 mg/dL Final     Comment:     The National Cholesterol Education Program (NCEP) has set the  following guidelines (reference values) for LDL Cholesterol:  Optimal.......................<130 mg/dL  Borderline High...............130-159  mg/dL  High..........................160-189 mg/dL  Very High.....................>190 mg/dL              Failed - HDL within 360 days     HDL   Date Value Ref Range Status   11/21/2019 66 40 - 75 mg/dL Final     Comment:     The National Cholesterol Education Program (NCEP) has set the  following guidelines (reference values) for HDL Cholesterol:  Low...............<40 mg/dL  Optimal...........>60 mg/dL              Failed - Triglycerides within 360 days     Triglycerides   Date Value Ref Range Status   11/21/2019 66 30 - 150 mg/dL Final     Comment:     The National Cholesterol Education Program (NCEP) has set the  following guidelines (reference values) for triglycerides:  Normal......................<150 mg/dL  Borderline High.............150-199 mg/dL  High........................200-499 mg/dL     05/24/2019 49 30 - 150 mg/dL Final     Comment:     The National Cholesterol Education Program (NCEP) has set the  following guidelines (reference values) for triglycerides:  Normal......................<150 mg/dL  Borderline High.............150-199 mg/dL  High........................200-499 mg/dL     03/28/2018 49 30 - 150 mg/dL Final     Comment:     The National Cholesterol Education Program (NCEP) has set the  following guidelines (reference values) for triglycerides:  Normal......................<150 mg/dL  Borderline High.............150-199 mg/dL  High........................200-499 mg/dL                Passed - Patient is at least 18 years old        Passed - Office visit in past 12 months or future 90 days     Recent Outpatient Visits            3 days ago Essential hypertension    [facility] Alexandre Griffiths MD    2 weeks ago Renal cell carcinoma, unspecified laterality    [facility] - [facility] 3rd Fl Chanadna Carrasco MD    3 weeks ago Renal cell carcinoma of left kidney    [facility] - Urology 2nd Fl Vega Sears MD    1 month ago Anxiety about health    [facility] 3rd Fl Al Sorto, PhD    1 month ago Renal cell carcinoma, unspecified laterality    [facility] - PCT[facility] 3rd Fl Chandana Carrasco MD          Future Appointments              In 1 week SPECIMEN, [facility]    In 2 weeks SPECIMEN, MAIN CAMPUS [facility]    In 3 weeks [facility] 1st Fl[facility]    In 3 weeks LAB, HEMONC CANCER BLDG [facility] - Lab 3rd Fl, [facility]    In 3 weeks Chandana Carrasco MD [facility] - Hem Onc 3rd Fl, [facility]                Passed - ALT is 94 or below and within 360 days     ALT   Date Value Ref Range Status   09/28/2020 24 10 - 44 U/L Final   11/21/2019 29 10 - 44 U/L Final   05/24/2019 30 10 - 44 U/L Final              Passed - AST is 54 or below and within 360 days     AST   Date Value Ref Range Status   09/28/2020 38 10 - 40 U/L Final   11/21/2019 25 10 - 40 U/L Final   05/24/2019 30 10 - 40 U/L Final                  Appointments  past 12m or future 3m with PCP    Date Provider   Last Visit   11/20/2020 Alexandre Griffiths MD   Next Visit   Visit date not found Alexandre Griffiths MD   ED visits in past 90 days: 0     Note composed:2:37 PM 11/23/2020

## 2020-11-23 NOTE — PROGRESS NOTES
Subjective:       Patient ID: Venkat Jonas is a 55 y.o. male.    Chief Complaint: Annual Exam, Hyperlipidemia, Hypertension, and renal cell carcinoma    Hyperlipidemia  This is a chronic problem. The problem is controlled. Recent lipid tests were reviewed and are normal. Pertinent negatives include no chest pain or shortness of breath. Current antihyperlipidemic treatment includes statins. The current treatment provides significant improvement of lipids.   Hypertension  This is a chronic problem. The problem is unchanged. The problem is controlled. Pertinent negatives include no chest pain, palpitations or shortness of breath.     Review of Systems   Constitutional: Negative for fatigue.   HENT: Negative for sore throat.    Eyes: Negative for visual disturbance.   Respiratory: Negative for shortness of breath.    Cardiovascular: Negative for chest pain and palpitations.   Gastrointestinal: Negative for abdominal pain.   Genitourinary: Negative for dysuria, frequency and hematuria.   Musculoskeletal: Negative for joint swelling.   Skin: Negative for rash.   Neurological: Negative for speech difficulty and weakness.   Psychiatric/Behavioral: Negative for dysphoric mood.       Objective:      Physical Exam  Vitals signs reviewed.   Constitutional:       General: He is not in acute distress.     Appearance: He is well-developed.   HENT:      Head: Normocephalic and atraumatic.   Eyes:      Conjunctiva/sclera: Conjunctivae normal.      Pupils: Pupils are equal, round, and reactive to light.   Neck:      Musculoskeletal: Normal range of motion and neck supple.   Cardiovascular:      Rate and Rhythm: Normal rate and regular rhythm.      Heart sounds: Normal heart sounds.   Pulmonary:      Effort: Pulmonary effort is normal.      Breath sounds: Normal breath sounds. No wheezing.   Abdominal:      General: Bowel sounds are normal.      Palpations: Abdomen is soft.      Tenderness: There is no abdominal tenderness.        Genitourinary:     Scrotum/Testes: Normal.      Prostate: Normal.      Rectum: Guaiac result negative.   Musculoskeletal: Normal range of motion.         General: No tenderness.   Skin:     Findings: No erythema.   Neurological:      Mental Status: He is alert and oriented to person, place, and time.      Cranial Nerves: No cranial nerve deficit.         Assessment:       1. Essential hypertension    2. Renal cell cancer, left    3. History of left nephrectomy    4. Pure hypercholesterolemia        Plan:       Venkat was seen today for annual exam, hyperlipidemia, hypertension and renal cell carcinoma.    Diagnoses and all orders for this visit:    Essential hypertension    Renal cell cancer, left  Comments:  discussed management moving forward    History of left nephrectomy    Pure hypercholesterolemia  Comments:  labs upcoming        Follow up in about 6 months (around 5/20/2021) for F/U APPOINTMENT WITH ME.

## 2020-12-02 LAB
FINAL PATHOLOGIC DIAGNOSIS: ABNORMAL
GROSS: ABNORMAL
Lab: ABNORMAL
MICROSCOPIC EXAM: ABNORMAL
SUPPLEMENTAL DIAGNOSIS: ABNORMAL

## 2020-12-08 ENCOUNTER — PATIENT MESSAGE (OUTPATIENT)
Dept: INTERNAL MEDICINE | Facility: CLINIC | Age: 55
End: 2020-12-08

## 2020-12-11 NOTE — PROGRESS NOTES
Subjective:       Patient ID: Venkat Jonas    Chief Complaint: Renal cell carcinoma, unspecified laterality    HPI     Venkat Jonas is a 55 y.o. male,  to clinic for evaluation and management of RCC    Oncologic History:    Developed hematuria recently and on imaging was found to have a renal mass c/w RCC.  Questionable bone lesion.  Recently underwent nephrectomy with below pathology.  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       Recovering from surgery remarkably well.  Back to work.  Working out regularly. No major issues.      PATHOLOGY:    1. Lymph nodes, aortic (regional resection):  - 3 lymph nodes negative for tumor (0/3)    2. Left kidney and adrenal gland (radical nephrectomy):  - Renal cell carcinoma, see synoptic report below  - Additional ancillary testing for tumor classification is ordered and results will be issued in a supplemental report    Kidney carcinoma synoptic report  - Procedure: Radical nephrectomy  - Specimen laterality: Left  - Tumor site: Upper pole  - Tumor size: 8.9 x 8.1 x 7.7 cm  - Tumor focality: Renal cell carcinoma, unclassified (see microscopic section)  - Sarcomatoid features: Not identified  - Rhabdoid features: Present  - Histologic grade: WHO/ISUP Nuclear grade 4  - Tumor necrosis: Present, approximately 20% of the tumor  - Tumor extension: Tumor extends into renal vein  - Margins:  - Uninvolved by invasive carcinoma  - Regional lymph nodes:  - Number of lymph nodes involved: 0  - Number of lymph nodes examined: 4 (specimen 1 and specimen 2)  - Pathologic staging: pT3a N0 MX  - Other findings: Separate sclerotic and calcified nodule  - Tumor block for potential studies: 2D, 2E, 2F 2H, 2I, 2J, 2K  - Nontumor block: 2N  - Immunostain panel in microscopic section  - MMR IHC: No loss of expression (see microscopic section)  Sections show an eosinophilic renal cell carcinoma with nuclear inclusions ,cytoplasmic inclusions , and  extracellular  eosinophillic material. Several renal cell carcinoma subtypes are in the differential including  high grade chromophobe, eosinophilic clear cell, SDH deficient, and translocation associated. Subtyping  will be attempted with ancillary testing and results issued in a supplemental report. Selected slides  reviewed by additional pathologists.    Immunostain results:  Positive stains: AMACR, keratin AE1/AE3, CD10,  (weak), vimentin  Negative stains: Keratin 7, keratin 20, HMB45, Mart 1, desmin    Immunohistochemistry (IHC) Testing for Mismatch Repair (MMR) Proteins:  MLH1 - Intact nuclear expression  MSH2 - Intact nuclear expression  MSH6 - Intact nuclear expression  PMS2 - Intact nuclear expression    Review of Systems   Constitutional: Negative for activity change, appetite change, chills, fatigue, fever and unexpected weight change.   HENT: Negative for congestion, hearing loss, mouth sores, sore throat and trouble swallowing.    Eyes: Negative for pain and visual disturbance.   Respiratory: Negative for cough, shortness of breath and wheezing.    Cardiovascular: Negative for chest pain, palpitations and leg swelling.   Gastrointestinal: Negative for abdominal pain, constipation, diarrhea, nausea and vomiting.   Endocrine: Negative for cold intolerance and heat intolerance.   Genitourinary: Negative for difficulty urinating, discharge, dysuria, enuresis, frequency, hematuria, scrotal swelling and testicular pain.   Musculoskeletal: Negative for arthralgias, back pain and myalgias.   Skin: Negative for color change, rash and wound.   Allergic/Immunologic: Negative for environmental allergies and food allergies.   Neurological: Negative for weakness, numbness and headaches.   Hematological: Negative for adenopathy. Does not bruise/bleed easily.   Psychiatric/Behavioral: Negative for confusion, hallucinations and sleep disturbance. The patient is not nervous/anxious.    All other systems reviewed and are  negative.        Allergies:  Review of patient's allergies indicates:  No Known Allergies    Medications:  Current Outpatient Medications   Medication Sig Dispense Refill    albuterol (PROVENTIL/VENTOLIN HFA) 90 mcg/actuation inhaler Inhale 2 puffs into the lungs every 4 (four) hours as needed. 18 g 3    aspirin 81 MG Chew Take 81 mg by mouth once daily.      ezetimibe (ZETIA) 10 mg tablet Take 1 tablet by mouth daily 90 tablet 3    pantoprazole (PROTONIX) 40 MG tablet TAKE ONE TABLET BY MOUTH TWICE A DAY ON AN EMPTY STOMACH (Patient taking differently: Take 40 mg by mouth once daily. TAKE ONE TABLET BY MOUTH TWICE A DAY ON AN EMPTY STOMACH) 60 tablet 3    rosuvastatin (CRESTOR) 40 MG Tab Take 1 tablet (40 mg total) by mouth every evening. 90 tablet 0    valsartan-hydrochlorothiazide (DIOVAN-HCT) 160-12.5 mg per tablet Take 1 tablet by mouth daily 90 tablet 3    azelastine (OPTIVAR) 0.05 % ophthalmic solution Place 1 drop into both eyes 2 (two) times daily. (Patient not taking: Reported on 11/20/2020) 18 mL 3    fluticasone (FLONASE) 50 mcg/actuation nasal spray 2 sprays by Each Nare route once daily. (Patient not taking: Reported on 12/16/2020) 48 g 4    SYMBICORT 80-4.5 mcg/actuation HFAA Inhale 2 puffs into the lungs 2 (two) times daily. (Patient not taking: Reported on 12/16/2020) 30.6 g 3    zolpidem (AMBIEN) 10 mg Tab Take 1 tablet (10 mg total) by mouth nightly as needed. (Patient not taking: Reported on 12/16/2020) 30 tablet 5     No current facility-administered medications for this visit.        PMH:  Past Medical History:   Diagnosis Date    Allergic sinusitis     Exercise-induced asthma     Hyperlipidemia     Hypertension     Renal cell carcinoma 10/14/2020       PSH:  Past Surgical History:   Procedure Laterality Date    COLONOSCOPY N/A 12/5/2016    Procedure: COLONOSCOPY;  Surgeon: Toni Rivera MD;  Location: [address];  Service: Endoscopy;  Laterality: N/A;  VIP     COLONOSCOPY N/A 11/3/2020    Procedure: COLONOSCOPY;  Surgeon: Toni Rivera MD;  Location: [facility] (2ND FLR);  Service: Endoscopy;  Laterality: N/A;  Schedule patient EGD colonoscopy with me next available but as soon as possible    CYSTOSCOPY W/ RETROGRADES Left 10/1/2020    Procedure: CYSTOSCOPY, WITH RETROGRADE PYELOGRAM;  Surgeon: Vega Sears MD;  Location: [facility] OR 1ST FLR;  Service: Urology;  Laterality: Left;    ESOPHAGOGASTRODUODENOSCOPY N/A 11/3/2020    Procedure: EGD (ESOPHAGOGASTRODUODENOSCOPY);  Surgeon: Toni Rivera MD;  Location: [facility] (2ND FLR);  Service: Endoscopy;  Laterality: N/A;  pt will get rapid on 11/2-MS    LAPAROSCOPIC ROBOT-ASSISTED SURGICAL REMOVAL OF KIDNEY USING DA SONIDO XI Left 10/7/2020    Procedure: XI ROBOTIC NEPHRECTOMY;  Surgeon: Vega Sears MD;  Location: [facility] OR 2ND FLR;  Service: Urology;  Laterality: Left;  gen with regional/ 5hrs    SINUS SURGERY         FamHx:  Family History   Problem Relation Age of Onset    Hyperlipidemia Father     Hyperlipidemia Brother     Heart attack Maternal Grandmother     Cancer Maternal Grandfather         throat    Melanoma Neg Hx     Psoriasis Neg Hx     Lupus Neg Hx     Eczema Neg Hx     Acne Neg Hx     Heart disease Neg Hx        SocHx:  Social History     Socioeconomic History    Marital status:      Spouse name: Kamila    Number of children: 3    Years of education: Not on file    Highest education level: Not on file   Occupational History    Occupation:  Marketing     Employer: [employer] ([employer])   Social Needs    Financial resource strain: Not on file    Food insecurity     Worry: Not on file     Inability: Not on file    Transportation needs     Medical: Not on file     Non-medical: Not on file   Tobacco Use    Smoking status: Never Smoker    Smokeless tobacco: Never Used    Tobacco comment: The patient exercises regularly at [employer].  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   Substance and Sexual Activity    Alcohol use: Yes     Alcohol/week: 4.0 standard drinks     Types: 4 Shots of liquor per week     Comment: socially    Drug use: No    Sexual activity: Yes     Partners: Female   Lifestyle    Physical activity     Days per week: Not on file     Minutes per session: Not on file    Stress: Not on file   Relationships    Social connections     Talks on phone: Not on file     Gets together: Not on file     Attends Rastafarian service: Not on file     Active member of club or organization: Not on file     Attends meetings of clubs or organizations: Not on file     Relationship status: Not on file   Other Topics Concern    Patient feels they ought to cut down on drinking/drug use Not Asked    Patient annoyed by others criticizing their drinking/drug use Not Asked    Patient has felt bad or guilty about drinking/drug use Not Asked    Patient has had a drink/used drugs as an eye opener in the AM Not Asked   Social History Narrative    Administration at [employer]    , 3 kids (23, 18, 17)       Distress Score    Distress Score: 1        Practical Problems Physical Problems   : (P) No Appearance: (P) No   Housing: (P) No Bathing / Dressing: (P) No   Insurance / Financial: (P) No Breathing: (P) No    Transportation: (P) No  Changes in Urination: (P) No    Work / School: (P) No  Constipation: (P) No   Treatment Decisions: (P) No  Diarrhea: (P) No     Eating: (P) No    Family Problems Fatigue: (P) No    Dealing with Children: (P) No Feeling Swollen: (P) No    Dealing with Partner: (P) No Fevers: (P) No    Ability to Have Children: (P) No  Getting Around: (P) No       Indigestion: (P) No     Memory / Concentration: (P) No   Emotional Problems Mouth Sores: (P) No    Depression: (P) No  Nausea: (P) No    Fears: (P) No  Nose Dry / Congested: (P) No    Nervousness: (P) No  Pain: (P) No    Sadness: (P) No Sexual: (P) No    Worry: (P) No  "Skin Dry / Itchy: (P) No    Loss of Interest in Usual Activities: (P) No Sleep: (P) No     Substance Abuse: (P) No    Spiritual/Religions Concerns Tingling in Hands / Feet: (P) No   Spritual / Restorationism Concerns: (P) No         Other Problems                Objective:         /86 (BP Location: Left arm, Patient Position: Sitting, BP Method: Medium (Automatic))   Pulse 84   Temp 97.8 °F (36.6 °C) (Oral)   Resp 18   Ht 5' 10" (1.778 m)   Wt 88.2 kg (194 lb 7.1 oz)   SpO2 98%   BMI 27.90 kg/m²     Physical Exam  Constitutional:       General: He is not in acute distress.     Appearance: Normal appearance. He is normal weight. He is not ill-appearing, toxic-appearing or diaphoretic.   HENT:      Head: Normocephalic and atraumatic.      Nose: Nose normal.   Eyes:      General: No scleral icterus.        Right eye: No discharge.         Left eye: No discharge.      Conjunctiva/sclera: Conjunctivae normal.   Skin:     General: Skin is warm and dry.      Coloration: Skin is not jaundiced or pale.   Neurological:      General: No focal deficit present.      Mental Status: He is alert and oriented to person, place, and time. Mental status is at baseline.   Psychiatric:         Mood and Affect: Mood normal.         Behavior: Behavior normal.         Thought Content: Thought content normal.         Judgment: Judgment normal.           LABS:  WBC   Date Value Ref Range Status   12/14/2020 4.49 3.90 - 12.70 K/uL Final     Hemoglobin   Date Value Ref Range Status   12/14/2020 13.1 (L) 14.0 - 18.0 g/dL Final     Hematocrit   Date Value Ref Range Status   12/14/2020 41.5 40.0 - 54.0 % Final     Platelets   Date Value Ref Range Status   12/14/2020 259 150 - 350 K/uL Final       Chemistry        Component Value Date/Time     12/14/2020 0736    K 4.1 12/14/2020 0736     12/14/2020 0736    CO2 28 12/14/2020 0736    BUN 26 (H) 12/14/2020 0736    CREATININE 1.6 (H) 12/14/2020 0736    GLU 89 12/14/2020 0736      "   Component Value Date/Time    CALCIUM 9.4 12/14/2020 0736    ALKPHOS 71 12/14/2020 0736    AST 22 12/14/2020 0736    ALT 27 12/14/2020 0736    BILITOT 0.4 12/14/2020 0736    ESTGFRAFRICA 55.2 (A) 12/14/2020 0736    EGFRNONAA 47.8 (A) 12/14/2020 0736              Assessment:       1. Renal cell carcinoma, unspecified laterality          Plan:         1. RCC:    Reviewed pathology.  I am happy that lymph nodes were clear, and margins were negative.  The fact that there was renal vein involvement, pushes this to a stage III.  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    We are assuming that the questionable bone lesion is benign, but will need to watch this carefully.      At this point, we discussed monitor this closely or considering adjuvant Sutent.     After careful consideration, the patient has decided to monitor this through routine surveillance, which I fully support.    Scans now show ELLI    RTC 3 mos with CT CAP, CBC, CMP.      He knows to call us if there are issues or questions in the meantime.    The patient agrees with the plan, and all questions have been answered to their satisfaction.      More than 25 mins were spent during this encounter, greater than 50% was spent in direct counseling and/or coordination of care.     Chandana Carrasco M.D., M.S., F.A.C.P.  Hematology and Oncology Attending  [facility]

## 2020-12-14 ENCOUNTER — HOSPITAL ENCOUNTER (OUTPATIENT)
Dept: RADIOLOGY | Facility: HOSPITAL | Age: 55
Discharge: HOME | End: 2020-12-14
Attending: INTERNAL MEDICINE
Payer: COMMERCIAL

## 2020-12-14 DIAGNOSIS — C64.9 RENAL CELL CARCINOMA, UNSPECIFIED LATERALITY: ICD-10-CM

## 2020-12-14 PROCEDURE — 74177 CT CHEST ABDOMEN PELVIS WITH CONTRAST (XPD): ICD-10-PCS | Mod: 26,,, | Performed by: RADIOLOGY

## 2020-12-14 PROCEDURE — 71260 CT THORAX DX C+: CPT | Mod: 26,,, | Performed by: RADIOLOGY

## 2020-12-14 PROCEDURE — 71260 CT CHEST ABDOMEN PELVIS WITH CONTRAST (XPD): ICD-10-PCS | Mod: 26,,, | Performed by: RADIOLOGY

## 2020-12-14 PROCEDURE — 74177 CT ABD & PELVIS W/CONTRAST: CPT | Mod: TC

## 2020-12-14 PROCEDURE — 25500020 PHARM REV CODE 255: Performed by: INTERNAL MEDICINE

## 2020-12-14 PROCEDURE — 74177 CT ABD & PELVIS W/CONTRAST: CPT | Mod: 26,,, | Performed by: RADIOLOGY

## 2020-12-14 RX ADMIN — IOHEXOL 15 ML: 350 INJECTION, SOLUTION INTRAVENOUS at 08:12

## 2020-12-14 RX ADMIN — IOHEXOL 100 ML: 350 INJECTION, SOLUTION INTRAVENOUS at 10:12

## 2020-12-15 ENCOUNTER — TELEPHONE (OUTPATIENT)
Dept: CARDIOLOGY | Facility: HOSPITAL | Age: 55
End: 2020-12-15

## 2020-12-15 ENCOUNTER — PATIENT MESSAGE (OUTPATIENT)
Dept: ADMINISTRATIVE | Facility: OTHER | Age: 55
End: 2020-12-15

## 2020-12-15 DIAGNOSIS — E78.00 PURE HYPERCHOLESTEROLEMIA: ICD-10-CM

## 2020-12-15 DIAGNOSIS — I10 ESSENTIAL HYPERTENSION: Primary | Chronic | ICD-10-CM

## 2020-12-15 DIAGNOSIS — R93.1 AGATSTON CAC SCORE 100-199: ICD-10-CM

## 2020-12-16 ENCOUNTER — TELEPHONE (OUTPATIENT)
Dept: HEMATOLOGY/ONCOLOGY | Facility: CLINIC | Age: 55
End: 2020-12-16

## 2020-12-16 ENCOUNTER — OFFICE VISIT (OUTPATIENT)
Dept: HEMATOLOGY/ONCOLOGY | Facility: CLINIC | Age: 55
End: 2020-12-16
Payer: COMMERCIAL

## 2020-12-16 VITALS
BODY MASS INDEX: 27.84 KG/M2 | RESPIRATION RATE: 18 BRPM | HEIGHT: 70 IN | HEART RATE: 84 BPM | SYSTOLIC BLOOD PRESSURE: 128 MMHG | OXYGEN SATURATION: 98 % | DIASTOLIC BLOOD PRESSURE: 86 MMHG | WEIGHT: 194.44 LBS | TEMPERATURE: 98 F

## 2020-12-16 DIAGNOSIS — C64.9 RENAL CELL CARCINOMA, UNSPECIFIED LATERALITY: Primary | ICD-10-CM

## 2020-12-16 PROCEDURE — 99999 PR PBB SHADOW E&M-EST. PATIENT-LVL IV: CPT | Mod: PBBFAC,,, | Performed by: INTERNAL MEDICINE

## 2020-12-16 PROCEDURE — 99214 OFFICE O/P EST MOD 30 MIN: CPT | Mod: S$GLB,,, | Performed by: INTERNAL MEDICINE

## 2020-12-16 PROCEDURE — 3008F BODY MASS INDEX DOCD: CPT | Mod: CPTII,S$GLB,, | Performed by: INTERNAL MEDICINE

## 2020-12-16 PROCEDURE — 99214 PR OFFICE/OUTPT VISIT, EST, LEVL IV, 30-39 MIN: ICD-10-PCS | Mod: S$GLB,,, | Performed by: INTERNAL MEDICINE

## 2020-12-16 PROCEDURE — 3079F PR MOST RECENT DIASTOLIC BLOOD PRESSURE 80-89 MM HG: ICD-10-PCS | Mod: CPTII,S$GLB,, | Performed by: INTERNAL MEDICINE

## 2020-12-16 PROCEDURE — 1126F PR PAIN SEVERITY QUANTIFIED, NO PAIN PRESENT: ICD-10-PCS | Mod: S$GLB,,, | Performed by: INTERNAL MEDICINE

## 2020-12-16 PROCEDURE — 3079F DIAST BP 80-89 MM HG: CPT | Mod: CPTII,S$GLB,, | Performed by: INTERNAL MEDICINE

## 2020-12-16 PROCEDURE — 99999 PR PBB SHADOW E&M-EST. PATIENT-LVL IV: ICD-10-PCS | Mod: PBBFAC,,, | Performed by: INTERNAL MEDICINE

## 2020-12-16 PROCEDURE — 3074F SYST BP LT 130 MM HG: CPT | Mod: CPTII,S$GLB,, | Performed by: INTERNAL MEDICINE

## 2020-12-16 PROCEDURE — 3074F PR MOST RECENT SYSTOLIC BLOOD PRESSURE < 130 MM HG: ICD-10-PCS | Mod: CPTII,S$GLB,, | Performed by: INTERNAL MEDICINE

## 2020-12-16 PROCEDURE — 3008F PR BODY MASS INDEX (BMI) DOCUMENTED: ICD-10-PCS | Mod: CPTII,S$GLB,, | Performed by: INTERNAL MEDICINE

## 2020-12-16 PROCEDURE — 1126F AMNT PAIN NOTED NONE PRSNT: CPT | Mod: S$GLB,,, | Performed by: INTERNAL MEDICINE

## 2020-12-16 NOTE — TELEPHONE ENCOUNTER
----- Message from Chandana Carrasco MD sent at 12/16/2020 12:43 PM CST -----  Magno, this patient is [facility]  for [employer] services.  His assistant, Bren, may be reaching out to you to schedule directly.  He will need the following every three months for the next year: RTC 3 mos with CT CAP, CBC, CMP.  Thank you!

## 2020-12-16 NOTE — Clinical Note
Magno, this patient is [employer]  for [employer].  His assistant, Bren, may be reaching out to you to schedule directly.  He will need the following every three months for the next year: RTC 3 mos with CT CAP, CBC, CMP.  Thank you!

## 2020-12-21 ENCOUNTER — TELEPHONE (OUTPATIENT)
Dept: HEMATOLOGY/ONCOLOGY | Facility: CLINIC | Age: 55
End: 2020-12-21

## 2020-12-21 ENCOUNTER — IMMUNIZATION (OUTPATIENT)
Dept: INTERNAL MEDICINE | Facility: CLINIC | Age: 55
End: 2020-12-21
Payer: COMMERCIAL

## 2020-12-21 DIAGNOSIS — Z23 NEED FOR VACCINATION: ICD-10-CM

## 2020-12-21 DIAGNOSIS — C64.2 RENAL CELL CANCER, LEFT: Primary | ICD-10-CM

## 2020-12-21 PROCEDURE — 0001A COVID-19, MRNA, LNP-S, PF, 30 MCG/0.3 ML DOSE VACCINE: ICD-10-PCS | Mod: CV19,,, | Performed by: INTERNAL MEDICINE

## 2020-12-21 PROCEDURE — 91300 COVID-19, MRNA, LNP-S, PF, 30 MCG/0.3 ML DOSE VACCINE: CPT | Mod: ,,, | Performed by: INTERNAL MEDICINE

## 2020-12-21 PROCEDURE — 0001A COVID-19, MRNA, LNP-S, PF, 30 MCG/0.3 ML DOSE VACCINE: CPT | Mod: CV19,,, | Performed by: INTERNAL MEDICINE

## 2020-12-21 PROCEDURE — 91300 COVID-19, MRNA, LNP-S, PF, 30 MCG/0.3 ML DOSE VACCINE: ICD-10-PCS | Mod: ,,, | Performed by: INTERNAL MEDICINE

## 2020-12-22 ENCOUNTER — PATIENT MESSAGE (OUTPATIENT)
Dept: ADMINISTRATIVE | Facility: OTHER | Age: 55
End: 2020-12-22

## 2020-12-23 ENCOUNTER — HOSPITAL ENCOUNTER (OUTPATIENT)
Dept: CARDIOLOGY | Facility: HOSPITAL | Age: 55
Discharge: HOME | End: 2020-12-23
Attending: INTERNAL MEDICINE
Payer: COMMERCIAL

## 2020-12-23 VITALS
HEIGHT: 70 IN | HEART RATE: 70 BPM | WEIGHT: 194 LBS | SYSTOLIC BLOOD PRESSURE: 130 MMHG | BODY MASS INDEX: 27.77 KG/M2 | DIASTOLIC BLOOD PRESSURE: 80 MMHG

## 2020-12-23 DIAGNOSIS — C64.2 RENAL CELL CANCER, LEFT: Primary | ICD-10-CM

## 2020-12-23 DIAGNOSIS — I10 ESSENTIAL HYPERTENSION: ICD-10-CM

## 2020-12-23 DIAGNOSIS — R93.1 AGATSTON CAC SCORE 100-199: ICD-10-CM

## 2020-12-23 DIAGNOSIS — E78.00 PURE HYPERCHOLESTEROLEMIA: ICD-10-CM

## 2020-12-23 LAB
ASCENDING AORTA: 3.6 CM
AV INDEX (PROSTH): 0.59
AV MEAN GRADIENT: 3 MMHG
AV PEAK GRADIENT: 6 MMHG
AV VALVE AREA: 2.25 CM2
AV VELOCITY RATIO: 0.56
BSA FOR ECHO PROCEDURE: 2.08 M2
CV ECHO LV RWT: 0.39 CM
DOP CALC AO PEAK VEL: 1.22 M/S
DOP CALC AO VTI: 21.7 CM
DOP CALC LVOT AREA: 3.8 CM2
DOP CALC LVOT DIAMETER: 2.21 CM
DOP CALC LVOT PEAK VEL: 0.68 M/S
DOP CALC LVOT STROKE VOLUME: 48.81 CM3
DOP CALCLVOT PEAK VEL VTI: 12.73 CM
E WAVE DECELERATION TIME: 196 MSEC
E/A RATIO: 0.67
E/E' RATIO: 5.25 M/S
ECHO LV POSTERIOR WALL: 0.88 CM (ref 0.6–1.1)
FRACTIONAL SHORTENING: 37 % (ref 28–44)
INTERVENTRICULAR SEPTUM: 0.87 CM (ref 0.6–1.1)
IVRT: 102.76 MSEC
LA MAJOR: 4.6 CM
LA MINOR: 4.72 CM
LA WIDTH: 4.08 CM
LEFT ATRIUM SIZE: 4 CM
LEFT ATRIUM VOLUME INDEX MOD: 27 ML/M2
LEFT ATRIUM VOLUME INDEX: 31.4 ML/M2
LEFT ATRIUM VOLUME MOD: 55.54 CM3
LEFT ATRIUM VOLUME: 64.63 CM3
LEFT INTERNAL DIMENSION IN SYSTOLE: 2.85 CM (ref 2.1–4)
LEFT VENTRICLE DIASTOLIC VOLUME INDEX: 45.19 ML/M2
LEFT VENTRICLE DIASTOLIC VOLUME: 93.12 ML
LEFT VENTRICLE MASS INDEX: 62 G/M2
LEFT VENTRICLE SYSTOLIC VOLUME INDEX: 15 ML/M2
LEFT VENTRICLE SYSTOLIC VOLUME: 30.99 ML
LEFT VENTRICULAR INTERNAL DIMENSION IN DIASTOLE: 4.51 CM (ref 3.5–6)
LEFT VENTRICULAR MASS: 128.38 G
LV LATERAL E/E' RATIO: 4.2 M/S
LV SEPTAL E/E' RATIO: 7 M/S
MV A" WAVE DURATION": 19.98 MSEC
MV PEAK A VEL: 0.63 M/S
MV PEAK E VEL: 0.42 M/S
PISA TR MAX VEL: 2.18 M/S
PULM VEIN S/D RATIO: 1.32
PV PEAK D VEL: 0.38 M/S
PV PEAK S VEL: 0.5 M/S
RA MAJOR: 4.05 CM
RA PRESSURE: 3 MMHG
RA WIDTH: 3.06 CM
RIGHT VENTRICULAR END-DIASTOLIC DIMENSION: 3.39 CM
RV TISSUE DOPPLER FREE WALL SYSTOLIC VELOCITY 1 (APICAL 4 CHAMBER VIEW): 12.96 CM/S
SINUS: 3.6 CM
STJ: 3.17 CM
TDI LATERAL: 0.1 M/S
TDI SEPTAL: 0.06 M/S
TDI: 0.08 M/S
TR MAX PG: 19 MMHG
TRICUSPID ANNULAR PLANE SYSTOLIC EXCURSION: 2.26 CM
TV REST PULMONARY ARTERY PRESSURE: 22 MMHG

## 2020-12-23 PROCEDURE — 93306 ECHO (CUPID ONLY): ICD-10-PCS | Mod: 26,,, | Performed by: INTERNAL MEDICINE

## 2020-12-23 PROCEDURE — 93306 TTE W/DOPPLER COMPLETE: CPT | Mod: 26,,, | Performed by: INTERNAL MEDICINE

## 2020-12-23 PROCEDURE — 93306 TTE W/DOPPLER COMPLETE: CPT

## 2020-12-29 ENCOUNTER — PATIENT OUTREACH (OUTPATIENT)
Dept: OTHER | Facility: OTHER | Age: 55
End: 2020-12-29

## 2021-01-05 ENCOUNTER — PATIENT MESSAGE (OUTPATIENT)
Dept: ADMINISTRATIVE | Facility: OTHER | Age: 56
End: 2021-01-05

## 2021-01-07 ENCOUNTER — PATIENT MESSAGE (OUTPATIENT)
Dept: INTERNAL MEDICINE | Facility: CLINIC | Age: 56
End: 2021-01-07

## 2021-01-07 RX ORDER — FLUTICASONE PROPIONATE 50 MCG
2 SPRAY, SUSPENSION (ML) NASAL DAILY
Qty: 48 G | Refills: 6 | Status: CANCELLED | OUTPATIENT
Start: 2021-01-07

## 2021-01-07 RX ORDER — FLUTICASONE PROPIONATE 50 MCG
2 SPRAY, SUSPENSION (ML) NASAL DAILY
Qty: 48 G | Refills: 3 | Status: SHIPPED | OUTPATIENT
Start: 2021-01-07 | End: 2021-07-06

## 2021-01-11 ENCOUNTER — IMMUNIZATION (OUTPATIENT)
Dept: INTERNAL MEDICINE | Facility: CLINIC | Age: 56
End: 2021-01-11
Payer: COMMERCIAL

## 2021-01-11 DIAGNOSIS — Z23 NEED FOR VACCINATION: ICD-10-CM

## 2021-01-11 PROCEDURE — 91300 COVID-19, MRNA, LNP-S, PF, 30 MCG/0.3 ML DOSE VACCINE: CPT | Mod: PBBFAC | Performed by: INTERNAL MEDICINE

## 2021-01-11 PROCEDURE — 0002A COVID-19, MRNA, LNP-S, PF, 30 MCG/0.3 ML DOSE VACCINE: CPT | Mod: PBBFAC | Performed by: INTERNAL MEDICINE

## 2021-01-25 DIAGNOSIS — Z20.822 ENCOUNTER FOR LABORATORY TESTING FOR COVID-19 VIRUS: ICD-10-CM

## 2021-01-25 DIAGNOSIS — J45.20 ASTHMA, MILD INTERMITTENT, WELL-CONTROLLED: ICD-10-CM

## 2021-01-25 RX ORDER — BUDESONIDE AND FORMOTEROL FUMARATE DIHYDRATE 80; 4.5 UG/1; UG/1
2 AEROSOL RESPIRATORY (INHALATION) 2 TIMES DAILY
Qty: 30.6 G | Refills: 3 | Status: SHIPPED | OUTPATIENT
Start: 2021-01-25 | End: 2022-01-25

## 2021-02-12 ENCOUNTER — OFFICE VISIT (OUTPATIENT)
Dept: PRIMARY CARE CLINIC | Facility: CLINIC | Age: 56
End: 2021-02-12
Attending: INTERNAL MEDICINE
Payer: COMMERCIAL

## 2021-02-12 VITALS
HEIGHT: 71 IN | RESPIRATION RATE: 16 BRPM | SYSTOLIC BLOOD PRESSURE: 122 MMHG | DIASTOLIC BLOOD PRESSURE: 80 MMHG | BODY MASS INDEX: 27.26 KG/M2 | HEART RATE: 81 BPM | TEMPERATURE: 97 F | OXYGEN SATURATION: 99 % | WEIGHT: 194.69 LBS

## 2021-02-12 DIAGNOSIS — C64.2 RENAL CELL CANCER, LEFT: ICD-10-CM

## 2021-02-12 DIAGNOSIS — E66.3 OVERWEIGHT (BMI 25.0-29.9): ICD-10-CM

## 2021-02-12 DIAGNOSIS — I10 ESSENTIAL HYPERTENSION: Primary | Chronic | ICD-10-CM

## 2021-02-12 PROCEDURE — 1126F PR PAIN SEVERITY QUANTIFIED, NO PAIN PRESENT: ICD-10-PCS | Mod: S$GLB,,, | Performed by: INTERNAL MEDICINE

## 2021-02-12 PROCEDURE — 3008F PR BODY MASS INDEX (BMI) DOCUMENTED: ICD-10-PCS | Mod: CPTII,S$GLB,, | Performed by: INTERNAL MEDICINE

## 2021-02-12 PROCEDURE — 99499 NO LOS: ICD-10-PCS | Mod: S$GLB,,, | Performed by: INTERNAL MEDICINE

## 2021-02-12 PROCEDURE — 99999 PR PBB SHADOW E&M-EST. PATIENT-LVL IV: CPT | Mod: PBBFAC,,, | Performed by: INTERNAL MEDICINE

## 2021-02-12 PROCEDURE — 3008F BODY MASS INDEX DOCD: CPT | Mod: CPTII,S$GLB,, | Performed by: INTERNAL MEDICINE

## 2021-02-12 PROCEDURE — 1126F AMNT PAIN NOTED NONE PRSNT: CPT | Mod: S$GLB,,, | Performed by: INTERNAL MEDICINE

## 2021-02-12 PROCEDURE — 99999 PR PBB SHADOW E&M-EST. PATIENT-LVL IV: ICD-10-PCS | Mod: PBBFAC,,, | Performed by: INTERNAL MEDICINE

## 2021-02-12 PROCEDURE — 99499 UNLISTED E&M SERVICE: CPT | Mod: S$GLB,,, | Performed by: INTERNAL MEDICINE

## 2021-02-26 ENCOUNTER — NUTRITION (OUTPATIENT)
Dept: PRIMARY CARE CLINIC | Facility: CLINIC | Age: 56
End: 2021-02-26
Payer: COMMERCIAL

## 2021-02-26 ENCOUNTER — CLINICAL SUPPORT (OUTPATIENT)
Dept: PRIMARY CARE CLINIC | Facility: CLINIC | Age: 56
End: 2021-02-26
Payer: COMMERCIAL

## 2021-02-26 VITALS — BODY MASS INDEX: 27.32 KG/M2 | WEIGHT: 195.88 LBS

## 2021-02-26 DIAGNOSIS — I10 ESSENTIAL HYPERTENSION: Primary | ICD-10-CM

## 2021-02-26 DIAGNOSIS — Z71.3 NUTRITIONAL COUNSELING: ICD-10-CM

## 2021-02-26 PROCEDURE — 97802 PR MED NUTR THER, 1ST, INDIV, EA 15 MIN: ICD-10-PCS | Mod: S$GLB,,, | Performed by: DIETITIAN, REGISTERED

## 2021-02-26 PROCEDURE — 99999 PR PBB SHADOW E&M-EST. PATIENT-LVL I: ICD-10-PCS | Mod: PBBFAC,,, | Performed by: DIETITIAN, REGISTERED

## 2021-02-26 PROCEDURE — 99999 PR PBB SHADOW E&M-EST. PATIENT-LVL I: CPT | Mod: PBBFAC,,, | Performed by: DIETITIAN, REGISTERED

## 2021-02-26 PROCEDURE — 97802 MEDICAL NUTRITION INDIV IN: CPT | Mod: S$GLB,,, | Performed by: DIETITIAN, REGISTERED

## 2021-03-01 ENCOUNTER — TELEPHONE (OUTPATIENT)
Dept: HEMATOLOGY/ONCOLOGY | Facility: CLINIC | Age: 56
End: 2021-03-01

## 2021-03-15 ENCOUNTER — HOSPITAL ENCOUNTER (OUTPATIENT)
Dept: RADIOLOGY | Facility: HOSPITAL | Age: 56
Discharge: HOME | End: 2021-03-15
Attending: INTERNAL MEDICINE
Payer: COMMERCIAL

## 2021-03-15 DIAGNOSIS — C64.2 RENAL CELL CANCER, LEFT: ICD-10-CM

## 2021-03-15 DIAGNOSIS — J45.990 BRONCHOSPASM, EXERCISE-INDUCED: ICD-10-CM

## 2021-03-15 PROCEDURE — 74178 CT ABD&PLV WO CNTR FLWD CNTR: CPT | Mod: 26,,, | Performed by: RADIOLOGY

## 2021-03-15 PROCEDURE — 71260 CT CHEST ABDOMEN PELVIS W W/O CONTRAST (XPD): ICD-10-PCS | Mod: 26,,, | Performed by: RADIOLOGY

## 2021-03-15 PROCEDURE — 74178 CT CHEST ABDOMEN PELVIS W W/O CONTRAST (XPD): ICD-10-PCS | Mod: 26,,, | Performed by: RADIOLOGY

## 2021-03-15 PROCEDURE — 71260 CT THORAX DX C+: CPT | Mod: TC

## 2021-03-15 PROCEDURE — 71260 CT THORAX DX C+: CPT | Mod: 26,,, | Performed by: RADIOLOGY

## 2021-03-15 PROCEDURE — 25500020 PHARM REV CODE 255: Performed by: INTERNAL MEDICINE

## 2021-03-15 RX ORDER — ALBUTEROL SULFATE 90 UG/1
2 AEROSOL, METERED RESPIRATORY (INHALATION) EVERY 4 HOURS PRN
Qty: 18 G | Refills: 3 | Status: SHIPPED | OUTPATIENT
Start: 2021-03-15 | End: 2024-02-20 | Stop reason: SDUPTHER

## 2021-03-15 RX ORDER — ROSUVASTATIN CALCIUM 40 MG/1
40 TABLET, COATED ORAL NIGHTLY
Qty: 90 TABLET | Refills: 3 | Status: SHIPPED | OUTPATIENT
Start: 2021-03-15 | End: 2022-03-23 | Stop reason: SDUPTHER

## 2021-03-15 RX ORDER — PANTOPRAZOLE SODIUM 40 MG/1
TABLET, DELAYED RELEASE ORAL
Qty: 180 TABLET | Refills: 3 | Status: SHIPPED | OUTPATIENT
Start: 2021-03-15 | End: 2022-08-16 | Stop reason: SDUPTHER

## 2021-03-15 RX ADMIN — IOHEXOL 100 ML: 350 INJECTION, SOLUTION INTRAVENOUS at 09:03

## 2021-03-16 ENCOUNTER — OFFICE VISIT (OUTPATIENT)
Dept: HEMATOLOGY/ONCOLOGY | Facility: CLINIC | Age: 56
End: 2021-03-16
Payer: COMMERCIAL

## 2021-03-16 VITALS
OXYGEN SATURATION: 98 % | DIASTOLIC BLOOD PRESSURE: 96 MMHG | HEIGHT: 71 IN | SYSTOLIC BLOOD PRESSURE: 149 MMHG | HEART RATE: 76 BPM | BODY MASS INDEX: 27.53 KG/M2 | RESPIRATION RATE: 14 BRPM | WEIGHT: 196.63 LBS | TEMPERATURE: 99 F

## 2021-03-16 DIAGNOSIS — C64.2 RENAL CELL CANCER, LEFT: Primary | ICD-10-CM

## 2021-03-16 PROCEDURE — 1126F AMNT PAIN NOTED NONE PRSNT: CPT | Mod: S$GLB,,, | Performed by: INTERNAL MEDICINE

## 2021-03-16 PROCEDURE — 3077F PR MOST RECENT SYSTOLIC BLOOD PRESSURE >= 140 MM HG: ICD-10-PCS | Mod: CPTII,S$GLB,, | Performed by: INTERNAL MEDICINE

## 2021-03-16 PROCEDURE — 99214 OFFICE O/P EST MOD 30 MIN: CPT | Mod: S$GLB,,, | Performed by: INTERNAL MEDICINE

## 2021-03-16 PROCEDURE — 99999 PR PBB SHADOW E&M-EST. PATIENT-LVL IV: CPT | Mod: PBBFAC,,, | Performed by: INTERNAL MEDICINE

## 2021-03-16 PROCEDURE — 3008F PR BODY MASS INDEX (BMI) DOCUMENTED: ICD-10-PCS | Mod: CPTII,S$GLB,, | Performed by: INTERNAL MEDICINE

## 2021-03-16 PROCEDURE — 3080F PR MOST RECENT DIASTOLIC BLOOD PRESSURE >= 90 MM HG: ICD-10-PCS | Mod: CPTII,S$GLB,, | Performed by: INTERNAL MEDICINE

## 2021-03-16 PROCEDURE — 99999 PR PBB SHADOW E&M-EST. PATIENT-LVL IV: ICD-10-PCS | Mod: PBBFAC,,, | Performed by: INTERNAL MEDICINE

## 2021-03-16 PROCEDURE — 99214 PR OFFICE/OUTPT VISIT, EST, LEVL IV, 30-39 MIN: ICD-10-PCS | Mod: S$GLB,,, | Performed by: INTERNAL MEDICINE

## 2021-03-16 PROCEDURE — 3080F DIAST BP >= 90 MM HG: CPT | Mod: CPTII,S$GLB,, | Performed by: INTERNAL MEDICINE

## 2021-03-16 PROCEDURE — 3077F SYST BP >= 140 MM HG: CPT | Mod: CPTII,S$GLB,, | Performed by: INTERNAL MEDICINE

## 2021-03-16 PROCEDURE — 1126F PR PAIN SEVERITY QUANTIFIED, NO PAIN PRESENT: ICD-10-PCS | Mod: S$GLB,,, | Performed by: INTERNAL MEDICINE

## 2021-03-16 PROCEDURE — 3008F BODY MASS INDEX DOCD: CPT | Mod: CPTII,S$GLB,, | Performed by: INTERNAL MEDICINE

## 2021-04-13 ENCOUNTER — TELEPHONE (OUTPATIENT)
Dept: ORTHOPEDICS | Facility: CLINIC | Age: 56
End: 2021-04-13

## 2021-04-13 ENCOUNTER — PATIENT MESSAGE (OUTPATIENT)
Dept: ORTHOPEDICS | Facility: CLINIC | Age: 56
End: 2021-04-13

## 2021-04-13 DIAGNOSIS — M79.644 PAIN OF RIGHT THUMB: Primary | ICD-10-CM

## 2021-04-14 ENCOUNTER — HOSPITAL ENCOUNTER (OUTPATIENT)
Dept: RADIOLOGY | Facility: OTHER | Age: 56
Discharge: HOME | End: 2021-04-14
Attending: ORTHOPAEDIC SURGERY
Payer: COMMERCIAL

## 2021-04-14 ENCOUNTER — TELEPHONE (OUTPATIENT)
Dept: ORTHOPEDICS | Facility: CLINIC | Age: 56
End: 2021-04-14

## 2021-04-14 DIAGNOSIS — Z87.828 HISTORY OF TRAUMA: ICD-10-CM

## 2021-04-14 DIAGNOSIS — M79.644 PAIN OF RIGHT THUMB: Primary | ICD-10-CM

## 2021-04-14 DIAGNOSIS — M79.644 PAIN OF RIGHT THUMB: ICD-10-CM

## 2021-04-14 PROCEDURE — 73140 X-RAY EXAM OF FINGER(S): CPT | Mod: TC,FY,RT

## 2021-04-14 PROCEDURE — 73140 XR FINGER 2 OR MORE VIEWS RIGHT: ICD-10-PCS | Mod: 26,RT,, | Performed by: RADIOLOGY

## 2021-04-14 PROCEDURE — 73140 X-RAY EXAM OF FINGER(S): CPT | Mod: 26,RT,, | Performed by: RADIOLOGY

## 2021-04-16 ENCOUNTER — HOSPITAL ENCOUNTER (OUTPATIENT)
Dept: RADIOLOGY | Facility: HOSPITAL | Age: 56
Discharge: HOME | End: 2021-04-16
Attending: ORTHOPAEDIC SURGERY
Payer: COMMERCIAL

## 2021-04-16 DIAGNOSIS — Z87.828 HISTORY OF TRAUMA: ICD-10-CM

## 2021-04-16 DIAGNOSIS — M79.644 PAIN OF RIGHT THUMB: ICD-10-CM

## 2021-04-16 PROCEDURE — 73218 MRI UPPER EXTREMITY W/O DYE: CPT | Mod: 26,RT,, | Performed by: RADIOLOGY

## 2021-04-16 PROCEDURE — 73218 MRI THUMB WITHOUT CONTRAST RIGHT: ICD-10-PCS | Mod: 26,RT,, | Performed by: RADIOLOGY

## 2021-04-16 PROCEDURE — 73218 MRI UPPER EXTREMITY W/O DYE: CPT | Mod: TC,RT

## 2021-04-19 ENCOUNTER — TELEPHONE (OUTPATIENT)
Dept: ORTHOPEDICS | Facility: CLINIC | Age: 56
End: 2021-04-19

## 2021-04-19 ENCOUNTER — PATIENT OUTREACH (OUTPATIENT)
Dept: ADMINISTRATIVE | Facility: OTHER | Age: 56
End: 2021-04-19

## 2021-04-20 ENCOUNTER — OFFICE VISIT (OUTPATIENT)
Dept: ORTHOPEDICS | Facility: CLINIC | Age: 56
End: 2021-04-20
Payer: COMMERCIAL

## 2021-04-20 VITALS — SYSTOLIC BLOOD PRESSURE: 139 MMHG | DIASTOLIC BLOOD PRESSURE: 94 MMHG | HEART RATE: 75 BPM

## 2021-04-20 DIAGNOSIS — Z01.818 PRE-OP TESTING: Primary | ICD-10-CM

## 2021-04-20 PROCEDURE — 99204 PR OFFICE/OUTPT VISIT, NEW, LEVL IV, 45-59 MIN: ICD-10-PCS | Mod: S$GLB,,, | Performed by: ORTHOPAEDIC SURGERY

## 2021-04-20 PROCEDURE — 99204 OFFICE O/P NEW MOD 45 MIN: CPT | Mod: S$GLB,,, | Performed by: ORTHOPAEDIC SURGERY

## 2021-04-20 PROCEDURE — 99999 PR PBB SHADOW E&M-EST. PATIENT-LVL III: ICD-10-PCS | Mod: PBBFAC,,, | Performed by: ORTHOPAEDIC SURGERY

## 2021-04-20 PROCEDURE — 99999 PR PBB SHADOW E&M-EST. PATIENT-LVL III: CPT | Mod: PBBFAC,,, | Performed by: ORTHOPAEDIC SURGERY

## 2021-04-21 DIAGNOSIS — Z87.828 HISTORY OF TRAUMA: Primary | ICD-10-CM

## 2021-04-21 DIAGNOSIS — S63.641A RUPTURE OF ULNAR COLLATERAL LIGAMENT OF RIGHT THUMB: ICD-10-CM

## 2021-05-03 ENCOUNTER — ANESTHESIA EVENT (OUTPATIENT)
Dept: SURGERY | Facility: HOSPITAL | Age: 56
End: 2021-05-03
Payer: COMMERCIAL

## 2021-05-11 ENCOUNTER — TELEPHONE (OUTPATIENT)
Dept: INFECTIOUS DISEASES | Facility: CLINIC | Age: 56
End: 2021-05-11

## 2021-05-11 DIAGNOSIS — Z20.822 ENCOUNTER FOR LABORATORY TESTING FOR COVID-19 VIRUS: ICD-10-CM

## 2021-05-13 ENCOUNTER — TELEPHONE (OUTPATIENT)
Dept: ORTHOPEDICS | Facility: CLINIC | Age: 56
End: 2021-05-13

## 2021-05-13 ENCOUNTER — TELEPHONE (OUTPATIENT)
Dept: HEMATOLOGY/ONCOLOGY | Facility: CLINIC | Age: 56
End: 2021-05-13

## 2021-05-13 DIAGNOSIS — S63.649A RUPTURE OF ULNAR COLLATERAL LIGAMENT (UCL) OF THUMB: ICD-10-CM

## 2021-05-13 DIAGNOSIS — C64.2 RENAL CELL CANCER, LEFT: Primary | ICD-10-CM

## 2021-05-13 DIAGNOSIS — Z98.890 POST-OPERATIVE STATE: Primary | ICD-10-CM

## 2021-05-13 RX ORDER — HYDROCODONE BITARTRATE AND ACETAMINOPHEN 5; 325 MG/1; MG/1
1 TABLET ORAL
Qty: 30 TABLET | Refills: 0 | Status: SHIPPED | OUTPATIENT
Start: 2021-05-13 | End: 2021-10-18

## 2021-05-13 RX ORDER — MUPIROCIN 20 MG/G
OINTMENT TOPICAL
Status: CANCELLED | OUTPATIENT
Start: 2021-05-13

## 2021-05-14 ENCOUNTER — ANESTHESIA (OUTPATIENT)
Dept: SURGERY | Facility: HOSPITAL | Age: 56
End: 2021-05-14
Payer: COMMERCIAL

## 2021-05-14 ENCOUNTER — HOSPITAL ENCOUNTER (OUTPATIENT)
Facility: HOSPITAL | Age: 56
Discharge: HOME | End: 2021-05-14
Attending: ORTHOPAEDIC SURGERY | Admitting: ORTHOPAEDIC SURGERY
Payer: COMMERCIAL

## 2021-05-14 VITALS
TEMPERATURE: 98 F | OXYGEN SATURATION: 94 % | DIASTOLIC BLOOD PRESSURE: 61 MMHG | SYSTOLIC BLOOD PRESSURE: 126 MMHG | HEIGHT: 71 IN | BODY MASS INDEX: 26.32 KG/M2 | RESPIRATION RATE: 23 BRPM | WEIGHT: 188 LBS | HEART RATE: 67 BPM

## 2021-05-14 DIAGNOSIS — S63.641D RUPTURE OF ULNAR COLLATERAL LIGAMENT OF RIGHT THUMB, SUBSEQUENT ENCOUNTER: Primary | ICD-10-CM

## 2021-05-14 DIAGNOSIS — S63.641A RUPTURE OF UCL OF RIGHT THUMB: Primary | ICD-10-CM

## 2021-05-14 DIAGNOSIS — S63.649A RUPTURE OF ULNAR COLLATERAL LIGAMENT (UCL) OF THUMB: ICD-10-CM

## 2021-05-14 PROCEDURE — D9220A PRA ANESTHESIA: ICD-10-PCS | Mod: ANES,,, | Performed by: ANESTHESIOLOGY

## 2021-05-14 PROCEDURE — 25000003 PHARM REV CODE 250: Performed by: NURSE ANESTHETIST, CERTIFIED REGISTERED

## 2021-05-14 PROCEDURE — 63600175 PHARM REV CODE 636 W HCPCS: Performed by: NURSE ANESTHETIST, CERTIFIED REGISTERED

## 2021-05-14 PROCEDURE — 99900035 HC TECH TIME PER 15 MIN (STAT)

## 2021-05-14 PROCEDURE — 37000009 HC ANESTHESIA EA ADD 15 MINS: Performed by: ORTHOPAEDIC SURGERY

## 2021-05-14 PROCEDURE — 94761 N-INVAS EAR/PLS OXIMETRY MLT: CPT

## 2021-05-14 PROCEDURE — 63600175 PHARM REV CODE 636 W HCPCS: Performed by: ANESTHESIOLOGY

## 2021-05-14 PROCEDURE — 37000008 HC ANESTHESIA 1ST 15 MINUTES: Performed by: ORTHOPAEDIC SURGERY

## 2021-05-14 PROCEDURE — 36000708 HC OR TIME LEV III 1ST 15 MIN: Performed by: ORTHOPAEDIC SURGERY

## 2021-05-14 PROCEDURE — 76942 SUPRACLAVICULAR BRACHIAL PLEXUS SINGLE INJECTION BLOCK: ICD-10-PCS | Mod: 26,,, | Performed by: ANESTHESIOLOGY

## 2021-05-14 PROCEDURE — 25000003 PHARM REV CODE 250: Performed by: STUDENT IN AN ORGANIZED HEALTH CARE EDUCATION/TRAINING PROGRAM

## 2021-05-14 PROCEDURE — 64415 NJX AA&/STRD BRCH PLXS IMG: CPT | Performed by: ANESTHESIOLOGY

## 2021-05-14 PROCEDURE — 71000015 HC POSTOP RECOV 1ST HR: Performed by: ORTHOPAEDIC SURGERY

## 2021-05-14 PROCEDURE — 26540 REPAIR HAND JOINT: CPT | Mod: F5,,, | Performed by: ORTHOPAEDIC SURGERY

## 2021-05-14 PROCEDURE — D9220A PRA ANESTHESIA: Mod: CRNA,,, | Performed by: NURSE ANESTHETIST, CERTIFIED REGISTERED

## 2021-05-14 PROCEDURE — 64415 SUPRACLAVICULAR BRACHIAL PLEXUS SINGLE INJECTION BLOCK: ICD-10-PCS | Mod: 59,RT,, | Performed by: ANESTHESIOLOGY

## 2021-05-14 PROCEDURE — C1713 ANCHOR/SCREW BN/BN,TIS/BN: HCPCS | Performed by: ORTHOPAEDIC SURGERY

## 2021-05-14 PROCEDURE — D9220A PRA ANESTHESIA: Mod: ANES,,, | Performed by: ANESTHESIOLOGY

## 2021-05-14 PROCEDURE — 36000709 HC OR TIME LEV III EA ADD 15 MIN: Performed by: ORTHOPAEDIC SURGERY

## 2021-05-14 PROCEDURE — 26540 PR FIX COLLAT LIG,MC-P JT,I-P JT: ICD-10-PCS | Mod: F5,,, | Performed by: ORTHOPAEDIC SURGERY

## 2021-05-14 PROCEDURE — 27201423 OPTIME MED/SURG SUP & DEVICES STERILE SUPPLY: Performed by: ORTHOPAEDIC SURGERY

## 2021-05-14 PROCEDURE — 76942 ECHO GUIDE FOR BIOPSY: CPT | Mod: 26,,, | Performed by: ANESTHESIOLOGY

## 2021-05-14 PROCEDURE — D9220A PRA ANESTHESIA: ICD-10-PCS | Mod: CRNA,,, | Performed by: NURSE ANESTHETIST, CERTIFIED REGISTERED

## 2021-05-14 PROCEDURE — 63600175 PHARM REV CODE 636 W HCPCS: Performed by: STUDENT IN AN ORGANIZED HEALTH CARE EDUCATION/TRAINING PROGRAM

## 2021-05-14 PROCEDURE — 71000033 HC RECOVERY, INTIAL HOUR: Performed by: ORTHOPAEDIC SURGERY

## 2021-05-14 PROCEDURE — 25000003 PHARM REV CODE 250: Performed by: ANESTHESIOLOGY

## 2021-05-14 PROCEDURE — 25000003 PHARM REV CODE 250: Performed by: ORTHOPAEDIC SURGERY

## 2021-05-14 DEVICE — IMPLANTABLE DEVICE: Type: IMPLANTABLE DEVICE | Site: THUMB | Status: FUNCTIONAL

## 2021-05-14 RX ORDER — SODIUM CHLORIDE 9 MG/ML
INJECTION, SOLUTION INTRAVENOUS CONTINUOUS
Status: DISCONTINUED | OUTPATIENT
Start: 2021-05-14 | End: 2021-05-14 | Stop reason: HOSPADM

## 2021-05-14 RX ORDER — PROCHLORPERAZINE EDISYLATE 5 MG/ML
5 INJECTION INTRAMUSCULAR; INTRAVENOUS EVERY 30 MIN PRN
Status: DISCONTINUED | OUTPATIENT
Start: 2021-05-14 | End: 2021-05-14 | Stop reason: HOSPADM

## 2021-05-14 RX ORDER — HYDROMORPHONE HYDROCHLORIDE 1 MG/ML
0.2 INJECTION, SOLUTION INTRAMUSCULAR; INTRAVENOUS; SUBCUTANEOUS EVERY 5 MIN PRN
Status: DISCONTINUED | OUTPATIENT
Start: 2021-05-14 | End: 2021-05-14 | Stop reason: HOSPADM

## 2021-05-14 RX ORDER — ONDANSETRON 2 MG/ML
4 INJECTION INTRAMUSCULAR; INTRAVENOUS DAILY PRN
Status: DISCONTINUED | OUTPATIENT
Start: 2021-05-14 | End: 2021-05-14 | Stop reason: HOSPADM

## 2021-05-14 RX ORDER — DEXMEDETOMIDINE HYDROCHLORIDE 100 UG/ML
INJECTION, SOLUTION INTRAVENOUS
Status: DISCONTINUED | OUTPATIENT
Start: 2021-05-14 | End: 2021-05-14

## 2021-05-14 RX ORDER — PROPOFOL 10 MG/ML
VIAL (ML) INTRAVENOUS
Status: DISCONTINUED | OUTPATIENT
Start: 2021-05-14 | End: 2021-05-14

## 2021-05-14 RX ORDER — LIDOCAINE HYDROCHLORIDE 10 MG/ML
INJECTION, SOLUTION INTRAVENOUS
Status: DISCONTINUED | OUTPATIENT
Start: 2021-05-14 | End: 2021-05-14

## 2021-05-14 RX ORDER — CEFAZOLIN SODIUM 1 G/3ML
2 INJECTION, POWDER, FOR SOLUTION INTRAMUSCULAR; INTRAVENOUS
Status: DISCONTINUED | OUTPATIENT
Start: 2021-05-14 | End: 2021-05-14

## 2021-05-14 RX ORDER — CEFAZOLIN SODIUM 1 G/3ML
2 INJECTION, POWDER, FOR SOLUTION INTRAMUSCULAR; INTRAVENOUS
Status: DISCONTINUED | OUTPATIENT
Start: 2021-05-14 | End: 2021-05-14 | Stop reason: HOSPADM

## 2021-05-14 RX ORDER — CARBOXYMETHYLCELLULOSE SODIUM 10 MG/ML
GEL OPHTHALMIC
Status: DISCONTINUED | OUTPATIENT
Start: 2021-05-14 | End: 2021-05-14

## 2021-05-14 RX ORDER — MUPIROCIN 20 MG/G
OINTMENT TOPICAL
Status: DISCONTINUED | OUTPATIENT
Start: 2021-05-14 | End: 2021-05-14 | Stop reason: HOSPADM

## 2021-05-14 RX ORDER — MIDAZOLAM HYDROCHLORIDE 1 MG/ML
0.5 INJECTION INTRAMUSCULAR; INTRAVENOUS
Status: DISCONTINUED | OUTPATIENT
Start: 2021-05-14 | End: 2021-10-18

## 2021-05-14 RX ORDER — ACETAMINOPHEN 500 MG
1000 TABLET ORAL
Status: COMPLETED | OUTPATIENT
Start: 2021-05-14 | End: 2021-05-14

## 2021-05-14 RX ORDER — DEXAMETHASONE SODIUM PHOSPHATE 4 MG/ML
INJECTION, SOLUTION INTRA-ARTICULAR; INTRALESIONAL; INTRAMUSCULAR; INTRAVENOUS; SOFT TISSUE
Status: DISCONTINUED | OUTPATIENT
Start: 2021-05-14 | End: 2021-05-14

## 2021-05-14 RX ORDER — BACITRACIN ZINC 500 UNIT/G
OINTMENT (GRAM) TOPICAL
Status: DISCONTINUED | OUTPATIENT
Start: 2021-05-14 | End: 2021-05-14 | Stop reason: HOSPADM

## 2021-05-14 RX ORDER — OXYCODONE HYDROCHLORIDE 5 MG/1
5 TABLET ORAL
Status: DISCONTINUED | OUTPATIENT
Start: 2021-05-14 | End: 2021-05-14 | Stop reason: HOSPADM

## 2021-05-14 RX ORDER — FENTANYL CITRATE 50 UG/ML
25 INJECTION, SOLUTION INTRAMUSCULAR; INTRAVENOUS EVERY 5 MIN PRN
Status: DISCONTINUED | OUTPATIENT
Start: 2021-05-14 | End: 2021-05-14 | Stop reason: HOSPADM

## 2021-05-14 RX ORDER — BUPIVACAINE HYDROCHLORIDE 5 MG/ML
INJECTION, SOLUTION EPIDURAL; INTRACAUDAL
Status: COMPLETED | OUTPATIENT
Start: 2021-05-14 | End: 2021-05-14

## 2021-05-14 RX ORDER — MUPIROCIN 20 MG/G
OINTMENT TOPICAL 2 TIMES DAILY
Status: DISCONTINUED | OUTPATIENT
Start: 2021-05-14 | End: 2021-05-14 | Stop reason: HOSPADM

## 2021-05-14 RX ORDER — PROPOFOL 10 MG/ML
VIAL (ML) INTRAVENOUS CONTINUOUS PRN
Status: DISCONTINUED | OUTPATIENT
Start: 2021-05-14 | End: 2021-05-14

## 2021-05-14 RX ORDER — ONDANSETRON 2 MG/ML
INJECTION INTRAMUSCULAR; INTRAVENOUS
Status: DISCONTINUED | OUTPATIENT
Start: 2021-05-14 | End: 2021-05-14

## 2021-05-14 RX ORDER — SODIUM CHLORIDE 0.9 % (FLUSH) 0.9 %
10 SYRINGE (ML) INJECTION
Status: DISCONTINUED | OUTPATIENT
Start: 2021-05-14 | End: 2021-05-14 | Stop reason: HOSPADM

## 2021-05-14 RX ORDER — CEFAZOLIN SODIUM 1 G/3ML
INJECTION, POWDER, FOR SOLUTION INTRAMUSCULAR; INTRAVENOUS
Status: DISCONTINUED | OUTPATIENT
Start: 2021-05-14 | End: 2021-05-14

## 2021-05-14 RX ORDER — FENTANYL CITRATE 50 UG/ML
25 INJECTION, SOLUTION INTRAMUSCULAR; INTRAVENOUS EVERY 5 MIN PRN
Status: DISCONTINUED | OUTPATIENT
Start: 2021-05-14 | End: 2021-10-18

## 2021-05-14 RX ORDER — MIDAZOLAM HYDROCHLORIDE 1 MG/ML
INJECTION INTRAMUSCULAR; INTRAVENOUS
Status: DISCONTINUED | OUTPATIENT
Start: 2021-05-14 | End: 2021-05-14

## 2021-05-14 RX ADMIN — SODIUM CHLORIDE: 9 INJECTION, SOLUTION INTRAVENOUS at 07:05

## 2021-05-14 RX ADMIN — MEPIVACAINE HYDROCHLORIDE 15 ML: 15 INJECTION, SOLUTION EPIDURAL; INFILTRATION at 07:05

## 2021-05-14 RX ADMIN — MIDAZOLAM HYDROCHLORIDE 2 MG: 1 INJECTION, SOLUTION INTRAMUSCULAR; INTRAVENOUS at 07:05

## 2021-05-14 RX ADMIN — MUPIROCIN: 20 OINTMENT TOPICAL at 06:05

## 2021-05-14 RX ADMIN — DEXMEDETOMIDINE HYDROCHLORIDE 20 MCG: 100 INJECTION, SOLUTION, CONCENTRATE INTRAVENOUS at 08:05

## 2021-05-14 RX ADMIN — CEFAZOLIN 2 G: 330 INJECTION, POWDER, FOR SOLUTION INTRAMUSCULAR; INTRAVENOUS at 08:05

## 2021-05-14 RX ADMIN — DEXAMETHASONE SODIUM PHOSPHATE 4 MG: 4 INJECTION, SOLUTION INTRAMUSCULAR; INTRAVENOUS at 08:05

## 2021-05-14 RX ADMIN — LIDOCAINE HYDROCHLORIDE 100 MG: 10 INJECTION, SOLUTION INTRAVENOUS at 08:05

## 2021-05-14 RX ADMIN — SODIUM CHLORIDE: 0.9 INJECTION, SOLUTION INTRAVENOUS at 07:05

## 2021-05-14 RX ADMIN — FENTANYL CITRATE 50 MCG: 50 INJECTION INTRAMUSCULAR; INTRAVENOUS at 07:05

## 2021-05-14 RX ADMIN — CARBOXYMETHYLCELLULOSE SODIUM 2 DROP: 10 GEL OPHTHALMIC at 08:05

## 2021-05-14 RX ADMIN — ACETAMINOPHEN 1000 MG: 500 TABLET ORAL at 06:05

## 2021-05-14 RX ADMIN — BUPIVACAINE HYDROCHLORIDE 15 ML: 5 INJECTION, SOLUTION EPIDURAL; INTRACAUDAL; PERINEURAL at 07:05

## 2021-05-14 RX ADMIN — PROPOFOL 30 MG: 10 INJECTION, EMULSION INTRAVENOUS at 08:05

## 2021-05-14 RX ADMIN — ONDANSETRON 4 MG: 2 INJECTION, SOLUTION INTRAMUSCULAR; INTRAVENOUS at 08:05

## 2021-05-14 RX ADMIN — PROPOFOL 125 MCG/KG/MIN: 10 INJECTION, EMULSION INTRAVENOUS at 08:05

## 2021-05-20 ENCOUNTER — CLINICAL SUPPORT (OUTPATIENT)
Dept: REHABILITATION | Facility: HOSPITAL | Age: 56
End: 2021-05-20
Attending: STUDENT IN AN ORGANIZED HEALTH CARE EDUCATION/TRAINING PROGRAM
Payer: COMMERCIAL

## 2021-05-20 DIAGNOSIS — R53.1 WEAKNESS: Primary | ICD-10-CM

## 2021-05-20 DIAGNOSIS — S63.641D RUPTURE OF ULNAR COLLATERAL LIGAMENT OF RIGHT THUMB, SUBSEQUENT ENCOUNTER: ICD-10-CM

## 2021-05-20 PROCEDURE — L3906 WHO W/O JOINTS CF: HCPCS | Mod: PO

## 2021-05-24 NOTE — PROGRESS NOTES
Called patient to review readings. LVM.   BP at goal, <130/80 mmHg  Continue monitoring        Last 5 Patient Entered Readings                                      Current 30 Day Average: 119/75     Recent Readings 9/15/2018 9/14/2018 9/7/2018 9/6/2018 8/31/2018    SBP (mmHg) 120 111 127 129 112    DBP (mmHg) 74 73 79 76 72    Pulse 70 69 80 71 76             [FreeTextEntry3] : I, Juliette Saini, solely acted as a scribe for Dr. Bree Hewitt on 05/20/2021 . All medical entries made by the Scribe were at my, Dr. Martinez's, direction and personally dictated by me on 05/20/2021. I have reviewed the chart and agree that the record accurately reflects my personal performance of the history, physical exam, assessment and plan. I have also personally directed, reviewed and agreed with the chart.

## 2021-05-25 ENCOUNTER — CLINICAL SUPPORT (OUTPATIENT)
Dept: REHABILITATION | Facility: HOSPITAL | Age: 56
End: 2021-05-25
Attending: STUDENT IN AN ORGANIZED HEALTH CARE EDUCATION/TRAINING PROGRAM
Payer: COMMERCIAL

## 2021-05-25 ENCOUNTER — PATIENT MESSAGE (OUTPATIENT)
Dept: REHABILITATION | Facility: HOSPITAL | Age: 56
End: 2021-05-25

## 2021-05-25 DIAGNOSIS — R53.1 WEAKNESS: ICD-10-CM

## 2021-05-25 DIAGNOSIS — S63.641D RUPTURE OF ULNAR COLLATERAL LIGAMENT OF RIGHT THUMB, SUBSEQUENT ENCOUNTER: Primary | ICD-10-CM

## 2021-05-25 PROCEDURE — 97110 THERAPEUTIC EXERCISES: CPT | Mod: PO

## 2021-05-25 PROCEDURE — 97165 OT EVAL LOW COMPLEX 30 MIN: CPT | Mod: PO

## 2021-05-26 ENCOUNTER — TELEPHONE (OUTPATIENT)
Dept: ORTHOPEDICS | Facility: CLINIC | Age: 56
End: 2021-05-26

## 2021-05-27 ENCOUNTER — OFFICE VISIT (OUTPATIENT)
Dept: ORTHOPEDICS | Facility: CLINIC | Age: 56
End: 2021-05-27
Payer: COMMERCIAL

## 2021-05-27 ENCOUNTER — CLINICAL SUPPORT (OUTPATIENT)
Dept: REHABILITATION | Facility: HOSPITAL | Age: 56
End: 2021-05-27
Attending: STUDENT IN AN ORGANIZED HEALTH CARE EDUCATION/TRAINING PROGRAM
Payer: COMMERCIAL

## 2021-05-27 ENCOUNTER — TELEPHONE (OUTPATIENT)
Dept: INTERNAL MEDICINE | Facility: CLINIC | Age: 56
End: 2021-05-27

## 2021-05-27 ENCOUNTER — PATIENT MESSAGE (OUTPATIENT)
Dept: ADMINISTRATIVE | Facility: OTHER | Age: 56
End: 2021-05-27

## 2021-05-27 VITALS
DIASTOLIC BLOOD PRESSURE: 79 MMHG | RESPIRATION RATE: 18 BRPM | SYSTOLIC BLOOD PRESSURE: 117 MMHG | HEART RATE: 88 BPM | WEIGHT: 188 LBS | BODY MASS INDEX: 26.32 KG/M2 | HEIGHT: 71 IN

## 2021-05-27 DIAGNOSIS — S63.641D RUPTURE OF ULNAR COLLATERAL LIGAMENT OF RIGHT THUMB, SUBSEQUENT ENCOUNTER: Primary | ICD-10-CM

## 2021-05-27 DIAGNOSIS — S63.642D RUPTURE OF ULNAR COLLATERAL LIGAMENT OF LEFT THUMB, SUBSEQUENT ENCOUNTER: Primary | ICD-10-CM

## 2021-05-27 DIAGNOSIS — R53.1 WEAKNESS: ICD-10-CM

## 2021-05-27 PROCEDURE — 99999 PR PBB SHADOW E&M-EST. PATIENT-LVL III: ICD-10-PCS | Mod: PBBFAC,,, | Performed by: ORTHOPAEDIC SURGERY

## 2021-05-27 PROCEDURE — 97124 MASSAGE THERAPY: CPT | Mod: PO

## 2021-05-27 PROCEDURE — 99024 PR POST-OP FOLLOW-UP VISIT: ICD-10-PCS | Mod: S$GLB,,, | Performed by: ORTHOPAEDIC SURGERY

## 2021-05-27 PROCEDURE — 3008F BODY MASS INDEX DOCD: CPT | Mod: CPTII,S$GLB,, | Performed by: ORTHOPAEDIC SURGERY

## 2021-05-27 PROCEDURE — 1126F AMNT PAIN NOTED NONE PRSNT: CPT | Mod: S$GLB,,, | Performed by: ORTHOPAEDIC SURGERY

## 2021-05-27 PROCEDURE — 3008F PR BODY MASS INDEX (BMI) DOCUMENTED: ICD-10-PCS | Mod: CPTII,S$GLB,, | Performed by: ORTHOPAEDIC SURGERY

## 2021-05-27 PROCEDURE — 99024 POSTOP FOLLOW-UP VISIT: CPT | Mod: S$GLB,,, | Performed by: ORTHOPAEDIC SURGERY

## 2021-05-27 PROCEDURE — 99999 PR PBB SHADOW E&M-EST. PATIENT-LVL III: CPT | Mod: PBBFAC,,, | Performed by: ORTHOPAEDIC SURGERY

## 2021-05-27 PROCEDURE — 1126F PR PAIN SEVERITY QUANTIFIED, NO PAIN PRESENT: ICD-10-PCS | Mod: S$GLB,,, | Performed by: ORTHOPAEDIC SURGERY

## 2021-06-01 ENCOUNTER — CLINICAL SUPPORT (OUTPATIENT)
Dept: REHABILITATION | Facility: HOSPITAL | Age: 56
End: 2021-06-01
Attending: STUDENT IN AN ORGANIZED HEALTH CARE EDUCATION/TRAINING PROGRAM
Payer: COMMERCIAL

## 2021-06-01 DIAGNOSIS — S63.641D RUPTURE OF ULNAR COLLATERAL LIGAMENT OF RIGHT THUMB, SUBSEQUENT ENCOUNTER: Primary | ICD-10-CM

## 2021-06-01 DIAGNOSIS — R53.1 WEAKNESS: ICD-10-CM

## 2021-06-01 PROCEDURE — 97035 APP MDLTY 1+ULTRASOUND EA 15: CPT | Mod: PO

## 2021-06-01 PROCEDURE — 97124 MASSAGE THERAPY: CPT | Mod: PO

## 2021-06-01 PROCEDURE — 97140 MANUAL THERAPY 1/> REGIONS: CPT | Mod: PO

## 2021-06-03 ENCOUNTER — CLINICAL SUPPORT (OUTPATIENT)
Dept: REHABILITATION | Facility: HOSPITAL | Age: 56
End: 2021-06-03
Attending: STUDENT IN AN ORGANIZED HEALTH CARE EDUCATION/TRAINING PROGRAM
Payer: COMMERCIAL

## 2021-06-03 DIAGNOSIS — M25.60 DECREASED RANGE OF MOTION: ICD-10-CM

## 2021-06-03 PROCEDURE — 97110 THERAPEUTIC EXERCISES: CPT | Mod: PO

## 2021-06-03 PROCEDURE — 97035 APP MDLTY 1+ULTRASOUND EA 15: CPT | Mod: PO

## 2021-06-07 ENCOUNTER — HOSPITAL ENCOUNTER (OUTPATIENT)
Dept: RADIOLOGY | Facility: HOSPITAL | Age: 56
Discharge: HOME | End: 2021-06-07
Attending: INTERNAL MEDICINE
Payer: COMMERCIAL

## 2021-06-07 DIAGNOSIS — C64.2 RENAL CELL CANCER, LEFT: ICD-10-CM

## 2021-06-07 PROCEDURE — 74177 CT ABD & PELVIS W/CONTRAST: CPT | Mod: TC

## 2021-06-07 PROCEDURE — 25500020 PHARM REV CODE 255: Performed by: INTERNAL MEDICINE

## 2021-06-07 PROCEDURE — 74177 CT CHEST ABDOMEN PELVIS WITH CONTRAST (XPD): ICD-10-PCS | Mod: 26,,, | Performed by: RADIOLOGY

## 2021-06-07 PROCEDURE — 74177 CT ABD & PELVIS W/CONTRAST: CPT | Mod: 26,,, | Performed by: RADIOLOGY

## 2021-06-07 PROCEDURE — 71260 CT THORAX DX C+: CPT | Mod: 26,,, | Performed by: RADIOLOGY

## 2021-06-07 PROCEDURE — 71260 CT CHEST ABDOMEN PELVIS WITH CONTRAST (XPD): ICD-10-PCS | Mod: 26,,, | Performed by: RADIOLOGY

## 2021-06-07 RX ADMIN — IOHEXOL 15 ML: 350 INJECTION, SOLUTION INTRAVENOUS at 08:06

## 2021-06-07 RX ADMIN — IOHEXOL 15 ML: 350 INJECTION, SOLUTION INTRAVENOUS at 09:06

## 2021-06-07 RX ADMIN — IOHEXOL 100 ML: 350 INJECTION, SOLUTION INTRAVENOUS at 10:06

## 2021-06-08 ENCOUNTER — CLINICAL SUPPORT (OUTPATIENT)
Dept: REHABILITATION | Facility: HOSPITAL | Age: 56
End: 2021-06-08
Attending: STUDENT IN AN ORGANIZED HEALTH CARE EDUCATION/TRAINING PROGRAM
Payer: COMMERCIAL

## 2021-06-08 DIAGNOSIS — M25.60 DECREASED RANGE OF MOTION: ICD-10-CM

## 2021-06-08 PROCEDURE — 97140 MANUAL THERAPY 1/> REGIONS: CPT | Mod: PO

## 2021-06-08 PROCEDURE — 97035 APP MDLTY 1+ULTRASOUND EA 15: CPT | Mod: PO

## 2021-06-10 ENCOUNTER — OFFICE VISIT (OUTPATIENT)
Dept: HEMATOLOGY/ONCOLOGY | Facility: CLINIC | Age: 56
End: 2021-06-10
Payer: COMMERCIAL

## 2021-06-10 VITALS
HEIGHT: 71 IN | WEIGHT: 201.06 LBS | DIASTOLIC BLOOD PRESSURE: 102 MMHG | RESPIRATION RATE: 16 BRPM | SYSTOLIC BLOOD PRESSURE: 168 MMHG | OXYGEN SATURATION: 96 % | BODY MASS INDEX: 28.15 KG/M2 | HEART RATE: 75 BPM

## 2021-06-10 DIAGNOSIS — C64.2 RENAL CELL CANCER, LEFT: Primary | ICD-10-CM

## 2021-06-10 DIAGNOSIS — C64.9 RENAL CELL CARCINOMA, UNSPECIFIED LATERALITY: ICD-10-CM

## 2021-06-10 PROCEDURE — 3008F PR BODY MASS INDEX (BMI) DOCUMENTED: ICD-10-PCS | Mod: CPTII,S$GLB,, | Performed by: INTERNAL MEDICINE

## 2021-06-10 PROCEDURE — 3008F BODY MASS INDEX DOCD: CPT | Mod: CPTII,S$GLB,, | Performed by: INTERNAL MEDICINE

## 2021-06-10 PROCEDURE — 99999 PR PBB SHADOW E&M-EST. PATIENT-LVL III: ICD-10-PCS | Mod: PBBFAC,,, | Performed by: INTERNAL MEDICINE

## 2021-06-10 PROCEDURE — 99214 PR OFFICE/OUTPT VISIT, EST, LEVL IV, 30-39 MIN: ICD-10-PCS | Mod: S$GLB,,, | Performed by: INTERNAL MEDICINE

## 2021-06-10 PROCEDURE — 99999 PR PBB SHADOW E&M-EST. PATIENT-LVL III: CPT | Mod: PBBFAC,,, | Performed by: INTERNAL MEDICINE

## 2021-06-10 PROCEDURE — 1126F PR PAIN SEVERITY QUANTIFIED, NO PAIN PRESENT: ICD-10-PCS | Mod: S$GLB,,, | Performed by: INTERNAL MEDICINE

## 2021-06-10 PROCEDURE — 1126F AMNT PAIN NOTED NONE PRSNT: CPT | Mod: S$GLB,,, | Performed by: INTERNAL MEDICINE

## 2021-06-10 PROCEDURE — 99214 OFFICE O/P EST MOD 30 MIN: CPT | Mod: S$GLB,,, | Performed by: INTERNAL MEDICINE

## 2021-06-11 ENCOUNTER — CLINICAL SUPPORT (OUTPATIENT)
Dept: REHABILITATION | Facility: HOSPITAL | Age: 56
End: 2021-06-11
Attending: STUDENT IN AN ORGANIZED HEALTH CARE EDUCATION/TRAINING PROGRAM
Payer: COMMERCIAL

## 2021-06-11 DIAGNOSIS — M25.60 DECREASED RANGE OF MOTION: ICD-10-CM

## 2021-06-11 PROCEDURE — 97110 THERAPEUTIC EXERCISES: CPT | Mod: PO

## 2021-06-11 PROCEDURE — 97035 APP MDLTY 1+ULTRASOUND EA 15: CPT | Mod: PO

## 2021-06-15 ENCOUNTER — CLINICAL SUPPORT (OUTPATIENT)
Dept: REHABILITATION | Facility: HOSPITAL | Age: 56
End: 2021-06-15
Attending: STUDENT IN AN ORGANIZED HEALTH CARE EDUCATION/TRAINING PROGRAM
Payer: COMMERCIAL

## 2021-06-15 DIAGNOSIS — M25.60 DECREASED RANGE OF MOTION: ICD-10-CM

## 2021-06-15 PROCEDURE — 97140 MANUAL THERAPY 1/> REGIONS: CPT | Mod: PO

## 2021-06-15 PROCEDURE — 97035 APP MDLTY 1+ULTRASOUND EA 15: CPT | Mod: PO

## 2021-06-15 PROCEDURE — 97110 THERAPEUTIC EXERCISES: CPT | Mod: PO

## 2021-06-18 ENCOUNTER — CLINICAL SUPPORT (OUTPATIENT)
Dept: REHABILITATION | Facility: HOSPITAL | Age: 56
End: 2021-06-18
Attending: STUDENT IN AN ORGANIZED HEALTH CARE EDUCATION/TRAINING PROGRAM
Payer: COMMERCIAL

## 2021-06-18 DIAGNOSIS — M25.60 DECREASED RANGE OF MOTION: ICD-10-CM

## 2021-06-18 PROCEDURE — 97110 THERAPEUTIC EXERCISES: CPT | Mod: PO

## 2021-06-22 ENCOUNTER — CLINICAL SUPPORT (OUTPATIENT)
Dept: REHABILITATION | Facility: HOSPITAL | Age: 56
End: 2021-06-22
Attending: STUDENT IN AN ORGANIZED HEALTH CARE EDUCATION/TRAINING PROGRAM
Payer: COMMERCIAL

## 2021-06-22 DIAGNOSIS — M25.60 DECREASED RANGE OF MOTION: ICD-10-CM

## 2021-06-22 PROCEDURE — 97140 MANUAL THERAPY 1/> REGIONS: CPT | Mod: PO

## 2021-06-22 PROCEDURE — 97110 THERAPEUTIC EXERCISES: CPT | Mod: PO

## 2021-06-24 ENCOUNTER — CLINICAL SUPPORT (OUTPATIENT)
Dept: REHABILITATION | Facility: HOSPITAL | Age: 56
End: 2021-06-24
Attending: STUDENT IN AN ORGANIZED HEALTH CARE EDUCATION/TRAINING PROGRAM
Payer: COMMERCIAL

## 2021-06-24 DIAGNOSIS — M25.60 DECREASED RANGE OF MOTION: ICD-10-CM

## 2021-06-24 PROCEDURE — 97110 THERAPEUTIC EXERCISES: CPT | Mod: PO

## 2021-06-25 ENCOUNTER — TELEPHONE (OUTPATIENT)
Dept: ORTHOPEDICS | Facility: CLINIC | Age: 56
End: 2021-06-25

## 2021-06-29 ENCOUNTER — OFFICE VISIT (OUTPATIENT)
Dept: ORTHOPEDICS | Facility: CLINIC | Age: 56
End: 2021-06-29
Payer: COMMERCIAL

## 2021-06-29 ENCOUNTER — CLINICAL SUPPORT (OUTPATIENT)
Dept: REHABILITATION | Facility: HOSPITAL | Age: 56
End: 2021-06-29
Attending: STUDENT IN AN ORGANIZED HEALTH CARE EDUCATION/TRAINING PROGRAM
Payer: COMMERCIAL

## 2021-06-29 VITALS
SYSTOLIC BLOOD PRESSURE: 137 MMHG | BODY MASS INDEX: 28.14 KG/M2 | DIASTOLIC BLOOD PRESSURE: 94 MMHG | WEIGHT: 201 LBS | HEIGHT: 71 IN | HEART RATE: 64 BPM

## 2021-06-29 DIAGNOSIS — M25.60 DECREASED RANGE OF MOTION: ICD-10-CM

## 2021-06-29 DIAGNOSIS — M65.30 TRIGGER FINGER OF RIGHT HAND, UNSPECIFIED FINGER: Primary | ICD-10-CM

## 2021-06-29 PROCEDURE — 3008F BODY MASS INDEX DOCD: CPT | Mod: CPTII,S$GLB,, | Performed by: ORTHOPAEDIC SURGERY

## 2021-06-29 PROCEDURE — 1126F AMNT PAIN NOTED NONE PRSNT: CPT | Mod: S$GLB,,, | Performed by: ORTHOPAEDIC SURGERY

## 2021-06-29 PROCEDURE — 3008F PR BODY MASS INDEX (BMI) DOCUMENTED: ICD-10-PCS | Mod: CPTII,S$GLB,, | Performed by: ORTHOPAEDIC SURGERY

## 2021-06-29 PROCEDURE — 97035 APP MDLTY 1+ULTRASOUND EA 15: CPT | Mod: PO

## 2021-06-29 PROCEDURE — 99999 PR PBB SHADOW E&M-EST. PATIENT-LVL III: CPT | Mod: PBBFAC,,, | Performed by: ORTHOPAEDIC SURGERY

## 2021-06-29 PROCEDURE — 20550 NJX 1 TENDON SHEATH/LIGAMENT: CPT | Mod: 79,RT,S$GLB, | Performed by: ORTHOPAEDIC SURGERY

## 2021-06-29 PROCEDURE — 99024 POSTOP FOLLOW-UP VISIT: CPT | Mod: S$GLB,,, | Performed by: ORTHOPAEDIC SURGERY

## 2021-06-29 PROCEDURE — 1126F PR PAIN SEVERITY QUANTIFIED, NO PAIN PRESENT: ICD-10-PCS | Mod: S$GLB,,, | Performed by: ORTHOPAEDIC SURGERY

## 2021-06-29 PROCEDURE — 97140 MANUAL THERAPY 1/> REGIONS: CPT | Mod: PO

## 2021-06-29 PROCEDURE — 99024 PR POST-OP FOLLOW-UP VISIT: ICD-10-PCS | Mod: S$GLB,,, | Performed by: ORTHOPAEDIC SURGERY

## 2021-06-29 PROCEDURE — 99999 PR PBB SHADOW E&M-EST. PATIENT-LVL III: ICD-10-PCS | Mod: PBBFAC,,, | Performed by: ORTHOPAEDIC SURGERY

## 2021-06-29 PROCEDURE — 20550 TENDON SHEATH: ICD-10-PCS | Mod: 79,RT,S$GLB, | Performed by: ORTHOPAEDIC SURGERY

## 2021-06-29 RX ADMIN — TRIAMCINOLONE ACETONIDE 40 MG: 40 INJECTION, SUSPENSION INTRA-ARTICULAR; INTRAMUSCULAR at 03:06

## 2021-07-02 ENCOUNTER — CLINICAL SUPPORT (OUTPATIENT)
Dept: REHABILITATION | Facility: HOSPITAL | Age: 56
End: 2021-07-02
Attending: STUDENT IN AN ORGANIZED HEALTH CARE EDUCATION/TRAINING PROGRAM
Payer: COMMERCIAL

## 2021-07-02 DIAGNOSIS — M25.60 DECREASED RANGE OF MOTION: ICD-10-CM

## 2021-07-02 PROCEDURE — 97035 APP MDLTY 1+ULTRASOUND EA 15: CPT | Mod: PO

## 2021-07-02 PROCEDURE — 97110 THERAPEUTIC EXERCISES: CPT | Mod: PO

## 2021-07-02 RX ORDER — TRIAMCINOLONE ACETONIDE 40 MG/ML
40 INJECTION, SUSPENSION INTRA-ARTICULAR; INTRAMUSCULAR
Status: DISCONTINUED | OUTPATIENT
Start: 2021-06-29 | End: 2021-07-02 | Stop reason: HOSPADM

## 2021-07-08 ENCOUNTER — CLINICAL SUPPORT (OUTPATIENT)
Dept: REHABILITATION | Facility: HOSPITAL | Age: 56
End: 2021-07-08
Attending: STUDENT IN AN ORGANIZED HEALTH CARE EDUCATION/TRAINING PROGRAM
Payer: COMMERCIAL

## 2021-07-08 ENCOUNTER — CLINICAL SUPPORT (OUTPATIENT)
Dept: URGENT CARE | Facility: CLINIC | Age: 56
End: 2021-07-08
Payer: COMMERCIAL

## 2021-07-08 DIAGNOSIS — M25.60 DECREASED RANGE OF MOTION: ICD-10-CM

## 2021-07-08 DIAGNOSIS — Z20.822 ENCOUNTER FOR LABORATORY TESTING FOR COVID-19 VIRUS: ICD-10-CM

## 2021-07-08 LAB — SARS-COV-2 RNA RESP QL NAA+PROBE: NOT DETECTED

## 2021-07-08 PROCEDURE — 97110 THERAPEUTIC EXERCISES: CPT | Mod: PO

## 2021-07-08 PROCEDURE — 99211 OFF/OP EST MAY X REQ PHY/QHP: CPT | Mod: S$GLB,,, | Performed by: INTERNAL MEDICINE

## 2021-07-08 PROCEDURE — 99211 PR OFFICE/OUTPT VISIT, EST, LEVL I: ICD-10-PCS | Mod: S$GLB,,, | Performed by: INTERNAL MEDICINE

## 2021-07-08 PROCEDURE — U0003 INFECTIOUS AGENT DETECTION BY NUCLEIC ACID (DNA OR RNA); SEVERE ACUTE RESPIRATORY SYNDROME CORONAVIRUS 2 (SARS-COV-2) (CORONAVIRUS DISEASE [COVID-19]), AMPLIFIED PROBE TECHNIQUE, MAKING USE OF HIGH THROUGHPUT TECHNOLOGIES AS DESCRIBED BY CMS-2020-01-R: HCPCS | Performed by: INTERNAL MEDICINE

## 2021-07-08 PROCEDURE — U0005 INFEC AGEN DETEC AMPLI PROBE: HCPCS | Performed by: INTERNAL MEDICINE

## 2021-07-13 DIAGNOSIS — B00.9 HSV INFECTION: ICD-10-CM

## 2021-07-13 RX ORDER — VALACYCLOVIR HYDROCHLORIDE 1 G/1
TABLET, FILM COATED ORAL
Qty: 30 TABLET | Refills: 1 | Status: SHIPPED | OUTPATIENT
Start: 2021-07-13 | End: 2021-07-19 | Stop reason: SDUPTHER

## 2021-07-15 NOTE — PROGRESS NOTES
Last 5 Patient Entered Readings                                      Current 30 Day Average:      Recent Readings 9/28/2018 9/21/2018 9/15/2018 9/14/2018 9/7/2018    SBP (mmHg) 109 115 120 111 127    DBP (mmHg) 70 72 74 73 79    Pulse 69 72 70 69 80        Digital Medicine: Health  Follow Up    Having annual physical tomorrow.     Got a new phone and it won't sync to cuff. But plans on working with it tonight or tomorrow - knows how to fix the issue.    Lifestyle Modifications:    1.Dietary Modifications: Working with Sushma Aragon for nutrition. Still not seeing the body fat loss he'd like. Plans to try intermittent fasting for 30 days.    2.Physical Activity: Working out ~5x/wk. More weight training, HIIT, yoga.    3.Medication Therapy: Patient has been compliant with the medication regimen.    4.Patient has the following medication side effects/concerns:   (Frequency/Alleviating factors/Precipitating factors, etc.)     Follow up with Mr. Venkat Jonas completed. No further questions or concerns. Will continue to follow up to achieve health goals.    
no back pain/no neck pain

## 2021-07-19 DIAGNOSIS — B00.9 HSV INFECTION: ICD-10-CM

## 2021-07-19 RX ORDER — VALACYCLOVIR HYDROCHLORIDE 1 G/1
TABLET, FILM COATED ORAL
Qty: 12 TABLET | Refills: 1 | Status: SHIPPED | OUTPATIENT
Start: 2021-07-19 | End: 2022-12-27 | Stop reason: SDUPTHER

## 2021-07-20 ENCOUNTER — OFFICE VISIT (OUTPATIENT)
Dept: ORTHOPEDICS | Facility: CLINIC | Age: 56
End: 2021-07-20
Payer: COMMERCIAL

## 2021-07-20 VITALS
HEART RATE: 80 BPM | WEIGHT: 201 LBS | DIASTOLIC BLOOD PRESSURE: 93 MMHG | HEIGHT: 71 IN | SYSTOLIC BLOOD PRESSURE: 150 MMHG | BODY MASS INDEX: 28.14 KG/M2

## 2021-07-20 DIAGNOSIS — M65.30 TRIGGER FINGER OF RIGHT HAND, UNSPECIFIED FINGER: Primary | ICD-10-CM

## 2021-07-20 PROCEDURE — 1125F AMNT PAIN NOTED PAIN PRSNT: CPT | Mod: CPTII,S$GLB,, | Performed by: ORTHOPAEDIC SURGERY

## 2021-07-20 PROCEDURE — 1159F MED LIST DOCD IN RCRD: CPT | Mod: CPTII,S$GLB,, | Performed by: ORTHOPAEDIC SURGERY

## 2021-07-20 PROCEDURE — 1125F PR PAIN SEVERITY QUANTIFIED, PAIN PRESENT: ICD-10-PCS | Mod: CPTII,S$GLB,, | Performed by: ORTHOPAEDIC SURGERY

## 2021-07-20 PROCEDURE — 3080F DIAST BP >= 90 MM HG: CPT | Mod: CPTII,S$GLB,, | Performed by: ORTHOPAEDIC SURGERY

## 2021-07-20 PROCEDURE — 3077F PR MOST RECENT SYSTOLIC BLOOD PRESSURE >= 140 MM HG: ICD-10-PCS | Mod: CPTII,S$GLB,, | Performed by: ORTHOPAEDIC SURGERY

## 2021-07-20 PROCEDURE — 3080F PR MOST RECENT DIASTOLIC BLOOD PRESSURE >= 90 MM HG: ICD-10-PCS | Mod: CPTII,S$GLB,, | Performed by: ORTHOPAEDIC SURGERY

## 2021-07-20 PROCEDURE — 99999 PR PBB SHADOW E&M-EST. PATIENT-LVL III: ICD-10-PCS | Mod: PBBFAC,,, | Performed by: ORTHOPAEDIC SURGERY

## 2021-07-20 PROCEDURE — 20550 NJX 1 TENDON SHEATH/LIGAMENT: CPT | Mod: 58,F5,S$GLB, | Performed by: ORTHOPAEDIC SURGERY

## 2021-07-20 PROCEDURE — 99024 POSTOP FOLLOW-UP VISIT: CPT | Mod: S$GLB,,, | Performed by: ORTHOPAEDIC SURGERY

## 2021-07-20 PROCEDURE — 99024 PR POST-OP FOLLOW-UP VISIT: ICD-10-PCS | Mod: S$GLB,,, | Performed by: ORTHOPAEDIC SURGERY

## 2021-07-20 PROCEDURE — 3008F BODY MASS INDEX DOCD: CPT | Mod: CPTII,S$GLB,, | Performed by: ORTHOPAEDIC SURGERY

## 2021-07-20 PROCEDURE — 20550 TENDON SHEATH: ICD-10-PCS | Mod: 58,F5,S$GLB, | Performed by: ORTHOPAEDIC SURGERY

## 2021-07-20 PROCEDURE — 99999 PR PBB SHADOW E&M-EST. PATIENT-LVL III: CPT | Mod: PBBFAC,,, | Performed by: ORTHOPAEDIC SURGERY

## 2021-07-20 PROCEDURE — 3077F SYST BP >= 140 MM HG: CPT | Mod: CPTII,S$GLB,, | Performed by: ORTHOPAEDIC SURGERY

## 2021-07-20 PROCEDURE — 1159F PR MEDICATION LIST DOCUMENTED IN MEDICAL RECORD: ICD-10-PCS | Mod: CPTII,S$GLB,, | Performed by: ORTHOPAEDIC SURGERY

## 2021-07-20 PROCEDURE — 3008F PR BODY MASS INDEX (BMI) DOCUMENTED: ICD-10-PCS | Mod: CPTII,S$GLB,, | Performed by: ORTHOPAEDIC SURGERY

## 2021-07-20 RX ORDER — METHYLPREDNISOLONE 4 MG/1
TABLET ORAL
Qty: 1 PACKAGE | Refills: 0 | Status: SHIPPED | OUTPATIENT
Start: 2021-07-20 | End: 2021-08-10

## 2021-07-22 ENCOUNTER — NUTRITION (OUTPATIENT)
Dept: NUTRITION | Facility: CLINIC | Age: 56
End: 2021-07-22

## 2021-07-22 VITALS — WEIGHT: 191.69 LBS | BODY MASS INDEX: 26.74 KG/M2

## 2021-07-22 DIAGNOSIS — Z71.3 NUTRITIONAL COUNSELING: Primary | ICD-10-CM

## 2021-07-22 PROCEDURE — S9470 NUTRITIONAL COUNSELING, DIET: HCPCS | Mod: CSM,S$GLB,, | Performed by: DIETITIAN, REGISTERED

## 2021-07-22 PROCEDURE — 99999 PR PBB SHADOW E&M-EST. PATIENT-LVL I: CPT | Mod: PBBFAC,,, | Performed by: DIETITIAN, REGISTERED

## 2021-07-22 PROCEDURE — 99999 PR PBB SHADOW E&M-EST. PATIENT-LVL I: ICD-10-PCS | Mod: PBBFAC,,, | Performed by: DIETITIAN, REGISTERED

## 2021-07-22 PROCEDURE — S9470 PR NUTRITIONAL COUNSELING, DIETITIAN 12 WEEK PACKAGE: ICD-10-PCS | Mod: CSM,S$GLB,, | Performed by: DIETITIAN, REGISTERED

## 2021-07-23 ENCOUNTER — CLINICAL SUPPORT (OUTPATIENT)
Dept: REHABILITATION | Facility: HOSPITAL | Age: 56
End: 2021-07-23
Attending: STUDENT IN AN ORGANIZED HEALTH CARE EDUCATION/TRAINING PROGRAM
Payer: COMMERCIAL

## 2021-07-23 DIAGNOSIS — M25.60 DECREASED RANGE OF MOTION: ICD-10-CM

## 2021-07-23 PROCEDURE — 97110 THERAPEUTIC EXERCISES: CPT | Mod: PO

## 2021-08-05 ENCOUNTER — NUTRITION (OUTPATIENT)
Dept: NUTRITION | Facility: CLINIC | Age: 56
End: 2021-08-05
Payer: COMMERCIAL

## 2021-08-05 ENCOUNTER — CLINICAL SUPPORT (OUTPATIENT)
Dept: REHABILITATION | Facility: HOSPITAL | Age: 56
End: 2021-08-05
Attending: STUDENT IN AN ORGANIZED HEALTH CARE EDUCATION/TRAINING PROGRAM
Payer: COMMERCIAL

## 2021-08-05 ENCOUNTER — PATIENT OUTREACH (OUTPATIENT)
Dept: ADMINISTRATIVE | Facility: OTHER | Age: 56
End: 2021-08-05

## 2021-08-05 VITALS — BODY MASS INDEX: 26.3 KG/M2 | WEIGHT: 188.63 LBS

## 2021-08-05 DIAGNOSIS — Z71.3 NUTRITIONAL COUNSELING: Primary | ICD-10-CM

## 2021-08-05 DIAGNOSIS — M25.60 DECREASED RANGE OF MOTION: ICD-10-CM

## 2021-08-05 PROCEDURE — 97110 THERAPEUTIC EXERCISES: CPT | Mod: PO

## 2021-08-05 PROCEDURE — 97035 APP MDLTY 1+ULTRASOUND EA 15: CPT | Mod: PO

## 2021-08-05 PROCEDURE — 99499 UNLISTED E&M SERVICE: CPT | Mod: CSM,S$GLB,, | Performed by: DIETITIAN, REGISTERED

## 2021-08-05 PROCEDURE — 99499 NO LOS: ICD-10-PCS | Mod: CSM,S$GLB,, | Performed by: DIETITIAN, REGISTERED

## 2021-08-18 ENCOUNTER — PATIENT MESSAGE (OUTPATIENT)
Dept: ADMINISTRATIVE | Facility: OTHER | Age: 56
End: 2021-08-18

## 2021-08-23 ENCOUNTER — IMMUNIZATION (OUTPATIENT)
Dept: INTERNAL MEDICINE | Facility: CLINIC | Age: 56
End: 2021-08-23
Payer: COMMERCIAL

## 2021-08-23 DIAGNOSIS — Z23 NEED FOR VACCINATION: Primary | ICD-10-CM

## 2021-08-23 DIAGNOSIS — F51.02 ADJUSTMENT INSOMNIA: ICD-10-CM

## 2021-08-23 PROCEDURE — 0003A COVID-19, MRNA, LNP-S, PF, 30 MCG/0.3 ML DOSE VACCINE: ICD-10-PCS | Mod: CV19,,, | Performed by: INTERNAL MEDICINE

## 2021-08-23 PROCEDURE — 91300 COVID-19, MRNA, LNP-S, PF, 30 MCG/0.3 ML DOSE VACCINE: CPT | Mod: ,,, | Performed by: INTERNAL MEDICINE

## 2021-08-23 PROCEDURE — 91300 COVID-19, MRNA, LNP-S, PF, 30 MCG/0.3 ML DOSE VACCINE: ICD-10-PCS | Mod: ,,, | Performed by: INTERNAL MEDICINE

## 2021-08-23 PROCEDURE — 0003A COVID-19, MRNA, LNP-S, PF, 30 MCG/0.3 ML DOSE VACCINE: CPT | Mod: CV19,,, | Performed by: INTERNAL MEDICINE

## 2021-08-23 RX ORDER — ZOLPIDEM TARTRATE 10 MG/1
10 TABLET ORAL NIGHTLY PRN
Qty: 30 TABLET | Refills: 5 | Status: SHIPPED | OUTPATIENT
Start: 2021-08-23 | End: 2023-02-06 | Stop reason: SDUPTHER

## 2021-08-26 ENCOUNTER — NUTRITION (OUTPATIENT)
Dept: NUTRITION | Facility: CLINIC | Age: 56
End: 2021-08-26

## 2021-08-26 VITALS — WEIGHT: 188.5 LBS | BODY MASS INDEX: 26.29 KG/M2

## 2021-08-26 DIAGNOSIS — Z71.3 NUTRITIONAL COUNSELING: Primary | ICD-10-CM

## 2021-08-26 PROCEDURE — 99499 NO LOS: ICD-10-PCS | Mod: CSM,S$GLB,, | Performed by: DIETITIAN, REGISTERED

## 2021-08-26 PROCEDURE — 99499 UNLISTED E&M SERVICE: CPT | Mod: CSM,S$GLB,, | Performed by: DIETITIAN, REGISTERED

## 2021-09-04 RX ORDER — VALSARTAN AND HYDROCHLOROTHIAZIDE 160; 12.5 MG/1; MG/1
TABLET, FILM COATED ORAL
Qty: 90 TABLET | Refills: 3 | Status: SHIPPED | OUTPATIENT
Start: 2021-09-04 | End: 2021-10-18 | Stop reason: DRUGHIGH

## 2021-09-04 RX ORDER — EZETIMIBE 10 MG/1
TABLET ORAL
Qty: 90 TABLET | Refills: 3 | Status: SHIPPED | OUTPATIENT
Start: 2021-09-04 | End: 2022-09-08 | Stop reason: SDUPTHER

## 2021-09-13 ENCOUNTER — HOSPITAL ENCOUNTER (OUTPATIENT)
Dept: RADIOLOGY | Facility: HOSPITAL | Age: 56
Discharge: HOME | End: 2021-09-13
Attending: INTERNAL MEDICINE
Payer: COMMERCIAL

## 2021-09-13 DIAGNOSIS — C64.9 RENAL CELL CARCINOMA, UNSPECIFIED LATERALITY: ICD-10-CM

## 2021-09-13 DIAGNOSIS — C64.2 RENAL CELL CANCER, LEFT: ICD-10-CM

## 2021-09-13 PROCEDURE — 25500020 PHARM REV CODE 255: Performed by: INTERNAL MEDICINE

## 2021-09-13 PROCEDURE — 71260 CT CHEST ABDOMEN PELVIS W W/O CONTRAST (XPD): ICD-10-PCS | Mod: 26,,, | Performed by: STUDENT IN AN ORGANIZED HEALTH CARE EDUCATION/TRAINING PROGRAM

## 2021-09-13 PROCEDURE — 71260 CT THORAX DX C+: CPT | Mod: TC

## 2021-09-13 PROCEDURE — 74178 CT CHEST ABDOMEN PELVIS W W/O CONTRAST (XPD): ICD-10-PCS | Mod: 26,,, | Performed by: STUDENT IN AN ORGANIZED HEALTH CARE EDUCATION/TRAINING PROGRAM

## 2021-09-13 PROCEDURE — 71260 CT THORAX DX C+: CPT | Mod: 26,,, | Performed by: STUDENT IN AN ORGANIZED HEALTH CARE EDUCATION/TRAINING PROGRAM

## 2021-09-13 PROCEDURE — 74178 CT ABD&PLV WO CNTR FLWD CNTR: CPT | Mod: 26,,, | Performed by: STUDENT IN AN ORGANIZED HEALTH CARE EDUCATION/TRAINING PROGRAM

## 2021-09-13 RX ADMIN — IOHEXOL 100 ML: 350 INJECTION, SOLUTION INTRAVENOUS at 08:09

## 2021-09-13 RX ADMIN — IOHEXOL 30 ML: 350 INJECTION, SOLUTION INTRAVENOUS at 08:09

## 2021-09-15 ENCOUNTER — OFFICE VISIT (OUTPATIENT)
Dept: HEMATOLOGY/ONCOLOGY | Facility: CLINIC | Age: 56
End: 2021-09-15
Payer: COMMERCIAL

## 2021-09-15 VITALS — BODY MASS INDEX: 26.29 KG/M2 | HEIGHT: 71 IN

## 2021-09-15 DIAGNOSIS — C64.2 RENAL CELL CANCER, LEFT: Primary | ICD-10-CM

## 2021-09-15 PROCEDURE — 3008F PR BODY MASS INDEX (BMI) DOCUMENTED: ICD-10-PCS | Mod: CPTII,S$GLB,, | Performed by: INTERNAL MEDICINE

## 2021-09-15 PROCEDURE — 3008F BODY MASS INDEX DOCD: CPT | Mod: CPTII,S$GLB,, | Performed by: INTERNAL MEDICINE

## 2021-09-15 PROCEDURE — 99214 PR OFFICE/OUTPT VISIT, EST, LEVL IV, 30-39 MIN: ICD-10-PCS | Mod: S$GLB,,, | Performed by: INTERNAL MEDICINE

## 2021-09-15 PROCEDURE — 99999 PR PBB SHADOW E&M-EST. PATIENT-LVL I: ICD-10-PCS | Mod: PBBFAC,,, | Performed by: INTERNAL MEDICINE

## 2021-09-15 PROCEDURE — 99999 PR PBB SHADOW E&M-EST. PATIENT-LVL I: CPT | Mod: PBBFAC,,, | Performed by: INTERNAL MEDICINE

## 2021-09-15 PROCEDURE — 99214 OFFICE O/P EST MOD 30 MIN: CPT | Mod: S$GLB,,, | Performed by: INTERNAL MEDICINE

## 2021-09-30 ENCOUNTER — NUTRITION (OUTPATIENT)
Dept: NUTRITION | Facility: CLINIC | Age: 56
End: 2021-09-30
Payer: COMMERCIAL

## 2021-09-30 DIAGNOSIS — Z71.3 NUTRITIONAL COUNSELING: Primary | ICD-10-CM

## 2021-09-30 PROCEDURE — 99499 UNLISTED E&M SERVICE: CPT | Mod: CSM,S$GLB,, | Performed by: DIETITIAN, REGISTERED

## 2021-09-30 PROCEDURE — 99499 NO LOS: ICD-10-PCS | Mod: CSM,S$GLB,, | Performed by: DIETITIAN, REGISTERED

## 2021-10-13 ENCOUNTER — TELEPHONE (OUTPATIENT)
Dept: INTERNAL MEDICINE | Facility: CLINIC | Age: 56
End: 2021-10-13

## 2021-10-13 DIAGNOSIS — Z12.5 PROSTATE CANCER SCREENING: ICD-10-CM

## 2021-10-13 DIAGNOSIS — Z00.00 WELLNESS EXAMINATION: Primary | ICD-10-CM

## 2021-10-13 DIAGNOSIS — I10 ESSENTIAL HYPERTENSION: ICD-10-CM

## 2021-10-13 DIAGNOSIS — E78.00 PURE HYPERCHOLESTEROLEMIA: ICD-10-CM

## 2021-10-15 ENCOUNTER — LAB VISIT (OUTPATIENT)
Dept: LAB | Facility: HOSPITAL | Age: 56
End: 2021-10-15
Payer: COMMERCIAL

## 2021-10-15 DIAGNOSIS — I10 ESSENTIAL HYPERTENSION: ICD-10-CM

## 2021-10-15 DIAGNOSIS — E78.00 PURE HYPERCHOLESTEROLEMIA: ICD-10-CM

## 2021-10-15 DIAGNOSIS — Z12.5 PROSTATE CANCER SCREENING: ICD-10-CM

## 2021-10-15 DIAGNOSIS — Z00.00 WELLNESS EXAMINATION: ICD-10-CM

## 2021-10-15 LAB
ALBUMIN SERPL BCP-MCNC: 4.2 G/DL (ref 3.5–5.2)
ALP SERPL-CCNC: 63 U/L (ref 55–135)
ALT SERPL W/O P-5'-P-CCNC: 36 U/L (ref 10–44)
ANION GAP SERPL CALC-SCNC: 14 MMOL/L (ref 8–16)
AST SERPL-CCNC: 26 U/L (ref 10–40)
BASOPHILS # BLD AUTO: 0.05 K/UL (ref 0–0.2)
BASOPHILS NFR BLD: 1.1 % (ref 0–1.9)
BILIRUB SERPL-MCNC: 0.5 MG/DL (ref 0.1–1)
BUN SERPL-MCNC: 18 MG/DL (ref 6–20)
CALCIUM SERPL-MCNC: 10 MG/DL (ref 8.7–10.5)
CHLORIDE SERPL-SCNC: 105 MMOL/L (ref 95–110)
CHOLEST SERPL-MCNC: 132 MG/DL (ref 120–199)
CHOLEST/HDLC SERPL: 2.4 {RATIO} (ref 2–5)
CO2 SERPL-SCNC: 23 MMOL/L (ref 23–29)
COMPLEXED PSA SERPL-MCNC: 1.2 NG/ML (ref 0–4)
CREAT SERPL-MCNC: 1.4 MG/DL (ref 0.5–1.4)
DIFFERENTIAL METHOD: NORMAL
EOSINOPHIL # BLD AUTO: 0.3 K/UL (ref 0–0.5)
EOSINOPHIL NFR BLD: 5.6 % (ref 0–8)
ERYTHROCYTE [DISTWIDTH] IN BLOOD BY AUTOMATED COUNT: 11.9 % (ref 11.5–14.5)
EST. GFR  (AFRICAN AMERICAN): >60 ML/MIN/1.73 M^2
EST. GFR  (NON AFRICAN AMERICAN): 55.8 ML/MIN/1.73 M^2
GLUCOSE SERPL-MCNC: 99 MG/DL (ref 70–110)
HCT VFR BLD AUTO: 46.2 % (ref 40–54)
HDLC SERPL-MCNC: 55 MG/DL (ref 40–75)
HDLC SERPL: 41.7 % (ref 20–50)
HGB BLD-MCNC: 15 G/DL (ref 14–18)
IMM GRANULOCYTES # BLD AUTO: 0.01 K/UL (ref 0–0.04)
IMM GRANULOCYTES NFR BLD AUTO: 0.2 % (ref 0–0.5)
LDLC SERPL CALC-MCNC: 67.6 MG/DL (ref 63–159)
LYMPHOCYTES # BLD AUTO: 1.5 K/UL (ref 1–4.8)
LYMPHOCYTES NFR BLD: 32.1 % (ref 18–48)
MCH RBC QN AUTO: 30.5 PG (ref 27–31)
MCHC RBC AUTO-ENTMCNC: 32.5 G/DL (ref 32–36)
MCV RBC AUTO: 94 FL (ref 82–98)
MONOCYTES # BLD AUTO: 0.4 K/UL (ref 0.3–1)
MONOCYTES NFR BLD: 8.8 % (ref 4–15)
NEUTROPHILS # BLD AUTO: 2.4 K/UL (ref 1.8–7.7)
NEUTROPHILS NFR BLD: 52.2 % (ref 38–73)
NONHDLC SERPL-MCNC: 77 MG/DL
NRBC BLD-RTO: 0 /100 WBC
PLATELET # BLD AUTO: 256 K/UL (ref 150–450)
PMV BLD AUTO: 9.2 FL (ref 9.2–12.9)
POTASSIUM SERPL-SCNC: 4.8 MMOL/L (ref 3.5–5.1)
PROT SERPL-MCNC: 7.7 G/DL (ref 6–8.4)
RBC # BLD AUTO: 4.92 M/UL (ref 4.6–6.2)
SODIUM SERPL-SCNC: 142 MMOL/L (ref 136–145)
TRIGL SERPL-MCNC: 47 MG/DL (ref 30–150)
WBC # BLD AUTO: 4.64 K/UL (ref 3.9–12.7)

## 2021-10-15 PROCEDURE — 80053 COMPREHEN METABOLIC PANEL: CPT | Performed by: INTERNAL MEDICINE

## 2021-10-15 PROCEDURE — 85025 COMPLETE CBC W/AUTO DIFF WBC: CPT | Performed by: INTERNAL MEDICINE

## 2021-10-15 PROCEDURE — 84153 ASSAY OF PSA TOTAL: CPT | Performed by: NURSE PRACTITIONER

## 2021-10-15 PROCEDURE — 80061 LIPID PANEL: CPT | Performed by: INTERNAL MEDICINE

## 2021-10-15 PROCEDURE — 36415 COLL VENOUS BLD VENIPUNCTURE: CPT | Performed by: NURSE PRACTITIONER

## 2021-10-18 ENCOUNTER — OFFICE VISIT (OUTPATIENT)
Dept: INTERNAL MEDICINE | Facility: CLINIC | Age: 56
End: 2021-10-18
Payer: COMMERCIAL

## 2021-10-18 VITALS
HEART RATE: 60 BPM | DIASTOLIC BLOOD PRESSURE: 78 MMHG | SYSTOLIC BLOOD PRESSURE: 108 MMHG | HEIGHT: 71 IN | BODY MASS INDEX: 26.55 KG/M2 | WEIGHT: 189.63 LBS

## 2021-10-18 DIAGNOSIS — I10 ESSENTIAL HYPERTENSION: Primary | ICD-10-CM

## 2021-10-18 DIAGNOSIS — Z85.528 HISTORY OF RENAL CELL CARCINOMA: Chronic | ICD-10-CM

## 2021-10-18 DIAGNOSIS — E78.00 PURE HYPERCHOLESTEROLEMIA: Chronic | ICD-10-CM

## 2021-10-18 DIAGNOSIS — Z12.5 SCREENING FOR PROSTATE CANCER: ICD-10-CM

## 2021-10-18 PROBLEM — Z90.5 HISTORY OF LEFT NEPHRECTOMY: Chronic | Status: ACTIVE | Noted: 2020-11-23

## 2021-10-18 PROBLEM — M25.60 DECREASED RANGE OF MOTION: Status: RESOLVED | Noted: 2021-06-03 | Resolved: 2021-10-18

## 2021-10-18 PROCEDURE — 3074F PR MOST RECENT SYSTOLIC BLOOD PRESSURE < 130 MM HG: ICD-10-PCS | Mod: CPTII,S$GLB,, | Performed by: INTERNAL MEDICINE

## 2021-10-18 PROCEDURE — 1160F RVW MEDS BY RX/DR IN RCRD: CPT | Mod: CPTII,S$GLB,, | Performed by: INTERNAL MEDICINE

## 2021-10-18 PROCEDURE — 4010F PR ACE/ARB THEARPY RXD/TAKEN: ICD-10-PCS | Mod: CPTII,S$GLB,, | Performed by: INTERNAL MEDICINE

## 2021-10-18 PROCEDURE — 99396 PREV VISIT EST AGE 40-64: CPT | Mod: S$GLB,,, | Performed by: INTERNAL MEDICINE

## 2021-10-18 PROCEDURE — 3078F DIAST BP <80 MM HG: CPT | Mod: CPTII,S$GLB,, | Performed by: INTERNAL MEDICINE

## 2021-10-18 PROCEDURE — 99396 PR PREVENTIVE VISIT,EST,40-64: ICD-10-PCS | Mod: S$GLB,,, | Performed by: INTERNAL MEDICINE

## 2021-10-18 PROCEDURE — 4010F ACE/ARB THERAPY RXD/TAKEN: CPT | Mod: CPTII,S$GLB,, | Performed by: INTERNAL MEDICINE

## 2021-10-18 PROCEDURE — 3008F PR BODY MASS INDEX (BMI) DOCUMENTED: ICD-10-PCS | Mod: CPTII,S$GLB,, | Performed by: INTERNAL MEDICINE

## 2021-10-18 PROCEDURE — 3074F SYST BP LT 130 MM HG: CPT | Mod: CPTII,S$GLB,, | Performed by: INTERNAL MEDICINE

## 2021-10-18 PROCEDURE — 99999 PR PBB SHADOW E&M-EST. PATIENT-LVL III: ICD-10-PCS | Mod: PBBFAC,,, | Performed by: INTERNAL MEDICINE

## 2021-10-18 PROCEDURE — 99999 PR PBB SHADOW E&M-EST. PATIENT-LVL III: CPT | Mod: PBBFAC,,, | Performed by: INTERNAL MEDICINE

## 2021-10-18 PROCEDURE — 3008F BODY MASS INDEX DOCD: CPT | Mod: CPTII,S$GLB,, | Performed by: INTERNAL MEDICINE

## 2021-10-18 PROCEDURE — 1160F PR REVIEW ALL MEDS BY PRESCRIBER/CLIN PHARMACIST DOCUMENTED: ICD-10-PCS | Mod: CPTII,S$GLB,, | Performed by: INTERNAL MEDICINE

## 2021-10-18 PROCEDURE — 3078F PR MOST RECENT DIASTOLIC BLOOD PRESSURE < 80 MM HG: ICD-10-PCS | Mod: CPTII,S$GLB,, | Performed by: INTERNAL MEDICINE

## 2021-10-18 PROCEDURE — 1159F MED LIST DOCD IN RCRD: CPT | Mod: CPTII,S$GLB,, | Performed by: INTERNAL MEDICINE

## 2021-10-18 PROCEDURE — 1159F PR MEDICATION LIST DOCUMENTED IN MEDICAL RECORD: ICD-10-PCS | Mod: CPTII,S$GLB,, | Performed by: INTERNAL MEDICINE

## 2021-10-18 RX ORDER — VALSARTAN 160 MG/1
160 TABLET ORAL DAILY
Qty: 90 TABLET | Refills: 3 | Status: SHIPPED | OUTPATIENT
Start: 2021-10-18 | End: 2022-10-13 | Stop reason: SDUPTHER

## 2021-10-29 ENCOUNTER — NUTRITION (OUTPATIENT)
Dept: NUTRITION | Facility: CLINIC | Age: 56
End: 2021-10-29
Payer: COMMERCIAL

## 2021-10-29 DIAGNOSIS — Z71.3 NUTRITIONAL COUNSELING: Primary | ICD-10-CM

## 2021-10-29 PROCEDURE — 99499 NO LOS: ICD-10-PCS | Mod: CSM,S$GLB,, | Performed by: DIETITIAN, REGISTERED

## 2021-10-29 PROCEDURE — 99499 UNLISTED E&M SERVICE: CPT | Mod: CSM,S$GLB,, | Performed by: DIETITIAN, REGISTERED

## 2021-11-18 DIAGNOSIS — Z20.822 ENCOUNTER FOR LABORATORY TESTING FOR COVID-19 VIRUS: ICD-10-CM

## 2021-12-14 DIAGNOSIS — M54.2 NECK PAIN: ICD-10-CM

## 2021-12-14 DIAGNOSIS — M62.838 MUSCLE SPASM: Primary | ICD-10-CM

## 2021-12-14 RX ORDER — CYCLOBENZAPRINE HCL 5 MG
5 TABLET ORAL 3 TIMES DAILY PRN
Qty: 30 TABLET | Refills: 1 | Status: SHIPPED | OUTPATIENT
Start: 2021-12-14 | End: 2022-01-28

## 2021-12-15 DIAGNOSIS — Z20.822 ENCOUNTER FOR LABORATORY TESTING FOR COVID-19 VIRUS: ICD-10-CM

## 2021-12-16 ENCOUNTER — LAB VISIT (OUTPATIENT)
Dept: LAB | Facility: HOSPITAL | Age: 56
End: 2021-12-16
Payer: COMMERCIAL

## 2021-12-16 DIAGNOSIS — Z20.822 ENCOUNTER FOR LABORATORY TESTING FOR COVID-19 VIRUS: ICD-10-CM

## 2021-12-16 LAB — SARS-COV-2 RNA RESP QL NAA+PROBE: NOT DETECTED

## 2021-12-16 PROCEDURE — U0003 INFECTIOUS AGENT DETECTION BY NUCLEIC ACID (DNA OR RNA); SEVERE ACUTE RESPIRATORY SYNDROME CORONAVIRUS 2 (SARS-COV-2) (CORONAVIRUS DISEASE [COVID-19]), AMPLIFIED PROBE TECHNIQUE, MAKING USE OF HIGH THROUGHPUT TECHNOLOGIES AS DESCRIBED BY CMS-2020-01-R: HCPCS | Performed by: INTERNAL MEDICINE

## 2021-12-16 PROCEDURE — U0005 INFEC AGEN DETEC AMPLI PROBE: HCPCS | Performed by: INTERNAL MEDICINE

## 2021-12-20 ENCOUNTER — TELEPHONE (OUTPATIENT)
Dept: HEMATOLOGY/ONCOLOGY | Facility: CLINIC | Age: 56
End: 2021-12-20
Payer: COMMERCIAL

## 2021-12-20 DIAGNOSIS — C64.2 RENAL CELL CANCER, LEFT: Primary | ICD-10-CM

## 2022-01-02 DIAGNOSIS — Z03.818 ENCOUNTER FOR OBSERVATION FOR SUSPECTED EXPOSURE TO OTHER BIOLOGICAL AGENTS RULED OUT: ICD-10-CM

## 2022-01-07 ENCOUNTER — NUTRITION (OUTPATIENT)
Dept: NUTRITION | Facility: CLINIC | Age: 57
End: 2022-01-07

## 2022-01-07 DIAGNOSIS — Z71.3 NUTRITIONAL COUNSELING: Primary | ICD-10-CM

## 2022-01-07 PROCEDURE — 99499 NO LOS: ICD-10-PCS | Mod: CSM,S$GLB,, | Performed by: DIETITIAN, REGISTERED

## 2022-01-07 PROCEDURE — 99499 UNLISTED E&M SERVICE: CPT | Mod: CSM,S$GLB,, | Performed by: DIETITIAN, REGISTERED

## 2022-01-07 NOTE — PROGRESS NOTES
"Nutrition Assessment for Follow Up Medical Nutrition Therapy--12 week program $575      Reason for MNT visit: Pt in for education and nutrition counseling regarding HTN and Overweight, desire to optimize his diet for preventative nutrition. And optimizing kidney care, desired weight loss       ASSESSMENT    Age: 56 y.o.  Wt: 1/7/2021- 193 lbs   10/29/2021- 191.4 lbs (151.9 lbs lean mass, 86.6 lbs skeletal muscle mass, 39.5 lbs fat mass, visceral fat level 8)   9/30/2021- 188.8 lbs (151.9 lbs lean mass, 87.3 lbs skeletal muscle mass, 36.9 lbs fat mass, visceral fat level 7)  8/26/2021- 188.5 lbs (149.0 lbs lean mass, 85.5 lbs skeletal muscle mass, 39.5 lbs fat mass, visceral fat level 7)  8/5/2021- 188.6 lbs (149 lbs lean body mass, 85.3 lbs skeletal muscle mass, 39.6 lbs fat mass, visceral fat level 8)  7/22/2021- 191.7 lbs (151.5 lbs lean body mass, 87.5 lbs skeletal muscle mass, 40.2 lbs fat mass, visceral fat level 7)  3/2/2021- 193.3 lbs (154.5 lbs lean body mass, 88.4 lbs skeletal muscle mass, 38.8 lbs fat mass, visceral fat level 8)  Wt Readings from Last 1 Encounters:   10/18/21 86 kg (189 lb 9.5 oz)     Ht:   Ht Readings from Last 1 Encounters:   10/18/21 5' 11" (1.803 m)     BMI:   BMI Readings from Last 1 Encounters:   10/18/21 26.44 kg/m²       Clinical signs/symptoms: high blood pressure, controlled. BMI >25    Medical History:   Past Medical History:   Diagnosis Date    Allergic sinusitis     Exercise-induced asthma     Hyperlipidemia     Hypertension     Renal cell carcinoma 10/14/2020     Problem List         Resolved    Hyperlipidemia         Verruca plantaris         AR (allergic rhinitis)         Sinusitis         Essential hypertension (Chronic)         Agatston CAC score 100-199         AK (actinic keratosis)         Renal cell cancer, left         Anxiety about health         Colon adenomas         History of left nephrectomy         Overweight (BMI 25.0-29.9)               Medications: "   Current Outpatient Medications:     albuterol (PROVENTIL/VENTOLIN HFA) 90 mcg/actuation inhaler, Inhale 2 puffs into the lungs every 4 (four) hours as needed for Shortness of Breath., Disp: 18 g, Rfl: 3    aspirin 81 MG Chew, Take 81 mg by mouth once daily., Disp: , Rfl:     azelastine (OPTIVAR) 0.05 % ophthalmic solution, Place 1 drop into both eyes 2 (two) times daily., Disp: 18 mL, Rfl: 3    budesonide-formoterol 80-4.5 mcg (SYMBICORT) 80-4.5 mcg/actuation HFAA, Inhale 2 puffs into the lungs 2 (two) times daily. (Patient taking differently: Inhale 2 puffs into the lungs 2 (two) times daily. As needed), Disp: 30.6 g, Rfl: 3    ezetimibe (ZETIA) 10 mg tablet, Take 1 tablet by mouth daily, Disp: 90 tablet, Rfl: 3    pantoprazole (PROTONIX) 40 MG tablet, TAKE ONE TABLET BY MOUTH TWICE A DAY ON AN EMPTY STOMACH, Disp: 180 tablet, Rfl: 3    rosuvastatin (CRESTOR) 40 MG Tab, Take 1 tablet (40 mg total) by mouth every evening., Disp: 90 tablet, Rfl: 3    valACYclovir (VALTREX) 1000 MG tablet, TAKE 2 TABLETS (2,000 MG TOTAL) BY MOUTH 2 (TWO) TIMES DAILY FOR 2 DAYS and repeat as needed, Disp: 12 tablet, Rfl: 1    valsartan (DIOVAN) 160 MG tablet, Take 1 tablet (160 mg total) by mouth once daily., Disp: 90 tablet, Rfl: 3    zolpidem (AMBIEN) 10 mg Tab, Take 1 tablet (10 mg total) by mouth nightly as needed., Disp: 30 tablet, Rfl: 5    Supplements: Pt not currently taking any vitamins + minerals    Food allergies  intolerances: NKFA    Labs:  Reviewed   Hemoglobin A1C   Date Value Ref Range Status   05/24/2019 5.2 4.0 - 5.6 % Final     Comment:     ADA Screening Guidelines:  5.7-6.4%  Consistent with prediabetes  >or=6.5%  Consistent with diabetes  High levels of fetal hemoglobin interfere with the HbA1C  assay. Heterozygous hemoglobin variants (HbS, HgC, etc)do  not significantly interfere with this assay.   However, presence of multiple variants may affect accuracy.     11/04/2016 5.5 4.5 - 6.2 % Final      Comment:     According to ADA guidelines, hemoglobin A1C <7.0% represents  optimal control in non-pregnant diabetic patients.  Different  metrics may apply to specific populations.   Standards of Medical Care in Diabetes - 2016.  For the purpose of screening for the presence of diabetes:  <5.7%     Consistent with the absence of diabetes  5.7-6.4%  Consistent with increasing risk for diabetes   (prediabetes)  >or=6.5%  Consistent with diabetes  Currently no consensus exists for use of hemoglobin A1C  for diagnosis of diabetes for children.     09/08/2014 5.4 4.5 - 6.2 % Final     Cholesterol   Date Value Ref Range Status   10/15/2021 132 120 - 199 mg/dL Final     Comment:     The National Cholesterol Education Program (NCEP) has set the  following guidelines (reference ranges) for Cholesterol:  Optimal.....................<200 mg/dL  Borderline High.............200-239 mg/dL  High........................> or = 240 mg/dL     12/14/2020 152 120 - 199 mg/dL Final     Comment:     The National Cholesterol Education Program (NCEP) has set the  following guidelines (reference ranges) for Cholesterol:  Optimal.....................<200 mg/dL  Borderline High.............200-239 mg/dL  High........................> or = 240 mg/dL     11/21/2019 156 120 - 199 mg/dL Final     Comment:     The National Cholesterol Education Program (NCEP) has set the  following guidelines (reference ranges) for Cholesterol:  Optimal.....................<200 mg/dL  Borderline High.............200-239 mg/dL  High........................> or = 240 mg/dL       Triglycerides   Date Value Ref Range Status   10/15/2021 47 30 - 150 mg/dL Final     Comment:     The National Cholesterol Education Program (NCEP) has set the  following guidelines (reference values) for triglycerides:  Normal......................<150 mg/dL  Borderline High.............150-199 mg/dL  High........................200-499 mg/dL     12/14/2020 68 30 - 150 mg/dL Final      Comment:     The National Cholesterol Education Program (NCEP) has set the  following guidelines (reference values) for triglycerides:  Normal......................<150 mg/dL  Borderline High.............150-199 mg/dL  High........................200-499 mg/dL     11/21/2019 66 30 - 150 mg/dL Final     Comment:     The National Cholesterol Education Program (NCEP) has set the  following guidelines (reference values) for triglycerides:  Normal......................<150 mg/dL  Borderline High.............150-199 mg/dL  High........................200-499 mg/dL       HDL   Date Value Ref Range Status   10/15/2021 55 40 - 75 mg/dL Final     Comment:     The National Cholesterol Education Program (NCEP) has set the  following guidelines (reference values) for HDL Cholesterol:  Low...............<40 mg/dL  Optimal...........>60 mg/dL     12/14/2020 64 40 - 75 mg/dL Final     Comment:     The National Cholesterol Education Program (NCEP) has set the  following guidelines (reference values) for HDL Cholesterol:  Low...............<40 mg/dL  Optimal...........>60 mg/dL     11/21/2019 66 40 - 75 mg/dL Final     Comment:     The National Cholesterol Education Program (NCEP) has set the  following guidelines (reference values) for HDL Cholesterol:  Low...............<40 mg/dL  Optimal...........>60 mg/dL       LDL Cholesterol   Date Value Ref Range Status   10/15/2021 67.6 63.0 - 159.0 mg/dL Final     Comment:     The National Cholesterol Education Program (NCEP) has set the  following guidelines (reference values) for LDL Cholesterol:  Optimal.......................<130 mg/dL  Borderline High...............130-159 mg/dL  High..........................160-189 mg/dL  Very High.....................>190 mg/dL     12/14/2020 74.4 63.0 - 159.0 mg/dL Final     Comment:     The National Cholesterol Education Program (NCEP) has set the  following guidelines (reference values) for LDL Cholesterol:  Optimal.......................<130  mg/dL  Borderline High...............130-159 mg/dL  High..........................160-189 mg/dL  Very High.....................>190 mg/dL     11/21/2019 76.8 63.0 - 159.0 mg/dL Final     Comment:     The National Cholesterol Education Program (NCEP) has set the  following guidelines (reference values) for LDL Cholesterol:  Optimal.......................<130 mg/dL  Borderline High...............130-159 mg/dL  High..........................160-189 mg/dL  Very High.....................>190 mg/dL       HDL/Cholesterol Ratio   Date Value Ref Range Status   10/15/2021 41.7 20.0 - 50.0 % Final   12/14/2020 42.1 20.0 - 50.0 % Final   11/21/2019 42.3 20.0 - 50.0 % Final     Non-HDL Cholesterol   Date Value Ref Range Status   10/15/2021 77 mg/dL Final     Comment:     Risk category and Non-HDL cholesterol goals:  Coronary heart disease (CHD)or equivalent (10-year risk of CHD >20%):  Non-HDL cholesterol goal     <130 mg/dL  Two or more CHD risk factors and 10-year risk of CHD <= 20%:  Non-HDL cholesterol goal     <160 mg/dL  0 to 1 CHD risk factor:  Non-HDL cholesterol goal     <190 mg/dL     12/14/2020 88 mg/dL Final     Comment:     Risk category and Non-HDL cholesterol goals:  Coronary heart disease (CHD)or equivalent (10-year risk of CHD >20%):  Non-HDL cholesterol goal     <130 mg/dL  Two or more CHD risk factors and 10-year risk of CHD <= 20%:  Non-HDL cholesterol goal     <160 mg/dL  0 to 1 CHD risk factor:  Non-HDL cholesterol goal     <190 mg/dL     11/21/2019 90 mg/dL Final     Comment:     Risk category and Non-HDL cholesterol goals:  Coronary heart disease (CHD)or equivalent (10-year risk of CHD >20%):  Non-HDL cholesterol goal     <130 mg/dL  Two or more CHD risk factors and 10-year risk of CHD <= 20%:  Non-HDL cholesterol goal     <160 mg/dL  0 to 1 CHD risk factor:  Non-HDL cholesterol goal     <190 mg/dL       No results found for: [id number], [id number], [id number]  TSH   Date Value Ref Range Status   05/24/2019  1.927 0.400 - 4.000 uIU/mL Final         Typical day recall: 1/7/2022- Reviewed and noted during consultation.Pt has been through high stress environment, and is looking at January 10th as a re-set. Retested his metabolism and saw a decrease to 1700 calories. Will update meal plan with more plant-based options, focusing on fibrous carbohydrates that are nutrient dense.     10/29/2021- Reviewed and noted during consultation. Pt is realizing his overall health is near goal. Pt got taken off diuretic portion of blood pressure medicine, helping to boost energy. He feels best on his plant focused diet. Discussed last to do is to lose fat mass to get to his overall goal of 175 lbs.     9/30/2021- Reviewed and noted during consultation. Pt has gone through a lot of stress with his girls, and [employer] working around the clock. Pt has been staying on track as best as possible with eating times and options. Some things are out of his control, but pt now making the best out of the options. Pt is still seeing progress with body composition. His pants are fitting loser. Pt is doing intermittent fasting, times vary on if doing OPT or not. Pt has a great outlook on life and his health, eating for longevity. Discussed different snack options.      8/26/2021- Reviewed and noted during consultation. Discussed ways to possibly kick start weight loss. Pt is maintaining 188 lbs with slight variation. Recommended a plant forward diet and introducing more complex, nutrient dense carbohydrates in his diet throughout the day. We also discussed intermittent fasting, doing 12-16 hour fast, listening to the body for hunger cues for when to start his eating period with end time being 7:00pm.  1st meal/snack- focus on fats  2nd meal- veg + adding in complex carbs- quinoa, chickpeas, etc.+ avocado/oils  Snack- OWYN shake + serving fruit/ banana + peanut butter  Dinner- 4-5 ounces lean protein (fish/ seafood/ lean cuts meat) + veg + 1 serving  CHO.      8/5/2021- Reviewed and noted during consultation. Pt is 3 weeks in and frustrated his weight has not gone down more. Pt is using my fitness pal to track food consumption. Pt has been exercising intently, starting at [facility] this past week along with [place] workouts. Pt's BP is fantastic, pt is waking up in the morning at 5:15am without an alarm, he is alert and ready to start his day. Pt is overall feeling better in terms of his health. Pt had been dipping near 8682-1306 calories daily and instructed to not go below 1600 calories a day. Venkat is interested in intermittent fasting if he does not see changes in his body composition. Pt only drinking 1 alcoholic beverage on weekend when desired.    7/22/2021- Reviewed and noted during consultation. Pt has decided to go through the 12 week nutrition program. We are focusing on leaning more towards a plant based diet with 6-8 ounces lean protein layered in. Pt has had muchnutrition education, we will be increasing carbohydrates through eating more nutrient dense plants which is different than his previous way of thinking. Will be cutting back on the amount of alcohol consumed by half. Pt may have 2 glasses of wine at dinner, scale back to 1 glass, and also not drinking more than 3 times per week. Also while pt is dinning out recommended to ask for his meal to be prepared without salt. Pt has seen a rise in his Blood Pressure lately, could be from stress or dinning out more frequently. Pt does well with mocktails in maci of hard alcohol. Pt is determined and driven to create a lifestyle change for the better. Pt is also looking to see 15-18 pounds over the next 12 weeks. Basal Metabolic Rate 2090 calories.    3/2/2021- Reviewed and noted during consultation. Pt is already choosing nutrient dense foods. He is typically following a Mediterranean diet. Pt eats out for work often, sometimes 4 dinners a week and 3-4 lunches a week, 7-8 times. Pt will bring lunch if he  has left overs, or eat a salad from [facility]. Daughter is pescatarian. Testing pt metabolism and doing inbody at [facility] on Tuesday morning. Pt is considering intermittent fasting. Recommended pt stop eating by 7:00/7:30pm. Creating a 12 hour fast.  Wake- 5:30am  5:45-6:45am- [place]  7:00/7:30am- BF- egg white omelet with spinach + veg sometimes roasted low sodium turkey/ ezekial bread + avocado + poaches egg/ steel cut oats with fuji apple  12:00pm- Lunch- Mixed greens with 1/4 cup blackberries, strawberries, blueberries, cucumbers, tomatoes, 1/2 cup chicken. Sometimes bringing protein from home / Left overs or out to eat  2:30/3:00pm- sometimes snack- keto bars, atkins bar, ICONIC protein shake, think thin, apple slices  6:30pm- Dinner- fish + veg, protein + zoodles, 4-6 ounces of protien using dash seasoning  7:15pm- 2-3 x per week- keto ice cream, WW ice cream, halo top ice cream/ fat bombs chocolate  Mindful meditation before sleep on the [employer]! haven or head space haven  Relaxing 9:45pm/ Sleeping 10pm    Beverages: 1-2 cups ice coffee + almond milk, 4-6 bottles of water, unsweetened tea at dinner sweetened with stevia or splenda    Dining out: Regular, 6 or more times per week Pt has work dinner and lunches    Alcohol: nonsmoker, Pt has stopped drinking hard alcohol, only consuming wine Friday and Saturday during the week, limiting self to not more than 2 glasses wine at a time    Lifestyle Influences  Support System: Wife, family, pt reports his stress at a 7     Meal preparation/shopping: spouse    Current Activity Level: pt has started back on his [employer]! bike, 60 minutes 5 days a week burning ~500 calories. Pt discussed going back to OPT for more weight training.    Patient motivation, anticipated barriers, expected compliance: Patient is motivated and has verbalized understanding and intent to comply    DIAGNOSIS improving  Problem: HTN + overweight  Etiology: RT dinning out often for  work  Signs/Symptoms: AEB food recall, pt dining out 7-8x per week    INTERVENTION continues  Nutrition prescription: estimated energy requirements:   Calories: [id number]  Protein:  g   Carbohydrates: 200  g  Focus on plant-based, heart healthy fats  Total Fat: 85-90 g  Baseline for fluids: 95 fl ounces + sweat loss    Recommendations & Goals:  Patient goals and recommendations are tailored to the specific patient's needs, readiness to change, lifestyle, culture, skills, resources, & abilities. Strategies to help achieve these nutrition-related goals were discussed which can include but are not limited to SMART goal setting & mindful eating.     Aim for a minimum of 7 hours sleep   Exercise 60 minutes most days  Eat breakfast within 1-2 hours of waking up  Try not to skip any meals or snacks, not going more than 3-4 hours without eating.   At each meal and snack, try to include a source of fiber + lean protein + healthy fat.     Written Materials Provided  These resources are intended to assist the patient in making it easier to choose recommended options when eating out & to identify better-for-you brands at the grocery store:     Meal Planning Guide with recommendations discussed along with portion sizes and a customized meal plan    Eat Fit haven card as a reminder to download the haven to ones smart phone which provides: current Eat Fit partners, approved menu options, food labels for carb counting, & recipes    On-the-Go Food Guide   Brand Specific Eat Fit Grocery Product list    RD contact information- for patient to contact regarding any questions, needs, and/or concerns that may arise     MONITORING & EVALUATION    Communicated with healthcare provider    Documented plan for referral to appropriate agency/healthcare provider as needed    Comprehension: good     Motivation to change: high    Follow-up: PRN    Counseling time: 1 Hour

## 2022-01-11 ENCOUNTER — HOSPITAL ENCOUNTER (OUTPATIENT)
Dept: RADIOLOGY | Facility: HOSPITAL | Age: 57
Discharge: HOME | End: 2022-01-11
Attending: NURSE PRACTITIONER
Payer: COMMERCIAL

## 2022-01-11 DIAGNOSIS — C64.2 RENAL CELL CANCER, LEFT: ICD-10-CM

## 2022-01-11 PROCEDURE — 25500020 PHARM REV CODE 255: Performed by: NURSE PRACTITIONER

## 2022-01-11 PROCEDURE — 74177 CT ABD & PELVIS W/CONTRAST: CPT | Mod: 26,,, | Performed by: RADIOLOGY

## 2022-01-11 PROCEDURE — 74177 CT ABD & PELVIS W/CONTRAST: CPT | Mod: TC

## 2022-01-11 PROCEDURE — 74177 CT CHEST ABDOMEN PELVIS WITH CONTRAST (XPD): ICD-10-PCS | Mod: 26,,, | Performed by: RADIOLOGY

## 2022-01-11 PROCEDURE — 71260 CT THORAX DX C+: CPT | Mod: 26,,, | Performed by: RADIOLOGY

## 2022-01-11 PROCEDURE — 71260 CT THORAX DX C+: CPT | Mod: TC

## 2022-01-11 PROCEDURE — 71260 CT CHEST ABDOMEN PELVIS WITH CONTRAST (XPD): ICD-10-PCS | Mod: 26,,, | Performed by: RADIOLOGY

## 2022-01-11 RX ADMIN — IOHEXOL 100 ML: 350 INJECTION, SOLUTION INTRAVENOUS at 04:01

## 2022-01-11 NOTE — PROGRESS NOTES
Subjective:       Patient ID: Venkat Jonas    Chief Complaint: h/o RCC    HPI     Venkat Jonas is a 56 y.o. male,  to clinic for evaluation and management of RCC, s/p nephrectomy.        Oncologic History:    Developed hematuria and on imaging was found to have a renal mass c/w RCC.  Questionable bone lesion.  Underwent nephrectomy October 7, 2020 with below pathology.  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       Recovered from surgery remarkably well.  Back to work.  No major issues.  Feeling well.        PATHOLOGY:    1. Lymph nodes, aortic (regional resection):  - 3 lymph nodes negative for tumor (0/3)    2. Left kidney and adrenal gland (radical nephrectomy):  - Renal cell carcinoma, see synoptic report below  - Additional ancillary testing for tumor classification is ordered and results will be issued in a supplemental report    Kidney carcinoma synoptic report  - Procedure: Radical nephrectomy  - Specimen laterality: Left  - Tumor site: Upper pole  - Tumor size: 8.9 x 8.1 x 7.7 cm  - Tumor focality: Renal cell carcinoma, unclassified (see microscopic section)  - Sarcomatoid features: Not identified  - Rhabdoid features: Present  - Histologic grade: WHO/ISUP Nuclear grade 4  - Tumor necrosis: Present, approximately 20% of the tumor  - Tumor extension: Tumor extends into renal vein  - Margins:  - Uninvolved by invasive carcinoma  - Regional lymph nodes:  - Number of lymph nodes involved: 0  - Number of lymph nodes examined: 4 (specimen 1 and specimen 2)  - Pathologic staging: pT3a N0 MX  - Other findings: Separate sclerotic and calcified nodule  - Tumor block for potential studies: 2D, 2E, 2F 2H, 2I, 2J, 2K  - Nontumor block: 2N  - Immunostain panel in microscopic section  - MMR IHC: No loss of expression (see microscopic section)  Sections show an eosinophilic renal cell carcinoma with nuclear inclusions ,cytoplasmic inclusions , and  extracellular eosinophillic material.  Several renal cell carcinoma subtypes are in the differential including  high grade chromophobe, eosinophilic clear cell, SDH deficient, and translocation associated. Subtyping  will be attempted with ancillary testing and results issued in a supplemental report. Selected slides  reviewed by additional pathologists.    Immunostain results:  Positive stains: AMACR, keratin AE1/AE3, CD10,  (weak), vimentin  Negative stains: Keratin 7, keratin 20, HMB45, Mart 1, desmin    Immunohistochemistry (IHC) Testing for Mismatch Repair (MMR) Proteins:  MLH1 - Intact nuclear expression  MSH2 - Intact nuclear expression  MSH6 - Intact nuclear expression  PMS2 - Intact nuclear expression    Review of Systems   Constitutional: Negative for activity change, appetite change, chills, fatigue, fever and unexpected weight change.   HENT: Negative for congestion, hearing loss, mouth sores, sore throat and trouble swallowing.    Eyes: Negative for pain and visual disturbance.   Respiratory: Negative for cough, shortness of breath and wheezing.    Cardiovascular: Negative for chest pain, palpitations and leg swelling.   Gastrointestinal: Negative for abdominal pain, constipation, diarrhea, nausea and vomiting.   Endocrine: Negative for cold intolerance and heat intolerance.   Genitourinary: Negative for difficulty urinating, discharge, dysuria, enuresis, frequency, hematuria, scrotal swelling and testicular pain.   Musculoskeletal: Negative for arthralgias, back pain and myalgias.   Skin: Negative for color change, rash and wound.   Allergic/Immunologic: Negative for environmental allergies and food allergies.   Neurological: Negative for weakness, numbness and headaches.   Hematological: Negative for adenopathy. Does not bruise/bleed easily.   Psychiatric/Behavioral: Negative for confusion, hallucinations and sleep disturbance. The patient is not nervous/anxious.    All other systems reviewed and are negative.         Allergies:  Review of patient's allergies indicates:  No Known Allergies    Medications:  Current Outpatient Medications   Medication Sig Dispense Refill    albuterol (PROVENTIL/VENTOLIN HFA) 90 mcg/actuation inhaler Inhale 2 puffs into the lungs every 4 (four) hours as needed for Shortness of Breath. 18 g 3    aspirin 81 MG Chew Take 81 mg by mouth once daily.      azelastine (OPTIVAR) 0.05 % ophthalmic solution Place 1 drop into both eyes 2 (two) times daily. 18 mL 3    budesonide-formoterol 80-4.5 mcg (SYMBICORT) 80-4.5 mcg/actuation HFAA Inhale 2 puffs into the lungs 2 (two) times daily. (Patient taking differently: Inhale 2 puffs into the lungs 2 (two) times daily. As needed) 30.6 g 3    ezetimibe (ZETIA) 10 mg tablet Take 1 tablet by mouth daily 90 tablet 3    pantoprazole (PROTONIX) 40 MG tablet TAKE ONE TABLET BY MOUTH TWICE A DAY ON AN EMPTY STOMACH 180 tablet 3    rosuvastatin (CRESTOR) 40 MG Tab Take 1 tablet (40 mg total) by mouth every evening. 90 tablet 3    valACYclovir (VALTREX) 1000 MG tablet TAKE 2 TABLETS (2,000 MG TOTAL) BY MOUTH 2 (TWO) TIMES DAILY FOR 2 DAYS and repeat as needed 12 tablet 1    valsartan (DIOVAN) 160 MG tablet Take 1 tablet (160 mg total) by mouth once daily. 90 tablet 3    zolpidem (AMBIEN) 10 mg Tab Take 1 tablet (10 mg total) by mouth nightly as needed. 30 tablet 5     No current facility-administered medications for this visit.       PMH:  Past Medical History:   Diagnosis Date    Allergic sinusitis     Exercise-induced asthma     Hyperlipidemia     Hypertension     Renal cell carcinoma 10/14/2020       PSH:  Past Surgical History:   Procedure Laterality Date    COLONOSCOPY N/A 12/5/2016    Procedure: COLONOSCOPY;  Surgeon: Toni Rivera MD;  Location: [facility] (4TH FLR);  Service: Endoscopy;  Laterality: N/A;  [facility]    COLONOSCOPY N/A 11/3/2020    Procedure: COLONOSCOPY;  Surgeon: Toni Rivera MD;  Location: [facility] (2ND FLR);  Service:  Endoscopy;  Laterality: N/A;  Schedule patient EGD colonoscopy with me next available but as soon as possible    CYSTOSCOPY W/ RETROGRADES Left 10/1/2020    Procedure: CYSTOSCOPY, WITH RETROGRADE PYELOGRAM;  Surgeon: Vega Sears MD;  Location: [facility] 1ST FLR;  Service: Urology;  Laterality: Left;    ESOPHAGOGASTRODUODENOSCOPY N/A 11/3/2020    Procedure: EGD (ESOPHAGOGASTRODUODENOSCOPY);  Surgeon: Toni Rivera MD;  Location: [facility] (2ND FLR);  Service: Endoscopy;  Laterality: N/A;  pt will get rapid on 11/2-MS    LAPAROSCOPIC ROBOT-ASSISTED SURGICAL REMOVAL OF KIDNEY USING DA SONIDO XI Left 10/7/2020    Procedure: XI ROBOTIC NEPHRECTOMY;  Surgeon: Vega Sears MD;  Location: [facility] OR 2ND FLR;  Service: Urology;  Laterality: Left;  gen with regional/ 5hrs    REPAIR OF COLLATERAL LIGAMENT OF THUMB Right 5/14/2021    Procedure: REPAIR, LIGAMENT, COLLATERAL, THUMB, right UCLUCL;  Surgeon: Carolyn Thao MD;  Location[facility];  Service: Orthopedics;  Laterality: Right;  Regional/MAC, [facility]    SINUS SURGERY         FamHx:  Family History   Problem Relation Age of Onset    Hyperlipidemia Father     Hyperlipidemia Brother     Heart attack Maternal Grandmother     Cancer Maternal Grandfather         throat    Melanoma Neg Hx     Psoriasis Neg Hx     Lupus Neg Hx     Eczema Neg Hx     Acne Neg Hx     Heart disease Neg Hx        SocHx:  Social History     Socioeconomic History    Marital status:      Spouse name: Kamila    Number of children: 3   Occupational History    Occupation:  Marketing     Employer: [employer]   Tobacco Use    Smoking status: Never Smoker    Smokeless tobacco: Never Used    Tobacco comment: The patient exercises regularly at [employer].  He works as a  at [employer] over retail services.   Substance and Sexual Activity    Alcohol use: Yes     Alcohol/week: 4.0 standard drinks     Types: 4 Shots of liquor per week  "    Comment: socially    Drug use: No    Sexual activity: Yes     Partners: Female   Social History Narrative    Administration at [employer]    , 3 kids (23, 18, 17)       Distress Score    Distress Score: (P) 2        Practical Problems Physical Problems   : (P) No Appearance: (P) No   Housing: (P) No Bathing / Dressing: (P) No   Insurance / Financial: (P) No Breathing: (P) No    Transportation: (P) No  Changes in Urination: (P) No    Work / School: (P) No  Constipation: (P) No   Treatment Decisions: (P) No  Diarrhea: (P) No     Eating: (P) No    Family Problems Fatigue: (P) No    Dealing with Children: (P) No Feeling Swollen: (P) No    Dealing with Partner: (P) No Fevers: (P) No    Ability to Have Children: (P) No  Getting Around: (P) No       Indigestion: (P) No     Memory / Concentration: (P) No   Emotional Problems Mouth Sores: (P) No    Depression: (P) No  Nausea: (P) No    Fears: (P) No  Nose Dry / Congested: (P) No    Nervousness: (P) No  Pain: (P) No    Sadness: (P) No Sexual: (P) No    Worry: (P) Yes Skin Dry / Itchy: (P) No    Loss of Interest in Usual Activities: (P) No Sleep: (P) No     Substance Abuse: (P) No    Spiritual/Religions Concerns Tingling in Hands / Feet: (P) No   Spritual / Confucianism Concerns: (P) No         Other Problems                Objective:         BP (!) 129/91   Pulse 90   Ht 5' 11" (1.803 m)   Wt 90.3 kg (199 lb 1.2 oz)   BMI 27.77 kg/m²     Physical Exam  Constitutional:       General: He is not in acute distress.     Appearance: Normal appearance. He is normal weight. He is not ill-appearing, toxic-appearing or diaphoretic.   HENT:      Head: Normocephalic and atraumatic.      Nose: Nose normal.   Eyes:      General: No scleral icterus.        Right eye: No discharge.         Left eye: No discharge.      Conjunctiva/sclera: Conjunctivae normal.   Skin:     General: Skin is warm and dry.      Coloration: Skin is not jaundiced or pale.   Neurological:      " General: No focal deficit present.      Mental Status: He is alert and oriented to person, place, and time. Mental status is at baseline.   Psychiatric:         Mood and Affect: Mood normal.         Behavior: Behavior normal.         Thought Content: Thought content normal.         Judgment: Judgment normal.           LABS:  WBC   Date Value Ref Range Status   01/11/2022 6.29 3.90 - 12.70 K/uL Final     Hemoglobin   Date Value Ref Range Status   01/11/2022 14.7 14.0 - 18.0 g/dL Final     Hematocrit   Date Value Ref Range Status   01/11/2022 45.8 40.0 - 54.0 % Final     Platelets   Date Value Ref Range Status   01/11/2022 232 150 - 450 K/uL Final       Chemistry        Component Value Date/Time     01/11/2022 1221    K 4.8 01/11/2022 1221     01/11/2022 1221    CO2 28 01/11/2022 1221    BUN 18 01/11/2022 1221    CREATININE 1.3 01/11/2022 1221    GLU 78 01/11/2022 1221        Component Value Date/Time    CALCIUM 9.4 01/11/2022 1221    ALKPHOS 63 01/11/2022 1221    AST 28 01/11/2022 1221    ALT 28 01/11/2022 1221    BILITOT 0.6 01/11/2022 1221    ESTGFRAFRICA >60.0 01/11/2022 1221    EGFRNONAA >60.0 01/11/2022 1221              Assessment:       1. Renal cell cancer, left    2. Renal cell carcinoma, unspecified laterality          Plan:         1. RCC:    Previously reviewed pathology.  I am happy that lymph nodes were clear, and margins were negative.  The fact that there was renal vein involvement, pushes this to a stage III.  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    We are assuming that the questionable bone lesion is benign, watching this carefully.      At this point, we discussed monitoring this closely or considering adjuvant Sutent    After careful consideration, the patient has decided to monitor this through routine surveillance, which I fully support.    New data from [facility] on adjuvant immunotherapy that was presented  only studied patients 12 wks or less from nephrectomy, we did not think that considering immunotherapy 9 mos after surgery would be prudent or c/w supporting data.     Rescanning now stable, monitoring tiny subcm spots, otherwise stable with ELLI    RTC 4 mos to see me with CT CAP, CBC, CMP (all on same day)    He knows to call us if there are issues or questions in the meantime.    The patient agrees with the plan, and all questions have been answered to their satisfaction.      More than 30 mins were spent during this encounter, greater than 50% was spent in direct counseling and/or coordination of care.     Chandana Carrasco M.D., M.S., F.A.C.P.  Hematology and Oncology Attending  [facility]

## 2022-01-12 ENCOUNTER — OFFICE VISIT (OUTPATIENT)
Dept: HEMATOLOGY/ONCOLOGY | Facility: CLINIC | Age: 57
End: 2022-01-12
Payer: COMMERCIAL

## 2022-01-12 VITALS
HEART RATE: 90 BPM | DIASTOLIC BLOOD PRESSURE: 91 MMHG | SYSTOLIC BLOOD PRESSURE: 129 MMHG | BODY MASS INDEX: 27.87 KG/M2 | WEIGHT: 199.06 LBS | HEIGHT: 71 IN

## 2022-01-12 DIAGNOSIS — C64.9 RENAL CELL CARCINOMA, UNSPECIFIED LATERALITY: ICD-10-CM

## 2022-01-12 DIAGNOSIS — C64.2 RENAL CELL CANCER, LEFT: Primary | ICD-10-CM

## 2022-01-12 PROCEDURE — 99999 PR PBB SHADOW E&M-EST. PATIENT-LVL III: ICD-10-PCS | Mod: PBBFAC,,, | Performed by: INTERNAL MEDICINE

## 2022-01-12 PROCEDURE — 3080F PR MOST RECENT DIASTOLIC BLOOD PRESSURE >= 90 MM HG: ICD-10-PCS | Mod: CPTII,S$GLB,, | Performed by: INTERNAL MEDICINE

## 2022-01-12 PROCEDURE — 99999 PR PBB SHADOW E&M-EST. PATIENT-LVL III: CPT | Mod: PBBFAC,,, | Performed by: INTERNAL MEDICINE

## 2022-01-12 PROCEDURE — 99214 PR OFFICE/OUTPT VISIT, EST, LEVL IV, 30-39 MIN: ICD-10-PCS | Mod: S$GLB,,, | Performed by: INTERNAL MEDICINE

## 2022-01-12 PROCEDURE — 3074F SYST BP LT 130 MM HG: CPT | Mod: CPTII,S$GLB,, | Performed by: INTERNAL MEDICINE

## 2022-01-12 PROCEDURE — 3080F DIAST BP >= 90 MM HG: CPT | Mod: CPTII,S$GLB,, | Performed by: INTERNAL MEDICINE

## 2022-01-12 PROCEDURE — 3008F BODY MASS INDEX DOCD: CPT | Mod: CPTII,S$GLB,, | Performed by: INTERNAL MEDICINE

## 2022-01-12 PROCEDURE — 99214 OFFICE O/P EST MOD 30 MIN: CPT | Mod: S$GLB,,, | Performed by: INTERNAL MEDICINE

## 2022-01-12 PROCEDURE — 3074F PR MOST RECENT SYSTOLIC BLOOD PRESSURE < 130 MM HG: ICD-10-PCS | Mod: CPTII,S$GLB,, | Performed by: INTERNAL MEDICINE

## 2022-01-12 PROCEDURE — 3008F PR BODY MASS INDEX (BMI) DOCUMENTED: ICD-10-PCS | Mod: CPTII,S$GLB,, | Performed by: INTERNAL MEDICINE

## 2022-01-12 PROCEDURE — 1159F PR MEDICATION LIST DOCUMENTED IN MEDICAL RECORD: ICD-10-PCS | Mod: CPTII,S$GLB,, | Performed by: INTERNAL MEDICINE

## 2022-01-12 PROCEDURE — 1159F MED LIST DOCD IN RCRD: CPT | Mod: CPTII,S$GLB,, | Performed by: INTERNAL MEDICINE

## 2022-01-20 ENCOUNTER — CLINICAL SUPPORT (OUTPATIENT)
Dept: REHABILITATION | Facility: HOSPITAL | Age: 57
End: 2022-01-20
Payer: COMMERCIAL

## 2022-01-20 DIAGNOSIS — H81.12 BPPV (BENIGN PAROXYSMAL POSITIONAL VERTIGO), LEFT: ICD-10-CM

## 2022-01-20 PROCEDURE — 97161 PT EVAL LOW COMPLEX 20 MIN: CPT | Mod: PO

## 2022-01-20 PROCEDURE — 95992 CANALITH REPOSITIONING PROC: CPT | Mod: PO

## 2022-01-20 NOTE — PLAN OF CARE
[facility]  Physical Therapy Neurological Rehabilitation Initial Evaluation    Name: Venkat Jonas  Clinic Number: [id number]    Therapy Diagnosis:   Encounter Diagnosis   Name Primary?    BPPV (benign paroxysmal positional vertigo), left      Physician: Self, Aaareferral    Physician Orders: PT Eval and Treat   Medical Diagnosis from Referral: BPPV evaluation  Evaluation Date: 1/20/2022  Authorization Period Expiration: 01/20/22 to 01/20/23  Plan of Care Expiration: 02/18/22  Visit # / Visits authorized: 01/ 01    Time In: 13:00  Time Out: 13:34  Total Billable Time: 34 minutes    Precautions: Standard, h/o cancer    Subjective   Date of onset: 01/16/22  History of current condition - Venkat reports: that his dizziness episode started this past Sunday. This is the first time he has had these symptoms. He was installing a dryer and when he turned his head, vertigo hit, along with nausea and sense of warmth. He was able to reproduce symptoms again when sitting in a chair and extending his head/neck back. Endorses feeling nauseated the rest of the day. Currently, when he stands up and walks, he feels dizzy/off. Does endorse taking bonine for symptoms which made him tired; last dose was Tuesday night.     History of Current Symptoms   Triggers: head extension, walking   Alleviating Factors: none specific; took bonine but did not help with dizziness   Description of symptoms: nauseated sensation, off sensation   Onset: 01/16/22, no prior episodes   Frequency: intermittent since onset   Duration: does not exceed 1 minute   Positional changes: yes, supine <> sit, rolling onto right side   Limitations due to symptoms: unsteadiness with walking    History of migraines: not tested this date     Medical History:   Past Medical History:   Diagnosis Date    Allergic sinusitis     Exercise-induced asthma     Hyperlipidemia     Hypertension     Renal cell carcinoma 10/14/2020       Surgical  History:   Venkat Jonas  has a past surgical history that includes Sinus surgery; Colonoscopy (N/A, 12/5/2016); Cystoscopy w/ retrogrades (Left, 10/1/2020); Laparoscopic robot-assisted surgical removal of kidney using da Hellen Xi (Left, 10/7/2020); Esophagogastroduodenoscopy (N/A, 11/3/2020); Colonoscopy (N/A, 11/3/2020); and Repair of collateral ligament of thumb (Right, 5/14/2021).    Medications:   Venkat has a current medication list which includes the following prescription(s): albuterol, aspirin, azelastine, budesonide-formoterol 80-4.5 mcg, cyclobenzaprine, ezetimibe, pantoprazole, rosuvastatin, valacyclovir, valsartan, and zolpidem.    Allergies:   Review of patient's allergies indicates:  No Known Allergies     Imaging: available imaging in Epic reviewed prior to evaluation    Pain:  No significant pain reported at this time.     Patient's goals: to improve dizziness symptoms    Objective   - Follows commands: 100% of time   - Speech: no deficits     BP: medical history of HTN    Mental status: alert, oriented to person, place, and time, normal mood, behavior, speech, dress, motor activity, and thought processes  Appearance: Casually dressed  Behavior:  calm and cooperative  Attention Span and Concentration:  Normal    Posture Alignment in sitting and standing:   Head: slightly forward head   Scapulae: slightly rounded shoulders   Trunk: WFL   Pelvis: NT   Legs: NT     Sensation: NT this date         Visual/Oculomotor Screen: denies changes   Tracking/Smooth Pursuits:Intact  Saccades: Intact  Acuity:Intact  R/L discrimination: Intact  Visual field: Intact  Convergence: WFL  VOR 1: Intact, no visual slippage, no dizziness  VCR: NT    ROM:   CERVICAL SPINE  Flexion 70 degrees (80-90 deg)  Extension 61 degrees (70-80 deg)  L side bend WFL, R side bend WFL (20-45 degrees)  L rotation 70 degrees, R rotation 66 degrees (70-90 degrees)  Are concurrent symptoms present with any of these movements: no  dizziness throughout    Modified VAS (Vertebral Artery Screen), in sitting (rotation, then extension):  R: (-)  L: (-)    MERARY SENSORY TESTING: NT this date due to priority of CRTs/CRMs    Functional Gait Assessment: NT this date due to priority of CRTs/CRMs    Gait Assessment:  - AD used: none  - Assistance: (I)  - Distance: community distances    GAIT DEVIATIONS:  Venkat displays the following deviations with ambulation: slight lateral sway in hallway following CRMs    Endurance Deficit: none noted    POSITIONAL CANAL TESTING  Looking for nystagmus (slow phase followed by quick phase to the affected side for BPPV)    Unsupported [place] Hallpike (posterior / CL anterior), dizziness reported when moving out of test position for each side   Right : (-) vertigo, (-) nystagmus, (-) dizziness   Left: (+) vertigo, (+) upward torsional nystagmus lasting ~20-25 seconds  Side lying Horizontal Canals- dizziness reported when moving out of test position for each side   Right: (-) vertigo, (-) nystagmus, (-) dizziness   Left: (-) vertigo, (-) nystagmus, (-) dizziness    Treatment Performed: see below for more details    CMS Impairment/Limitation/Restriction for FOTO Survey- not performed this date       TREATMENT   Treatment Time In: 13:15  Treatment Time Out: 13:30  Total Treatment time separate from Evaluation: 15 minutes    Venkat received canalith repositioning procedures to assess and treat BPPV for 15 minutes including:    Supported Epley maneuver to treat left PSCC canalithiasis BPPV x 1 trial; each condition held for additional 30 seconds  Position 1: (-) vertigo, (-) nystagmus, (+) dizziness  Position 2: (-) vertigo, (-) nystagmus, (-) dizziness  Position 3:(-) vertigo, (-) nystagmus, (-) dizziness  Position 4: (-) vertigo, (-) nystagmus, (-) dizziness    1. Re-test following Epley maneuver:  Ka[place] Hallpike (posterior / CL anterior)- unsupported   Right : NT   Left: (-) vertigo, (-) nystagmus lasting, (-) dizziness  Side  lying Horizontal Canals   Right: NT   Left: (-) vertigo, (-) nystagmus, (-) dizziness    2. Re-test following Epley maneuver:  Loaded Kailee Randall pike in plane of left PSCC: (forward flexion with hold for 30 sec > unsupported Kailee Randall pike position): (-) vertigo, (-) nystagmus lasting, (-) dizziness    Home Exercises and Patient Education Provided    Education provided:   - plan of care (POC)  - PT instructed patient to avoid sleeping on left side and to avoid extreme neck extension for 48 hours to prevent crystal migration and to allow for crystal reabsorption     Written Home Exercises Provided: none provided at this time    Assessment   Venkat is a 56 y.o. male presents to outpatient Physical Therapy with a self referral for BPPV. Patient presents with chief complaints of dizziness and associated nausea that started this past Sunday with symptoms appearing most triggered with head/body movements. Oculomotor testing WFL with no dizziness symptoms elicited throughout. During cervical AROM screen, no concordant dizziness elicited. VAS test (-) bilaterally. Since patient history is consistent with potential BPPV, evaluation prioritized on canalith repositioning testing (CRTs). Patient (+) concordant dizziness and upward torsional nystagmus in left Kailee Randall pike position lasting 20-25 seconds, which is consistent with diagnosis of left PSCC BPPV canalithiasis. Therefore, supported Epley maneuver performed to treat left PSCC BPPV. Upon re-test with traditional unsupported Kailee Randall pike test and loaded Kailee Randall pike test, patient (-) for nystagmus, dizziness, and vertigo. Plan to reassess next session and treat accordingly. If CRTs are (-) for BPPV, but dizziness symptoms remain, plan to formally assess postural stability.     Patient prognosis is Good.   Patient will benefit from skilled outpatient Physical Therapy to address the deficits stated above and in the chart below, provide patient/family education, and to  maximize patient's level of independence.     Plan of care discussed with patient: Yes  Patient's spiritual, cultural and educational needs considered and patient is agreeable to the plan of care and goals as stated below:     Anticipated Barriers for therapy: none noted    Medical Necessity is demonstrated by the following  History  Co-morbidities and personal factors that may impact the plan of care Co-morbidities:   Hypertension, h/o asthma, h/o cancer    Personal Factors:   no deficits     high   Examination  Body Structures and Functions, activity limitations and participation restrictions that may impact the plan of care Body Regions:   head  neck    Body Systems:    vestibular function    Participation Restrictions:   None noted    Activity limitations:   Learning and applying knowledge  no deficits    General Tasks and Commands  no deficits    Communication  no deficits    Mobility  slight lateral sway with ambulation post CRTs/CRMs    Self care  no deficits    Domestic Life  no deficits    Interactions/Relationships  no deficits    Life Areas  no deficits    Community and Social Life  community life  recreation and leisure         low   Clinical Presentation stable and uncomplicated low   Decision Making/ Complexity Score: low     Goals:  Short Term Goals = Long Term Goals: 4 weeks   1. Patient to be (-) for nystagmus and vertigo in both Kailee Randall pike test positions for resolution of BPPV episode.   2. Patient to be (-) for nystagmus and vertigo in both side lying horizontal test positions for resolution of BPPV episode.  3. PT to assess postural stability and establish appropriate goals if indicated.     Plan   Plan of care Certification: 1/20/2022 to 02/18/22.    Outpatient Physical Therapy 2 times weekly for 4 weeks to include the following interventions: Gait Training, Manual Therapy, Moist Heat/ Ice, Neuromuscular Re-ed, Orthotic Management and Training, Patient Education, Self Care, Therapeutic  Activities, Therapeutic Exercise and Canalith Repositioning Procedures.     Radha Jimenez, PT

## 2022-01-21 NOTE — PROGRESS NOTES
[facility]   Physical Therapy Treatment Note     Name: Venkat Jonas  Clinic Number: [id number]    Therapy Diagnosis:   Encounter Diagnosis   Name Primary?    BPPV (benign paroxysmal positional vertigo), left      Physician: Self, Aaareferral    Visit Date: 1/24/2022    Physician Orders: PT Eval and Treat   Medical Diagnosis from Referral: BPPV evaluation  Evaluation Date: 1/20/2022  Authorization Period Expiration: 01/20/22 to 01/20/23  Plan of Care Expiration: 02/18/22  Visit # / Visits authorized: 01/ 20 + eval     Time In: 08:00  Time Out: 08:30  Total Billable Time: 30 minutes     Precautions: Standard, h/o cancer     SUBJECTIVE     Pt reports: that symptoms are improved compared to evaluation on Thursday. He still notices a slight brief dizziness sensation when moving from supine > sit. He also noticed this sensation when head was extended and he was putting in eye drops this morning. Endorses that walking feels much more steady.     Home exercise program provided this date.     Response to previous treatment: improved dizziness symptoms, improved steadiness with ambulation  Functional change: improved steadiness with ambulation    Pain: none reported  Location: NA    OBJECTIVE     Objective Measures updated at progress report unless specified.     Treatment     Venkat received the treatments listed below:      Canalith repositioning procedures to assess BPPV for 10 minutes including:    POSITIONAL CANAL TESTING  Looking for nystagmus (slow phase followed by quick phase to the affected side for BPPV)     Unsupported Kailee Hallpike (posterior / CL anterior), dizziness reported when moving out of test position for each side (upon sitting up)              Right : (-) vertigo, (-) nystagmus, (-) dizziness; slight symptoms upon initially sitting up              Left: (-) vertigo, (-) nystagmus, (-) dizziness; slight symptoms upon initially sitting up    Side lying Horizontal Canals     "          Right: (-) vertigo, (-) nystagmus, (-) dizziness              Left: (-) vertigo, (-) nystagmus, (-) dizziness    Neuromuscular re-education activities to improve: Balance and Sense for 20 minutes. The following activities were included:    MERARY SENSORY TESTING:  (P= Pass, F= Fail; note any sway; hold each position for 30")  Condition 1: (firm surface/feet together/eyes open) P  Condition 2: (firm surface/feet together/eyes closed) P  Condition 3: (firm surface/feet in tandem/eyes open) P, each lower extremity in front  Condition 4: (firm surface/feet in tandem/eyes closed) F, 11 sec c/ RLE in front, 28 sec c/ LLE in front  Condition 5: (soft surface/feet together/eyes open) P  Condition 6: (soft surface/feet together/eyes closed) P, slight sway  Condition 7: (Fakuda step test), measure distance varied from center starting position P, 10 deg bias to left side    X 20 feet ambulation with right<>L head turns, no sway, no symptoms  X 20 feet ambulation with up <> down head turns, no sway, no symptoms    Home exercise program instruction:   - PT verbally explains supine <> sit activity with spine in neutral position (no neck extension)  - x 30" each lower extremity in front, tandem stance in corner of room, eyes closed, S from PT, intermittent touchdown support  - x 2 laps x 90 feet ambulation in hallway, vertical self tossing <> catching ball with eyes tracking ball and with neck extension, no dizziness      Patient Education and Home Exercises     Home Exercises Provided and Patient Education Provided     Education provided:   - home exercise program, POC    Written Home Exercises Provided: yes. Exercises were reviewed and Venkat was able to demonstrate them prior to the end of the session.  Venkat demonstrated good  understanding of the education provided. See EMR under Patient Instructions for exercises provided during therapy session on 01/24/22.     ASSESSMENT     Venkat tolerated initial follow up session " well this morning with reports of improved symptoms following evaluation. CRTs reassessed with patient demonstrating (-) vertigo, (-) nystagmus, and (-) dizziness in both [place] pike and both side lying horizontal test positions. Therefore, BPPV episode appears resolved at this time. Patient does endorse brief slight dizziness symptoms upon sitting up from both [place] pike test positions which is consistent with his reports of slight brief dizziness episodes he has experienced at home with positional changes. GST performed to assess postural stability and sensory organization. Patient demonstrates good baseline performance on this test with most difficulty noted on vision eliminated tandem stance and vision eliminated stance on foam pad. Fukuda step test WFL with 10 deg bias noted to left side. During ambulation with head movements, no concordant symptoms reproduced and no instability noted. Objective findings discussed with patient along with plan of care (POC) treatment options. Considering patient's busy work schedule, patient to perform motion tolerance and vision eliminated balance exercises as part of home exercise program for the remainder of this week. Patient to touch base with PT by end of week regarding symptoms and home exercise program performance. Therapy plan of care (POC) to be updated as needed pending symptom presentation.     Venkat Is progressing well towards his goals.   Pt prognosis is Good.     Pt will continue to benefit from skilled outpatient physical therapy to address the deficits listed in the problem list box on initial evaluation, provide pt/family education and to maximize pt's level of independence in the home and community environment.     Pt's spiritual, cultural and educational needs considered and pt agreeable to plan of care and goals.     Anticipated barriers to physical therapy: none noted    Goals:  Short Term Goals = Long Term Goals: 4 weeks   1. Patient to be (-) for  nystagmus and vertigo in both Kailee Randall pike test positions for resolution of BPPV episode. MET 01/24/22  2. Patient to be (-) for nystagmus and vertigo in both side lying horizontal test positions for resolution of BPPV episode. MET 01/24/22  3. PT to assess postural stability and establish appropriate goals if indicated. MET 01/24/22  4. Updated 01/24/22: Patient to perform GST condition 4 for at least 20 sec with each lower extremity in front for improved balance in low vision environments. Ongoing    PLAN     Reassess CRTs as indicated. Check symptom presentation and home exercise program performance and adjust treatment plan as needed.     Radha Jimenez, PT

## 2022-01-24 ENCOUNTER — CLINICAL SUPPORT (OUTPATIENT)
Dept: REHABILITATION | Facility: HOSPITAL | Age: 57
End: 2022-01-24
Payer: COMMERCIAL

## 2022-01-24 DIAGNOSIS — H81.12 BPPV (BENIGN PAROXYSMAL POSITIONAL VERTIGO), LEFT: ICD-10-CM

## 2022-01-24 PROCEDURE — 95992 CANALITH REPOSITIONING PROC: CPT | Mod: PO

## 2022-01-24 PROCEDURE — 97112 NEUROMUSCULAR REEDUCATION: CPT | Mod: PO

## 2022-01-24 NOTE — PATIENT INSTRUCTIONS
Home Exercise Program: Perform 1x per day    X 10 reps, supine to sit with head in neutral position (avoid neck extension)    Eyes closed tandem stance; perform in corner of room or in doorway and use hands for support as needed; 2 x 30 sec for each foot in front    X 4 laps x ~40 feet, walking with vertical self ball toss with eyes tracking ball; make sure to achieve neck extension

## 2022-01-31 ENCOUNTER — LAB VISIT (OUTPATIENT)
Dept: LAB | Facility: HOSPITAL | Age: 57
End: 2022-01-31
Attending: INTERNAL MEDICINE
Payer: COMMERCIAL

## 2022-01-31 ENCOUNTER — PATIENT MESSAGE (OUTPATIENT)
Dept: REHABILITATION | Facility: HOSPITAL | Age: 57
End: 2022-01-31
Payer: COMMERCIAL

## 2022-01-31 DIAGNOSIS — Z03.818 ENCOUNTER FOR OBSERVATION FOR SUSPECTED EXPOSURE TO OTHER BIOLOGICAL AGENTS RULED OUT: ICD-10-CM

## 2022-01-31 DIAGNOSIS — U07.1 COVID-19 VIRUS DETECTED: ICD-10-CM

## 2022-01-31 LAB — SARS-COV-2 RNA RESP QL NAA+PROBE: DETECTED

## 2022-01-31 PROCEDURE — U0005 INFEC AGEN DETEC AMPLI PROBE: HCPCS | Performed by: INTERNAL MEDICINE

## 2022-01-31 PROCEDURE — U0003 INFECTIOUS AGENT DETECTION BY NUCLEIC ACID (DNA OR RNA); SEVERE ACUTE RESPIRATORY SYNDROME CORONAVIRUS 2 (SARS-COV-2) (CORONAVIRUS DISEASE [COVID-19]), AMPLIFIED PROBE TECHNIQUE, MAKING USE OF HIGH THROUGHPUT TECHNOLOGIES AS DESCRIBED BY CMS-2020-01-R: HCPCS | Performed by: INTERNAL MEDICINE

## 2022-02-17 RX ORDER — PREDNISONE 20 MG/1
20 TABLET ORAL DAILY
Qty: 3 TABLET | Refills: 0 | Status: SHIPPED | OUTPATIENT
Start: 2022-02-17 | End: 2022-02-20

## 2022-02-19 ENCOUNTER — TELEPHONE (OUTPATIENT)
Dept: INTERNAL MEDICINE | Facility: CLINIC | Age: 57
End: 2022-02-19
Payer: COMMERCIAL

## 2022-02-19 DIAGNOSIS — J32.9 SINUSITIS, UNSPECIFIED CHRONICITY, UNSPECIFIED LOCATION: Primary | ICD-10-CM

## 2022-02-19 RX ORDER — AMOXICILLIN 875 MG/1
875 TABLET, FILM COATED ORAL EVERY 12 HOURS
Qty: 20 TABLET | Refills: 0 | Status: SHIPPED | OUTPATIENT
Start: 2022-02-19 | End: 2022-09-01

## 2022-02-19 RX ORDER — CODEINE PHOSPHATE AND GUAIFENESIN 10; 100 MG/5ML; MG/5ML
5 SOLUTION ORAL 3 TIMES DAILY PRN
Qty: 236 ML | Refills: 0 | Status: SHIPPED | OUTPATIENT
Start: 2022-02-19 | End: 2022-03-07

## 2022-03-23 RX ORDER — ROSUVASTATIN CALCIUM 40 MG/1
40 TABLET, COATED ORAL NIGHTLY
Qty: 90 TABLET | Refills: 3 | Status: SHIPPED | OUTPATIENT
Start: 2022-03-23 | End: 2023-02-16

## 2022-03-23 NOTE — TELEPHONE ENCOUNTER
No new care gaps identified.  Powered by [employer]. Reference number: [id number].   3/23/2022 8:16:18 AM CDT

## 2022-03-29 ENCOUNTER — PATIENT MESSAGE (OUTPATIENT)
Dept: ADMINISTRATIVE | Facility: OTHER | Age: 57
End: 2022-03-29
Payer: COMMERCIAL

## 2022-03-29 ENCOUNTER — PATIENT MESSAGE (OUTPATIENT)
Dept: CARDIOLOGY | Facility: CLINIC | Age: 57
End: 2022-03-29
Payer: COMMERCIAL

## 2022-03-29 DIAGNOSIS — Z85.528 HISTORY OF RENAL CELL CARCINOMA: Primary | ICD-10-CM

## 2022-04-26 ENCOUNTER — CLINICAL SUPPORT (OUTPATIENT)
Dept: REHABILITATION | Facility: HOSPITAL | Age: 57
End: 2022-04-26
Payer: COMMERCIAL

## 2022-04-26 DIAGNOSIS — M43.6 NECK STIFFNESS: ICD-10-CM

## 2022-04-26 DIAGNOSIS — R29.3 POSTURE ABNORMALITY: ICD-10-CM

## 2022-04-26 DIAGNOSIS — M54.2 NECK PAIN: ICD-10-CM

## 2022-04-26 PROCEDURE — 97140 MANUAL THERAPY 1/> REGIONS: CPT | Performed by: PHYSICAL THERAPIST

## 2022-04-26 PROCEDURE — 97161 PT EVAL LOW COMPLEX 20 MIN: CPT | Performed by: PHYSICAL THERAPIST

## 2022-04-26 PROCEDURE — 97110 THERAPEUTIC EXERCISES: CPT | Performed by: PHYSICAL THERAPIST

## 2022-04-27 PROBLEM — M43.6 NECK STIFFNESS: Status: ACTIVE | Noted: 2022-04-27

## 2022-04-27 PROBLEM — R29.3 POSTURE ABNORMALITY: Status: ACTIVE | Noted: 2022-04-27

## 2022-04-27 PROBLEM — M54.2 NECK PAIN: Status: ACTIVE | Noted: 2022-04-27

## 2022-04-27 NOTE — PLAN OF CARE
[facility] OUTPAT[facility]ER[facility] AND [facility]LL[facility]  Physical Therapy Direct Access Initial Evaluation    Name: Venkat Jonas  Clinic Number: [id number]    Therapy Diagnosis:   Encounter Diagnoses   Name Primary?    Neck pain     Neck stiffness     Posture abnormality        Reason for Visit:: right sided neck pain  Evaluation Date: 4/26/2022  Authorization Period Expiration: 4/22/23  Plan of Care Expiration: 7/19/22   Visit # / Visits authorized: 1/ 1  FOTO Visit #:  1/next visit    Time In: 1400  Time Out: 1455  Total Billable Time: 55 minutes    Precautions: Standard    Medical  History:     Current Care Team:  Primary Care Provider:  NO  Specialty Provider: NO    Date of Last Physical Examination: January 2022    Past Medical History:  Past Medical History:   Diagnosis Date    Allergic sinusitis     Exercise-induced asthma     Hyperlipidemia     Hypertension     Renal cell carcinoma 10/14/2020       Past Surgical History:   has a past surgical history that includes Sinus surgery; Colonoscopy (N/A, 12/5/2016); Cystoscopy w/ retrogrades (Left, 10/1/2020); Laparoscopic robot-assisted surgical removal of kidney using da Hellen Xi (Left, 10/7/2020); Esophagogastroduodenoscopy (N/A, 11/3/2020); Colonoscopy (N/A, 11/3/2020); and Repair of collateral ligament of thumb (Right, 5/14/2021).  Other Surgeries Absentnot stated above: NO    Medications:  Venkat has a current medication list which includes the following prescription(s): albuterol, amoxicillin, aspirin, azelastine, ezetimibe, pantoprazole, rosuvastatin, valacyclovir, valsartan, and zolpidem.  Other Medications not stated above: NO    Which of the following medications have you taken in the last week?   Aspirin: NO  Tylenol: NO  Antiinflammatories (Advil/Motrin/Ibuprofen/Aleve etc): NO  Stomach Ulcer medications: NO  Vitamins/mineral supplements: NO  Herbals/Remedies: NO  Other: NO    How much caffeinated coffee or caffeine containing beverages do you drink per  day: 1    Has anyone in your immediate family (parents, brothers, sisters) ever been treated for any of the following?  Diabetes no  Cancer no  Heart disease no  Alcoholism (chemical dependency) no  High blood pressure no  Depression no  Stroke no  Kidney disease no  Inflammatory Arthritis (Rheumatoid, Ankylosing) no    Allergies:  Review of patient's allergies indicates:  No Known Allergies   Latex Sensitive: no  Other Allergies not stated above: NO    Imaging: none:     Subjective::     Date of onset: November  History of current condition - Venkat reports: R sided neck pain since November following changing to a new stiff pillow. He has tried muscle relaxers twice with only moderate improvements. Pain location at base of R side skull, worse in the morning and at end of day. Pt is a right side sleeper.     Have you recently noted:  weight loss/gain no   joint/muscle swelling no  nausea/vomiting no  easy bruising no  dizziness/lightheadedness no  excessive bleeding no  fatigue no  difficulty breathing no  weakness no  regular cough no  fever/chills/sweats no  arm/leg swelling no  numbness or tingling no  heart racing in your chest no  tremors no  difficulty swelling no  seizures no  Heartburn/indigestion no  double vision no  Constipation/diarrhea no  loss of vision no  blood in stools no  eye redness no  post menopause No  skin rash no  problems urinating (difficulty starting, painful etc.) no  problems sleeping no  urinary incontinence no  sexual difficulties no  blood in the urine no  night sweats no  pregnant or think you might be pregnant No  hearing problems  no  stress at home or work no    Prior Therapy: none  Social History:  lives with their spouse  Occupation: office job at [employer]  Leisure Activities: tennis, bike riding, weight lifting  Prior Level of Function: pain-free with all ADLs and recreational activities  Current Level of Function: pain with sustained sitting, laying on R side     Pain:  Current  0/10, worst 6/10, best 0/10   Location: right neck   Description: Aching, Dull and Throbbing  Aggravating Factors: Sitting, Standing, Laying and Morning  Easing Factors: lying down, heating pad and rest    Pts goals: exercise and work without neck pain    Objective     Observation: increased thoracic kyphosis, forward head position  - ipsilateral cervical rotation with overhead shoulder flexion bilaterally    Cervical Range of Motion:    Degrees Pain   Flexion 40 no     Extension 50 no     Right Rotation 60 yes     Left Rotation 65 no     Right Side Bending 25 yes   Left Side Bending 25 no      Shoulder Range of Motion:   Shoulder Left Right   Flexion 165 165   Abduction 165 165   ER T4 T4   IR T6 T6     Upper Extremity Strength  (R) UE  (L) UE    Lower Trap 3/5 Lower Trap 3/5   Middle Trap 3+/5 Middle Trap 3+/5       Cervical joint mobility: decreased R OA  Mobility  Decreased L cervical side glide C3-4, 2-3    Thoracic mobility: decreased CT junction and mid thoracic mobility into extension    Palpation: TTP R OA joint      Sensation: intact    Flexibility: lats/teres major bilaterally, suboccipitals    TREATMENT   Treatment Time In: 1400  Treatment Time Out: 1455  Total Treatment time separate from Evaluation: 24 minutes    Venkat received the following treatments:  THERAPEUTIC EXERCISES to develop strength, endurance, ROM and flexibility for 14 minutes including supine chin tucks, B ER with GTB, thoracic foam roll, chicken wings (CT junction mobility)  MANUAL THERAPY TECHNIQUES including Joint mobilizations were applied to cervical spine/thoracic spine for 10 minutes.including:  R OA joint mobilization, Gr III  R sideglides C2-3, 3-4, Gr III;  Prone thoracic extension mobilization, Gr IV;    Home Exercises and Patient Education Provided    Education provided:   - pillow selection, work set up    Written Home Exercises Provided: Patient instructed to cont prior HEP.  Exercises were reviewed and Venkat was able to  demonstrate them prior to the end of the session.  Venkat demonstrated good  understanding of the education provided.     See EMR under Patient Instructions for exercises provided prior visit.    Assessment   Venkat is a 56 y.o. male referred to outpatient Physical Therapy with a medical diagnosis of R neck pain. Pt presents with decreased cervical ROM, impaired joint mobility, cervical muscle weakness, impaired posture and pain. Current condition is likely due to relative hypermobility of R upper cervical spine, exacerbated by sustained posturing. Pt provided with HEP and activity modification instructions.     Pt prognosis is Excellent.   Pt will benefit from skilled outpatient Physical Therapy to address the deficits stated above and in the chart below, provide pt/family education, and to maximize pt's level of independence.     Plan of care discussed with patient: Yes  Pt's spiritual, cultural and educational needs considered and patient is agreeable to the plan of care and goals as stated below:     Anticipated Barriers for therapy: none    Medical Necessity is demonstrated by the following  History  Co-morbidities and personal factors that may impact the plan of care Co-morbidities:   HTN, prior history of cancer    Personal Factors:   no deficits     moderate   Examination  Body Structures and Functions, activity limitations and participation restrictions that may impact the plan of care Body Regions:   neck    Body Systems:    gross symmetry  ROM  strength  motor control  motor learning    Participation Restrictions:   none    Activity limitations:   Learning and applying knowledge  no deficits    General Tasks and Commands  no deficits    Communication  no deficits    Mobility  lifting and carrying objects    Self care  no deficits    Domestic Life  no deficits    Interactions/Relationships  no deficits    Life Areas  no deficits    Community and Social Life  no deficits         moderate   Clinical  Presentation stable and uncomplicated low   Decision Making/ Complexity Score: low     Goals:  Short Term Goals: 2-4 weeks   1. Pt will be compliant with HEP at least 50%.   2. Pt will be able to improve cervical rotation to 65 degrees both directions.  3. Pt will be able to sleep for 4 hours with less than 2/10 pain in the morning.   4. Pt will be able to improve FOTO score by 9 pts to demonstrate improved cervical spine function.     Long Term Goals: 12 weeks   1. Pt will be able to sleep for 8 hours with less than 2/10 pain in the morning.   2. Pt will improve cervical spine mobility from moderately limited to minimally limited to allow end range motions.   3. Pt will be able to lift B UE without compensatory cervical spine motions.     Plan   Plan of care Certification: 4/26/2022 to 7/19/22.    Attestation Statement:  I evaluated Venkat  on 4/27/2022 and recommend the following plan of care:  Outpatient Physical Therapy 1 times weekly for 8 weeks to include the following interventions: Manual Therapy, Neuromuscular Re-ed, Therapeutic Activities, Therapeutic Exercise and modalities, including dry needling. .    Shankar Kaiser, PT, DPT

## 2022-05-02 ENCOUNTER — HOSPITAL ENCOUNTER (OUTPATIENT)
Dept: RADIOLOGY | Facility: HOSPITAL | Age: 57
Discharge: HOME | End: 2022-05-02
Attending: INTERNAL MEDICINE
Payer: COMMERCIAL

## 2022-05-02 DIAGNOSIS — C64.2 RENAL CELL CANCER, LEFT: ICD-10-CM

## 2022-05-02 DIAGNOSIS — C64.9 RENAL CELL CARCINOMA, UNSPECIFIED LATERALITY: ICD-10-CM

## 2022-05-02 PROCEDURE — 71260 CT CHEST ABDOMEN PELVIS WITH CONTRAST (XPD): ICD-10-PCS | Mod: 26,,, | Performed by: RADIOLOGY

## 2022-05-02 PROCEDURE — 74177 CT CHEST ABDOMEN PELVIS WITH CONTRAST (XPD): ICD-10-PCS | Mod: 26,,, | Performed by: RADIOLOGY

## 2022-05-02 PROCEDURE — 74177 CT ABD & PELVIS W/CONTRAST: CPT | Mod: TC

## 2022-05-02 PROCEDURE — 74177 CT ABD & PELVIS W/CONTRAST: CPT | Mod: 26,,, | Performed by: RADIOLOGY

## 2022-05-02 PROCEDURE — 71260 CT THORAX DX C+: CPT | Mod: TC

## 2022-05-02 PROCEDURE — 25500020 PHARM REV CODE 255: Performed by: INTERNAL MEDICINE

## 2022-05-02 PROCEDURE — 71260 CT THORAX DX C+: CPT | Mod: 26,,, | Performed by: RADIOLOGY

## 2022-05-02 RX ADMIN — IOHEXOL 15 ML: 350 INJECTION, SOLUTION INTRAVENOUS at 11:05

## 2022-05-02 RX ADMIN — IOHEXOL 100 ML: 350 INJECTION, SOLUTION INTRAVENOUS at 11:05

## 2022-05-03 NOTE — PROGRESS NOTES
Subjective:       Patient ID: Venkat Jonas    Chief Complaint: h/o RCC    HPI     Venkat Jonas is a 56 y.o. male,  to clinic for evaluation and management of RCC, s/p nephrectomy.      He notes cold symptoms (congestion, sore throat) for 1-2 days.  Also, mild fatigue since having COVID in February.        Oncologic History:    Developed hematuria and on imaging was found to have a renal mass c/w RCC.  Questionable bone lesion.  Underwent nephrectomy October 7, 2020 with below pathology.  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       Recovered from surgery remarkably well.  Back to work.  No major issues.  Feeling well.        PATHOLOGY:    1. Lymph nodes, aortic (regional resection):  - 3 lymph nodes negative for tumor (0/3)    2. Left kidney and adrenal gland (radical nephrectomy):  - Renal cell carcinoma, see synoptic report below  - Additional ancillary testing for tumor classification is ordered and results will be issued in a supplemental report    Kidney carcinoma synoptic report  - Procedure: Radical nephrectomy  - Specimen laterality: Left  - Tumor site: Upper pole  - Tumor size: 8.9 x 8.1 x 7.7 cm  - Tumor focality: Renal cell carcinoma, unclassified (see microscopic section)  - Sarcomatoid features: Not identified  - Rhabdoid features: Present  - Histologic grade: WHO/ISUP Nuclear grade 4  - Tumor necrosis: Present, approximately 20% of the tumor  - Tumor extension: Tumor extends into renal vein  - Margins:  - Uninvolved by invasive carcinoma  - Regional lymph nodes:  - Number of lymph nodes involved: 0  - Number of lymph nodes examined: 4 (specimen 1 and specimen 2)  - Pathologic staging: pT3a N0 MX  - Other findings: Separate sclerotic and calcified nodule  - Tumor block for potential studies: 2D, 2E, 2F 2H, 2I, 2J, 2K  - Nontumor block: 2N  - Immunostain panel in microscopic section  - MMR IHC: No loss of expression (see microscopic section)  Sections show an  eosinophilic renal cell carcinoma with nuclear inclusions ,cytoplasmic inclusions , and  extracellular eosinophillic material. Several renal cell carcinoma subtypes are in the differential including  high grade chromophobe, eosinophilic clear cell, SDH deficient, and translocation associated. Subtyping  will be attempted with ancillary testing and results issued in a supplemental report. Selected slides  reviewed by additional pathologists.    Immunostain results:  Positive stains: AMACR, keratin AE1/AE3, CD10,  (weak), vimentin  Negative stains: Keratin 7, keratin 20, HMB45, Mart 1, desmin    Immunohistochemistry (IHC) Testing for Mismatch Repair (MMR) Proteins:  MLH1 - Intact nuclear expression  MSH2 - Intact nuclear expression  MSH6 - Intact nuclear expression  PMS2 - Intact nuclear expression    Review of Systems   Constitutional: Negative for activity change, appetite change, chills, fatigue, fever and unexpected weight change.   HENT: Negative for congestion, hearing loss, mouth sores, sore throat and trouble swallowing.    Eyes: Negative for pain and visual disturbance.   Respiratory: Negative for cough, shortness of breath and wheezing.    Cardiovascular: Negative for chest pain, palpitations and leg swelling.   Gastrointestinal: Negative for abdominal pain, constipation, diarrhea, nausea and vomiting.   Endocrine: Negative for cold intolerance and heat intolerance.   Genitourinary: Negative for difficulty urinating, discharge, dysuria, enuresis, frequency, hematuria, scrotal swelling and testicular pain.   Musculoskeletal: Negative for arthralgias, back pain and myalgias.   Skin: Negative for color change, rash and wound.   Allergic/Immunologic: Negative for environmental allergies and food allergies.   Neurological: Negative for weakness, numbness and headaches.   Hematological: Negative for adenopathy. Does not bruise/bleed easily.   Psychiatric/Behavioral: Negative for confusion, hallucinations and  sleep disturbance. The patient is not nervous/anxious.    All other systems reviewed and are negative.        Allergies:  Review of patient's allergies indicates:  No Known Allergies    Medications:  Current Outpatient Medications   Medication Sig Dispense Refill    albuterol (PROVENTIL/VENTOLIN HFA) 90 mcg/actuation inhaler Inhale 2 puffs into the lungs every 4 (four) hours as needed for Shortness of Breath. 18 g 3    amoxicillin (AMOXIL) 875 MG tablet Take 1 tablet (875 mg total) by mouth every 12 (twelve) hours. 20 tablet 0    aspirin 81 MG Chew Take 81 mg by mouth once daily.      azelastine (OPTIVAR) 0.05 % ophthalmic solution Place 1 drop into both eyes 2 (two) times daily. 18 mL 3    ezetimibe (ZETIA) 10 mg tablet Take 1 tablet by mouth daily 90 tablet 3    pantoprazole (PROTONIX) 40 MG tablet TAKE ONE TABLET BY MOUTH TWICE A DAY ON AN EMPTY STOMACH 180 tablet 3    rosuvastatin (CRESTOR) 40 MG Tab Take 1 tablet (40 mg total) by mouth every evening. 90 tablet 3    valACYclovir (VALTREX) 1000 MG tablet TAKE 2 TABLETS (2,000 MG TOTAL) BY MOUTH 2 (TWO) TIMES DAILY FOR 2 DAYS and repeat as needed 12 tablet 1    valsartan (DIOVAN) 160 MG tablet Take 1 tablet (160 mg total) by mouth once daily. 90 tablet 3    zolpidem (AMBIEN) 10 mg Tab Take 1 tablet (10 mg total) by mouth nightly as needed. 30 tablet 5     No current facility-administered medications for this visit.       PMH:  Past Medical History:   Diagnosis Date    Allergic sinusitis     Exercise-induced asthma     Hyperlipidemia     Hypertension     Renal cell carcinoma 10/14/2020       PSH:  Past Surgical History:   Procedure Laterality Date    COLONOSCOPY N/A 12/5/2016    Procedure: COLONOSCOPY;  Surgeon: Toni Rivera MD;  Location: [facility] ENDO (4TH FLR);  Service: Endoscopy;  Laterality: N/A;  VIP    COLONOSCOPY N/A 11/3/2020    Procedure: COLONOSCOPY;  Surgeon: Toni Rivera MD;  Location: [facility] (2ND FLR);  Service: Endoscopy;   Laterality: N/A;  Schedule patient EGD colonoscopy with me next available but as soon as possible    CYSTOSCOPY W/ RETROGRADES Left 10/1/2020    Procedure: CYSTOSCOPY, WITH RETROGRADE PYELOGRAM;  Surgeon: Vega Sears MD;  Location: [facility] 1ST FLR;  Service: Urology;  Laterality: Left;    ESOPHAGOGASTRODUODENOSCOPY N/A 11/3/2020    Procedure: EGD (ESOPHAGOGASTRODUODENOSCOPY);  Surgeon: Toni Rivera MD;  Location: [facility] ENDO (2ND FLR);  Service: Endoscopy;  Laterality: N/A;  pt will get rapid on 11/2-MS    LAPAROSCOPIC ROBOT-ASSISTED SURGICAL REMOVAL OF KIDNEY USING DA SONIDO XI Left 10/7/2020    Procedure: XI ROBOTIC NEPHRECTOMY;  Surgeon: Vega Sears MD;  Location: [facility] OR 2ND FLR;  Service: Urology;  Laterality: Left;  gen with regional/ 5hrs    REPAIR OF COLLATERAL LIGAMENT OF THUMB Right 5/14/2021    Procedure: REPAIR, LIGAMENT, COLLATERAL, THUMB, right UCLUCL;  Surgeon: Carolyn Thao MD;  Location: [facility];  Service: Orthopedics;  Laterality: Right;  Regional/MAC, [facility]    SINUS SURGERY         FamHx:  Family History   Problem Relation Age of Onset    Hyperlipidemia Father     Hyperlipidemia Brother     Heart attack Maternal Grandmother     Cancer Maternal Grandfather         throat    Melanoma Neg Hx     Psoriasis Neg Hx     Lupus Neg Hx     Eczema Neg Hx     Acne Neg Hx     Heart disease Neg Hx        SocHx:  Social History     Socioeconomic History    Marital status:      Spouse name: Kamila    Number of children: 3   Occupational History    Occupation:  Marketing     Employer: [employer] ([employer]   Tobacco Use    Smoking status: Never Smoker    Smokeless tobacco: Never Used    Tobacco comment: The patient exercises regularly at [employer].  He works as a  at [employer] over retail services.   Substance and Sexual Activity    Alcohol use: Yes     Alcohol/week: 4.0 standard drinks     Types: 4 Shots of liquor per week     Comment:  socially    Drug use: No    Sexual activity: Yes     Partners: Female   Social History Narrative    Administration at [employer]    , 3 kids (23, 18, 17)       Distress Score    Distress Score: (P) 4        Practical Problems Physical Problems   : (P) No Appearance: (P) No   Housing: (P) No Bathing / Dressing: (P) No   Insurance / Financial: (P) No Breathing: (P) No    Transportation: (P) No  Changes in Urination: (P) No    Work / School: (P) Yes  Constipation: (P) No   Treatment Decisions: (P) No  Diarrhea: (P) No     Eating: (P) No    Family Problems Fatigue: (P) No    Dealing with Children: (P) No Feeling Swollen: (P) No    Dealing with Partner: (P) No Fevers: (P) No    Ability to Have Children: (P) No  Getting Around: (P) No       Indigestion: (P) No     Memory / Concentration: (P) No   Emotional Problems Mouth Sores: (P) No    Depression: (P) No  Nausea: (P) No    Fears: (P) No  Nose Dry / Congested: (P) Yes    Nervousness: (P) No  Pain: (P) No    Sadness: (P) No Sexual: (P) No    Worry: (P) No Skin Dry / Itchy: (P) No    Loss of Interest in Usual Activities: (P) No Sleep: (P) No     Substance Abuse: (P) No    Spiritual/Religions Concerns Tingling in Hands / Feet: (P) No   Spritual / Baptist Concerns: (P) No         Other Problems                Objective:         Vitals reviewed.     Physical Exam  Constitutional:       General: He is not in acute distress.     Appearance: Normal appearance. He is normal weight. He is not ill-appearing, toxic-appearing or diaphoretic.   HENT:      Head: Normocephalic and atraumatic.      Nose: Nose normal.   Eyes:      General: No scleral icterus.        Right eye: No discharge.         Left eye: No discharge.      Conjunctiva/sclera: Conjunctivae normal.   Skin:     General: Skin is warm and dry.      Coloration: Skin is not jaundiced or pale.   Neurological:      General: No focal deficit present.      Mental Status: He is alert and oriented to person,  place, and time. Mental status is at baseline.   Psychiatric:         Mood and Affect: Mood normal.         Behavior: Behavior normal.         Thought Content: Thought content normal.         Judgment: Judgment normal.           LABS:  WBC   Date Value Ref Range Status   05/02/2022 5.30 3.90 - 12.70 K/uL Final     Hemoglobin   Date Value Ref Range Status   05/02/2022 14.3 14.0 - 18.0 g/dL Final     Hematocrit   Date Value Ref Range Status   05/02/2022 45.3 40.0 - 54.0 % Final     Platelets   Date Value Ref Range Status   05/02/2022 265 150 - 450 K/uL Final       Chemistry        Component Value Date/Time     05/02/2022 1011    K 5.0 05/02/2022 1011     05/02/2022 1011    CO2 27 05/02/2022 1011    BUN 18 05/02/2022 1011    CREATININE 1.3 05/02/2022 1011    GLU 97 05/02/2022 1011        Component Value Date/Time    CALCIUM 9.5 05/02/2022 1011    ALKPHOS 67 05/02/2022 1011    AST 30 05/02/2022 1011    ALT 29 05/02/2022 1011    BILITOT 0.5 05/02/2022 1011    ESTGFRAFRICA >60.0 05/02/2022 1011    EGFRNONAA >60.0 05/02/2022 1011              Assessment:       1. History of renal cell carcinoma    2. Renal cell cancer, left    3. Renal cell carcinoma, unspecified laterality          Plan:         1. RCC:    Previously reviewed pathology.  I am happy that lymph nodes were clear, and margins were negative.  The fact that there was renal vein involvement, pushes this to a stage III.  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    We are assuming that the questionable bone lesion is benign, watching this carefully.      At this point, we discussed monitoring this closely or considering adjuvant Sutent    After careful consideration, the patient has decided to monitor this through routine surveillance, which I fully support.    New data from [facility] on adjuvant immunotherapy that was presented only studied patients 12 wks or less from nephrectomy,  we did not think that considering immunotherapy 9 mos after surgery would be prudent or c/w supporting data.     Rescanning now stable, monitoring tiny subcm spots, otherwise stable with ELLI    RTC 4 mos to see me with CT CAP, CBC, CMP     He knows to call us if there are issues or questions in the meantime.    The patient agrees with the plan, and all questions have been answered to their satisfaction.      More than 30 mins were spent during this encounter, greater than 50% was spent in direct counseling and/or coordination of care.     Chandana Carrasco M.D., M.S., F.A.C.P.  Hematology and Oncology Attending  [facility]          Route Chart for Scheduling    Med Onc Chart Routing      Follow up with physician 4 months.   Follow up with RYNE    Labs CBC and CMP   Lab interval:     Imaging CT chest abdomen pelvis      Pharmacy appointment    Other referrals

## 2022-05-04 ENCOUNTER — OFFICE VISIT (OUTPATIENT)
Dept: HEMATOLOGY/ONCOLOGY | Facility: CLINIC | Age: 57
End: 2022-05-04
Payer: COMMERCIAL

## 2022-05-04 DIAGNOSIS — C64.9 RENAL CELL CARCINOMA, UNSPECIFIED LATERALITY: ICD-10-CM

## 2022-05-04 DIAGNOSIS — C64.2 RENAL CELL CANCER, LEFT: ICD-10-CM

## 2022-05-04 DIAGNOSIS — Z85.528 HISTORY OF RENAL CELL CARCINOMA: Primary | ICD-10-CM

## 2022-05-04 PROCEDURE — 99999 PR PBB SHADOW E&M-EST. PATIENT-LVL I: ICD-10-PCS | Mod: PBBFAC,,, | Performed by: INTERNAL MEDICINE

## 2022-05-04 PROCEDURE — 99214 PR OFFICE/OUTPT VISIT, EST, LEVL IV, 30-39 MIN: ICD-10-PCS | Mod: S$GLB,,, | Performed by: INTERNAL MEDICINE

## 2022-05-04 PROCEDURE — 4010F ACE/ARB THERAPY RXD/TAKEN: CPT | Mod: CPTII,S$GLB,, | Performed by: INTERNAL MEDICINE

## 2022-05-04 PROCEDURE — 99214 OFFICE O/P EST MOD 30 MIN: CPT | Mod: S$GLB,,, | Performed by: INTERNAL MEDICINE

## 2022-05-04 PROCEDURE — 99999 PR PBB SHADOW E&M-EST. PATIENT-LVL I: CPT | Mod: PBBFAC,,, | Performed by: INTERNAL MEDICINE

## 2022-05-04 PROCEDURE — 4010F PR ACE/ARB THEARPY RXD/TAKEN: ICD-10-PCS | Mod: CPTII,S$GLB,, | Performed by: INTERNAL MEDICINE

## 2022-05-20 ENCOUNTER — IMMUNIZATION (OUTPATIENT)
Dept: INTERNAL MEDICINE | Facility: CLINIC | Age: 57
End: 2022-05-20
Payer: COMMERCIAL

## 2022-05-20 DIAGNOSIS — Z23 NEED FOR VACCINATION: Primary | ICD-10-CM

## 2022-05-20 PROCEDURE — 91305 COVID-19, MRNA, LNP-S, PF, 30 MCG/0.3 ML DOSE VACCINE (PFIZER): CPT | Mod: PBBFAC | Performed by: INTERNAL MEDICINE

## 2022-06-16 ENCOUNTER — TELEPHONE (OUTPATIENT)
Dept: HEMATOLOGY/ONCOLOGY | Facility: CLINIC | Age: 57
End: 2022-06-16
Payer: COMMERCIAL

## 2022-06-16 DIAGNOSIS — C64.9 RENAL CELL CARCINOMA, UNSPECIFIED LATERALITY: Primary | ICD-10-CM

## 2022-06-16 NOTE — TELEPHONE ENCOUNTER
Patient requested to self-pay the Jany Broussard test. The following was reviewed with the patient:  The test costs $800. Payment will be expected at the time of the lab draw. The test is not covered by insurance.  The lab draw will take place in our [facility] department.  The test will detect if there is a cancer signal at the time of the lab draw.  The test results will say either cancer signal detected or cancer signal not detected.   If a cancer signal is detected, then the patient will be referred to a medical oncologist to begin a work-up starting with imaging to detect the cancer.  If a cancer signal is not detected, there is no further work-up needed at this time.  This test is not meant to replace any cancer screenings based on their personal and family history of cancer.  This test is not recommended to be done any more than once a year.  The test results will take around 10 business days to get the results and the patient will be called with their results.   All questions/concerns were answered to the patients satisfaction.

## 2022-06-17 ENCOUNTER — TELEPHONE (OUTPATIENT)
Dept: RESEARCH | Facility: HOSPITAL | Age: 57
End: 2022-06-17
Payer: COMMERCIAL

## 2022-06-17 DIAGNOSIS — Z00.6 CLINICAL TRIAL PARTICIPANT: ICD-10-CM

## 2022-06-17 DIAGNOSIS — C64.2 RENAL ADENOCARCINOMA, LEFT: Primary | ICD-10-CM

## 2022-06-21 ENCOUNTER — LAB VISIT (OUTPATIENT)
Dept: LAB | Facility: HOSPITAL | Age: 57
End: 2022-06-21
Payer: COMMERCIAL

## 2022-06-21 ENCOUNTER — RESEARCH ENCOUNTER (OUTPATIENT)
Dept: RESEARCH | Facility: HOSPITAL | Age: 57
End: 2022-06-21
Payer: COMMERCIAL

## 2022-06-21 DIAGNOSIS — C64.2 RENAL ADENOCARCINOMA, LEFT: ICD-10-CM

## 2022-06-21 DIAGNOSIS — Z00.6 CLINICAL TRIAL PARTICIPANT: ICD-10-CM

## 2022-06-21 LAB
DRUG STUDY SPECIMEN TYPE: NORMAL
DRUG STUDY TEST NAME: NORMAL
DRUG STUDY TEST RESULT: NORMAL

## 2022-06-21 PROCEDURE — 36415 COLL VENOUS BLD VENIPUNCTURE: CPT | Performed by: INTERNAL MEDICINE

## 2022-06-21 PROCEDURE — 99000 SPECIMEN HANDLING OFFICE-LAB: CPT | Performed by: INTERNAL MEDICINE

## 2022-06-21 NOTE — PROGRESS NOTES
"Venkat Jonas      [id number]  [id number] (SENTINEL)  IRB# [id number]  Date:6/21/2022     Subject ID: [id number]        INFORMED CONSENT AND BASELINE VISIT NOTE:     Pt arrived to clinic today for baseline consent and mandatory whole blood lab. Through pre-screening, pt appears to be an eligible match for [place] trial.Dr. Carrasco introduced the trial as an option to Sumi Carmona who was enthusiastic. Prior to meeting Alexa and ANA CRISTINA contacted the patient and briefly introduced the trial and the pre-screening IFC process.     We discussed the ICF in detail including:   * Purpose of the study   * Length of study and number of participants  * Procedures (Tissue testing and biomarker requirements)  * Study visits  * Risks  * Confidentiality and HIPAA Authorization for Release of Medical Records for the research trial/ subject's rights/research related injury  * Potential Benefits  * Costs  * Payment for Participation and/or Reimbursement of expenses  * Alternative/Treatments  * Study Related Questions and Compensation for Injury   * Participation in the research trial is voluntary and patient may withdraw at anytime; Questions about your rights as a research subject  * Employees in Research, New Findings, Study withdrawal, Withdrawal of consent to collect and use of personal data  * DNA sequencing  * Use of remaining samples, Return of research results   * Confidentiality/ HIPAA     Pt was given the opportunity for questions, although he did not have many questions. He asked how long will he be on the trial. And if study labs could be drawn with his maintenance labs in the future. All questions were answered to his satisfaction. Pt would like to proceed w/ signing "pre-screening" ICF. Pre-screening ICF was signed by the pt and witnessed by myself and Alexa Guthrie. Pt was provided w/ a copy of signed ICF, along w/ my contact information.     Per study calendar, pt had his mandatory whole blood specimens drawn for the " screening visit. His tissue will be requested and obtained by the [facility] tissue procurement team from the [facility] pathology department. Baseline mandatory whole blood specimens were shipped out today. Pending the results, pt may proceed to trial if eligible.      Pt was entered into the study specific IRT and assigned the pt ID# [id number].     (Copy of signed ICF has been scanned into pt's chart and linked to this encounter, see link @ bottom of encounter.)

## 2022-06-22 ENCOUNTER — CLINICAL SUPPORT (OUTPATIENT)
Dept: REHABILITATION | Facility: HOSPITAL | Age: 57
End: 2022-06-22
Payer: COMMERCIAL

## 2022-06-22 ENCOUNTER — PATIENT MESSAGE (OUTPATIENT)
Dept: REHABILITATION | Facility: HOSPITAL | Age: 57
End: 2022-06-22

## 2022-06-22 DIAGNOSIS — R42 DIZZINESS: ICD-10-CM

## 2022-06-22 PROCEDURE — 97161 PT EVAL LOW COMPLEX 20 MIN: CPT | Mod: PO

## 2022-06-23 RX ORDER — SCOLOPAMINE TRANSDERMAL SYSTEM 1 MG/1
1 PATCH, EXTENDED RELEASE TRANSDERMAL
Qty: 6 PATCH | Refills: 0 | Status: SHIPPED | OUTPATIENT
Start: 2022-06-23 | End: 2022-07-23

## 2022-06-23 NOTE — PLAN OF CARE
"[facility]  Physical Therapy Neurological Rehabilitation Initial Evaluation    Name: Venkat Jonas  Clinic Number: [id number]    Therapy Diagnosis:   Encounter Diagnosis   Name Primary?    Dizziness      Physician: Aleksander Murphy MD    Physician Orders: PT Eval and Treat   Medical Diagnosis from Referral: Vertigo evaluation- direct access  Evaluation Date: 6/22/2022  Authorization Period Expiration: 06/22/22  Plan of Care Expiration: 08/19/22  Visit # / Visits authorized: 01/ 01    Time In: 16:15  Time Out: 17:00  Total Billable Time: 45 minutes    Precautions: Standard, h/o cancer    Subjective   Date of onset: ~2 days ago    History of current condition - Venkat reports: that the other night , he was sleeping on his left side. When he went to roll over, he lifted his head to turn and he got a violent sensation of spinning "like being on a Tilt-a Whirl" ride. He immediately put his head back down, which made the symptoms worse. He then moved his head to a center position and the symptoms stopped. Last night, he had a dinner, got home, and must have dozed off. He unconsciously rolled from his right side onto his left side, and the spinning sensation started again. He has been very conscious to try to sleep on his right side to prevent these symptoms from recurring. Endorses that he did not sleep well last night due to trying to maintain staying on his right side. This morning, he had a breakfast meeting, and just felt a dizzy/off sensation-no spinning. He recently saw Shankar Kaiser for PT at [facility] due to having very tight neck muscles from working on computer for extended hours. Shankar provided him with exercises, but he has not had a chance to do them. He is going on vacation next week and plans to improve his compliance with those exercises.     History of migraines: NT this date  Blood Pressure: h/o essential HTN   History of Heart Condition: none     Medical History:   Past " Medical History:   Diagnosis Date    Allergic sinusitis     Exercise-induced asthma     Hyperlipidemia     Hypertension     Renal cell carcinoma 10/14/2020       Surgical History:   Venkat Jonas  has a past surgical history that includes Sinus surgery; Colonoscopy (N/A, 12/5/2016); Cystoscopy w/ retrogrades (Left, 10/1/2020); Laparoscopic robot-assisted surgical removal of kidney using da Hellen Xi (Left, 10/7/2020); Esophagogastroduodenoscopy (N/A, 11/3/2020); Colonoscopy (N/A, 11/3/2020); and Repair of collateral ligament of thumb (Right, 5/14/2021).    Medications:   Venkat has a current medication list which includes the following prescription(s): albuterol, amoxicillin, aspirin, azelastine, ezetimibe, pantoprazole, rosuvastatin, valacyclovir, valsartan, and zolpidem.    Allergies:   Review of patient's allergies indicates:  No Known Allergies     Imaging: imaging available in Epic reviewed prior to evaluation  VNG: none at this time  Audiogram: none at this time    Prior Therapy: vestibular rehab in Jan 2022 for left sided BPV    Pain:  Endorses bilateral neck and shoulder region tightness    Patient's goals: to improve dizziness symptoms    Objective   - Follows commands: 100% of time   - Speech: no deficits     Mental status: alert, oriented to person, place, and time, normal mood, behavior, speech, dress, motor activity, and thought processes  Appearance: Casually dressed  Behavior:  calm and cooperative  Attention Span and Concentration:  Normal    Posture Alignment in sitting and standing:   Head: slightly forward head   Scapulae: slightly rounded shoulders   Trunk: WFL   Pelvis: NT   Legs: NT     Sensation: Light Touch: NT          Proprioception: NT, Kinesthesia NT    Visual/Oculomotor Screen: denies changes  Spontaneous nystagmus (fixation present): none present  Gaze-evoked nystagmus (fixation present): none present  Tracking/Smooth Pursuits:WFL tracking pen in all planes, no symptoms  "elicited  Saccades: horizontal WFL, no symptoms elicited  Convergence: NT  VOR 1: horizontal, WFL- no visual slippage, no symptoms elicited  VOR cancellation: NT  Acuity:Intact  R/L discrimination: Intact  Visual field: Intact  VCR: (-) for any dizziness with multiple trials performed in both directions (head remains still, body moves)    Coordination: NT    ROM:   CERVICAL SPINE Screen  Flexion -no dizziness  Extension -no dizziness  L rotation- no dizziness, R rotation- no dizziness  Are concurrent symptoms present with any of these movements: no dizziness    Modified VAS (Vertebral Artery Screen), in sitting (rotation, then extension):  R: (-)  L: (-)        MERARY SENSORY TESTING:  (P= Pass, F= Fail; note any sway; hold each position for 30")  Condition 1: (firm surface/feet together/eyes open) P  Condition 2: (firm surface/feet together/eyes closed) P  Condition 3: (firm surface/feet in tandem/eyes open) P  Condition 4: (firm surface/feet in tandem/eyes closed) P, min sway c/ each lower extremity in front  Condition 5: (soft surface/feet together/eyes open) P  Condition 6: (soft surface/feet together/eyes closed) P, slight sway  Condition 7: (Fukuda step test), measure distance varied from center starting position, > 30 deg deviation to either side indicates hypofunction of biased side: P, 25 deg bias to right side    Ambulation in hallway:  X 100 feet ambulation with right <> left head movements, no dizziness, no significant sway  X 100 feet ambulation with up <> down head movements, no dizziness, no significant sway    Gait Assessment:  - AD used: none  - Assistance: (I)  - Distance: community distances    GAIT DEVIATIONS:  Venkat displays the following deviations with ambulation: none noted    POSITIONAL CANAL TESTING  Looking for nystagmus (slow phase followed by quick phase to the affected side for BPPV)    Supported Schroeder Hallkriss[place]ke (posterior / CL anterior)   Right : (-) nystagmus, (-) vertigo, (-) " dizziness   Left:     Trial 1: (-) nystagmus, (-) vertigo, (-) dizziness    Trial 2 c/ loading prior: (-) nystagmus, (-) vertigo, (-) dizziness    Trial 3 (during Epley instruction) : (-) nystagmus, (-) vertigo, (-) dizziness  Horizontal Canals- performed in side lying   Right: (-) nystagmus, (-) vertigo, (-) dizziness   Left: (-) nystagmus, (-) vertigo, (-) dizziness  Treatment Performed: Epley maneuver performed for instructional purposes if patient needed to treat left PSCC BPPV since patient will be on vacation and requested to learn maneuver in the event that symptoms recur while he is away.   Not performed to treat BPPV as testing this date indicated (-) BPPV      CMS Impairment/Limitation/Restriction for FOTO Survey- not performed this date         TREATMENT   Treatment Time In: 16:50  Treatment Time Out: 16:55  Total Treatment time separate from Evaluation: 5 minutes    Venkat participated in canalith repositioning procedures to assess and treat BPPV for 5  minutes, including:    Instruction and participation in Epley maneuver to treat left sided PSCC BPPV in the event of recurrence:  -PT verbally instructed patient to assess (+) signs and side for BPPV if vertigo recurs  Patient then performed Epley maneuver to treat left sided PSCC BPPV for educational purposes. Hand out provided and patient demonstrates good understanding of maneuver.     Home Exercises and Patient Education Provided    Education provided:   - findings of today's evaluation  -Epley maneuver in the event that dizziness recurs  -if dizziness symptoms recur, to notify PT so measurements can be completed  -if symptoms worsen, he will need to see an ENT to further assess for potential neuritis involvement    Written Home Exercises Provided: yes.  Exercises were reviewed and Venkat was able to demonstrate them prior to the end of the session.  Venkat demonstrated good  understanding of the education provided.     See EMR under Patient Instructions  for exercises provided 6/22/2022.    Assessment   Venkat is a 57 y.o. male referred to outpatient Physical Therapy with a self referral due to recent onset of vertigo episodes with positional changes when rolling in bed. Oculomotor screen WFL with no symptoms of dizziness elicited throughout. During cervical ROM screen, no dizziness elicited throughout. VAS test (-) bilaterally. VCR (-) bilaterally for concordant dizziness, indicating (-) for cervicogenic dizziness this date. During CRT testing, patient (-) for nystagmus, vertigo and dizziness in both [place] pike test positions and both side lying horizontal tests. Left [place] Randall pike test repeated multiple times to ensure clear response as patient has a h/o left sided PSCC BPPV and recent symptoms have been elicited with movement to left side. However, CRTs remain (-) indicating that patient is (-) for BPPV at this time. GST performed for postural assessment. Patient able to pass all conditions, but increased sway noted with vision eliminated tandem stance and vision eliminated stance on foam pad. No dizziness elicited throughout GST. Fukuda step test score WFL, but on the high end of this range. Patient able to complete ambulation in hallway with both horizontal and vertical head movements. No additional sway and no dizziness elicited during this activity. Patient instructed in BPPV diagnosis and performance of Epley maneuver in the even that symptoms recur while patient is away on vacation next week. At this time, subjective reports appear most consistent with BPPV episode. However, based on today's objective findings, this episodes appears to have self resolved. Patient instructed to return to therapy if symptoms recur for repeat testing. Patient also instructed that if symptoms worsen, ENT consult is recommended.     Patient prognosis is Good.   Patient will benefit from skilled outpatient Physical Therapy to address the deficits stated above and in the chart  below, provide patient/family education, and to maximize patient's level of independence.     Plan of care discussed with patient: Yes  Patient's spiritual, cultural and educational needs considered and patient is agreeable to the plan of care and goals as stated below:     Anticipated Barriers for therapy: prior h/o BPPV    Medical Necessity is demonstrated by the following  History  Co-morbidities and personal factors that may impact the plan of care Co-morbidities:   Hypertension, h/o asthma, h/o cancer     Personal Factors:   no deficits       high   Examination  Body Structures and Functions, activity limitations and participation restrictions that may impact the plan of care Body Regions:   head  neck     Body Systems:    vestibular function     Participation Restrictions:   None noted     Activity limitations:   Learning and applying knowledge  no deficits     General Tasks and Commands  no deficits     Communication  no deficits     Mobility  slight lateral sway with ambulation post CRTs/CRMs     Self care  no deficits     Domestic Life  no deficits     Interactions/Relationships  no deficits     Life Areas  no deficits     Community and Social Life  community life  recreation and leisure             low   Clinical Presentation stable and uncomplicated low   Decision Making/ Complexity Score: low       Goals:  Short Term Goals = Long Term Goals: 8 weeks   1. Patient to be (I) in home exercise program for managing further potential BPPV episodes.   2. Patient to report no further episodes of dizziness for resolution of vestibular deficits.      Plan   Plan of care Certification: 6/22/2022 to 08/19/22.    Outpatient Physical Therapy 2 times weekly for 8 weeks to include the following interventions: Gait Training, Manual Therapy, Moist Heat/ Ice, Neuromuscular Re-ed, Orthotic Management and Training, Patient Education, Self Care, Therapeutic Activities, Therapeutic Exercise and Canalith Repositioning  Procedures.     Radha Jimenez, PT

## 2022-07-14 ENCOUNTER — TELEPHONE (OUTPATIENT)
Dept: INTERNAL MEDICINE | Facility: CLINIC | Age: 57
End: 2022-07-14
Payer: COMMERCIAL

## 2022-07-14 DIAGNOSIS — Z12.5 SCREENING FOR PROSTATE CANCER: ICD-10-CM

## 2022-07-14 DIAGNOSIS — E78.5 HYPERLIPIDEMIA, UNSPECIFIED HYPERLIPIDEMIA TYPE: Primary | ICD-10-CM

## 2022-08-16 RX ORDER — PANTOPRAZOLE SODIUM 40 MG/1
TABLET, DELAYED RELEASE ORAL
Qty: 180 TABLET | Refills: 3 | Status: SHIPPED | OUTPATIENT
Start: 2022-08-16 | End: 2024-01-12 | Stop reason: SDUPTHER

## 2022-08-16 NOTE — TELEPHONE ENCOUNTER
Care Due:                  Date            Visit Type   Department     Provider  --------------------------------------------------------------------------------                                EP -                              PRIMARY      [facility] INTER[facility]  Last Visit: 10-      CARE ([facility])   MEDICINE       [facility]  Next Visit: None Scheduled  None         None Found                                                            Last  Test          Frequency    Reason                     Performed    Due Date  --------------------------------------------------------------------------------    Office Visit  12 months..  albuterol, valsartan.....  10-   10-    [facility] Embedded Care Gaps. Reference number: [id number]. 8/16/2022   11:25:45 AM CDT

## 2022-08-16 NOTE — TELEPHONE ENCOUNTER
Refill Routing Note   Medication(s) are not appropriate for processing by [facility] for the following reason(s):      - Outside of protocol    [facility] action(s):  Defer Medication-related problems identified: Requires appointment     Medication Therapy Plan: over 40mg; OOP  Medication reconciliation completed: No     Appointments  past 12m or future 3m with PCP    Date Provider   Last Visit   10/18/2021 Alexandre Griffiths MD   Next Visit   Visit date not found Alexandre Griffiths MD   ED visits in past 90 days: 0        Note composed:11:42 AM 08/16/2022

## 2022-09-01 ENCOUNTER — OFFICE VISIT (OUTPATIENT)
Dept: DERMATOLOGY | Facility: CLINIC | Age: 57
End: 2022-09-01
Payer: COMMERCIAL

## 2022-09-01 DIAGNOSIS — L82.0 INFLAMED SEBORRHEIC KERATOSIS: ICD-10-CM

## 2022-09-01 DIAGNOSIS — Z12.83 SCREENING EXAM FOR SKIN CANCER: ICD-10-CM

## 2022-09-01 DIAGNOSIS — L73.8 SEBACEOUS GLAND HYPERPLASIA: ICD-10-CM

## 2022-09-01 DIAGNOSIS — B07.9 VERRUCA VULGARIS: Primary | ICD-10-CM

## 2022-09-01 DIAGNOSIS — L82.1 SK (SEBORRHEIC KERATOSIS): ICD-10-CM

## 2022-09-01 PROCEDURE — 4010F ACE/ARB THERAPY RXD/TAKEN: CPT | Mod: CPTII,S$GLB,, | Performed by: DERMATOLOGY

## 2022-09-01 PROCEDURE — 1159F PR MEDICATION LIST DOCUMENTED IN MEDICAL RECORD: ICD-10-PCS | Mod: CPTII,S$GLB,, | Performed by: DERMATOLOGY

## 2022-09-01 PROCEDURE — 99999 PR PBB SHADOW E&M-EST. PATIENT-LVL II: CPT | Mod: PBBFAC,,, | Performed by: DERMATOLOGY

## 2022-09-01 PROCEDURE — 99203 OFFICE O/P NEW LOW 30 MIN: CPT | Mod: 25,S$GLB,, | Performed by: DERMATOLOGY

## 2022-09-01 PROCEDURE — 17110 DESTRUCTION B9 LES UP TO 14: CPT | Mod: S$GLB,,, | Performed by: DERMATOLOGY

## 2022-09-01 PROCEDURE — 17110 PR DESTRUCTION BENIGN LESIONS UP TO 14: ICD-10-PCS | Mod: S$GLB,,, | Performed by: DERMATOLOGY

## 2022-09-01 PROCEDURE — 1160F PR REVIEW ALL MEDS BY PRESCRIBER/CLIN PHARMACIST DOCUMENTED: ICD-10-PCS | Mod: CPTII,S$GLB,, | Performed by: DERMATOLOGY

## 2022-09-01 PROCEDURE — 1160F RVW MEDS BY RX/DR IN RCRD: CPT | Mod: CPTII,S$GLB,, | Performed by: DERMATOLOGY

## 2022-09-01 PROCEDURE — 99203 PR OFFICE/OUTPT VISIT, NEW, LEVL III, 30-44 MIN: ICD-10-PCS | Mod: 25,S$GLB,, | Performed by: DERMATOLOGY

## 2022-09-01 PROCEDURE — 4010F PR ACE/ARB THEARPY RXD/TAKEN: ICD-10-PCS | Mod: CPTII,S$GLB,, | Performed by: DERMATOLOGY

## 2022-09-01 PROCEDURE — 99999 PR PBB SHADOW E&M-EST. PATIENT-LVL II: ICD-10-PCS | Mod: PBBFAC,,, | Performed by: DERMATOLOGY

## 2022-09-01 PROCEDURE — 1159F MED LIST DOCD IN RCRD: CPT | Mod: CPTII,S$GLB,, | Performed by: DERMATOLOGY

## 2022-09-01 NOTE — PROGRESS NOTES
Subjective:       Patient ID:  Venkat Jonas is a 57 y.o. male who presents for   Chief Complaint   Patient presents with    Skin Check     UBSE    Lesion     back     History of Present Illness: The patient presents for follow up of skin check.    The patient was last seen on: 09/05/2018 for follow up.     Other skin complaints: lesion to back    No h/o nmsc or mm    Patient with new complaint of lesion(s)  Location: back  Duration: 1 year  Symptoms: scales  Relieving factors/Previous treatments: none          Review of Systems   Skin:  Positive for activity-related sunscreen use and wears hat (activities). Negative for daily sunscreen use and recent sunburn.   Hematologic/Lymphatic: Does not bruise/bleed easily.      Objective:    Physical Exam   Constitutional: He appears well-developed and well-nourished. No distress.   Neurological: He is alert and oriented to person, place, and time. He is not disoriented.   Psychiatric: He has a normal mood and affect.   Skin:   Areas Examined (abnormalities noted in diagram):   Head / Face Inspection Performed  Neck Inspection Performed  Chest / Axilla Inspection Performed  Back Inspection Performed  RUE Inspected  LUE Inspection Performed                 Diagram Legend     Erythematous scaling macule/papule c/w actinic keratosis       Vascular papule c/w angioma      Pigmented verrucoid papule/plaque c/w seborrheic keratosis      Yellow umbilicated papule c/w sebaceous hyperplasia      Irregularly shaped tan macule c/w lentigo     1-2 mm smooth white papules consistent with Milia      Movable subcutaneous cyst with punctum c/w epidermal inclusion cyst      Subcutaneous movable cyst c/w pilar cyst      Firm pink to brown papule c/w dermatofibroma      Pedunculated fleshy papule(s) c/w skin tag(s)      Evenly pigmented macule c/w junctional nevus     Mildly variegated pigmented, slightly irregular-bordered macule c/w mildly atypical nevus      Flesh colored to evenly  pigmented papule c/w intradermal nevus       Pink pearly papule/plaque c/w basal cell carcinoma      Erythematous hyperkeratotic cursted plaque c/w SCC      Surgical scar with no sign of skin cancer recurrence      Open and closed comedones      Inflammatory papules and pustules      Verrucoid papule consistent consistent with wart     Erythematous eczematous patches and plaques     Dystrophic onycholytic nail with subungual debris c/w onychomycosis     Umbilicated papule    Erythematous-base heme-crusted tan verrucoid plaque consistent with inflamed seborrheic keratosis     Erythematous Silvery Scaling Plaque c/w Psoriasis     See annotation      Assessment / Plan:        Verruca vulgaris - right toes  Cryosurgery procedure note:    Verbal consent from the patient is obtained including, but not limited to, risk of hypopigmentation/hyperpigmentation, scar, recurrence of lesion. Liquid nitrogen cryosurgery is applied to 2 verruca with prior paring, as detailed in the physical exam, to produce a freeze injury. 3 consecutive freeze thaw cycles are applied to each verruca. The patient is aware that blisters (possibly blood blisters) may form.      Inflamed seborrheic keratosis - upper mid back  Cryosurgery procedure note:    Verbal consent from the patient is obtained including, but not limited to, risk of hypopigmentation/hyperpigmentation, scar, recurrence of lesion. Liquid nitrogen cryosurgery is applied to 1 lesions to produce a freeze injury. The patient is aware that blisters may form and is instructed on wound care with gentle cleansing and use of vaseline ointment to keep moist until healed. The patient is supplied a handout on cryosurgery and is instructed to call if lesions do not completely resolve.      SK (seborrheic keratosis)  These are benign inherited growths without a malignant potential. Reassurance given to patient. No treatment is necessary.       Sebaceous gland hyperplasia  This is a common  condition representing benign enlargement of the sebaceous lobule. It typically occurs in adulthood. Reassurance given to patient.       Screening exam for skin cancer  Upper body skin examination performed today including at least 6 points as noted in physical examination. No lesions suspicious for malignancy noted.    Recommend daily sun protection/avoidance and use of at least SPF 30, broad spectrum sunscreen (OTC drug).              Follow up if symptoms worsen or fail to improve.

## 2022-09-08 RX ORDER — EZETIMIBE 10 MG/1
TABLET ORAL
Qty: 90 TABLET | Refills: 3 | Status: SHIPPED | OUTPATIENT
Start: 2022-09-08 | End: 2023-08-31 | Stop reason: SDUPTHER

## 2022-09-09 ENCOUNTER — TELEPHONE (OUTPATIENT)
Dept: INTERNAL MEDICINE | Facility: CLINIC | Age: 57
End: 2022-09-09
Payer: COMMERCIAL

## 2022-09-09 DIAGNOSIS — R05.9 COUGH: Primary | ICD-10-CM

## 2022-09-09 RX ORDER — CODEINE PHOSPHATE AND GUAIFENESIN 10; 100 MG/5ML; MG/5ML
5 SOLUTION ORAL EVERY 8 HOURS PRN
Qty: 150 ML | Refills: 0 | Status: SHIPPED | OUTPATIENT
Start: 2022-09-09 | End: 2022-09-19

## 2022-09-12 ENCOUNTER — HOSPITAL ENCOUNTER (OUTPATIENT)
Dept: RADIOLOGY | Facility: HOSPITAL | Age: 57
Discharge: HOME | End: 2022-09-12
Attending: INTERNAL MEDICINE
Payer: COMMERCIAL

## 2022-09-12 DIAGNOSIS — C64.9 RENAL CELL CARCINOMA, UNSPECIFIED LATERALITY: ICD-10-CM

## 2022-09-12 DIAGNOSIS — Z85.528 HISTORY OF RENAL CELL CARCINOMA: ICD-10-CM

## 2022-09-12 DIAGNOSIS — C64.2 RENAL CELL CANCER, LEFT: ICD-10-CM

## 2022-09-12 PROCEDURE — 71260 CT THORAX DX C+: CPT | Mod: TC

## 2022-09-12 PROCEDURE — 71260 CT THORAX DX C+: CPT | Mod: 26,,, | Performed by: RADIOLOGY

## 2022-09-12 PROCEDURE — 71260 CT CHEST ABDOMEN PELVIS WITH CONTRAST (XPD): ICD-10-PCS | Mod: 26,,, | Performed by: RADIOLOGY

## 2022-09-12 PROCEDURE — 25500020 PHARM REV CODE 255: Performed by: INTERNAL MEDICINE

## 2022-09-12 PROCEDURE — 74177 CT CHEST ABDOMEN PELVIS WITH CONTRAST (XPD): ICD-10-PCS | Mod: 26,,, | Performed by: RADIOLOGY

## 2022-09-12 PROCEDURE — A9698 NON-RAD CONTRAST MATERIALNOC: HCPCS | Performed by: INTERNAL MEDICINE

## 2022-09-12 PROCEDURE — 74177 CT ABD & PELVIS W/CONTRAST: CPT | Mod: TC

## 2022-09-12 PROCEDURE — 74177 CT ABD & PELVIS W/CONTRAST: CPT | Mod: 26,,, | Performed by: RADIOLOGY

## 2022-09-12 RX ADMIN — IOHEXOL 100 ML: 350 INJECTION, SOLUTION INTRAVENOUS at 03:09

## 2022-09-12 RX ADMIN — BARIUM SULFATE 450 ML: 20 SUSPENSION ORAL at 03:09

## 2022-09-13 ENCOUNTER — IMMUNIZATION (OUTPATIENT)
Dept: INTERNAL MEDICINE | Facility: CLINIC | Age: 57
End: 2022-09-13
Payer: COMMERCIAL

## 2022-09-13 DIAGNOSIS — Z23 NEED FOR VACCINATION: Primary | ICD-10-CM

## 2022-09-13 PROCEDURE — 91312 COVID-19, MRNA, LNP-S, BIVALENT BOOSTER, PF, 30 MCG/0.3 ML DOSE: ICD-10-PCS | Mod: S$GLB,,, | Performed by: INTERNAL MEDICINE

## 2022-09-13 PROCEDURE — 91312 COVID-19, MRNA, LNP-S, BIVALENT BOOSTER, PF, 30 MCG/0.3 ML DOSE: CPT | Mod: S$GLB,,, | Performed by: INTERNAL MEDICINE

## 2022-09-13 NOTE — PROGRESS NOTES
Subjective:       Patient ID: Venkat Jonas    Chief Complaint: h/o RCC    HPI     Venkat Jonas is a 57 y.o. male,  to clinic for evaluation and management of RCC, s/p nephrectomy.      He notes viral cold symptoms, which are resolving.        Oncologic History:    Developed hematuria and on imaging was found to have a renal mass c/w RCC.  Questionable bone lesion.  Underwent nephrectomy October 7, 2020 with below pathology.  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       Recovered from surgery remarkably well.  Back to work.  No major issues.  Feeling well.        PATHOLOGY:    1. Lymph nodes, aortic (regional resection):  - 3 lymph nodes negative for tumor (0/3)    2. Left kidney and adrenal gland (radical nephrectomy):  - Renal cell carcinoma, see synoptic report below  - Additional ancillary testing for tumor classification is ordered and results will be issued in a supplemental report    Kidney carcinoma synoptic report  - Procedure: Radical nephrectomy  - Specimen laterality: Left  - Tumor site: Upper pole  - Tumor size: 8.9 x 8.1 x 7.7 cm  - Tumor focality: Renal cell carcinoma, unclassified (see microscopic section)  - Sarcomatoid features: Not identified  - Rhabdoid features: Present  - Histologic grade: WHO/ISUP Nuclear grade 4  - Tumor necrosis: Present, approximately 20% of the tumor  - Tumor extension: Tumor extends into renal vein  - Margins:  - Uninvolved by invasive carcinoma  - Regional lymph nodes:  - Number of lymph nodes involved: 0  - Number of lymph nodes examined: 4 (specimen 1 and specimen 2)  - Pathologic staging: pT3a N0 MX  - Other findings: Separate sclerotic and calcified nodule  - Tumor block for potential studies: 2D, 2E, 2F 2H, 2I, 2J, 2K  - Nontumor block: 2N  - Immunostain panel in microscopic section  - MMR IHC: No loss of expression (see microscopic section)  Sections show an eosinophilic renal cell carcinoma with nuclear inclusions ,cytoplasmic  inclusions , and  extracellular eosinophillic material. Several renal cell carcinoma subtypes are in the differential including  high grade chromophobe, eosinophilic clear cell, SDH deficient, and translocation associated. Subtyping  will be attempted with ancillary testing and results issued in a supplemental report. Selected slides  reviewed by additional pathologists.    Immunostain results:  Positive stains: AMACR, keratin AE1/AE3, CD10,  (weak), vimentin  Negative stains: Keratin 7, keratin 20, HMB45, Mart 1, desmin    Immunohistochemistry (IHC) Testing for Mismatch Repair (MMR) Proteins:  MLH1 - Intact nuclear expression  MSH2 - Intact nuclear expression  MSH6 - Intact nuclear expression  PMS2 - Intact nuclear expression    Review of Systems   Constitutional: Negative for activity change, appetite change, chills, fatigue, fever and unexpected weight change.   HENT: Negative for congestion, hearing loss, mouth sores, sore throat and trouble swallowing.    Eyes: Negative for pain and visual disturbance.   Respiratory: Negative for cough, shortness of breath and wheezing.    Cardiovascular: Negative for chest pain, palpitations and leg swelling.   Gastrointestinal: Negative for abdominal pain, constipation, diarrhea, nausea and vomiting.   Endocrine: Negative for cold intolerance and heat intolerance.   Genitourinary: Negative for difficulty urinating, discharge, dysuria, enuresis, frequency, hematuria, scrotal swelling and testicular pain.   Musculoskeletal: Negative for arthralgias, back pain and myalgias.   Skin: Negative for color change, rash and wound.   Allergic/Immunologic: Negative for environmental allergies and food allergies.   Neurological: Negative for weakness, numbness and headaches.   Hematological: Negative for adenopathy. Does not bruise/bleed easily.   Psychiatric/Behavioral: Negative for confusion, hallucinations and sleep disturbance. The patient is not nervous/anxious.    All other  systems reviewed and are negative.        Allergies:  Review of patient's allergies indicates:  No Known Allergies    Medications:  Current Outpatient Medications   Medication Sig Dispense Refill    albuterol (PROVENTIL/VENTOLIN HFA) 90 mcg/actuation inhaler Inhale 2 puffs into the lungs every 4 (four) hours as needed for Shortness of Breath. 18 g 3    aspirin 81 MG Chew Take 81 mg by mouth once daily.      azelastine (OPTIVAR) 0.05 % ophthalmic solution Place 1 drop into both eyes 2 (two) times daily. 18 mL 3    ezetimibe (ZETIA) 10 mg tablet Take 1 tablet by mouth daily 90 tablet 3    guaiFENesin-codeine 100-10 mg/5 ml (TUSSI-ORGANIDIN NR)  mg/5 mL syrup Take 5 mLs by mouth every 8 (eight) hours as needed for Cough. 150 mL 0    pantoprazole (PROTONIX) 40 MG tablet TAKE ONE TABLET BY MOUTH TWICE A DAY ON AN EMPTY STOMACH 180 tablet 3    rosuvastatin (CRESTOR) 40 MG Tab Take 1 tablet (40 mg total) by mouth every evening. 90 tablet 3    valACYclovir (VALTREX) 1000 MG tablet TAKE 2 TABLETS (2,000 MG TOTAL) BY MOUTH 2 (TWO) TIMES DAILY FOR 2 DAYS and repeat as needed 12 tablet 1    valsartan (DIOVAN) 160 MG tablet Take 1 tablet (160 mg total) by mouth once daily. 90 tablet 3    zolpidem (AMBIEN) 10 mg Tab Take 1 tablet (10 mg total) by mouth nightly as needed. 30 tablet 5     No current facility-administered medications for this visit.       PMH:  Past Medical History:   Diagnosis Date    Allergic sinusitis     Exercise-induced asthma     Hyperlipidemia     Hypertension     Renal cell carcinoma 10/14/2020       PSH:  Past Surgical History:   Procedure Laterality Date    COLONOSCOPY N/A 12/5/2016    Procedure: COLONOSCOPY;  Surgeon: Toni Rivera MD;  Location: [facility] ENDO (4TH FLR);  Service: Endoscopy;  Laterality: N/A;  [facility]    COLONOSCOPY N/A 11/3/2020    Procedure: COLONOSCOPY;  Surgeon: Toni Rivera MD;  Location: [facility] ENDO (2ND FLR);  Service: Endoscopy;  Laterality: N/A;  Schedule patient EGD  colonoscopy with me next available but as soon as possible    CYSTOSCOPY W/ RETROGRADES Left 10/1/2020    Procedure: CYSTOSCOPY, WITH RETROGRADE PYELOGRAM;  Surgeon: Vega Sears MD;  Location: [facility] OR 1ST FLR;  Service: Urology;  Laterality: Left;    ESOPHAGOGASTRODUODENOSCOPY N/A 11/3/2020    Procedure: EGD (ESOPHAGOGASTRODUODENOSCOPY);  Surgeon: Toni Rivera MD;  Location: [facility] ENDO (2ND FLR);  Service: Endoscopy;  Laterality: N/A;  pt will get rapid on 11/2-MS    LAPAROSCOPIC ROBOT-ASSISTED SURGICAL REMOVAL OF KIDNEY USING DA SONIDO XI Left 10/7/2020    Procedure: XI ROBOTIC NEPHRECTOMY;  Surgeon: Vega Sears MD;  Location: [facility] OR 2ND FLR;  Service: Urology;  Laterality: Left;  gen with regional/ 5hrs    REPAIR OF COLLATERAL LIGAMENT OF THUMB Right 5/14/2021    Procedure: REPAIR, LIGAMENT, COLLATERAL, THUMB, right UCLUCL;  Surgeon: Carolyn Thao MD;  Location: [facility];  Service: Orthopedics;  Laterality: Right;  Regional/MAC, [facility]    SINUS SURGERY         FamHx:  Family History   Problem Relation Age of Onset    Hyperlipidemia Father     Hyperlipidemia Brother     Heart attack Maternal Grandmother     Cancer Maternal Grandfather         throat    Melanoma Neg Hx     Psoriasis Neg Hx     Lupus Neg Hx     Eczema Neg Hx     Acne Neg Hx     Heart disease Neg Hx        SocHx:  Social History     Socioeconomic History    Marital status:      Spouse name: Kamila    Number of children: 3   Occupational History    Occupation:  Marketing     Employer: [employer]   Tobacco Use    Smoking status: Never    Smokeless tobacco: Never    Tobacco comments:     The patient exercises regularly at [employer].  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   Substance and Sexual Activity    Alcohol use: Yes     Alcohol/week: 4.0 standard drinks     Types: 4 Shots of liquor per week     Comment: socially    Drug use: No    Sexual activity: Yes     Partners:  Female   Social History Narrative    Administration at [employer]    , 3 kids (23, 18, 17)         Objective:         Vitals reviewed.     Physical Exam  Constitutional:       General: He is not in acute distress.     Appearance: Normal appearance. He is normal weight. He is not ill-appearing, toxic-appearing or diaphoretic.   HENT:      Head: Normocephalic and atraumatic.      Nose: Nose normal.   Eyes:      General: No scleral icterus.        Right eye: No discharge.         Left eye: No discharge.      Conjunctiva/sclera: Conjunctivae normal.   Skin:     General: Skin is warm and dry.      Coloration: Skin is not jaundiced or pale.   Neurological:      General: No focal deficit present.      Mental Status: He is alert and oriented to person, place, and time. Mental status is at baseline.   Psychiatric:         Mood and Affect: Mood normal.         Behavior: Behavior normal.         Thought Content: Thought content normal.         Judgment: Judgment normal.           LABS:  WBC   Date Value Ref Range Status   09/12/2022 5.20 3.90 - 12.70 K/uL Final     Hemoglobin   Date Value Ref Range Status   09/12/2022 14.5 14.0 - 18.0 g/dL Final     Hematocrit   Date Value Ref Range Status   09/12/2022 43.4 40.0 - 54.0 % Final     Platelets   Date Value Ref Range Status   09/12/2022 252 150 - 450 K/uL Final       Chemistry        Component Value Date/Time     09/12/2022 0720    K 4.2 09/12/2022 0720     09/12/2022 0720    CO2 25 09/12/2022 0720    BUN 22 (H) 09/12/2022 0720    CREATININE 1.3 09/12/2022 0720    GLU 96 09/12/2022 0720        Component Value Date/Time    CALCIUM 9.6 09/12/2022 0720    ALKPHOS 64 09/12/2022 0720    AST 20 09/12/2022 0720    ALT 20 09/12/2022 0720    BILITOT 0.5 09/12/2022 0720    ESTGFRAFRICA >60.0 05/02/2022 1011    EGFRNONAA >60.0 05/02/2022 1011              Assessment:       1. Renal adenocarcinoma, left            Plan:         1. RCC:    Previously reviewed pathology.  I am  happy that lymph nodes were clear, and margins were negative.  The fact that there was renal vein involvement, pushes this to a stage III.  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    We are assuming that the questionable bone lesion is benign, watching this carefully.      At this point, we discussed monitoring this closely or considering adjuvant Sutent    After careful consideration, the patient has decided to monitor this through routine surveillance, which I fully support.    New data from [facility] on adjuvant immunotherapy that was presented only studied patients 12 wks or less from nephrectomy, we did not think that considering immunotherapy 9 mos after surgery would be prudent or c/w supporting data.     Rescanning now stable, monitoring tiny subcm spots, otherwise stable with ELLI.     Discussed possible ozempic for weight loss-- he will talk to his PCP.     RTC 4 mos to see me with CT CAP, CBC, CMP.  Will go to q6 mo scans after that.      Will also draw PGx today.      He knows to call us if there are issues or questions in the meantime.    The patient agrees with the plan, and all questions have been answered to their satisfaction.      More than 30 mins were spent during this encounter, greater than 50% was spent in direct counseling and/or coordination of care.     Chandana Carrasco M.D., M.S., F.A.C.P.  Hematology and Oncology Attending  [facility]          Route Chart for Scheduling    Med Onc Chart Routing      Follow up with physician 4 months. RTC 4 mos to see me with CT CAP, CBC, CMP.   Follow up with RYNE    Infusion scheduling note    Injection scheduling note    Labs CBC and CMP   Lab interval:     Imaging CT chest abdomen pelvis      Pharmacy appointment    Other referrals

## 2022-09-14 ENCOUNTER — LAB VISIT (OUTPATIENT)
Dept: LAB | Facility: HOSPITAL | Age: 57
End: 2022-09-14
Attending: INTERNAL MEDICINE
Payer: COMMERCIAL

## 2022-09-14 ENCOUNTER — OFFICE VISIT (OUTPATIENT)
Dept: HEMATOLOGY/ONCOLOGY | Facility: CLINIC | Age: 57
End: 2022-09-14
Payer: COMMERCIAL

## 2022-09-14 VITALS — SYSTOLIC BLOOD PRESSURE: 122 MMHG | DIASTOLIC BLOOD PRESSURE: 90 MMHG | HEART RATE: 91 BPM | OXYGEN SATURATION: 97 %

## 2022-09-14 DIAGNOSIS — C64.2 RENAL ADENOCARCINOMA, LEFT: Primary | ICD-10-CM

## 2022-09-14 DIAGNOSIS — C64.2 RENAL ADENOCARCINOMA, LEFT: ICD-10-CM

## 2022-09-14 PROCEDURE — 3074F SYST BP LT 130 MM HG: CPT | Mod: CPTII,S$GLB,, | Performed by: INTERNAL MEDICINE

## 2022-09-14 PROCEDURE — 4010F ACE/ARB THERAPY RXD/TAKEN: CPT | Mod: CPTII,S$GLB,, | Performed by: INTERNAL MEDICINE

## 2022-09-14 PROCEDURE — 3080F DIAST BP >= 90 MM HG: CPT | Mod: CPTII,S$GLB,, | Performed by: INTERNAL MEDICINE

## 2022-09-14 PROCEDURE — 99214 PR OFFICE/OUTPT VISIT, EST, LEVL IV, 30-39 MIN: ICD-10-PCS | Mod: S$GLB,,, | Performed by: INTERNAL MEDICINE

## 2022-09-14 PROCEDURE — 36415 COLL VENOUS BLD VENIPUNCTURE: CPT | Performed by: INTERNAL MEDICINE

## 2022-09-14 PROCEDURE — 99214 OFFICE O/P EST MOD 30 MIN: CPT | Mod: S$GLB,,, | Performed by: INTERNAL MEDICINE

## 2022-09-14 PROCEDURE — 99999 PR PBB SHADOW E&M-EST. PATIENT-LVL II: ICD-10-PCS | Mod: PBBFAC,,, | Performed by: INTERNAL MEDICINE

## 2022-09-14 PROCEDURE — 3074F PR MOST RECENT SYSTOLIC BLOOD PRESSURE < 130 MM HG: ICD-10-PCS | Mod: CPTII,S$GLB,, | Performed by: INTERNAL MEDICINE

## 2022-09-14 PROCEDURE — 3080F PR MOST RECENT DIASTOLIC BLOOD PRESSURE >= 90 MM HG: ICD-10-PCS | Mod: CPTII,S$GLB,, | Performed by: INTERNAL MEDICINE

## 2022-09-14 PROCEDURE — 99999 PR PBB SHADOW E&M-EST. PATIENT-LVL II: CPT | Mod: PBBFAC,,, | Performed by: INTERNAL MEDICINE

## 2022-09-14 PROCEDURE — 4010F PR ACE/ARB THEARPY RXD/TAKEN: ICD-10-PCS | Mod: CPTII,S$GLB,, | Performed by: INTERNAL MEDICINE

## 2022-09-16 RX ORDER — TIRZEPATIDE 2.5 MG/.5ML
2.5 INJECTION, SOLUTION SUBCUTANEOUS
Qty: 4 PEN | Refills: 0 | Status: SHIPPED | OUTPATIENT
Start: 2022-09-16 | End: 2022-10-11 | Stop reason: DRUGHIGH

## 2022-09-21 ENCOUNTER — DOCUMENTATION ONLY (OUTPATIENT)
Dept: HEMATOLOGY/ONCOLOGY | Facility: CLINIC | Age: 57
End: 2022-09-21
Payer: COMMERCIAL

## 2022-09-21 NOTE — PROGRESS NOTES
PGx Consult note   REPORT DATE: 9/21/2022 This 57 y.o. patient has a recorded medication list that includes ONE medication that is potentially influenced by the patients genetics.  Specific recommendations are noted below along with future medications to avoid, use with caution, or use with dose modification.   PATIENT NAME: Venkat Jonas    MRN: [id number]    YOB: 1965    SEX: male    MEDICATION ALLERGIES: Patient has no known allergies.    These results should always be considered relative to other clinical indicators and the patients full medication profile. These results comprise only one aspect of the many components used in making clinical treatment decisions. Non-genetic factors can influence patient response to medication and dosage needs. Drug-drug and drug-gene interactions that lead to enzyme inhibition or induction may lead to altered metabolism.  Patients should not make changes to their medications without discussing test results with their healthcare provider.     This patient has 3 pharmacogenes that are considered actionable depending on certain medication use. Based on the patient's recorded medication list, 1 current medication(s) are potentially influenced by assessed patient genetics.     Pharmacogenomics Considerations Based on Current Medication Use    Current medication: Pantoprazole 40 mg BID  Related result: [id number] Poor Metabolizer  Recommendation: Increased plasma concentration of PPI compared to normal metabolism; increased chance of efficacy and potentially toxicity. The CPIC guidelines recommend to start normal initial dose and decrease by 50% for chronic therapy (>12 wks). Mr. Jonas has been on this medication for several years at this dose. If not experiencing side effects, it is unlikely a dose reduction is warranted.         Pertinent Findings    Mr. Jonas is currently on a PPI that is poorly metabolized via ASD2V56. Normally, we would reduce by 50% for  chronic therapy to avoid side effects, specifically infection. A trial could be attempted to wean to a lower dose. An H2RA, such as famotidine, could also be tried since it does not appear to be treating H. pylori or erosive esophagitis.     A future drug of concern is  lack of response to clopidogrel (Plavix) due to ZCH5X38 Poor Metabolism, as he appears to have an elevated coronary calcium score and a family history of heart attacks. An alternative medication (I.e., Brilinta, Effient) should be selected if possible.  His cholesterol, and LDL in particular are well controlled with Crestor + Zetia combo, neither which are affected by his DBOZ0I2 Normal result.     Future behavioral health medications may be limited by his MLX5J95 PM Status. See below for more detail.    Medications to Use With Caution    WRN8O11 Poor Metabolizer    PPIs (omeprazole, pantoprazole, lansoprazole): Initiate standard dosing; for chronic therapy (>12 weeks) and good efficacy, consider 50% reduction in daily dose due to increased plasma concentrations.    Sertraline/Citalopram/Escitalopram: Consider 50% reduction of normal starting dose due to greatly reduced metabolism and possible increased side effects.      Medications to Avoid    JRV3M68 Poor Metabolizer    Clopidogrel: Avoid due to reduced conversion to active metabolite; use prasugrel or ticagrelor if no contraindication.    Voriconazole: Choose an alternative agent that is not dependent on AEW6U76 metabolism as primary therapy in lieu of voriconazole. Such agents include isavuconazole, liposomal amphotericin B, and posaconazole. The probability of therapeutic concentrations is modest with standard voriconazole dosing.    Amitriptyline/Tertiary amines: Avoid use due to the potential for sub-optimal response. Alternatives include nortriptyline and desipramine.       Patient Note     Left patient a voicemail. Will attempt again.    9/22: Spoke with patient about results,  highlighting Plavix interaction and possible reduction of PPI. Reviewed medications and all questions answered. Results sent to [facility]rasco and [facility].   Genomic Indicators        COMT Intermediate Activity [id number] GA CHELITA/MET  Updated 2022 by Security, Standard Interface User      COMT is an enzyme that degrades dopamine and norepinephrine, primarily in the prefrontal cortex. 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 The G allele is associated with higher enzyme activity, compared with the A allele. Thus, patients with GA genotype are predicted to have intermediate COMT activity, ie, intermediate dopamine degradation and concentrations, compared to patients with GG genotype. Patients with GA genotype are more likely to respond to psychotropic medications than those with GG genotype.      Reference Links    Very Important Pharmacogene: COMT                      CY Rapid Metabolizer  Updated 2022 by Security, Standard Interface User      Genomic results predict that patient may metabolize CY substrates at a rate that exceeds average metabolic capacity. Patient may be more susceptible to the effects of enzyme inducers (such as tobacco smoke). The clinical significance of this phenotype is not fully known, and current guidelines provide no clinical recommendations related to CY variation. Commonly used medications metabolized by CY include clozapine and olanzapine. For questions, call [phone] ([phone] or order PGx Consult [[id number]].               CYP2B6 Normal Metabolizer  Updated 2022 by Security, Standard Interface User      Genomic results predict that this patient may metabolize CYP2B6 substrates at a rate consistent with average metabolic capacity. Thus, there is no clear indication for selective dose adjustment for most medications metabolized by CYP2B6. A commonly used medication metabolized by CYP2B6 is efavirenz. For questions, call  [phone] ([phone] or order PGx Consult [[id number]].      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 AA genotype has been associated with warfarin sensitivity in certain populations. This information should be considered along with genotyping results for CYP2C9, VKORC1, and CYP4F2 when initiating warfarin therapy. Genotyping results for [id number] may be requested in warfarin dosing algorithms. For questions, call [phone] ([phone] or order PGx Consult [[id number]].      Reference Links    CPIC® Guideline for Pharmacogenetics-Guided Warfarin Dosing                      RFC3D10 Poor Metabolizer  Updated 9/20/2022 by Security, Standard Interface User      Genomic results predict that this patient may metabolize GCR5G25 substrates at a rate that falls greatly below average metabolic capacity or approaches zero. Thus, this patient may have an increased risk of adverse or poor response to medications metabolized by XRA6L30. To avoid these types of responses, dose adjustments or alternative therapeutic agents may be necessary when medications metabolized by QVZ6M59 are being considered. Commonly used medications metabolized by PSM1S27 include the following: amitriptyline, brivaracetam, citalopram, clobazam, clomipramine, clopidogrel, dexlansoprazole, doxepin, escitalopram, imipramine, lansoprazole, omeprazole, pantoprazole, sertraline, trimipramine, and voriconazole. For questions, call [phone] ([phone] or order PGx Consult [[id number]].      Reference Links    CPIC® Guideline for Clopidogrel and IUH3K11    CPIC® Guideline for Proton Pump Inhibitors and JMA7M31    CPIC® Guideline for Voriconazole and RIP0H55    CPIC® Guideline for Selective Serotonin Reuptake  Inhibitors and CYP2D6 and KYI2P26    CPIC® Guideline for Tricyclic Antidepressants and CYP2D6 and IIF1L59                      CYP2C9 Normal Metabolizer  Updated 2022 by Security, Standard Interface User      Genomic results predict that this patient may metabolize CYP2C9 substrates at a rate consistent with average metabolic capacity. Thus, there is no clear indication for selective dose adjustment for most medications metabolized by CYP2C9. Commonly used medications metabolized by CYP2C9 include the following: celecoxib, flurbiprofen, fosphenytoin, ibuprofen, lornoxicam, meloxicam, phenytoin, piroxicam, and warfarin. For questions, call [phone] ([phone]) or order PGx Consult [[id number]].      Reference Links    CPIC® Guideline for NSAIDs based on CYP2C9 genotype    CPIC® Guideline for Phenytoin and CYP2C9 and HLA-B    CPIC® Guideline for Pharmacogenetics-Guided Warfarin Dosing                      CYP2D6 Normal Metabolizer  Updated 2022 by Security, Standard Interface User      Genomic results predict that this patient may metabolize CYP2D6 substrates at a rate consistent with average metabolic capacity. Thus, there is no clear indication for selective dose adjustment for most medications metabolized by CYP2D6. For questions, call [phone] ([phone]) or order PGx Consult [[id number]].      Reference Links    CPIC® Guideline for Ondansetron and Tropisetron based on CYP2D6 genotype    CPIC® Guideline for Atomoxetine based on CYP2D6 genotype    CPIC® Guideline for Selective Serotonin Reuptake Inhibitors and CYP2D6 and [id number]    CPIC® Guideline for Tricyclic Antidepressants and CYP2D6 and IJC0L73    CPIC® Guideline for Opioids and CYP2D6, OPRM1, and COMT    CPIC® Guideline for Tamoxifen based on CYP2D6 genotype                      CY Normal Metabolizer  Updated 2022 by Security, Standard Interface User      CY is involved in the metabolism of nearly 50% of all prescribed medications.  Genomic results predict that patient may metabolize CY substrates at rates consistent with average metabolic capacity. Current guidelines provide no clinical recommendations related to CY variations or reasons to selectively adjust dose of medications metabolized (inactivated or activated) by CY. Related literature is evolving. Commonly used medication metabolized by CY include atorvastatin, lovastatin, simvastatin and tacrolimus. For questions, call [phone]E ([phone]) or order PGx Consult [[id number]].      Reference Links    Very Important Pharmacogene: CY                      CY Poor Metabolizer  Updated 2022 by Security, Standard Interface User      Genomic results predict that this patient may metabolize CY substrates at a rate that falls greatly below metabolic capacity or approaches zero. For some populations, absence of functional CY is the normal state. Patients with this genotype are likely to require standard doses of certain medications metabolized by CY, including tacrolimus. For questions, call [phone]E ([phone]) or order PGx Consult [[id number]].      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 This information may be used in assessing patient sensitivity to warfarin; however, warfarin effect is also influenced by decreases in CYP2C9 function and VKORC1 status. To avoid risk of adverse or poor response to warfarin, genomic results for CYP2C9, VKORC1 function, and CYP2C cluster should be considered before initial dose determination or adjustment. Warfarin dosing algorithms are now available that incorporate CYP2C9, VKORC1, and CYP4F2 function ([email or web address].org). For questions, call [phone] ([phone]) or  order PGx Consult [[id number]].      Reference Links    CPIC® Guideline for Pharmacogenetics-Guided Warfarin Dosing                      DPYD Normal Metabolizer  Updated 9/20/2022 by Security, Standard Interface User      Genomic results predict that this patient may metabolize DPYD substrates at a rate consistent with average metabolic capacity (activity score = 2). Thus, there is no clear indication for selective dose adjustment for most medications metabolized by DPYD. Commonly used medications metabolized by DPYD include 5-fluorouracil and capecitabine. For questions, call [phone] ([phone]) or order PGx Consult [[id number]].      Reference Links    CPIC® Guideline for Fluoropyrimidines and DPYD                      DRD2 Normal Expression [id number] AA  Updated 9/20/2022 by Security, Standard Interface User      Genomic results indicate that patient is homozygous for the A allele (AA) and predict normal expression of the dopamine receptor D2. The usefulness of this result is currently limited in terms of predicting risk and guiding medication selection. For questions, call [phone] ([phone]) or order PGx Consult [[id number]].      Reference Links    [employer]: [id number]                      F2 Normal Thrombosis Risk (GG)  Updated 9/20/2022 by Security, Standard Interface User      F2 genotype may be useful in identifying increased risk of thrombosis due to prothrombin thrombophilia. Genomic results indicate that patient does not have variant c.*97G>A (formerly known as [id number]). This finding suggests no increase in genetic risk for thromboembolic events in patients taking oral contraceptives. For questions, call [phone] ([phone]) or order PGx Consult [[id number]].               F5 Normal Thrombosis Risk (GG)  Updated 9/20/2022 by Security, Standard Interface User      F5 genotype may be useful in identifying increased risk of thrombosis due to Factor 5 Leiden thrombophilia. Genomic results indicate that patient does not have  variant c.1601G>A (formerly known as Leiden, G4006K or R506Q). This finding suggests no increase in genetic risk for thromboembolic events in patients taking oral contraceptives. For questions, call [phone] ([phone]) or order PGx Consult [[id number]].               GRIK4 Normal Receptor Function [id number] CC  Updated 9/20/2022 by Security, Standard Interface User      GRIK4 is a protein involved in the transmission of certain signals in the brain. Genomic results indicate that patient is homozygous for the C allele (CC). GRIK4 polymorphisms are only one of many factors that contribute to a patient's clinical picture and response to medication; thus, the utility of GRIK4 genotyping is currently limited in terms of risk prediction and medication selection. Some evidence suggests that patients with genotype CC have an increased likelihood of responding to certain antidepressants, compared to patients with genotype TT. For questions, call [phone] ([phone]) or order PGx Consult [[id number]].      Reference Links    [employer]: [id number]    [employer] Clinical Annotation for [id number] (GRIK4)                      HLA-A*31:01 Not Detected  Updated 9/20/2022 by Security, Standard Interface User      Genomic results indicate that this patient is negative for HLA-A*31:01. When present, the HLA-A*31:01 allele is associated with severe cutaneous adverse reactions among patients taking certain aromatic anticonvulsants, including carbamazepine. No change in therapy is warranted based on these results. Note, however, that a negative HLA-A*31:01 result does not absolutely rule out the possibility of an adverse drug reaction. Results for HLA-B*15:02 should be considered. For questions, call [phone] ([phone]) or order PGx Consult [[id number]].      Reference Links    CPIC® Guideline for HLA genotype and Use of Carbamazepine and Oxcarbazepine                      HLA-B*15:02 Not Detected  Updated 9/20/2022 by Security, Standard Interface User       Genomic results indicate that this patient is negative for HLA-B*15:02. The HLA-B*15:02 allele is associated with severe cutaneous adverse reactions among patients taking certain aromatic anticonvulsants, including carbamazepine, oxcarbazepine, and phenytoin or fosphenytoin. No change in therapy is warranted based on these results. Note, however, that a negative HLA-B*15:02 result does not absolutely rule out the possibility of an adverse drug reaction. Results for HLA-A*31:01 should also be considered. For questions, call [phone] ([phone]) or order PGx Consult [[id number]].      Reference Links    CPIC® Guideline for Phenytoin and CYP2C9 and HLA-B    CPIC® Guideline for HLA genotype and Use of Carbamazepine and Oxcarbazepine    CPIC® Guideline for Abacavir and HLA-B    CPIC® Guideline for Allopurinol and HLA-B                      HLA-B*57:01 Not Detected  Updated 9/20/2022 by Security, Standard Interface User      Genomic results indicate that this patient is negative for HLA-B*57:01. Based on this result, abacavir may be prescribed. Refer to formulary dosing guidelines. A negative HLA-B*57:01 result does not absolutely rule out the possibility of some form of abacavir hypersensitivity. Administration of abacavir requires close observation including immediate discontinuation of therapy should any signs or symptoms of hypersensitivity develop. For questions, call [phone] CommissionGENE ([phone]) or order PGx Consult [[id number]].      Reference Links    CPIC® Guideline for Abacavir and HLA-B                      HLA-B*58:01 Not Detected  Updated 9/20/2022 by Security, Standard Interface User      Genomic results indicate that this patient is negative for HLA-B*58:01. Based on this result, allopurinol may be prescribed or continued. A negative HLA-B*58:01 result does not rule out the possibility of some form of allopurinol hypersensitivity. For questions, call [phone]GENE ([phone]) or order PGx Consult [[id number]].      Reference  Links    CPIC® Guideline for Allopurinol and HLA-B                      HTR2A Intron 2 Genotype GA ([id number])  Updated 9/20/2022 by Security, Standard Interface User      Genomic results indicate that patient is heterozygous for the G allele (GA). This genotype may predict normal HTR2A (serotonin or 5-hydroxytryptamine receptor 2A) receptor function. HTR2A polymorphisms are only one of many factors that contribute to a patient's clinical picture and response to medication. The usefulness of this result is currently limited in terms of predicting risk and guiding medication selection; however, some evidence suggests that GA and GG variations may be associated with increase response to certain antidepressants, compared with AA genotype. For questions, call [phone] ([phone]) or order PGx Consult [[id number]].               HTR2C Normal Influence [id number] CC  Updated 9/20/2022 by Security, Standard Interface User      HTR2C (serotonin or 5-hydroxytryptamine receptor 2C) plays a role in the satiety pathway. Genomic results indicate that patient is homozygous for the wild-type C allele (CC). The usefulness of this result is currently limited in terms of predicting risk and guiding medication selection. Some evidence suggests that CC genotype is associated with risk of weight gain in patients taking certain antipsychotics, but contradictory evidence exists. Other genetic and clinical factors may also influence risk of weight gain. For questions, call [phone] ([phone]) or order PGx Consult [[id number]].               IFNL3 (IL28B) [id number] C/C  Updated 9/20/2022 by Security, Standard Interface User      Genomic results predict that this patient has an increased likelihood of response (higher sustained virologic response [SVR] rate) to PEG-IFN-a and ribavirin (RBV) combination therapy when used to treat hepatitis C virus (HCV) genotype 1 infections (compared to patients with unfavorable-response genotype). This effect is  seen in combination therapy with or without protease inhibitors. IFNL3 genotype is only one factor that can influence response rates to PEG-IFN-a and RBV therapy in HCV genotype 1 infection and should be interpreted in the context of other clinical and genetic factors. PEG-IFN-a, pegylated interferon-a 2a or 2b. For questions, call [phone] ([phone]) or order PGx Consult [[id number]].      Reference Links    CPIC® Guideline for PEG Interferon-Alpha-Based Regimens and IFNL3                      MTHFR Decreased Activity  Updated 9/20/2022 by Security, Standard Interface User      Genomic results predict that patient has decreased MTHFR activity; thus, reduced conversion of folic acid to methylated folate (the active form of folate) is expected. Other genetic and/or clinical factors may influence the folate cycle. The usefulness of this result is currently limited in terms of predicting risk and guiding medication selection. For questions, call [phone] ([phone]) or order PGx Consult [[id number]].      Reference Links    Very Important Pharmacogene: MTHFR                      NUDT15 Normal Metabolizer  Updated 9/20/2022 by Security, Standard Interface User      Genomic results predict that this patient may metabolize NUDT15 substrates, including thiopurines, at a rate consistent with average metabolic capacity. A normal risk of drug-related leukopenia, neutropenia, and/or myelosuppression is predicted. While there is no clear indication for thiopurine dose adjustment based on NUDT15 function, dose adjustment may be recommended based on TPMT function. A TPMT metabolizer status may be available for this patient. For questions, call [phone] ([phone]) or order PGx Consult [[id number]].      Reference Links    CPIC® Guideline for Thiopurines and TPMT and NUDT15                      OPRM1 Intermediate Opioid Responder ([id number]) GA  Updated 9/20/2022 by Security, Standard Interface User      The opioid receptor mu 1 (OPRM1) gene  has more than 200 known variations; the [id number] variant has been studied for its role in opioid response. Genomic results indicate that patient is heterozygous for the G allele of the OPRM1 polymorphism c.397A>G. This GA genotype is consistent with a need for higher-than-average doses of opioids to achieve the desired therapeutic effect at opioid initiation; however, results for CYP2D6 should also be considered. For questions, call [phone] ([phone]) or order PGx Consult [[id number]].      Reference Links    Clinical Pharmacogenetics Implementation Consortium (CPIC) guideline for CYP2D6, OPRM1, and COMT genotype and select opioid therapy (December 2020)                      SLC6A4 Intermediate Responder  Updated 9/20/2022 by Security, Standard Interface User      SLC6A4 plays a role in the synaptic reuptake of serotonin. Genomic results indicate that patient has 1 copy of the long-form (LA or LG) promoter polymorphism and 1 copy of the short-form (S) promoter polymorphism. Patients with this result may have an increased risk of drug-induced side effects or may be less likely to achieve remission of depression with selective serotonin reuptake inhibitors (SSRIs). Caution is recommended when initiating or discontinuing SSRIs in this patient. Other genetic and clinical factors may also influence medication response. For questions, call [phone] ([phone] or order PGx Consult [[id number]].      Reference Links    [employer] SLC6A4 Clinical Annotations                      JXQI0X1 Normal Function  Updated 9/20/2022 by Security, Standard Interface User      Genomic results predict that this patient may have normal DJXK7B1 function. Thus, there is no clear indication for selective dose adjustment for most medications transported/affected by DAOD5M4, including simvastatin. For questions, call [phone] ([phone]) or order PGx Consult [[id number]].      Reference Links    CPIC® Guideline for Simvastatin and WILG5T6                       TPMT Normal Metabolizer  Updated 9/20/2022 by Security, Standard Interface User      Genomic results predict that this patient may metabolize TPMT substrates, including thiopurines, at a rate consistent with average metabolic capacity. A normal risk of drug-related leukopenia, neutropenia, and/or myelosuppression is predicted. While there is no clear indication for thiopurine dose adjustment based on TPMT function, dose adjustment may be recommended based on NUDT15 function. A NUDT15 metabolizer status may be available for this patient. For questions, call [phone] ([phone]) or order PGx Consult [[id number]].      Reference Links    CPIC® Guideline for Thiopurines and TPMT and NUDT15                      UGT1A1 Normal Metabolizer  Updated 9/20/2022 by Security, Standard Interface User      Genomic results predict that this patient may metabolize UGT1A1 substrates at a rate consistent with average metabolic capacity. Thus, there is no clear indication for selective dose adjustment for most medications metabolized or inhibited by UGT1A1. Commonly used medications metabolized by UGT1A1 include the following: atazanavir, irinotecan, and belinostat. For questions, call [phone] ([phone]) or order PGx Consult [[id number]].      Reference Links    CPIC® Guideline for Atazanavir and UGT1A1                      VKORC1 [id number] G/G  Updated 9/20/2022 by Security, Standard Interface User      Genomic results predict that this patient may have normal sensitivity to warfarin based on VKORC1 results. However, warfarin effect is also influenced by decreases in CYP2C9 function; thus, an increased risk of adverse or poor response upon initiation of therapy is not ruled out with this result. To avoid risk of adverse or poor response to warfarin, CYP2C9 functionality should be considered before initial dose determination or adjustment. Note: Some dosing algorithms for warfarin initiation also incorporate the CYP4F2 gene and CYP2C cluster  variant. For questions, call [phone] ([phone]) or order PGx Consult [[id number]].      Reference Links    CPIC® Guideline for Pharmacogenetics-Guided Warfarin Dosing                           This summary is based on genetic results provided by [facility] and its affiliated laboratories. For questions related to testing methods, please request a PGx consult or email your questions to [email or web address].

## 2022-10-05 ENCOUNTER — OFFICE VISIT (OUTPATIENT)
Dept: INTERNAL MEDICINE | Facility: CLINIC | Age: 57
End: 2022-10-05
Payer: COMMERCIAL

## 2022-10-05 VITALS
SYSTOLIC BLOOD PRESSURE: 126 MMHG | BODY MASS INDEX: 28.15 KG/M2 | HEART RATE: 74 BPM | HEIGHT: 70 IN | WEIGHT: 196.63 LBS | DIASTOLIC BLOOD PRESSURE: 80 MMHG

## 2022-10-05 DIAGNOSIS — Z12.5 SCREENING FOR PROSTATE CANCER: ICD-10-CM

## 2022-10-05 DIAGNOSIS — E78.00 PURE HYPERCHOLESTEROLEMIA: Chronic | ICD-10-CM

## 2022-10-05 DIAGNOSIS — I10 ESSENTIAL HYPERTENSION: Primary | Chronic | ICD-10-CM

## 2022-10-05 DIAGNOSIS — Z90.5 HISTORY OF LEFT NEPHRECTOMY: Chronic | ICD-10-CM

## 2022-10-05 PROCEDURE — 3074F SYST BP LT 130 MM HG: CPT | Mod: CPTII,S$GLB,, | Performed by: INTERNAL MEDICINE

## 2022-10-05 PROCEDURE — 1160F RVW MEDS BY RX/DR IN RCRD: CPT | Mod: CPTII,S$GLB,, | Performed by: INTERNAL MEDICINE

## 2022-10-05 PROCEDURE — 99999 PR PBB SHADOW E&M-EST. PATIENT-LVL III: ICD-10-PCS | Mod: PBBFAC,,, | Performed by: INTERNAL MEDICINE

## 2022-10-05 PROCEDURE — 4010F ACE/ARB THERAPY RXD/TAKEN: CPT | Mod: CPTII,S$GLB,, | Performed by: INTERNAL MEDICINE

## 2022-10-05 PROCEDURE — 3079F DIAST BP 80-89 MM HG: CPT | Mod: CPTII,S$GLB,, | Performed by: INTERNAL MEDICINE

## 2022-10-05 PROCEDURE — 99999 PR PBB SHADOW E&M-EST. PATIENT-LVL III: CPT | Mod: PBBFAC,,, | Performed by: INTERNAL MEDICINE

## 2022-10-05 PROCEDURE — 4010F PR ACE/ARB THEARPY RXD/TAKEN: ICD-10-PCS | Mod: CPTII,S$GLB,, | Performed by: INTERNAL MEDICINE

## 2022-10-05 PROCEDURE — 99214 PR OFFICE/OUTPT VISIT, EST, LEVL IV, 30-39 MIN: ICD-10-PCS | Mod: S$GLB,,, | Performed by: INTERNAL MEDICINE

## 2022-10-05 PROCEDURE — 3079F PR MOST RECENT DIASTOLIC BLOOD PRESSURE 80-89 MM HG: ICD-10-PCS | Mod: CPTII,S$GLB,, | Performed by: INTERNAL MEDICINE

## 2022-10-05 PROCEDURE — 3008F BODY MASS INDEX DOCD: CPT | Mod: CPTII,S$GLB,, | Performed by: INTERNAL MEDICINE

## 2022-10-05 PROCEDURE — 1159F MED LIST DOCD IN RCRD: CPT | Mod: CPTII,S$GLB,, | Performed by: INTERNAL MEDICINE

## 2022-10-05 PROCEDURE — 1160F PR REVIEW ALL MEDS BY PRESCRIBER/CLIN PHARMACIST DOCUMENTED: ICD-10-PCS | Mod: CPTII,S$GLB,, | Performed by: INTERNAL MEDICINE

## 2022-10-05 PROCEDURE — 1159F PR MEDICATION LIST DOCUMENTED IN MEDICAL RECORD: ICD-10-PCS | Mod: CPTII,S$GLB,, | Performed by: INTERNAL MEDICINE

## 2022-10-05 PROCEDURE — 3008F PR BODY MASS INDEX (BMI) DOCUMENTED: ICD-10-PCS | Mod: CPTII,S$GLB,, | Performed by: INTERNAL MEDICINE

## 2022-10-05 PROCEDURE — 99214 OFFICE O/P EST MOD 30 MIN: CPT | Mod: S$GLB,,, | Performed by: INTERNAL MEDICINE

## 2022-10-05 PROCEDURE — 3074F PR MOST RECENT SYSTOLIC BLOOD PRESSURE < 130 MM HG: ICD-10-PCS | Mod: CPTII,S$GLB,, | Performed by: INTERNAL MEDICINE

## 2022-10-05 NOTE — PROGRESS NOTES
Subjective:       Patient ID: Venkat Jonas is a 57 y.o. male.    Chief Complaint: Annual Exam    Hypertension  This is a chronic problem. The problem is unchanged. The problem is controlled. Pertinent negatives include no chest pain, palpitations or shortness of breath. The current treatment provides significant improvement.   Hyperlipidemia  This is a chronic problem. The problem is controlled. Recent lipid tests were reviewed and are normal. Pertinent negatives include no chest pain or shortness of breath.   Review of Systems   Constitutional:  Negative for fatigue.   HENT:  Negative for sore throat.    Eyes:  Negative for visual disturbance.   Respiratory:  Negative for shortness of breath.    Cardiovascular:  Negative for chest pain and palpitations.   Gastrointestinal:  Negative for abdominal pain.   Genitourinary:  Negative for dysuria, frequency and hematuria.   Musculoskeletal:  Negative for joint swelling.   Skin:  Negative for rash.   Neurological:  Negative for speech difficulty and weakness.   Psychiatric/Behavioral:  Negative for dysphoric mood.      Objective:      Physical Exam  Vitals reviewed.   Constitutional:       General: He is not in acute distress.     Appearance: He is well-developed.   HENT:      Head: Normocephalic and atraumatic.   Eyes:      Conjunctiva/sclera: Conjunctivae normal.      Pupils: Pupils are equal, round, and reactive to light.   Cardiovascular:      Rate and Rhythm: Normal rate and regular rhythm.      Heart sounds: Normal heart sounds.   Pulmonary:      Effort: Pulmonary effort is normal.      Breath sounds: Normal breath sounds. No wheezing.   Abdominal:      General: Bowel sounds are normal.      Palpations: Abdomen is soft.      Tenderness: There is no abdominal tenderness.   Genitourinary:     Prostate: Normal.      Rectum: Guaiac result negative.   Musculoskeletal:         General: No tenderness. Normal range of motion.      Cervical back: Normal range of  motion and neck supple.   Skin:     Findings: No erythema.   Neurological:      Mental Status: He is alert and oriented to person, place, and time.      Cranial Nerves: No cranial nerve deficit.       Assessment:       1. Essential hypertension    2. History of left nephrectomy    3. Pure hypercholesterolemia    4. Screening for prostate cancer          Plan:       Venkat was seen today for annual exam.    Diagnoses and all orders for this visit:    Essential hypertension  -     CBC Auto Differential; Future  -     Comprehensive Metabolic Panel; Future  -     Lipid Panel; Future    History of left nephrectomy    Pure hypercholesterolemia    Screening for prostate cancer  -     PSA, Screening; Future      Follow up in about 1 year (around 10/5/2023) for PHYSICAL EXAM, WITH LAB BEFORE.

## 2022-10-11 RX ORDER — TIRZEPATIDE 5 MG/.5ML
5 INJECTION, SOLUTION SUBCUTANEOUS
Qty: 4 PEN | Refills: 2 | Status: SHIPPED | OUTPATIENT
Start: 2022-10-11 | End: 2022-11-14

## 2022-10-12 DIAGNOSIS — Z85.528 HISTORY OF RENAL CELL CANCER: Primary | ICD-10-CM

## 2022-10-13 ENCOUNTER — PATIENT OUTREACH (OUTPATIENT)
Dept: ADMINISTRATIVE | Facility: HOSPITAL | Age: 57
End: 2022-10-13
Payer: COMMERCIAL

## 2022-10-13 DIAGNOSIS — I10 ESSENTIAL HYPERTENSION: ICD-10-CM

## 2022-10-13 RX ORDER — VALSARTAN 160 MG/1
160 TABLET ORAL DAILY
Qty: 90 TABLET | Refills: 3 | Status: CANCELLED | OUTPATIENT
Start: 2022-10-13 | End: 2023-10-13

## 2022-10-13 RX ORDER — VALSARTAN 160 MG/1
160 TABLET ORAL DAILY
Qty: 90 TABLET | Refills: 3 | Status: SHIPPED | OUTPATIENT
Start: 2022-10-13 | End: 2023-02-16

## 2022-10-13 NOTE — PROGRESS NOTES
Health Maintenance Due   Topic Date Due    Pneumococcal Vaccines (Age 0-64) (3 - PPSV23 if available, else PCV20) 12/18/2018

## 2022-10-13 NOTE — TELEPHONE ENCOUNTER
No new care gaps identified.  [facility] Embedded Care Gaps. Reference number: [id number]. 10/13/2022   10:27:59 AM BARBARAT

## 2022-10-19 LAB
ONEOME COMMENT: NORMAL
ONEOME METHOD: NORMAL

## 2022-10-20 ENCOUNTER — CLINICAL SUPPORT (OUTPATIENT)
Dept: INTERNAL MEDICINE | Facility: CLINIC | Age: 57
End: 2022-10-20

## 2022-10-20 DIAGNOSIS — Z85.528 HISTORY OF RENAL CELL CANCER: ICD-10-CM

## 2022-10-20 PROCEDURE — 81479 UNLISTED MOLECULAR PATHOLOGY: CPT | Mod: WS,SP

## 2022-10-20 PROCEDURE — 99999 PR PBB SHADOW E&M-EST. PATIENT-LVL I: ICD-10-PCS | Mod: PBBFAC,SP,,

## 2022-10-20 PROCEDURE — 99999 PR PBB SHADOW E&M-EST. PATIENT-LVL I: CPT | Mod: PBBFAC,SP,,

## 2022-10-31 LAB — GALLERI NO PREDICTED SIGNAL ORIGIN: NORMAL

## 2022-11-01 ENCOUNTER — TELEPHONE (OUTPATIENT)
Dept: HEMATOLOGY/ONCOLOGY | Facility: CLINIC | Age: 57
End: 2022-11-01
Payer: COMMERCIAL

## 2022-11-01 NOTE — TELEPHONE ENCOUNTER
"Nurse called pt and spoke to pt about their Jany Broussard cancer detection test results. Pt's results state "No Cancer Signal Detected".  Pt educated that this test is not meant to replace any normal cancer screenings based on their personal and family history. Pt is aware that this test is a compliment to or addition to their care, not a replacement for any cancer screenings. They may have this test done in the future such as with an annual visit as this test looks for a cancer signal at the time of the blood draw.   Nurse reviewed the difference between this test and hereditary cancer genetic testing. Pt asked if he is a candidate for genetic test.  Family hx of cancer includes:  Self: renal cell  Mother: breast cancer  Maternal GF: Throat cancer (history of smoking)  Nurse sent Dr. Carrasco a message to ask if patient's cancer has a genetic disposition and if he is a candidate for genetic testing.  Nurse will let pt know Dr. Carrasco's answer.    Pt was grateful for the call and is aware they may call in the future with any questions/concerns.   "

## 2022-11-11 NOTE — TELEPHONE ENCOUNTER
Patient inquired regarding outstanding refill.  No refills or open encounters recently.  Unclear what medication is being asked for.   Called [facility] [employer] and their team was also not aware of any outstanding refill requests.  Will update as more info as patient returns

## 2022-11-14 RX ORDER — TIRZEPATIDE 7.5 MG/.5ML
7.5 INJECTION, SOLUTION SUBCUTANEOUS
Qty: 12 PEN | Refills: 1 | Status: SHIPPED | OUTPATIENT
Start: 2022-11-14 | End: 2022-12-06

## 2022-11-14 NOTE — TELEPHONE ENCOUNTER
No new care gaps identified.  [facility] Embedded Care Gaps. Reference number: [id number]. 11/14/2022   4:46:42 PM CST

## 2022-11-14 NOTE — TELEPHONE ENCOUNTER
Pt seen by Dr. Griffiths. Please see refill request for increased dose of Mounjaro 7.5 mg weekly.     Requested Prescriptions     Pending Prescriptions Disp Refills    tirzepatide (MOUNJARO) 7.5 mg/0.5 mL PnIj 12 pen 0     Sig: Inject 7.5 mg into the skin every 7 days.

## 2022-12-06 RX ORDER — TIRZEPATIDE 10 MG/.5ML
10 INJECTION, SOLUTION SUBCUTANEOUS
Qty: 4 PEN | Refills: 2 | Status: SHIPPED | OUTPATIENT
Start: 2022-12-06 | End: 2023-02-16

## 2022-12-27 DIAGNOSIS — B00.9 HSV INFECTION: ICD-10-CM

## 2022-12-28 RX ORDER — VALACYCLOVIR HYDROCHLORIDE 1 G/1
TABLET, FILM COATED ORAL
Qty: 12 TABLET | Refills: 1 | Status: SHIPPED | OUTPATIENT
Start: 2022-12-28 | End: 2024-02-20 | Stop reason: SDUPTHER

## 2022-12-28 NOTE — TELEPHONE ENCOUNTER
Please see the attached refill request.    Last seen by JAKOB 09/2022    Assessment / Plan:         Verruca vulgaris - right toes  Cryosurgery procedure note:     Verbal consent from the patient is obtained including, but not limited to, risk of hypopigmentation/hyperpigmentation, scar, recurrence of lesion. Liquid nitrogen cryosurgery is applied to 2 verruca with prior paring, as detailed in the physical exam, to produce a freeze injury. 3 consecutive freeze thaw cycles are applied to each verruca. The patient is aware that blisters (possibly blood blisters) may form.        Inflamed seborrheic keratosis - upper mid back  Cryosurgery procedure note:     Verbal consent from the patient is obtained including, but not limited to, risk of hypopigmentation/hyperpigmentation, scar, recurrence of lesion. Liquid nitrogen cryosurgery is applied to 1 lesions to produce a freeze injury. The patient is aware that blisters may form and is instructed on wound care with gentle cleansing and use of vaseline ointment to keep moist until healed. The patient is supplied a handout on cryosurgery and is instructed to call if lesions do not completely resolve.        SK (seborrheic keratosis)  These are benign inherited growths without a malignant potential. Reassurance given to patient. No treatment is necessary.         Sebaceous gland hyperplasia  This is a common condition representing benign enlargement of the sebaceous lobule. It typically occurs in adulthood. Reassurance given to patient.         Screening exam for skin cancer  Upper body skin examination performed today including at least 6 points as noted in physical examination. No lesions suspicious for malignancy noted.     Recommend daily sun protection/avoidance and use of at least SPF 30, broad spectrum sunscreen (OTC drug).

## 2023-01-05 DIAGNOSIS — C64.9 RENAL CELL CARCINOMA, UNSPECIFIED LATERALITY: Primary | ICD-10-CM

## 2023-01-09 ENCOUNTER — OFFICE VISIT (OUTPATIENT)
Dept: PSYCHIATRY | Facility: CLINIC | Age: 58
End: 2023-01-09
Payer: COMMERCIAL

## 2023-01-09 DIAGNOSIS — R69 DIAGNOSIS UNKNOWN: Primary | ICD-10-CM

## 2023-01-09 PROCEDURE — 90791 PSYCH DIAGNOSTIC EVALUATION: CPT | Mod: S$GLB,,, | Performed by: SOCIAL WORKER

## 2023-01-09 PROCEDURE — 90791 PR PSYCHIATRIC DIAGNOSTIC EVALUATION: ICD-10-PCS | Mod: S$GLB,,, | Performed by: SOCIAL WORKER

## 2023-01-18 ENCOUNTER — OFFICE VISIT (OUTPATIENT)
Dept: PSYCHIATRY | Facility: CLINIC | Age: 58
End: 2023-01-18
Payer: COMMERCIAL

## 2023-01-18 DIAGNOSIS — R69 DIAGNOSIS UNKNOWN: Primary | ICD-10-CM

## 2023-01-18 DIAGNOSIS — C64.9 RENAL CELL CARCINOMA, UNSPECIFIED LATERALITY: Primary | ICD-10-CM

## 2023-01-18 PROCEDURE — 90834 PR PSYCHOTHERAPY W/PATIENT, 45 MIN: ICD-10-PCS | Mod: S$GLB,,, | Performed by: SOCIAL WORKER

## 2023-01-18 PROCEDURE — 90834 PSYTX W PT 45 MINUTES: CPT | Mod: S$GLB,,, | Performed by: SOCIAL WORKER

## 2023-01-18 PROCEDURE — 99999 PR PBB SHADOW E&M-EST. PATIENT-LVL I: ICD-10-PCS | Mod: PBBFAC,,, | Performed by: SOCIAL WORKER

## 2023-01-18 PROCEDURE — 99999 PR PBB SHADOW E&M-EST. PATIENT-LVL I: CPT | Mod: PBBFAC,,, | Performed by: SOCIAL WORKER

## 2023-01-18 RX ORDER — LORAZEPAM 0.5 MG/1
0.5 TABLET ORAL NIGHTLY PRN
Qty: 30 TABLET | Refills: 0 | Status: SHIPPED | OUTPATIENT
Start: 2023-01-18 | End: 2023-03-03 | Stop reason: SDUPTHER

## 2023-01-18 NOTE — PROGRESS NOTES
Will try ativan 0.5mg po QHS PRN anxiety as recommended by Dr. Virk.  Script sent to [facility].      Chandana Carrasco M.D., M.S., F.A.C.P.  Hematology and Oncology Attending  [facility]

## 2023-01-20 ENCOUNTER — PATIENT MESSAGE (OUTPATIENT)
Dept: PSYCHIATRY | Facility: CLINIC | Age: 58
End: 2023-01-20
Payer: COMMERCIAL

## 2023-01-24 ENCOUNTER — TELEPHONE (OUTPATIENT)
Dept: CARDIOLOGY | Facility: HOSPITAL | Age: 58
End: 2023-01-24
Payer: COMMERCIAL

## 2023-01-24 ENCOUNTER — OFFICE VISIT (OUTPATIENT)
Dept: PSYCHIATRY | Facility: CLINIC | Age: 58
End: 2023-01-24
Payer: COMMERCIAL

## 2023-01-24 DIAGNOSIS — R69 DIAGNOSIS UNKNOWN: Primary | ICD-10-CM

## 2023-01-24 PROCEDURE — 90834 PSYTX W PT 45 MINUTES: CPT | Mod: S$GLB,,, | Performed by: SOCIAL WORKER

## 2023-01-24 PROCEDURE — 90834 PR PSYCHOTHERAPY W/PATIENT, 45 MIN: ICD-10-PCS | Mod: S$GLB,,, | Performed by: SOCIAL WORKER

## 2023-01-24 NOTE — TELEPHONE ENCOUNTER
BP trending upward around 140-150 mmHg systolic off all meds.  Feels well but under stress.  I recommend he restart Valsartan 160 and record BP's and report new readings in 2-3 weeks.

## 2023-01-27 NOTE — PATIENT INSTRUCTIONS

## 2023-01-31 ENCOUNTER — OFFICE VISIT (OUTPATIENT)
Dept: PSYCHIATRY | Facility: CLINIC | Age: 58
End: 2023-01-31
Payer: COMMERCIAL

## 2023-01-31 ENCOUNTER — PATIENT MESSAGE (OUTPATIENT)
Dept: PSYCHIATRY | Facility: CLINIC | Age: 58
End: 2023-01-31
Payer: COMMERCIAL

## 2023-01-31 DIAGNOSIS — R69 DIAGNOSIS UNKNOWN: Primary | ICD-10-CM

## 2023-01-31 PROCEDURE — 90834 PSYTX W PT 45 MINUTES: CPT | Mod: S$GLB,,, | Performed by: SOCIAL WORKER

## 2023-01-31 PROCEDURE — 90834 PR PSYCHOTHERAPY W/PATIENT, 45 MIN: ICD-10-PCS | Mod: S$GLB,,, | Performed by: SOCIAL WORKER

## 2023-02-03 DIAGNOSIS — F51.02 ADJUSTMENT INSOMNIA: ICD-10-CM

## 2023-02-03 RX ORDER — ZOLPIDEM TARTRATE 10 MG/1
10 TABLET ORAL NIGHTLY PRN
Qty: 30 TABLET | Refills: 5 | Status: CANCELLED | OUTPATIENT
Start: 2023-02-03 | End: 2023-03-05

## 2023-02-06 ENCOUNTER — TELEPHONE (OUTPATIENT)
Dept: INTERNAL MEDICINE | Facility: CLINIC | Age: 58
End: 2023-02-06
Payer: COMMERCIAL

## 2023-02-06 ENCOUNTER — PATIENT MESSAGE (OUTPATIENT)
Dept: PSYCHIATRY | Facility: CLINIC | Age: 58
End: 2023-02-06
Payer: COMMERCIAL

## 2023-02-06 DIAGNOSIS — F51.02 ADJUSTMENT INSOMNIA: ICD-10-CM

## 2023-02-06 RX ORDER — ZOLPIDEM TARTRATE 10 MG/1
10 TABLET ORAL NIGHTLY PRN
Qty: 30 TABLET | Refills: 5 | Status: SHIPPED | OUTPATIENT
Start: 2023-02-06 | End: 2023-09-08 | Stop reason: SDUPTHER

## 2023-02-07 ENCOUNTER — OFFICE VISIT (OUTPATIENT)
Dept: PSYCHIATRY | Facility: CLINIC | Age: 58
End: 2023-02-07
Payer: COMMERCIAL

## 2023-02-07 DIAGNOSIS — F41.9 ANXIETY: Primary | ICD-10-CM

## 2023-02-07 PROCEDURE — 99999 PR PBB SHADOW E&M-EST. PATIENT-LVL I: ICD-10-PCS | Mod: PBBFAC,,, | Performed by: SOCIAL WORKER

## 2023-02-07 PROCEDURE — 90834 PSYTX W PT 45 MINUTES: CPT | Mod: S$GLB,,, | Performed by: SOCIAL WORKER

## 2023-02-07 PROCEDURE — 90834 PR PSYCHOTHERAPY W/PATIENT, 45 MIN: ICD-10-PCS | Mod: S$GLB,,, | Performed by: SOCIAL WORKER

## 2023-02-07 PROCEDURE — 99999 PR PBB SHADOW E&M-EST. PATIENT-LVL I: CPT | Mod: PBBFAC,,, | Performed by: SOCIAL WORKER

## 2023-02-07 NOTE — PROGRESS NOTES
Individual Psychotherapy (PhD/LCSW)    2/7/2023    Site:  [facility]         Therapeutic Intervention: Met with patient.  Outpatient - Insight oriented psychotherapy 45 min - CPT code 09575    Chief complaint/reason for encounter: anxiety     Interval history and content of current session: Pt frustrated with pattern of conflict with wife, discuss his emotional reaction to her anger and distancing, focus on ways to self-soothe and ground himself. Suggest he bring up pattern in couples Tx to establish ground rules for managing the conflict. Also suggest he and wife discuss conflict over managing daughter's care, to allow for co-parenting.Pt is receptive.    Treatment plan:  Target symptoms: anxiety   Why chosen therapy is appropriate versus another modality: relevant to diagnosis  Outcome monitoring methods: self-report  Therapeutic intervention type: insight oriented psychotherapy    Risk parameters:  Patient reports no suicidal ideation  Patient reports no homicidal ideation  Patient reports no self-injurious behavior  Patient reports no violent behavior    Verbal deficits: None    Patient's response to intervention:  The patient's response to intervention is motivated.    Progress toward goals and other mental status changes:  The patient's progress toward goals is fair .    Diagnosis:   anxiety    Plan:  individual psychotherapy    Return to clinic: as scheduled    Length of Service (minutes): 45

## 2023-02-10 ENCOUNTER — OFFICE VISIT (OUTPATIENT)
Dept: PSYCHIATRY | Facility: CLINIC | Age: 58
End: 2023-02-10
Payer: COMMERCIAL

## 2023-02-10 DIAGNOSIS — F43.22 ADJUSTMENT DISORDER WITH ANXIETY: Primary | ICD-10-CM

## 2023-02-10 PROCEDURE — 99999 PR PBB SHADOW E&M-EST. PATIENT-LVL II: CPT | Mod: PBBFAC,,, | Performed by: PSYCHIATRY & NEUROLOGY

## 2023-02-10 PROCEDURE — 90792 PR PSYCHIATRIC DIAGNOSTIC EVALUATION W/MEDICAL SERVICES: ICD-10-PCS | Mod: S$GLB,,, | Performed by: PSYCHIATRY & NEUROLOGY

## 2023-02-10 PROCEDURE — 99999 PR PBB SHADOW E&M-EST. PATIENT-LVL II: ICD-10-PCS | Mod: PBBFAC,,, | Performed by: PSYCHIATRY & NEUROLOGY

## 2023-02-10 PROCEDURE — 90792 PSYCH DIAG EVAL W/MED SRVCS: CPT | Mod: S$GLB,,, | Performed by: PSYCHIATRY & NEUROLOGY

## 2023-02-10 RX ORDER — ESCITALOPRAM OXALATE 10 MG/1
10 TABLET ORAL DAILY
Qty: 30 TABLET | Refills: 2 | Status: SHIPPED | OUTPATIENT
Start: 2023-02-10 | End: 2023-04-28

## 2023-02-10 NOTE — PROGRESS NOTES
Outpatient Psychiatry Initial Visit (MD/NP)    2/10/2023    Venkat Jonas, a 57 y.o. male, presenting for initial evaluation visit. Met with patient.    Reason for Encounter: self-referral. Patient complains of   Chief Complaint   Patient presents with    Anxiety   .    History of Present Illness: Anxiety  Patient is here for evaluation of anxiety.  He has the following anxiety symptoms: difficulty concentrating, fatigue, insomnia, racing thoughts. Onset of symptoms was approximately 4 weeks ago.  Symptoms have been gradually worsening since that time. He denies current suicidal and homicidal ideation. Family history significant for  ADHD  .Possible organic causes contributing are: none. Risk factors: negative life event of marital discord   Previous treatment includes none.   He complains of the following medication side effects: none.    Depression  Patient complains of depression. He complains of anhedonia, depressed mood, difficulty concentrating, fatigue, and insomnia. Onset was approximately 4 weeks ago. Symptoms have been gradually worsening since that time. Current symptoms include:  see above  . Patient denies recurrent thoughts of death, suicidal attempt, suicidal thoughts with specific plan, and suicidal thoughts without plan. Family history significant for  adhd  . Possible organic causes contributing are: none. Risk factors: negative life event marital discord  . Previous treatment includes none. He complains of the following side effects from the treatment: none.        Thur - Sunday post Mardi gras - will be intense program       Son  to  JUne     Meds Ativan 0.5 mg q hs prn or half tab prn but still feels too dulled    PMH    includes renal cancer cured with nephrectomy     Past psych hx   Counseling currently with Ms Daniels     Sunny hx ;  [facility]  ADHD   Dtr  Mandi under treatement for anorexia and doing well     Social Hx    [employer]   from wife  Kamila at her decision   Fa of 2 dtrs ; 1 son         Review Of Systems:     GENERAL:  No weight gain or loss  SKIN:  No rashes or lacerations  HEAD:  No headaches  EYES:  No exophthalmos, jaundice or blindness  EARS:  No dizziness, tinnitus or hearing loss  NOSE:  No changes in smell  MOUTH & THROAT:  No dyskinetic movements or obvious goiter  CHEST:  No shortness of breath, hyperventilation or cough  CARDIOVASCULAR:  No tachycardia or chest pain  ABDOMEN:  No nausea, vomiting, pain, constipation or diarrhea  URINARY:  No frequency, dysuria or sexual dysfunction  ENDOCRINE:  No polydipsia, polyuria  MUSCULOSKELETAL:  No pain or stiffness of the joints  NEUROLOGIC:  No weakness, sensory changes, seizures, confusion, memory loss, tremor or other abnormal movements    Current Evaluation:     Nutritional Screening: Considering the patient's height and weight, medications, medical history and preferences, should a referral be made to the dietitian? no    Constitutional  Vitals:  Most recent vital signs, dated less than 90 days prior to this appointment, were not reviewed.    There were no vitals filed for this visit.     General:  unremarkable, age appropriate, normal weight, well dressed, neatly groomed     Musculoskeletal  Muscle Strength/Tone:  no dyskinesia, no dystonia, no tremor   Gait & Station:  non-ataxic     Psychiatric  Speech:  no latency; no press   Mood & Affect:  anxious  congruent and appropriate   Thought Process:  normal and logical, goal-directed   Associations:  intact   Thought Content:  normal, no suicidality, no homicidality, delusions, or paranoia   Insight:  has awareness of illness   Judgement: behavior is adequate to circumstances   Orientation:  grossly intact   Memory: intact for content of interview   Language: grossly intact   Attention Span & Concentration:  able to focus   Fund of Knowledge:  intact and appropriate to age and level of education       Relevant Elements of Neurological  Exam: normal gait    Functioning in Relationships:  Spouse/partner:  from [place]   Peers: has support of his friends and colleague   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   Latest known visit with results is:   Clinical Support on 10/20/2022   Component Date Value Ref Range Status    Cancer Signal Report 10/20/2022 CANCER SIGNAL NOT DETECTED   Final         Medications  Outpatient Encounter Medications as of 2/10/2023   Medication Sig Dispense Refill    albuterol (PROVENTIL/VENTOLIN HFA) 90 mcg/actuation inhaler Inhale 2 puffs into the lungs every 4 (four) hours as needed for Shortness of Breath. 18 g 3    aspirin 81 MG Chew Take 81 mg by mouth once daily.      azelastine (OPTIVAR) 0.05 % ophthalmic solution Place 1 drop into both eyes 2 (two) times daily. 18 mL 3    ezetimibe (ZETIA) 10 mg tablet Take 1 tablet by mouth daily 90 tablet 3    LORazepam (ATIVAN) 0.5 MG tablet Take 1 tablet (0.5 mg total) by mouth nightly as needed for Anxiety. 30 tablet 0    pantoprazole (PROTONIX) 40 MG tablet TAKE ONE TABLET BY MOUTH TWICE A DAY ON AN EMPTY STOMACH (Patient taking differently: TAKE ONE TABLET BY MOUTH once A DAY ON AN EMPTY STOMACH) 180 tablet 3    rosuvastatin (CRESTOR) 40 MG Tab Take 1 tablet (40 mg total) by mouth every evening. 90 tablet 3    tirzepatide (MOUNJARO) 10 mg/0.5 mL PnIj Inject 10 mg into the skin every 7 days. 4 pen 2    valACYclovir (VALTREX) 1000 MG tablet TAKE 2 TABLETS (2,000 MG TOTAL) BY MOUTH 2 (TWO) TIMES DAILY FOR 2 DAYS and repeat as needed 12 tablet 1    valsartan (DIOVAN) 160 MG tablet Take 1 tablet (160 mg total) by mouth once daily. 90 tablet 3    zolpidem (AMBIEN) 10 mg Tab Take 1 tablet (10 mg total) by mouth nightly as needed (insomnia). 30 tablet 5     No facility-administered encounter medications on file as of 2/10/2023.           Assessment - Diagnosis - Goals:     Impression: new pt with distress over  wife's decision to separate       ICD-10-CM ICD-9-CM   1. Adjustment disorder with anxiety  F43.22 309.24       Strengths and Liabilities: Strength: Patient accepts guidance/feedback, Strength: Patient is expressive/articulate., Strength: Patient is intelligent., Strength: Patient is motivated for change., Strength: Patient is physically healthy., Strength: Patient has positive support network., Strength: Patient has reasonable judgment., Strength: Patient is stable.    Treatment Goals:  Specify outcomes written in observable, behavioral terms:   Anxiety: eliminating avoidance , reducing negative automatic thoughts, reducing physical symptoms of anxiety, and reducing time spent worrying (<30 minutes/day)    Treatment Plan/Recommendations:   Medication Management: The risks and benefits of medication were discussed with the patient.  Add lexapro for anxiety / worry   Continue ativan prn situational anxiety  Continue Ambien for insomnia         Return to Clinic: 1 month

## 2023-02-13 ENCOUNTER — HOSPITAL ENCOUNTER (OUTPATIENT)
Dept: RADIOLOGY | Facility: HOSPITAL | Age: 58
Discharge: HOME | End: 2023-02-13
Attending: INTERNAL MEDICINE
Payer: COMMERCIAL

## 2023-02-13 ENCOUNTER — PATIENT MESSAGE (OUTPATIENT)
Dept: PSYCHIATRY | Facility: CLINIC | Age: 58
End: 2023-02-13
Payer: COMMERCIAL

## 2023-02-13 ENCOUNTER — PATIENT MESSAGE (OUTPATIENT)
Dept: ADMINISTRATIVE | Facility: OTHER | Age: 58
End: 2023-02-13
Payer: COMMERCIAL

## 2023-02-13 ENCOUNTER — OFFICE VISIT (OUTPATIENT)
Dept: PSYCHIATRY | Facility: CLINIC | Age: 58
End: 2023-02-13
Payer: COMMERCIAL

## 2023-02-13 DIAGNOSIS — C64.9 RENAL CELL CARCINOMA, UNSPECIFIED LATERALITY: ICD-10-CM

## 2023-02-13 DIAGNOSIS — F41.9 ANXIETY: Primary | ICD-10-CM

## 2023-02-13 PROCEDURE — 74177 CT ABD & PELVIS W/CONTRAST: CPT | Mod: 26,,, | Performed by: RADIOLOGY

## 2023-02-13 PROCEDURE — 71260 CT THORAX DX C+: CPT | Mod: 26,,, | Performed by: RADIOLOGY

## 2023-02-13 PROCEDURE — 71260 CT CHEST ABDOMEN PELVIS WITH CONTRAST (XPD): ICD-10-PCS | Mod: 26,,, | Performed by: RADIOLOGY

## 2023-02-13 PROCEDURE — 90834 PR PSYCHOTHERAPY W/PATIENT, 45 MIN: ICD-10-PCS | Mod: S$GLB,,, | Performed by: SOCIAL WORKER

## 2023-02-13 PROCEDURE — 99999 PR PBB SHADOW E&M-EST. PATIENT-LVL I: ICD-10-PCS | Mod: PBBFAC,,, | Performed by: SOCIAL WORKER

## 2023-02-13 PROCEDURE — 74177 CT ABD & PELVIS W/CONTRAST: CPT | Mod: TC

## 2023-02-13 PROCEDURE — 74177 CT CHEST ABDOMEN PELVIS WITH CONTRAST (XPD): ICD-10-PCS | Mod: 26,,, | Performed by: RADIOLOGY

## 2023-02-13 PROCEDURE — 71260 CT THORAX DX C+: CPT | Mod: TC

## 2023-02-13 PROCEDURE — 90834 PSYTX W PT 45 MINUTES: CPT | Mod: S$GLB,,, | Performed by: SOCIAL WORKER

## 2023-02-13 PROCEDURE — 25500020 PHARM REV CODE 255: Performed by: INTERNAL MEDICINE

## 2023-02-13 PROCEDURE — 99999 PR PBB SHADOW E&M-EST. PATIENT-LVL I: CPT | Mod: PBBFAC,,, | Performed by: SOCIAL WORKER

## 2023-02-13 PROCEDURE — A9698 NON-RAD CONTRAST MATERIALNOC: HCPCS | Performed by: INTERNAL MEDICINE

## 2023-02-13 RX ADMIN — IOHEXOL 100 ML: 350 INJECTION, SOLUTION INTRAVENOUS at 02:02

## 2023-02-13 RX ADMIN — Medication 450 ML: at 11:02

## 2023-02-13 NOTE — PROGRESS NOTES
Individual Psychotherapy (PhD/LCSW)    2/13/2023    Site:  [facility]         Therapeutic Intervention: Met with patient.  Outpatient - Insight oriented psychotherapy 45 min - CPT code 57535    Chief complaint/reason for encounter: anxiety     Interval history and content of current session: Pt saw Dr. Virk about anxiety and depression, started low-dose Lexapro, will work with Dr. Virk on titrating the dose.  He and wife had very positive session with couples therapist, and pt has taken positive steps to show empathy and work on understanding wife's anxiety, particularly about finances and about their daughter. Pt is getting positive response and appreciation from wife for these steps and also feels happier with himself, so that they are less polarized in the relationship.    Treatment plan:  Target symptoms: anxiety   Why chosen therapy is appropriate versus another modality: relevant to diagnosis  Outcome monitoring methods: self-report  Therapeutic intervention type: insight oriented psychotherapy    Risk parameters:  Patient reports no suicidal ideation  Patient reports no homicidal ideation  Patient reports no self-injurious behavior  Patient reports no violent behavior    Verbal deficits: None    Patient's response to intervention:  The patient's response to intervention is motivated.    Progress toward goals and other mental status changes:  The patient's progress toward goals is good    Diagnosis:   anxiety    Plan:  individual psychotherapy    Return to clinic: as scheduled    Length of Service (minutes): 45

## 2023-02-15 ENCOUNTER — OFFICE VISIT (OUTPATIENT)
Dept: HEMATOLOGY/ONCOLOGY | Facility: CLINIC | Age: 58
End: 2023-02-15
Payer: COMMERCIAL

## 2023-02-15 VITALS
HEIGHT: 71 IN | HEART RATE: 89 BPM | SYSTOLIC BLOOD PRESSURE: 171 MMHG | RESPIRATION RATE: 18 BRPM | TEMPERATURE: 98 F | DIASTOLIC BLOOD PRESSURE: 100 MMHG | OXYGEN SATURATION: 96 % | BODY MASS INDEX: 27.43 KG/M2

## 2023-02-15 DIAGNOSIS — C64.9 RENAL CELL CARCINOMA, UNSPECIFIED LATERALITY: Primary | ICD-10-CM

## 2023-02-15 PROCEDURE — 99215 PR OFFICE/OUTPT VISIT, EST, LEVL V, 40-54 MIN: ICD-10-PCS | Mod: S$GLB,,, | Performed by: INTERNAL MEDICINE

## 2023-02-15 PROCEDURE — 99999 PR PBB SHADOW E&M-EST. PATIENT-LVL IV: ICD-10-PCS | Mod: PBBFAC,,, | Performed by: INTERNAL MEDICINE

## 2023-02-15 PROCEDURE — 1159F PR MEDICATION LIST DOCUMENTED IN MEDICAL RECORD: ICD-10-PCS | Mod: CPTII,S$GLB,, | Performed by: INTERNAL MEDICINE

## 2023-02-15 PROCEDURE — 3008F PR BODY MASS INDEX (BMI) DOCUMENTED: ICD-10-PCS | Mod: CPTII,S$GLB,, | Performed by: INTERNAL MEDICINE

## 2023-02-15 PROCEDURE — 3077F PR MOST RECENT SYSTOLIC BLOOD PRESSURE >= 140 MM HG: ICD-10-PCS | Mod: CPTII,S$GLB,, | Performed by: INTERNAL MEDICINE

## 2023-02-15 PROCEDURE — 1159F MED LIST DOCD IN RCRD: CPT | Mod: CPTII,S$GLB,, | Performed by: INTERNAL MEDICINE

## 2023-02-15 PROCEDURE — 3077F SYST BP >= 140 MM HG: CPT | Mod: CPTII,S$GLB,, | Performed by: INTERNAL MEDICINE

## 2023-02-15 PROCEDURE — 3080F PR MOST RECENT DIASTOLIC BLOOD PRESSURE >= 90 MM HG: ICD-10-PCS | Mod: CPTII,S$GLB,, | Performed by: INTERNAL MEDICINE

## 2023-02-15 PROCEDURE — 99215 OFFICE O/P EST HI 40 MIN: CPT | Mod: S$GLB,,, | Performed by: INTERNAL MEDICINE

## 2023-02-15 PROCEDURE — 99999 PR PBB SHADOW E&M-EST. PATIENT-LVL IV: CPT | Mod: PBBFAC,,, | Performed by: INTERNAL MEDICINE

## 2023-02-15 PROCEDURE — 3080F DIAST BP >= 90 MM HG: CPT | Mod: CPTII,S$GLB,, | Performed by: INTERNAL MEDICINE

## 2023-02-15 PROCEDURE — 3008F BODY MASS INDEX DOCD: CPT | Mod: CPTII,S$GLB,, | Performed by: INTERNAL MEDICINE

## 2023-02-15 NOTE — PROGRESS NOTES
Subjective:       Patient ID: Venkat Jonas    Chief Complaint: h/o RCC    HPI     Venkat Jonas is a 57 y.o. male,  to clinic for evaluation and management of RCC, s/p nephrectomy.      He notes anxiety related to recent martial separation.  Working with Dr. Virk and a therapist, recently started Lexapro.        Oncologic History:    Developed hematuria and on imaging was found to have a renal mass c/w RCC.  Questionable bone lesion.  Underwent nephrectomy October 7, 2020 with below pathology.  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         PATHOLOGY:    1. Lymph nodes, aortic (regional resection):  - 3 lymph nodes negative for tumor (0/3)    2. Left kidney and adrenal gland (radical nephrectomy):  - Renal cell carcinoma, see synoptic report below  - Additional ancillary testing for tumor classification is ordered and results will be issued in a supplemental report    Kidney carcinoma synoptic report  - Procedure: Radical nephrectomy  - Specimen laterality: Left  - Tumor site: Upper pole  - Tumor size: 8.9 x 8.1 x 7.7 cm  - Tumor focality: Renal cell carcinoma, unclassified (see microscopic section)  - Sarcomatoid features: Not identified  - Rhabdoid features: Present  - Histologic grade: WHO/ISUP Nuclear grade 4  - Tumor necrosis: Present, approximately 20% of the tumor  - Tumor extension: Tumor extends into renal vein  - Margins:  - Uninvolved by invasive carcinoma  - Regional lymph nodes:  - Number of lymph nodes involved: 0  - Number of lymph nodes examined: 4 (specimen 1 and specimen 2)  - Pathologic staging: pT3a N0 MX  - Other findings: Separate sclerotic and calcified nodule  - Tumor block for potential studies: 2D, 2E, 2F 2H, 2I, 2J, 2K  - Nontumor block: 2N  - Immunostain panel in microscopic section  - MMR IHC: No loss of expression (see microscopic section)  Sections show an eosinophilic renal cell carcinoma with nuclear inclusions ,cytoplasmic inclusions ,  and  extracellular eosinophillic material. Several renal cell carcinoma subtypes are in the differential including  high grade chromophobe, eosinophilic clear cell, SDH deficient, and translocation associated. Subtyping  will be attempted with ancillary testing and results issued in a supplemental report. Selected slides  reviewed by additional pathologists.    Immunostain results:  Positive stains: AMACR, keratin AE1/AE3, CD10,  (weak), vimentin  Negative stains: Keratin 7, keratin 20, HMB45, Mart 1, desmin    Immunohistochemistry (IHC) Testing for Mismatch Repair (MMR) Proteins:  MLH1 - Intact nuclear expression  MSH2 - Intact nuclear expression  MSH6 - Intact nuclear expression  PMS2 - Intact nuclear expression    Review of Systems   Constitutional: Negative for activity change, appetite change, chills, fatigue, fever and unexpected weight change.   HENT: Negative for congestion, hearing loss, mouth sores, sore throat and trouble swallowing.    Eyes: Negative for pain and visual disturbance.   Respiratory: Negative for cough, shortness of breath and wheezing.    Cardiovascular: Negative for chest pain, palpitations and leg swelling.   Gastrointestinal: Negative for abdominal pain, constipation, diarrhea, nausea and vomiting.   Endocrine: Negative for cold intolerance and heat intolerance.   Genitourinary: Negative for difficulty urinating, discharge, dysuria, enuresis, frequency, hematuria, scrotal swelling and testicular pain.   Musculoskeletal: Negative for arthralgias, back pain and myalgias.   Skin: Negative for color change, rash and wound.   Allergic/Immunologic: Negative for environmental allergies and food allergies.   Neurological: Negative for weakness, numbness and headaches.   Hematological: Negative for adenopathy. Does not bruise/bleed easily.   Psychiatric/Behavioral: Negative for confusion, hallucinations and sleep disturbance. The patient is not nervous/anxious.    All other systems reviewed  and are negative.        Allergies:  Review of patient's allergies indicates:  No Known Allergies    Medications:  Current Outpatient Medications   Medication Sig Dispense Refill    albuterol (PROVENTIL/VENTOLIN HFA) 90 mcg/actuation inhaler Inhale 2 puffs into the lungs every 4 (four) hours as needed for Shortness of Breath. 18 g 3    aspirin 81 MG Chew Take 81 mg by mouth once daily.      azelastine (OPTIVAR) 0.05 % ophthalmic solution Place 1 drop into both eyes 2 (two) times daily. 18 mL 3    EScitalopram oxalate (LEXAPRO) 10 MG tablet Take 1 tablet (10 mg total) by mouth once daily. 30 tablet 2    ezetimibe (ZETIA) 10 mg tablet Take 1 tablet by mouth daily 90 tablet 3    LORazepam (ATIVAN) 0.5 MG tablet Take 1 tablet (0.5 mg total) by mouth nightly as needed for Anxiety. 30 tablet 0    pantoprazole (PROTONIX) 40 MG tablet TAKE ONE TABLET BY MOUTH TWICE A DAY ON AN EMPTY STOMACH (Patient taking differently: TAKE ONE TABLET BY MOUTH once A DAY ON AN EMPTY STOMACH) 180 tablet 3    rosuvastatin (CRESTOR) 40 MG Tab Take 1 tablet (40 mg total) by mouth every evening. 90 tablet 3    tirzepatide (MOUNJARO) 10 mg/0.5 mL PnIj Inject 10 mg into the skin every 7 days. 4 pen 2    valACYclovir (VALTREX) 1000 MG tablet TAKE 2 TABLETS (2,000 MG TOTAL) BY MOUTH 2 (TWO) TIMES DAILY FOR 2 DAYS and repeat as needed 12 tablet 1    valsartan (DIOVAN) 160 MG tablet Take 1 tablet (160 mg total) by mouth once daily. 90 tablet 3    zolpidem (AMBIEN) 10 mg Tab Take 1 tablet (10 mg total) by mouth nightly as needed (insomnia). 30 tablet 5     No current facility-administered medications for this visit.       PMH:  Past Medical History:   Diagnosis Date    Allergic sinusitis     Exercise-induced asthma     Hyperlipidemia     Hypertension     Renal cell carcinoma 10/14/2020       PSH:  Past Surgical History:   Procedure Laterality Date    COLONOSCOPY N/A 12/5/2016    Procedure: COLONOSCOPY;  Surgeon: Toni Rievra MD;  Location: [facility]  ENDO (4TH FLR);  Service: Endoscopy;  Laterality: N/A;  VIP    COLONOSCOPY N/A 11/3/2020    Procedure: COLONOSCOPY;  Surgeon: Toni Rivera MD;  Location: [facility] ENDO (2ND FLR);  Service: Endoscopy;  Laterality: N/A;  Schedule patient EGD colonoscopy with me next available but as soon as possible    CYSTOSCOPY W/ RETROGRADES Left 10/1/2020    Procedure: CYSTOSCOPY, WITH RETROGRADE PYELOGRAM;  Surgeon: Vega Sears MD;  Location: [facility] 1ST FLR;  Service: Urology;  Laterality: Left;    ESOPHAGOGASTRODUODENOSCOPY N/A 11/3/2020    Procedure: EGD (ESOPHAGOGASTRODUODENOSCOPY);  Surgeon: Toni Rivera MD;  Location: [facility] ENDO (2ND FLR);  Service: Endoscopy;  Laterality: N/A;  pt will get rapid on 11/2-MS    LAPAROSCOPIC ROBOT-ASSISTED SURGICAL REMOVAL OF KIDNEY USING DA SONIDO XI Left 10/7/2020    Procedure: XI ROBOTIC NEPHRECTOMY;  Surgeon: Vega Sears MD;  Location: [facility] 2ND FLR;  Service: Urology;  Laterality: Left;  gen with regional/ 5hrs    REPAIR OF COLLATERAL LIGAMENT OF THUMB Right 5/14/2021    Procedure: REPAIR, LIGAMENT, COLLATERAL, THUMB, right UCLUCL;  Surgeon: Carolyn Thao MD;  Location: [facility];  Service: Orthopedics;  Laterality: Right;  Regional/MAC, [facility]    SINUS SURGERY         FamHx:  Family History   Problem Relation Age of Onset    Hyperlipidemia Father     Hyperlipidemia Brother     Heart attack Maternal Grandmother     Cancer Maternal Grandfather         throat    Melanoma Neg Hx     Psoriasis Neg Hx     Lupus Neg Hx     Eczema Neg Hx     Acne Neg Hx     Heart disease Neg Hx        SocHx:  Social History     Socioeconomic History    Marital status:      Spouse name: Kamila    Number of children: 3   Occupational History    Occupation:  Marketing     Employer: [employer] ([employer]   Tobacco Use    Smoking status: Never    Smokeless tobacco: Never    Tobacco comments:     The patient exercises regularly at [employer].  He works as a   "at [employer] over retail services.   Substance and Sexual Activity    Alcohol use: Yes     Alcohol/week: 4.0 standard drinks     Types: 4 Shots of liquor per week     Comment: socially    Drug use: No    Sexual activity: Yes     Partners: Female   Social History Narrative    Administration at [employer]    , 3 kids (23, 18, 17)         Objective:         BP (!) 171/100 (BP Location: Left arm, Patient Position: Sitting, BP Method: Medium (Automatic))   Pulse 89   Temp 97.7 °F (36.5 °C) (Oral)   Resp 18   Ht 5' 11" (1.803 m)   SpO2 96%   BMI 27.43 kg/m²       Physical Exam  Constitutional:       General: He is not in acute distress.     Appearance: Normal appearance. He is normal weight. He is not ill-appearing, toxic-appearing or diaphoretic.   HENT:      Head: Normocephalic and atraumatic.      Nose: Nose normal.   Eyes:      General: No scleral icterus.        Right eye: No discharge.         Left eye: No discharge.      Conjunctiva/sclera: Conjunctivae normal.   Skin:     General: Skin is warm and dry.      Coloration: Skin is not jaundiced or pale.   Neurological:      General: No focal deficit present.      Mental Status: He is alert and oriented to person, place, and time. Mental status is at baseline.   Psychiatric:         Mood and Affect: Mood normal.         Behavior: Behavior normal.         Thought Content: Thought content normal.         Judgment: Judgment normal.           LABS:  WBC   Date Value Ref Range Status   02/13/2023 6.31 3.90 - 12.70 K/uL Final     Hemoglobin   Date Value Ref Range Status   02/13/2023 14.7 14.0 - 18.0 g/dL Final     Hematocrit   Date Value Ref Range Status   02/13/2023 45.0 40.0 - 54.0 % Final     Platelets   Date Value Ref Range Status   02/13/2023 291 150 - 450 K/uL Final       Chemistry        Component Value Date/Time     02/13/2023 0744    K 4.8 02/13/2023 0744     02/13/2023 0744    CO2 27 02/13/2023 0744    BUN 12 02/13/2023 0744    CREATININE 1.2 " 02/13/2023 0744     02/13/2023 0744        Component Value Date/Time    CALCIUM 9.6 02/13/2023 0744    ALKPHOS 61 02/13/2023 0744    AST 23 02/13/2023 0744    ALT 23 02/13/2023 0744    BILITOT 0.6 02/13/2023 0744    ESTGFRAFRICA >60.0 05/02/2022 1011    EGFRNONAA >60.0 05/02/2022 1011              Assessment:       1. Renal cell carcinoma, unspecified laterality         Plan:         1. RCC:    Previously reviewed pathology.  I am happy that lymph nodes were clear, and margins were negative.  The fact that there was renal vein involvement, pushes this to a stage III.  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    We are assuming that the questionable bone lesion is benign, especially since it has been stable for years now, watching this carefully.      After surgery, we discussed monitoring this closely or considering adjuvant Sutent    After careful consideration, the patient has decided to monitor this through routine surveillance, which I fully support.    New data from [facility] on adjuvant immunotherapy that was presented only studied patients 12 wks or less from nephrectomy, we did not think that considering immunotherapy 9 mos after surgery would be prudent or c/w supporting data.     Rescanning now stable, monitoring tiny subcm spots, otherwise stable with ELLI.     He is now >2 years out, RTC 6 mos to see me with CT CAP, CBC, CMP.      He knows to call us if there are issues or questions in the meantime.    The patient agrees with the plan, and all questions have been answered to their satisfaction.      More than 40 mins were spent during this encounter, greater than 50% was spent in direct counseling and/or coordination of care.     Chandana Carrasco M.D., M.S., F.A.C.P.  Hematology and Oncology Attending  [facility]          Route Chart for Scheduling    Med Onc Chart  Routing      Follow up with physician 6 months. RTC 6 mos to see me with CT CAP, CBC, CMP.   Follow up with RYNE    Infusion scheduling note    Injection scheduling note    Labs CBC and CMP   Lab interval:     Imaging CT chest abdomen pelvis      Pharmacy appointment    Other referrals

## 2023-02-16 ENCOUNTER — TELEPHONE (OUTPATIENT)
Dept: CARDIOLOGY | Facility: HOSPITAL | Age: 58
End: 2023-02-16
Payer: COMMERCIAL

## 2023-02-16 ENCOUNTER — PATIENT MESSAGE (OUTPATIENT)
Dept: CARDIOLOGY | Facility: CLINIC | Age: 58
End: 2023-02-16
Payer: COMMERCIAL

## 2023-02-16 ENCOUNTER — OFFICE VISIT (OUTPATIENT)
Dept: INTERNAL MEDICINE | Facility: CLINIC | Age: 58
End: 2023-02-16
Payer: COMMERCIAL

## 2023-02-16 VITALS
HEART RATE: 83 BPM | WEIGHT: 179 LBS | BODY MASS INDEX: 25.06 KG/M2 | SYSTOLIC BLOOD PRESSURE: 144 MMHG | HEIGHT: 71 IN | DIASTOLIC BLOOD PRESSURE: 92 MMHG | OXYGEN SATURATION: 98 %

## 2023-02-16 DIAGNOSIS — I10 ESSENTIAL HYPERTENSION: Primary | Chronic | ICD-10-CM

## 2023-02-16 DIAGNOSIS — Z00.00 ROUTINE PHYSICAL EXAMINATION: ICD-10-CM

## 2023-02-16 DIAGNOSIS — G47.00 INSOMNIA, UNSPECIFIED TYPE: ICD-10-CM

## 2023-02-16 DIAGNOSIS — R93.1 AGATSTON CAC SCORE 100-199: ICD-10-CM

## 2023-02-16 DIAGNOSIS — E78.00 PURE HYPERCHOLESTEROLEMIA: Chronic | ICD-10-CM

## 2023-02-16 DIAGNOSIS — F41.9 ANXIETY: ICD-10-CM

## 2023-02-16 DIAGNOSIS — Z85.528 HISTORY OF RENAL CELL CARCINOMA: Chronic | ICD-10-CM

## 2023-02-16 DIAGNOSIS — Z90.5 HISTORY OF LEFT NEPHRECTOMY: Chronic | ICD-10-CM

## 2023-02-16 PROCEDURE — 1159F PR MEDICATION LIST DOCUMENTED IN MEDICAL RECORD: ICD-10-PCS | Mod: CPTII,S$GLB,, | Performed by: INTERNAL MEDICINE

## 2023-02-16 PROCEDURE — 3008F BODY MASS INDEX DOCD: CPT | Mod: CPTII,S$GLB,, | Performed by: INTERNAL MEDICINE

## 2023-02-16 PROCEDURE — 1159F MED LIST DOCD IN RCRD: CPT | Mod: CPTII,S$GLB,, | Performed by: INTERNAL MEDICINE

## 2023-02-16 PROCEDURE — 99214 OFFICE O/P EST MOD 30 MIN: CPT | Mod: S$GLB,,, | Performed by: INTERNAL MEDICINE

## 2023-02-16 PROCEDURE — 3008F PR BODY MASS INDEX (BMI) DOCUMENTED: ICD-10-PCS | Mod: CPTII,S$GLB,, | Performed by: INTERNAL MEDICINE

## 2023-02-16 PROCEDURE — 99999 PR PBB SHADOW E&M-EST. PATIENT-LVL IV: CPT | Mod: PBBFAC,,, | Performed by: INTERNAL MEDICINE

## 2023-02-16 PROCEDURE — 3080F PR MOST RECENT DIASTOLIC BLOOD PRESSURE >= 90 MM HG: ICD-10-PCS | Mod: CPTII,S$GLB,, | Performed by: INTERNAL MEDICINE

## 2023-02-16 PROCEDURE — 1160F RVW MEDS BY RX/DR IN RCRD: CPT | Mod: CPTII,S$GLB,, | Performed by: INTERNAL MEDICINE

## 2023-02-16 PROCEDURE — 99999 PR PBB SHADOW E&M-EST. PATIENT-LVL IV: ICD-10-PCS | Mod: PBBFAC,,, | Performed by: INTERNAL MEDICINE

## 2023-02-16 PROCEDURE — 1160F PR REVIEW ALL MEDS BY PRESCRIBER/CLIN PHARMACIST DOCUMENTED: ICD-10-PCS | Mod: CPTII,S$GLB,, | Performed by: INTERNAL MEDICINE

## 2023-02-16 PROCEDURE — 3077F PR MOST RECENT SYSTOLIC BLOOD PRESSURE >= 140 MM HG: ICD-10-PCS | Mod: CPTII,S$GLB,, | Performed by: INTERNAL MEDICINE

## 2023-02-16 PROCEDURE — 3077F SYST BP >= 140 MM HG: CPT | Mod: CPTII,S$GLB,, | Performed by: INTERNAL MEDICINE

## 2023-02-16 PROCEDURE — 99214 PR OFFICE/OUTPT VISIT, EST, LEVL IV, 30-39 MIN: ICD-10-PCS | Mod: S$GLB,,, | Performed by: INTERNAL MEDICINE

## 2023-02-16 PROCEDURE — 3080F DIAST BP >= 90 MM HG: CPT | Mod: CPTII,S$GLB,, | Performed by: INTERNAL MEDICINE

## 2023-02-16 RX ORDER — VALSARTAN AND HYDROCHLOROTHIAZIDE 160; 12.5 MG/1; MG/1
1 TABLET, FILM COATED ORAL DAILY
Qty: 90 TABLET | Refills: 3 | Status: SHIPPED | OUTPATIENT
Start: 2023-02-16 | End: 2024-02-06 | Stop reason: SDUPTHER

## 2023-02-16 RX ORDER — ROSUVASTATIN CALCIUM 40 MG/1
40 TABLET, COATED ORAL DAILY
Qty: 90 TABLET | Refills: 3 | Status: SHIPPED | OUTPATIENT
Start: 2023-02-16 | End: 2024-03-25 | Stop reason: SDUPTHER

## 2023-02-16 NOTE — TELEPHONE ENCOUNTER
Under increased stress.  BP trending higher running 140-146 / 88-96 mmHg.  Just taking valsartan without HCT.    Will re-initiate Valsartan/Hct 160  BMP in 2 weeks  Send me BP data

## 2023-02-16 NOTE — PROGRESS NOTES
Subjective:       Patient ID: Venkat Jonas is a 57 y.o. male.    Chief Complaint: Establish Care    HPI: New patient to me, 1 of our executives from [facility] comes to establish care.  Patient has prior PCP is no longer with us.  We spent over 20 minutes reviewing his history, current health, current condition, medication and labs.  He is currently being followed for kidney cancer status post nephrectomy.  He had a good scan recently.    He has dyslipidemia and mild coronary atherosclerosis on cardiac calcium scan.  He is on a statin and aspirin.  He sees cardiology.  Blood pressure has been a bit elevated but he is going through some personal stress that hopes to end in the coming week.    He is titrating meds to Psychiatry and that seems to be going well.  He does not need any labs at this time.  He is up-to-date with other screening and we will follow-up with that in September or so.    Review of Systems   Constitutional:  Negative for chills, fatigue and fever.   HENT:  Negative for nosebleeds and trouble swallowing.    Eyes:  Negative for pain and visual disturbance.   Respiratory:  Negative for cough, shortness of breath and wheezing.    Cardiovascular:  Negative for chest pain and palpitations.   Gastrointestinal:  Negative for abdominal pain, constipation, diarrhea, nausea and vomiting.   Genitourinary:  Negative for difficulty urinating and hematuria.   Musculoskeletal:  Negative for arthralgias and neck pain.   Integumentary:  Negative for rash.   Neurological:  Negative for dizziness and headaches.   Hematological:  Does not bruise/bleed easily.   Psychiatric/Behavioral:  Positive for dysphoric mood and sleep disturbance. The patient is nervous/anxious.          Past Medical History:   Diagnosis Date    Allergic sinusitis     Exercise-induced asthma     Hyperlipidemia     Hypertension     Renal cell carcinoma 10/14/2020     Past Surgical History:   Procedure Laterality Date    COLONOSCOPY N/A  12/5/2016    Procedure: COLONOSCOPY;  Surgeon: Toni Rivera MD;  Location: [facility] ENDO (4TH FLR);  Service: Endoscopy;  Laterality: N/A;  VIP    COLONOSCOPY N/A 11/3/2020    Procedure: COLONOSCOPY;  Surgeon: Toni Rivera MD;  Location: [facility] ENDO (2ND FLR);  Service: Endoscopy;  Laterality: N/A;  Schedule patient EGD colonoscopy with me next available but as soon as possible    CYSTOSCOPY W/ RETROGRADES Left 10/1/2020    Procedure: CYSTOSCOPY, WITH RETROGRADE PYELOGRAM;  Surgeon: Vega Sears MD;  Location: [facility] 1ST FLR;  Service: Urology;  Laterality: Left;    ESOPHAGOGASTRODUODENOSCOPY N/A 11/3/2020    Procedure: EGD (ESOPHAGOGASTRODUODENOSCOPY);  Surgeon: Toni Rivera MD;  Location: [facility] (2ND FLR);  Service: Endoscopy;  Laterality: N/A;  pt will get rapid on 11/2-MS    LAPAROSCOPIC ROBOT-ASSISTED SURGICAL REMOVAL OF KIDNEY USING DA SONIDO XI Left 10/7/2020    Procedure: XI ROBOTIC NEPHRECTOMY;  Surgeon: Vega Sears MD;  Location: [facility] OR 2ND FLR;  Service: Urology;  Laterality: Left;  gen with regional/ 5hrs    REPAIR OF COLLATERAL LIGAMENT OF THUMB Right 5/14/2021    Procedure: REPAIR, LIGAMENT, COLLATERAL, THUMB, right UCLUCL;  Surgeon: Carolyn Thao MD;  Location: [facility];  Service: Orthopedics;  Laterality: Right;  Regional/MAC, [facility]    SINUS SURGERY        Patient Active Problem List   Diagnosis    Hyperlipidemia    Verruca plantaris    AR (allergic rhinitis)    Sinusitis    Essential hypertension    Agatston CAC score 100-199    AK (actinic keratosis)    History of renal cell carcinoma    Anxiety about health    Colon adenomas    History of left nephrectomy    Overweight (BMI 25.0-29.9)    Rupture of ulnar collateral ligament (UCL) of thumb    Rupture of UCL of right thumb    BPPV (benign paroxysmal positional vertigo), left    Neck pain    Neck stiffness    Posture abnormality    Dizziness        Objective:      Physical Exam  Constitutional:       General: He is  not in acute distress.     Appearance: He is well-developed.   HENT:      Head: Normocephalic and atraumatic.      Right Ear: Tympanic membrane, ear canal and external ear normal.      Left Ear: Tympanic membrane, ear canal and external ear normal.      Mouth/Throat:      Pharynx: No oropharyngeal exudate or posterior oropharyngeal erythema.   Eyes:      General: No scleral icterus.     Conjunctiva/sclera: Conjunctivae normal.      Pupils: Pupils are equal, round, and reactive to light.   Neck:      Thyroid: No thyromegaly.      Comments: No supraclavicular nodes palpated  Cardiovascular:      Rate and Rhythm: Normal rate and regular rhythm.      Pulses: Normal pulses.      Heart sounds: Normal heart sounds. No murmur heard.  Pulmonary:      Effort: Pulmonary effort is normal.      Breath sounds: Normal breath sounds. No wheezing.   Abdominal:      General: Bowel sounds are normal.      Palpations: Abdomen is soft. There is no mass.      Tenderness: There is no abdominal tenderness.   Musculoskeletal:         General: No tenderness.      Cervical back: Normal range of motion and neck supple.      Right lower leg: No edema.      Left lower leg: No edema.   Lymphadenopathy:      Cervical: No cervical adenopathy.   Skin:     Coloration: Skin is not jaundiced or pale.   Neurological:      General: No focal deficit present.      Mental Status: He is alert and oriented to person, place, and time.   Psychiatric:         Mood and Affect: Mood normal.         Behavior: Behavior normal.       Assessment:       Problem List Items Addressed This Visit          Cardiac/Vascular    Hyperlipidemia (Chronic)    Essential hypertension - Primary (Chronic)       Renal/    History of left nephrectomy (Chronic)       Oncology    History of renal cell carcinoma (Chronic)     Other Visit Diagnoses       Insomnia, unspecified type        Routine physical examination        Anxiety                Plan:         Venkat was seen today for  "establish care.    Diagnoses and all orders for this visit:    Essential hypertension    Insomnia, unspecified type    History of renal cell carcinoma    History of left nephrectomy    Pure hypercholesterolemia    Routine physical examination    Anxiety       HCTZ was added back to the blood pressure medicine today.  He will start that soon.  Follow-up if blood pressure is not improving.  Continue to see specialty providers.  See me as needed and in September for annual exam or for any other problems.              Portions of this note may have been created with voice recognition software. Occasional "wrong-word" or "sound-a-like" substitutions may have occurred due to the inherent limitations of voice recognition software. Please, read the note carefully and recognize, using context, where substitutions have occurred.  "

## 2023-02-17 ENCOUNTER — PATIENT OUTREACH (OUTPATIENT)
Dept: ADMINISTRATIVE | Facility: HOSPITAL | Age: 58
End: 2023-02-17
Payer: COMMERCIAL

## 2023-02-17 NOTE — PROGRESS NOTES
Health Maintenance Due   Topic Date Due    Pneumococcal Vaccines (Age 0-64) (3 - PPSV23 if available, else PCV20) 12/18/2018     Triggered LINKS and reconciled immunizations. Updated Care Everywhere. Checked pt's Hypertension Digital Medicine flow sheet for an updated BP reading; no new BP readings recorded in flow sheet. Chart review completed.

## 2023-02-28 ENCOUNTER — OFFICE VISIT (OUTPATIENT)
Dept: PSYCHIATRY | Facility: CLINIC | Age: 58
End: 2023-02-28
Payer: COMMERCIAL

## 2023-02-28 DIAGNOSIS — F41.9 ANXIETY: Primary | ICD-10-CM

## 2023-02-28 PROCEDURE — 90834 PR PSYCHOTHERAPY W/PATIENT, 45 MIN: ICD-10-PCS | Mod: S$GLB,,, | Performed by: SOCIAL WORKER

## 2023-02-28 PROCEDURE — 99999 PR PBB SHADOW E&M-EST. PATIENT-LVL I: CPT | Mod: PBBFAC,,, | Performed by: SOCIAL WORKER

## 2023-02-28 PROCEDURE — 90834 PSYTX W PT 45 MINUTES: CPT | Mod: S$GLB,,, | Performed by: SOCIAL WORKER

## 2023-02-28 PROCEDURE — 99999 PR PBB SHADOW E&M-EST. PATIENT-LVL I: ICD-10-PCS | Mod: PBBFAC,,, | Performed by: SOCIAL WORKER

## 2023-02-28 NOTE — PROGRESS NOTES
"  Individual Psychotherapy (PhD/LCSW)    2/28/2023    Site:  [facility]         Therapeutic Intervention: Met with patient.  Outpatient - Insight oriented psychotherapy 45 min - CPT code 90625    Chief complaint/reason for encounter: anxiety     Interval history and content of current session: Pt d/c'd Lexapro after discussing with Dr. Virk. He and wife did marital "intensive" in [place] and found it beneficial. Review points raised, pt's greater understanding of how wife's trauma hx plays in to marriage dynamics, discuss management of his abandonment anxiety and how to manage this, and challenges of co-parenting daughter when they disagree.     Treatment plan:  Target symptoms: anxiety   Why chosen therapy is appropriate versus another modality: relevant to diagnosis  Outcome monitoring methods: self-report  Therapeutic intervention type: insight oriented psychotherapy    Risk parameters:  Patient reports no suicidal ideation  Patient reports no homicidal ideation  Patient reports no self-injurious behavior  Patient reports no violent behavior    Verbal deficits: None    Patient's response to intervention:  The patient's response to intervention is motivated.    Progress toward goals and other mental status changes:  The patient's progress toward goals is good    Diagnosis:   anxiety    Plan:  individual psychotherapy    Return to clinic: as scheduled    Length of Service (minutes): 45            "

## 2023-03-02 ENCOUNTER — LAB VISIT (OUTPATIENT)
Dept: LAB | Facility: HOSPITAL | Age: 58
End: 2023-03-02
Attending: INTERNAL MEDICINE
Payer: COMMERCIAL

## 2023-03-02 DIAGNOSIS — I10 ESSENTIAL HYPERTENSION: Chronic | ICD-10-CM

## 2023-03-02 LAB
ANION GAP SERPL CALC-SCNC: 6 MMOL/L (ref 8–16)
BUN SERPL-MCNC: 16 MG/DL (ref 6–20)
CALCIUM SERPL-MCNC: 9.8 MG/DL (ref 8.7–10.5)
CHLORIDE SERPL-SCNC: 108 MMOL/L (ref 95–110)
CO2 SERPL-SCNC: 28 MMOL/L (ref 23–29)
CREAT SERPL-MCNC: 1.3 MG/DL (ref 0.5–1.4)
EST. GFR  (NO RACE VARIABLE): >60 ML/MIN/1.73 M^2
GLUCOSE SERPL-MCNC: 76 MG/DL (ref 70–110)
POTASSIUM SERPL-SCNC: 4.9 MMOL/L (ref 3.5–5.1)
SODIUM SERPL-SCNC: 142 MMOL/L (ref 136–145)

## 2023-03-02 PROCEDURE — 80048 BASIC METABOLIC PNL TOTAL CA: CPT | Performed by: INTERNAL MEDICINE

## 2023-03-02 PROCEDURE — 36415 COLL VENOUS BLD VENIPUNCTURE: CPT | Performed by: INTERNAL MEDICINE

## 2023-03-03 DIAGNOSIS — C64.9 RENAL CELL CARCINOMA, UNSPECIFIED LATERALITY: ICD-10-CM

## 2023-03-03 RX ORDER — LORAZEPAM 0.5 MG/1
0.5 TABLET ORAL NIGHTLY PRN
Qty: 30 TABLET | Refills: 0 | Status: SHIPPED | OUTPATIENT
Start: 2023-03-03 | End: 2023-05-06 | Stop reason: SDUPTHER

## 2023-03-04 ENCOUNTER — PATIENT MESSAGE (OUTPATIENT)
Dept: PSYCHIATRY | Facility: CLINIC | Age: 58
End: 2023-03-04
Payer: COMMERCIAL

## 2023-03-07 ENCOUNTER — PATIENT MESSAGE (OUTPATIENT)
Dept: PSYCHIATRY | Facility: CLINIC | Age: 58
End: 2023-03-07
Payer: COMMERCIAL

## 2023-03-13 ENCOUNTER — OFFICE VISIT (OUTPATIENT)
Dept: PSYCHIATRY | Facility: CLINIC | Age: 58
End: 2023-03-13
Payer: COMMERCIAL

## 2023-03-13 DIAGNOSIS — F41.9 ANXIETY: Primary | ICD-10-CM

## 2023-03-13 PROCEDURE — 90834 PR PSYCHOTHERAPY W/PATIENT, 45 MIN: ICD-10-PCS | Mod: S$GLB,,, | Performed by: SOCIAL WORKER

## 2023-03-13 PROCEDURE — 90834 PSYTX W PT 45 MINUTES: CPT | Mod: S$GLB,,, | Performed by: SOCIAL WORKER

## 2023-03-13 NOTE — PROGRESS NOTES
Individual Psychotherapy (PhD/LCSW)    3/13/2023    Site:  [facility]         Therapeutic Intervention: Met with patient.  Outpatient - Insight oriented psychotherapy 45 min - CPT code 50596    Chief complaint/reason for encounter: anxiety     Interval history and content of current session: Discuss pt's frustration with slow pace of wife's path to forgiveness and feeling she is not accepting responsibility for her role in marital conflict. Continue working on checking in with M about his suggestions rather than declaring a plan, and pt is very receptive to working on this.    Treatment plan:  Target symptoms: anxiety   Why chosen therapy is appropriate versus another modality: relevant to diagnosis  Outcome monitoring methods: self-report  Therapeutic intervention type: insight oriented psychotherapy    Risk parameters:  Patient reports no suicidal ideation  Patient reports no homicidal ideation  Patient reports no self-injurious behavior  Patient reports no violent behavior    Verbal deficits: None    Patient's response to intervention:  The patient's response to intervention is motivated.    Progress toward goals and other mental status changes:  The patient's progress toward goals is good    Diagnosis:   anxiety    Plan:  individual psychotherapy    Return to clinic: as scheduled    Length of Service (minutes): 45

## 2023-03-24 ENCOUNTER — PATIENT MESSAGE (OUTPATIENT)
Dept: PSYCHIATRY | Facility: CLINIC | Age: 58
End: 2023-03-24
Payer: COMMERCIAL

## 2023-03-27 ENCOUNTER — OFFICE VISIT (OUTPATIENT)
Dept: PSYCHIATRY | Facility: CLINIC | Age: 58
End: 2023-03-27
Payer: COMMERCIAL

## 2023-03-27 DIAGNOSIS — F41.9 ANXIETY: Primary | ICD-10-CM

## 2023-03-27 PROCEDURE — 90834 PSYTX W PT 45 MINUTES: CPT | Mod: S$GLB,,, | Performed by: SOCIAL WORKER

## 2023-03-27 PROCEDURE — 90834 PR PSYCHOTHERAPY W/PATIENT, 45 MIN: ICD-10-PCS | Mod: S$GLB,,, | Performed by: SOCIAL WORKER

## 2023-03-27 NOTE — PROGRESS NOTES
"  Individual Psychotherapy (PhD/LCSW)    3/27/2023    Site:  [facility]         Therapeutic Intervention: Met with patient.  Outpatient - Insight oriented psychotherapy 45 min - CPT code 78700    Chief complaint/reason for encounter: anxiety     Interval history and content of current session: Pt sees progress in co-parenting with M and she did re-merge their finances, but she continues to be hypervigilant about any spending he does, highly distrustful. He is frustrated, seeks resolution and her full return to the marriage. Pt aware that I have touched back with their couples therapist ALIYA Dumont, discuss plan for a two week "break" from contact with M and that Ms. Dumont will be doing some EMDR with M around childhood trauma. Discuss pt's growing up with limited funds and how this may have impacted him.    Treatment plan:  Target symptoms: anxiety   Why chosen therapy is appropriate versus another modality: relevant to diagnosis  Outcome monitoring methods: self-report  Therapeutic intervention type: insight oriented psychotherapy    Risk parameters:  Patient reports no suicidal ideation  Patient reports no homicidal ideation  Patient reports no self-injurious behavior  Patient reports no violent behavior    Verbal deficits: None    Patient's response to intervention:  The patient's response to intervention is motivated.    Progress toward goals and other mental status changes:  The patient's progress toward goals is fair    Diagnosis:   anxiety    Plan:  individual psychotherapy    Return to clinic: as scheduled    Length of Service (minutes): 45                "

## 2023-04-04 ENCOUNTER — OFFICE VISIT (OUTPATIENT)
Dept: PSYCHIATRY | Facility: CLINIC | Age: 58
End: 2023-04-04
Payer: COMMERCIAL

## 2023-04-04 DIAGNOSIS — F41.9 ANXIETY: Primary | ICD-10-CM

## 2023-04-04 PROCEDURE — 90834 PSYTX W PT 45 MINUTES: CPT | Mod: S$GLB,,, | Performed by: SOCIAL WORKER

## 2023-04-04 PROCEDURE — 90834 PR PSYCHOTHERAPY W/PATIENT, 45 MIN: ICD-10-PCS | Mod: S$GLB,,, | Performed by: SOCIAL WORKER

## 2023-04-05 NOTE — PROGRESS NOTES
"  Individual Psychotherapy (PhD/LCSW)    4/4/2023    Site:  [facility]         Therapeutic Intervention: Met with patient.  Outpatient - Insight oriented psychotherapy 45 min - CPT code 95612    Chief complaint/reason for encounter: anxiety     Interval history and content of current session: Pt frustrated with what he sees as  a repeating pattern of wife edging closer to reconciliation and then pulling away, bringing up old hurts, fears "no progress."  However he sees them co-parenting better, agrees that her re-joining their finances was a big step. Discuss mutual frustration when he tries to walk her through financial issues, discuss finding a third party to do this with her to reduce tension and conflict.    Treatment plan:  Target symptoms: anxiety   Why chosen therapy is appropriate versus another modality: relevant to diagnosis  Outcome monitoring methods: self-report  Therapeutic intervention type: insight oriented psychotherapy    Risk parameters:  Patient reports no suicidal ideation  Patient reports no homicidal ideation  Patient reports no self-injurious behavior  Patient reports no violent behavior    Verbal deficits: None    Patient's response to intervention:  The patient's response to intervention is motivated.    Progress toward goals and other mental status changes:  The patient's progress toward goals is fair    Diagnosis:   anxiety    Plan:  individual psychotherapy    Return to clinic: as scheduled    Length of Service (minutes): 45                  "

## 2023-04-10 ENCOUNTER — OFFICE VISIT (OUTPATIENT)
Dept: PSYCHIATRY | Facility: CLINIC | Age: 58
End: 2023-04-10
Payer: COMMERCIAL

## 2023-04-10 DIAGNOSIS — F41.9 ANXIETY: Primary | ICD-10-CM

## 2023-04-10 PROCEDURE — 90834 PSYTX W PT 45 MINUTES: CPT | Mod: S$GLB,,, | Performed by: SOCIAL WORKER

## 2023-04-10 PROCEDURE — 90834 PR PSYCHOTHERAPY W/PATIENT, 45 MIN: ICD-10-PCS | Mod: S$GLB,,, | Performed by: SOCIAL WORKER

## 2023-04-10 NOTE — PROGRESS NOTES
Individual Psychotherapy (PhD/LCSW)    4/10/2023    Site:  [facility]         Therapeutic Intervention: Met with patient.  Outpatient - Insight oriented psychotherapy 45 min - CPT code 78861    Chief complaint/reason for encounter: anxiety     Interval history and content of current session: Pt sees more progress toward reconciliation with wife, resolved some financial conflicts, enjoyed much of the visit to [place] with M and both daughters. Main conflict is around parenting O. Discuss M's issues with O and ways to address these in couples Tx.    Treatment plan:  Target symptoms: anxiety   Why chosen therapy is appropriate versus another modality: relevant to diagnosis  Outcome monitoring methods: self-report  Therapeutic intervention type: insight oriented psychotherapy    Risk parameters:  Patient reports no suicidal ideation  Patient reports no homicidal ideation  Patient reports no self-injurious behavior  Patient reports no violent behavior    Verbal deficits: None    Patient's response to intervention:  The patient's response to intervention is motivated.    Progress toward goals and other mental status changes:  The patient's progress toward goals is good    Diagnosis:   anxiety    Plan:  individual psychotherapy    Return to clinic: as scheduled    Length of Service (minutes): 45

## 2023-04-28 ENCOUNTER — OFFICE VISIT (OUTPATIENT)
Dept: PSYCHIATRY | Facility: CLINIC | Age: 58
End: 2023-04-28
Payer: COMMERCIAL

## 2023-04-28 DIAGNOSIS — F43.22 ADJUSTMENT DISORDER WITH ANXIETY: Primary | ICD-10-CM

## 2023-04-28 PROCEDURE — 99213 PR OFFICE/OUTPT VISIT, EST, LEVL III, 20-29 MIN: ICD-10-PCS | Mod: 95,,, | Performed by: PSYCHIATRY & NEUROLOGY

## 2023-04-28 PROCEDURE — 99213 OFFICE O/P EST LOW 20 MIN: CPT | Mod: 95,,, | Performed by: PSYCHIATRY & NEUROLOGY

## 2023-04-28 RX ORDER — FLUOXETINE 10 MG/1
10 CAPSULE ORAL DAILY
Qty: 30 CAPSULE | Refills: 2 | Status: SHIPPED | OUTPATIENT
Start: 2023-04-28 | End: 2023-07-19 | Stop reason: SDUPTHER

## 2023-04-28 NOTE — PROGRESS NOTES
"Outpatient Psychiatry Follow-Up Visit (MD/NP)    4/28/2023    Clinical Status of Patient:  Outpatient (Ambulatory)    Chief Complaint:  Venkat Jonas is a 57 y.o. male who presents today for follow-up of depression, anxiety, and relational problem.  Met with patient.      Interval History and Content of Current Session:  Interim Events/Subjective Report/Content of Current Session:     Updated 4-28-23   Son to  in June 24  Dtr debutante with 3 other families  June 30  Cannot please wife in couples therapy   "Its all your fault"   Issues may escalate quickly about dtr's  Mandi paid UTILITY bill or dtr's departure time   Kamila DALE ; sis was enabled ; fa had ETOH concerns       Psychiatric Review Of Systems - Is patient experiencing or having changes in:  sleep: yes up at 2 am then up at 5 then at 6-630 am   appetite: no  weight: no, on low side   energy/anergy: no  interest/pleasure/anhedonia: no  somatic symptoms: no  libido: no  anxiety/panic: no  guilty/hopelessness: no  concentration: no  S.I.B.s/risky behavior: no  Irritability: no  Racing thoughts: no  Impulsive behaviors: no  Paranoia: no  AVH: no     Meds :  off lexapro x months   Did not advance dose bc of anxiety and anxiety improved     Psychotherapy:  Target symptoms: depression, anxiety , adjustment  Why chosen therapy is appropriate versus another modality: relevant to diagnosis, patient responds to this modality, evidence based practice  Outcome monitoring methods: self-report, observation  Therapeutic intervention type: supportive psychotherapy  Topics discussed/themes: relationships difficulties, difficulty managing affect in interpersonal relationships, life stage transitional issues  The patient's response to the intervention is accepting. The patient's progress toward treatment goals is fair.   Duration of intervention: 15 minutes.    Review of Systems   Prob Pert. 1 sys, Ext. psych +2 add., Comp. 10-14 sys  PSYCHIATRIC: Pertinant " items are noted in the narrative.  CONSTITUTIONAL: No weight gain or loss.   MUSCULOSKELETAL: No pain or stiffness of the joints.  NEUROLOGIC: No weakness, sensory changes, seizures, confusion, memory loss, tremor or other abnormal movements.  ENDOCRINE: No polydipsia or polyuria.  INTEGUMENTARY: No rashes or lacerations.  EYES: No exophthalmos, jaundice or blindness.  ENT: No dizziness, tinnitus or hearing loss.  RESPIRATORY: No shortness of breath.  CARDIOVASCULAR: No tachycardia or chest pain.  GASTROINTESTINAL: No nausea, vomiting, pain, constipation or diarrhea.  GENITOURINARY: No frequency, dysuria or sexual dysfunction.  HEMATOLOGIC/LYMPHATIC: No excessive bleeding, prolonged or excessive bleeding after dental extraction/injury.  ALLERGIC/IMMUNOLOGIC: No allergic response to materials, foods or animals at this time.    Past Medical, Family and Social History: The patient's past medical, family and social history have been reviewed and updated as appropriate within the electronic medical record - see encounter notes.    Compliance: yes    Side effects: None    Risk Parameters:  Patient reports no suicidal ideation  Patient reports no homicidal ideation  Patient reports no self-injurious behavior  Patient reports no violent behavior    Exam (detailed: at least 9 elements; comprehensive: all 15 elements)   Constitutional  Vitals:  Most recent vital signs, dated less than 90 days prior to this appointment, were reviewed.   There were no vitals filed for this visit.     General:  unremarkable, age appropriate, casually dressed, neatly groomed     Musculoskeletal  Muscle Strength/Tone:  no tremor, no tic, no choreoathetosis   Gait & Station:  non-ataxic     Psychiatric  Speech:  no latency; no press, spontaneous   Mood & Affect:  anxious  congruent and appropriate, mood-congruent   Thought Process:  normal and logical, goal-directed   Associations:  intact   Thought Content:  normal, no suicidality, no  homicidality, delusions, or paranoia   Insight:  has awareness of illness   Judgement: behavior is adequate to circumstances   Orientation:  grossly intact   Memory: intact for content of interview   Language: grossly intact   Attention Span & Concentration:  able to focus   Fund of Knowledge:  intact and appropriate to age and level of education     Assessment and Diagnosis   Status/Progress: Based on the examination today, the patient's problem(s) is/are inadequately controlled.  New problems have been presented today.   Co-morbidities are complicating management of the primary condition.  There are no active rule-out diagnoses for this patient at this time.     General Impression:       ICD-10-CM ICD-9-CM   1. Adjustment disorder with anxiety  F43.22 309.24       Intervention/Counseling/Treatment Plan   Medication Management: The risks and benefits of medication were discussed with the patient.  Do not restart  lexapro.  Add low dose Fluoxetine 10 mg q day   Counseling provided with patient as follows: importance of compliance with chosen treatment options was emphasized, risks and benefits of treatment options, including medications, were discussed with the patient  Care Coordination: During the visit, care coordination was conducted with  social work.      Return to Clinic: 1 month

## 2023-05-02 ENCOUNTER — OFFICE VISIT (OUTPATIENT)
Dept: PSYCHIATRY | Facility: CLINIC | Age: 58
End: 2023-05-02
Payer: COMMERCIAL

## 2023-05-02 DIAGNOSIS — F41.9 ANXIETY: Primary | ICD-10-CM

## 2023-05-02 DIAGNOSIS — F43.21 ADJUSTMENT DISORDER WITH DEPRESSED MOOD: ICD-10-CM

## 2023-05-02 PROCEDURE — 90834 PR PSYCHOTHERAPY W/PATIENT, 45 MIN: ICD-10-PCS | Mod: S$GLB,,, | Performed by: SOCIAL WORKER

## 2023-05-02 PROCEDURE — 99999 PR PBB SHADOW E&M-EST. PATIENT-LVL I: ICD-10-PCS | Mod: PBBFAC,,, | Performed by: SOCIAL WORKER

## 2023-05-02 PROCEDURE — 99999 PR PBB SHADOW E&M-EST. PATIENT-LVL I: CPT | Mod: PBBFAC,,, | Performed by: SOCIAL WORKER

## 2023-05-02 PROCEDURE — 90834 PSYTX W PT 45 MINUTES: CPT | Mod: S$GLB,,, | Performed by: SOCIAL WORKER

## 2023-05-02 NOTE — PROGRESS NOTES
"  Individual Psychotherapy (PhD/LCSW)    5/2/2023    Site:  [facility]         Therapeutic Intervention: Met with patient.  Outpatient - Insight oriented psychotherapy 45 min - CPT code 52774    Chief complaint/reason for encounter: anxietym depression     Interval history and content of current session: Pt has recently felt fatigued, sad, lack of interest, depressed mood, related to wife pulling back and asking for no contact, feels lonely and anxious about the future of his marriage. Couples therapist tells him wife is triggered and unstable, trying to do EMDR to resolve wife's trauma. Talk with pt about focusing on self-care while waiting for wife to do her work, accepting that he can't "fix" her. Pt enjoys golf, tennis, meditation, has friends and family members he can spend time with, but feels lonely and sad immediately on ending the activity. Started fluoxetine 10mg per Dr. Valdez a few days ago.    Treatment plan:  Target symptoms: anxiety   Why chosen therapy is appropriate versus another modality: relevant to diagnosis  Outcome monitoring methods: self-report  Therapeutic intervention type: insight oriented psychotherapy    Risk parameters:  Patient reports no suicidal ideation  Patient reports no homicidal ideation  Patient reports no self-injurious behavior  Patient reports no violent behavior    Verbal deficits: None    Patient's response to intervention:  The patient's response to intervention is motivated.    Progress toward goals and other mental status changes:  The patient's progress toward goals is limited    Diagnosis:   Anxiety, adjustment disorder with depressed mood    Plan:  individual psychotherapy    Return to clinic: as scheduled    Length of Service (minutes): 45                      "

## 2023-05-03 ENCOUNTER — PATIENT MESSAGE (OUTPATIENT)
Dept: PSYCHIATRY | Facility: CLINIC | Age: 58
End: 2023-05-03
Payer: COMMERCIAL

## 2023-05-06 DIAGNOSIS — C64.9 RENAL CELL CARCINOMA, UNSPECIFIED LATERALITY: ICD-10-CM

## 2023-05-08 RX ORDER — LORAZEPAM 0.5 MG/1
0.5 TABLET ORAL NIGHTLY PRN
Qty: 30 TABLET | Refills: 0 | Status: SHIPPED | OUTPATIENT
Start: 2023-05-08 | End: 2023-11-03

## 2023-05-09 ENCOUNTER — OFFICE VISIT (OUTPATIENT)
Dept: PSYCHIATRY | Facility: CLINIC | Age: 58
End: 2023-05-09
Payer: COMMERCIAL

## 2023-05-09 DIAGNOSIS — F43.21 ADJUSTMENT DISORDER WITH DEPRESSED MOOD: ICD-10-CM

## 2023-05-09 DIAGNOSIS — F41.9 ANXIETY: Primary | ICD-10-CM

## 2023-05-09 PROCEDURE — 90834 PSYTX W PT 45 MINUTES: CPT | Mod: S$GLB,,, | Performed by: SOCIAL WORKER

## 2023-05-09 PROCEDURE — 90834 PR PSYCHOTHERAPY W/PATIENT, 45 MIN: ICD-10-PCS | Mod: S$GLB,,, | Performed by: SOCIAL WORKER

## 2023-05-09 NOTE — PROGRESS NOTES
"  Individual Psychotherapy (PhD/LCSW)    5/9/2023    Site:  [facility]         Therapeutic Intervention: Met with patient.  Outpatient - Insight oriented psychotherapy 45 min - CPT code 08360    Chief complaint/reason for encounter: anxietym depression     Interval history and content of current session: Pt frustrated with M's increasing withdrawal from relationship, Ms. Dumont suggesting a hiatus in couples Tx, and messages that M needs to work on her own trauma. Let pt know we are trying to arrange a 4 way call between myself, M's  therapist, Ms. Dumont and the intensive couples therapist, to clarify issues and plans for future Tx.  Pt states he feels "stuck".  M has stated she is withdrawing from managing daughter Mandi's care.  Suggest pt get O's permission to talk with her and there therapist about parents' role in O's recovery, to help pt manage  these boundaries. Also encourage him to continue efforts to socialize with friends, minimize discussions with them about marital issues, to give him some "safe space" to be himself. He is actively doing this and does find it helpful.    Treatment plan:  Target symptoms: anxiety   Why chosen therapy is appropriate versus another modality: relevant to diagnosis  Outcome monitoring methods: self-report  Therapeutic intervention type: insight oriented psychotherapy    Risk parameters:  Patient reports no suicidal ideation  Patient reports no homicidal ideation  Patient reports no self-injurious behavior  Patient reports no violent behavior    Verbal deficits: None    Patient's response to intervention:  The patient's response to intervention is motivated.    Progress toward goals and other mental status changes:  The patient's progress toward goals is fair    Diagnosis:   Anxiety, adjustment disorder with depressed mood    Plan:  individual psychotherapy    Return to clinic: as scheduled    Length of Service (minutes): 45                        "

## 2023-05-11 DIAGNOSIS — C64.9 RENAL CELL CARCINOMA, UNSPECIFIED LATERALITY: Primary | ICD-10-CM

## 2023-05-15 ENCOUNTER — OFFICE VISIT (OUTPATIENT)
Dept: PSYCHIATRY | Facility: CLINIC | Age: 58
End: 2023-05-15
Payer: COMMERCIAL

## 2023-05-15 DIAGNOSIS — F41.9 ANXIETY: Primary | ICD-10-CM

## 2023-05-15 DIAGNOSIS — F43.21 ADJUSTMENT DISORDER WITH DEPRESSED MOOD: ICD-10-CM

## 2023-05-15 PROCEDURE — 90834 PSYTX W PT 45 MINUTES: CPT | Mod: S$GLB,,, | Performed by: SOCIAL WORKER

## 2023-05-15 PROCEDURE — 90834 PR PSYCHOTHERAPY W/PATIENT, 45 MIN: ICD-10-PCS | Mod: S$GLB,,, | Performed by: SOCIAL WORKER

## 2023-05-15 NOTE — PROGRESS NOTES
Individual Psychotherapy (PhD/LCSW)    5/15/2023    Site:  [facility]         Therapeutic Intervention: Met with patient.  Outpatient - Insight oriented psychotherapy 45 min - CPT code 65362    Chief complaint/reason for encounter: anxietym depression     Interval history and content of current session: Talk with pt about three way conference between myself, Anna Dumont LCSW and Cassandra Sewell LPC regarding issues in pt's marriage. Identify questions pt and I have about wife's expressed concerns and agree I will message Ms. Sewell to get clarification.    Treatment plan:  Target symptoms: anxiety   Why chosen therapy is appropriate versus another modality: relevant to diagnosis  Outcome monitoring methods: self-report  Therapeutic intervention type: insight oriented psychotherapy    Risk parameters:  Patient reports no suicidal ideation  Patient reports no homicidal ideation  Patient reports no self-injurious behavior  Patient reports no violent behavior    Verbal deficits: None    Patient's response to intervention:  The patient's response to intervention is motivated.    Progress toward goals and other mental status changes:  The patient's progress toward goals is fair    Diagnosis:   Anxiety, adjustment disorder with depressed mood    Plan:  individual psychotherapy    Return to clinic: as scheduled    Length of Service (minutes): 45

## 2023-05-22 ENCOUNTER — OFFICE VISIT (OUTPATIENT)
Dept: PSYCHIATRY | Facility: CLINIC | Age: 58
End: 2023-05-22
Payer: COMMERCIAL

## 2023-05-22 DIAGNOSIS — F41.9 ANXIETY: Primary | ICD-10-CM

## 2023-05-22 PROCEDURE — 90834 PR PSYCHOTHERAPY W/PATIENT, 45 MIN: ICD-10-PCS | Mod: S$GLB,,, | Performed by: SOCIAL WORKER

## 2023-05-22 PROCEDURE — 90834 PSYTX W PT 45 MINUTES: CPT | Mod: S$GLB,,, | Performed by: SOCIAL WORKER
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
## 2023-05-29 ENCOUNTER — OFFICE VISIT (OUTPATIENT)
Dept: PSYCHIATRY | Facility: CLINIC | Age: 58
End: 2023-05-29
Payer: COMMERCIAL

## 2023-05-29 DIAGNOSIS — F41.9 ANXIETY: Primary | ICD-10-CM

## 2023-05-29 PROCEDURE — 99999 PR PBB SHADOW E&M-EST. PATIENT-LVL I: ICD-10-PCS | Mod: PBBFAC,,, | Performed by: SOCIAL WORKER

## 2023-05-29 PROCEDURE — 90834 PR PSYCHOTHERAPY W/PATIENT, 45 MIN: ICD-10-PCS | Mod: S$GLB,,, | Performed by: SOCIAL WORKER

## 2023-05-29 PROCEDURE — 90834 PSYTX W PT 45 MINUTES: CPT | Mod: S$GLB,,, | Performed by: SOCIAL WORKER

## 2023-05-29 PROCEDURE — 99999 PR PBB SHADOW E&M-EST. PATIENT-LVL I: CPT | Mod: PBBFAC,,, | Performed by: SOCIAL WORKER

## 2023-05-29 NOTE — PROGRESS NOTES
Individual Psychotherapy (PhD/LCSW)    5/29/2023    Site:  [facility]         Therapeutic Intervention: Met with patient.  Outpatient - Insight oriented psychotherapy 45 min - CPT code 61393    Chief complaint/reason for encounter: anxietym depression     Interval history and content of current session: Pt doing well with his kids and with friends. He had a productive talk with Mandi about expectations and they are meeting together with her therapist last this week to discuss reasonable expectations and consequences. M remains distant. They had a 2 hour virtual chat going over bills and spending, and he reports she was upset that he had to leave for dinner with friends. He remains frustrated with lack of warmth or any sign of readiness to work on reconciliation. Son's wedding and daughter's debutante party are at the end of June. Suggest pt open discussion with M about how they plan to manage these two major events together, since they are not seeing each other at all.  Advise not pressing her further until these events are done, giving her time to work on her issues. Pt feels he is clearer that he will be ok with or without the marriage, still hopes to reconcile, but feels more stable within himself.    Treatment plan:  Target symptoms: anxiety   Why chosen therapy is appropriate versus another modality: relevant to diagnosis  Outcome monitoring methods: self-report  Therapeutic intervention type: insight oriented psychotherapy    Risk parameters:  Patient reports no suicidal ideation  Patient reports no homicidal ideation  Patient reports no self-injurious behavior  Patient reports no violent behavior    Verbal deficits: None    Patient's response to intervention:  The patient's response to intervention is motivated.    Progress toward goals and other mental status changes:  The patient's progress toward goals is fair    Diagnosis:   Anxiety    Plan:  individual psychotherapy    Return to clinic: as  scheduled    Length of Service (minutes): 45

## 2023-05-30 ENCOUNTER — PATIENT MESSAGE (OUTPATIENT)
Dept: PSYCHIATRY | Facility: CLINIC | Age: 58
End: 2023-05-30
Payer: COMMERCIAL

## 2023-06-06 NOTE — PROGRESS NOTES
"Per patient message,   "Should I continue with Rx as prescribed? I need to refill today- I do feel like I have a little more energy with this dosage. Thanks"      Last 5 Patient Entered Readings                                                               Current 30 Day Average: 129/89     Recent Readings 6/3/2017 6/2/2017 6/1/2017 6/1/2017 6/1/2017    Systolic BP (mmHg) 131 134 125 133 125    Diastolic BP (mmHg) 90 94 83 91 83    Pulse 81 74 69 68 72      BP at goal  Continue current therapy    Hypertension Medications             valsartan-hydrochlorothiazide (DIOVAN-HCT) 160-12.5 mg per tablet Take 1 tablet by mouth once daily.          "
Called patient to review readings and assess fatigue. LVM  BP at goal  Continue monitoring    Last 5 Patient Entered Readings                                                               Current 30 Day Average: 128/87     Recent Readings 5/17/2017 5/13/2017 5/13/2017 5/10/2017 5/5/2017    Systolic BP (mmHg) 123 140 149 142 125    Diastolic BP (mmHg) 83 95 98 95 87    Pulse 69 74 74 77 66        
18

## 2023-06-19 ENCOUNTER — OFFICE VISIT (OUTPATIENT)
Dept: PSYCHIATRY | Facility: CLINIC | Age: 58
End: 2023-06-19
Payer: COMMERCIAL

## 2023-06-19 DIAGNOSIS — F41.9 ANXIETY: Primary | ICD-10-CM

## 2023-06-19 PROCEDURE — 90834 PR PSYCHOTHERAPY W/PATIENT, 45 MIN: ICD-10-PCS | Mod: S$GLB,,, | Performed by: SOCIAL WORKER

## 2023-06-19 PROCEDURE — 90834 PSYTX W PT 45 MINUTES: CPT | Mod: S$GLB,,, | Performed by: SOCIAL WORKER

## 2023-06-19 NOTE — PROGRESS NOTES
Individual Psychotherapy (PhD/LCSW)    6/19/2023    Site:  [facility]         Therapeutic Intervention: Met with patient.  Outpatient - Insight oriented psychotherapy 45 min - CPT code 79593    Chief complaint/reason for encounter: anxietym depression     Interval history and content of current session: Pt shares recent events in which M was hostile and over-reactive. Pt finds he is calmer and less affected by her behavior and her distancing. He is working on staying grounded, doing things with kids and friends, still interested in saving the marriage  but less invested and no longer panicky. Current goal is to get through son's wedding and daughter's dori party, take daughters on planned vacation, and then reassess.    Treatment plan:  Target symptoms: anxiety   Why chosen therapy is appropriate versus another modality: relevant to diagnosis  Outcome monitoring methods: self-report  Therapeutic intervention type: insight oriented psychotherapy    Risk parameters:  Patient reports no suicidal ideation  Patient reports no homicidal ideation  Patient reports no self-injurious behavior  Patient reports no violent behavior    Verbal deficits: None    Patient's response to intervention:  The patient's response to intervention is motivated.    Progress toward goals and other mental status changes:  The patient's progress toward goals is good    Diagnosis:   Anxiety    Plan:  individual psychotherapy    Return to clinic: as scheduled    Length of Service (minutes): 45

## 2023-06-27 ENCOUNTER — OFFICE VISIT (OUTPATIENT)
Dept: PSYCHIATRY | Facility: CLINIC | Age: 58
End: 2023-06-27
Payer: COMMERCIAL

## 2023-06-27 DIAGNOSIS — F41.9 ANXIETY: Primary | ICD-10-CM

## 2023-06-27 PROCEDURE — 90834 PR PSYCHOTHERAPY W/PATIENT, 45 MIN: ICD-10-PCS | Mod: S$GLB,,, | Performed by: SOCIAL WORKER

## 2023-06-27 PROCEDURE — 90834 PSYTX W PT 45 MINUTES: CPT | Mod: S$GLB,,, | Performed by: SOCIAL WORKER

## 2023-06-27 NOTE — PROGRESS NOTES
"  Individual Psychotherapy (PhD/LCSW)    6/27/2023    Site:  [facility]         Therapeutic Intervention: Met with patient.  Outpatient - Insight oriented psychotherapy 45 min - CPT code 66656    Chief complaint/reason for encounter: anxietym depression     Interval history and content of current session: Pt shares multiple lengthy e-mails between himself and M reiterating old issues, with no resolution. Son's wedding came off well but pt reports son was upset with some of M's behaviors. Daughter Mandi is hurt by M's hostility around O's dori party next week. Pt feels impatient with lack of change or progress, wonders if he should give up on the marriage. Suggest he pull back, as M is asking for "no connection", get through dori party this Friday, take daughters on vacation as planned, and consider his options. Discuss how to invite M to look at her side of the relationship and what she might do to move it forward.    Treatment plan:  Target symptoms: anxiety   Why chosen therapy is appropriate versus another modality: relevant to diagnosis  Outcome monitoring methods: self-report  Therapeutic intervention type: insight oriented psychotherapy    Risk parameters:  Patient reports no suicidal ideation  Patient reports no homicidal ideation  Patient reports no self-injurious behavior  Patient reports no violent behavior    Verbal deficits: None    Patient's response to intervention:  The patient's response to intervention is motivated.    Progress toward goals and other mental status changes:  The patient's progress toward goals is good    Diagnosis:   Anxiety    Plan:  individual psychotherapy    Return to clinic: as scheduled    Length of Service (minutes): 45                                  "

## 2023-06-28 RX ORDER — FLUOXETINE 10 MG/1
10 CAPSULE ORAL DAILY
Qty: 30 CAPSULE | Refills: 2 | OUTPATIENT
Start: 2023-06-28 | End: 2024-06-27

## 2023-07-10 NOTE — TELEPHONE ENCOUNTER
He has lab schd for the 14th cbc and cmp with [facility]  Do you want me to add the PSA and LIPID ?     No

## 2023-07-11 ENCOUNTER — TELEPHONE (OUTPATIENT)
Dept: INTERNAL MEDICINE | Facility: CLINIC | Age: 58
End: 2023-07-11
Payer: COMMERCIAL

## 2023-07-11 VITALS — SYSTOLIC BLOOD PRESSURE: 136 MMHG | DIASTOLIC BLOOD PRESSURE: 90 MMHG

## 2023-07-11 NOTE — TELEPHONE ENCOUNTER
BP reading of 136/90 was taken from pt's [facility] flow sheet on 7/10/2023. Pt is being followed by [facility].

## 2023-07-18 NOTE — TELEPHONE ENCOUNTER
LDL improved but still elevated (132).  Taking Crestor 20 mg.    Discussed results; will increase to 40 mg Crestor (two 20 mg tabs) and add Zetia 10 mg.    Recheck lipids in 2 months.   Pt was called and she desires appt with soonest female provider for STD check and for her  scar to be examined. Pt complained of intermittent pains to site.  was done 10/2022 per pt. Appt scheduled and address given. Pt voiced understanding.

## 2023-07-19 RX ORDER — FLUOXETINE 10 MG/1
10 CAPSULE ORAL DAILY
Qty: 30 CAPSULE | Refills: 0 | Status: SHIPPED | OUTPATIENT
Start: 2023-07-19 | End: 2023-08-19

## 2023-07-19 RX ORDER — FLUOXETINE HYDROCHLORIDE 20 MG/1
20 CAPSULE ORAL DAILY
Qty: 30 CAPSULE | Refills: 2 | Status: SHIPPED | OUTPATIENT
Start: 2023-08-16 | End: 2023-09-06

## 2023-07-24 ENCOUNTER — OFFICE VISIT (OUTPATIENT)
Dept: PSYCHIATRY | Facility: CLINIC | Age: 58
End: 2023-07-24
Payer: COMMERCIAL

## 2023-07-24 DIAGNOSIS — F41.9 ANXIETY: Primary | ICD-10-CM

## 2023-07-24 PROCEDURE — 90834 PR PSYCHOTHERAPY W/PATIENT, 45 MIN: ICD-10-PCS | Mod: S$GLB,,, | Performed by: SOCIAL WORKER

## 2023-07-24 PROCEDURE — 90834 PSYTX W PT 45 MINUTES: CPT | Mod: S$GLB,,, | Performed by: SOCIAL WORKER

## 2023-07-24 PROCEDURE — 99999 PR PBB SHADOW E&M-EST. PATIENT-LVL I: ICD-10-PCS | Mod: PBBFAC,,, | Performed by: SOCIAL WORKER

## 2023-07-24 PROCEDURE — 99999 PR PBB SHADOW E&M-EST. PATIENT-LVL I: CPT | Mod: PBBFAC,,, | Performed by: SOCIAL WORKER

## 2023-07-24 NOTE — PROGRESS NOTES
Individual Psychotherapy (PhD/LCSW)    7/24/2023    Site:  [facility]         Therapeutic Intervention: Met with patient.  Outpatient - Insight oriented psychotherapy 45 min - CPT code 61399    Chief complaint/reason for encounter: anxietym depression     Interval history and content of current session: Pt set limits with wife, told her he would not tolerate her negativity toward daughter Mandi, or her constant doubts and castigation of him, that he would like to reconcile but has made the changes he needs to make and that she can either make needed changes or they can proceed with divorce. Wife has been much more receptive and forthcoming since then, they are seeing each other in a limited way and plan to travel together to take bradley Metcalf to [place] in August. Talk with pt about how to assess progress as they move forward.    Treatment plan:  Target symptoms: anxiety   Why chosen therapy is appropriate versus another modality: relevant to diagnosis  Outcome monitoring methods: self-report  Therapeutic intervention type: insight oriented psychotherapy    Risk parameters:  Patient reports no suicidal ideation  Patient reports no homicidal ideation  Patient reports no self-injurious behavior  Patient reports no violent behavior    Verbal deficits: None    Patient's response to intervention:  The patient's response to intervention is motivated.    Progress toward goals and other mental status changes:  The patient's progress toward goals is good    Diagnosis:   Anxiety    Plan:  individual psychotherapy    Return to clinic: as scheduled    Length of Service (minutes): 45

## 2023-07-31 ENCOUNTER — OFFICE VISIT (OUTPATIENT)
Dept: PSYCHIATRY | Facility: CLINIC | Age: 58
End: 2023-07-31
Payer: COMMERCIAL

## 2023-07-31 DIAGNOSIS — F41.9 ANXIETY: Primary | ICD-10-CM

## 2023-07-31 PROCEDURE — 90834 PSYTX W PT 45 MINUTES: CPT | Mod: S$GLB,,, | Performed by: SOCIAL WORKER

## 2023-07-31 PROCEDURE — 90834 PR PSYCHOTHERAPY W/PATIENT, 45 MIN: ICD-10-PCS | Mod: S$GLB,,, | Performed by: SOCIAL WORKER

## 2023-07-31 NOTE — PROGRESS NOTES
Individual Psychotherapy (PhD/LCSW)    7/31/2023    Site:  [facility]         Therapeutic Intervention: Met with patient.  Outpatient - Insight oriented psychotherapy 45 min - CPT code 64797    Chief complaint/reason for encounter: anxietym depression     Interval history and content of current session: Pt frustrated by wife's apparent mixed messages, saying she wants to commit to the marriage but continuing to find fault with him and pull back. Discuss feelings and options.    Treatment plan:  Target symptoms: anxiety   Why chosen therapy is appropriate versus another modality: relevant to diagnosis  Outcome monitoring methods: self-report  Therapeutic intervention type: insight oriented psychotherapy    Risk parameters:  Patient reports no suicidal ideation  Patient reports no homicidal ideation  Patient reports no self-injurious behavior  Patient reports no violent behavior    Verbal deficits: None    Patient's response to intervention:  The patient's response to intervention is motivated.    Progress toward goals and other mental status changes:  The patient's progress toward goals is fair    Diagnosis:   Anxiety    Plan:  individual psychotherapy    Return to clinic: as scheduled    Length of Service (minutes): 45

## 2023-08-07 ENCOUNTER — OFFICE VISIT (OUTPATIENT)
Dept: PSYCHIATRY | Facility: CLINIC | Age: 58
End: 2023-08-07
Payer: COMMERCIAL

## 2023-08-07 DIAGNOSIS — F41.9 ANXIETY: Primary | ICD-10-CM

## 2023-08-07 PROCEDURE — 99999 PR PBB SHADOW E&M-EST. PATIENT-LVL I: CPT | Mod: PBBFAC,,, | Performed by: SOCIAL WORKER

## 2023-08-07 PROCEDURE — 90834 PR PSYCHOTHERAPY W/PATIENT, 45 MIN: ICD-10-PCS | Mod: S$GLB,,, | Performed by: SOCIAL WORKER

## 2023-08-07 PROCEDURE — 90834 PSYTX W PT 45 MINUTES: CPT | Mod: S$GLB,,, | Performed by: SOCIAL WORKER

## 2023-08-07 PROCEDURE — 99999 PR PBB SHADOW E&M-EST. PATIENT-LVL I: ICD-10-PCS | Mod: PBBFAC,,, | Performed by: SOCIAL WORKER

## 2023-08-07 NOTE — PROGRESS NOTES
Individual Psychotherapy (PhD/LCSW)    8/7/2023    Site:  [facility]         Therapeutic Intervention: Met with patient.  Outpatient - Insight oriented psychotherapy 45 min - CPT code 36138    Chief complaint/reason for encounter: anxietym depression     Interval history and content of current session: Pt finds himself anxious and with low mood as he waits for wife to come back from mission in [place]. Discuss frustration with ongoing separation and her reactivity and high expectations for his behavior.  Discuss knowing he will be ok whether marriage continues or not, grounding himself and having clear talking points. Pt on Prozac 10 mg may discuss dosage with Dr. Virk.    Treatment plan:  Target symptoms: anxiety   Why chosen therapy is appropriate versus another modality: relevant to diagnosis  Outcome monitoring methods: self-report  Therapeutic intervention type: insight oriented psychotherapy    Risk parameters:  Patient reports no suicidal ideation  Patient reports no homicidal ideation  Patient reports no self-injurious behavior  Patient reports no violent behavior    Verbal deficits: None    Patient's response to intervention:  The patient's response to intervention is motivated.    Progress toward goals and other mental status changes:  The patient's progress toward goals is fair    Diagnosis:   Anxiety    Plan:  individual psychotherapy    Return to clinic: as scheduled    Length of Service (minutes): 45

## 2023-08-12 ENCOUNTER — PATIENT MESSAGE (OUTPATIENT)
Dept: ADMINISTRATIVE | Facility: OTHER | Age: 58
End: 2023-08-12
Payer: COMMERCIAL

## 2023-08-14 ENCOUNTER — OFFICE VISIT (OUTPATIENT)
Dept: HEMATOLOGY/ONCOLOGY | Facility: CLINIC | Age: 58
End: 2023-08-14
Payer: COMMERCIAL

## 2023-08-14 ENCOUNTER — HOSPITAL ENCOUNTER (OUTPATIENT)
Dept: RADIOLOGY | Facility: HOSPITAL | Age: 58
Discharge: HOME | End: 2023-08-14
Attending: INTERNAL MEDICINE
Payer: COMMERCIAL

## 2023-08-14 ENCOUNTER — OFFICE VISIT (OUTPATIENT)
Dept: PSYCHIATRY | Facility: CLINIC | Age: 58
End: 2023-08-14
Payer: COMMERCIAL

## 2023-08-14 VITALS
OXYGEN SATURATION: 100 % | HEIGHT: 71 IN | DIASTOLIC BLOOD PRESSURE: 98 MMHG | SYSTOLIC BLOOD PRESSURE: 150 MMHG | HEART RATE: 78 BPM | BODY MASS INDEX: 26.79 KG/M2 | RESPIRATION RATE: 18 BRPM | TEMPERATURE: 98 F | WEIGHT: 191.38 LBS

## 2023-08-14 DIAGNOSIS — C64.9 RENAL CELL CARCINOMA, UNSPECIFIED LATERALITY: ICD-10-CM

## 2023-08-14 DIAGNOSIS — F41.9 ANXIETY: Primary | ICD-10-CM

## 2023-08-14 DIAGNOSIS — C64.9 RENAL CELL CARCINOMA, UNSPECIFIED LATERALITY: Primary | ICD-10-CM

## 2023-08-14 PROCEDURE — 3008F PR BODY MASS INDEX (BMI) DOCUMENTED: ICD-10-PCS | Mod: CPTII,S$GLB,, | Performed by: INTERNAL MEDICINE

## 2023-08-14 PROCEDURE — 71260 CT CHEST ABDOMEN PELVIS WITH CONTRAST (XPD): ICD-10-PCS | Mod: 26,,, | Performed by: STUDENT IN AN ORGANIZED HEALTH CARE EDUCATION/TRAINING PROGRAM

## 2023-08-14 PROCEDURE — 74177 CT ABD & PELVIS W/CONTRAST: CPT | Mod: 26,,, | Performed by: STUDENT IN AN ORGANIZED HEALTH CARE EDUCATION/TRAINING PROGRAM

## 2023-08-14 PROCEDURE — 99214 PR OFFICE/OUTPT VISIT, EST, LEVL IV, 30-39 MIN: ICD-10-PCS | Mod: S$GLB,,, | Performed by: INTERNAL MEDICINE

## 2023-08-14 PROCEDURE — 3080F PR MOST RECENT DIASTOLIC BLOOD PRESSURE >= 90 MM HG: ICD-10-PCS | Mod: CPTII,S$GLB,, | Performed by: INTERNAL MEDICINE

## 2023-08-14 PROCEDURE — 99999 PR PBB SHADOW E&M-EST. PATIENT-LVL IV: CPT | Mod: PBBFAC,,, | Performed by: INTERNAL MEDICINE

## 2023-08-14 PROCEDURE — 74177 CT ABD & PELVIS W/CONTRAST: CPT | Mod: TC

## 2023-08-14 PROCEDURE — 71260 CT THORAX DX C+: CPT | Mod: 26,,, | Performed by: STUDENT IN AN ORGANIZED HEALTH CARE EDUCATION/TRAINING PROGRAM

## 2023-08-14 PROCEDURE — 71260 CT THORAX DX C+: CPT | Mod: TC

## 2023-08-14 PROCEDURE — 74177 CT CHEST ABDOMEN PELVIS WITH CONTRAST (XPD): ICD-10-PCS | Mod: 26,,, | Performed by: STUDENT IN AN ORGANIZED HEALTH CARE EDUCATION/TRAINING PROGRAM

## 2023-08-14 PROCEDURE — 3077F SYST BP >= 140 MM HG: CPT | Mod: CPTII,S$GLB,, | Performed by: INTERNAL MEDICINE

## 2023-08-14 PROCEDURE — 99999 PR PBB SHADOW E&M-EST. PATIENT-LVL I: CPT | Mod: PBBFAC,,, | Performed by: SOCIAL WORKER

## 2023-08-14 PROCEDURE — 1159F PR MEDICATION LIST DOCUMENTED IN MEDICAL RECORD: ICD-10-PCS | Mod: CPTII,S$GLB,, | Performed by: INTERNAL MEDICINE

## 2023-08-14 PROCEDURE — 90834 PSYTX W PT 45 MINUTES: CPT | Mod: S$GLB,,, | Performed by: SOCIAL WORKER

## 2023-08-14 PROCEDURE — 25500020 PHARM REV CODE 255: Performed by: INTERNAL MEDICINE

## 2023-08-14 PROCEDURE — 3008F BODY MASS INDEX DOCD: CPT | Mod: CPTII,S$GLB,, | Performed by: INTERNAL MEDICINE

## 2023-08-14 PROCEDURE — 90834 PR PSYCHOTHERAPY W/PATIENT, 45 MIN: ICD-10-PCS | Mod: S$GLB,,, | Performed by: SOCIAL WORKER

## 2023-08-14 PROCEDURE — 3080F DIAST BP >= 90 MM HG: CPT | Mod: CPTII,S$GLB,, | Performed by: INTERNAL MEDICINE

## 2023-08-14 PROCEDURE — 99214 OFFICE O/P EST MOD 30 MIN: CPT | Mod: S$GLB,,, | Performed by: INTERNAL MEDICINE

## 2023-08-14 PROCEDURE — 1159F MED LIST DOCD IN RCRD: CPT | Mod: CPTII,S$GLB,, | Performed by: INTERNAL MEDICINE

## 2023-08-14 PROCEDURE — 99999 PR PBB SHADOW E&M-EST. PATIENT-LVL I: ICD-10-PCS | Mod: PBBFAC,,, | Performed by: SOCIAL WORKER

## 2023-08-14 PROCEDURE — 99999 PR PBB SHADOW E&M-EST. PATIENT-LVL IV: ICD-10-PCS | Mod: PBBFAC,,, | Performed by: INTERNAL MEDICINE

## 2023-08-14 PROCEDURE — 3077F PR MOST RECENT SYSTOLIC BLOOD PRESSURE >= 140 MM HG: ICD-10-PCS | Mod: CPTII,S$GLB,, | Performed by: INTERNAL MEDICINE

## 2023-08-14 RX ADMIN — IOHEXOL 75 ML: 350 INJECTION, SOLUTION INTRAVENOUS at 10:08

## 2023-08-14 NOTE — PROGRESS NOTES
Individual Psychotherapy (PhD/LCSW)    8/14/2023    Site:  [facility]         Therapeutic Intervention: Met with patient.  Outpatient - Insight oriented psychotherapy 45 min - CPT code 20132    Chief complaint/reason for encounter: anxiety     Interval history and content of current session: Pt started setting firmer limits with wife after her return from mission trip, trying to help her move past judging him constantly and more toward real steps for reconciliation. So far he sees some signs that she is responding to these limits, and she was more affectionate on their trip to [place] to bring daughter to school. He defended his parenting decisions including helping A get meds for anxiety. He has agreed to 's request that he come with her to [facility]. However he feels she is not taking meaningful steps to meet his needs, discuss how to talk to her about this.    Treatment plan:  Target symptoms: anxiety   Why chosen therapy is appropriate versus another modality: relevant to diagnosis  Outcome monitoring methods: self-report  Therapeutic intervention type: insight oriented psychotherapy    Risk parameters:  Patient reports no suicidal ideation  Patient reports no homicidal ideation  Patient reports no self-injurious behavior  Patient reports no violent behavior    Verbal deficits: None    Patient's response to intervention:  The patient's response to intervention is motivated.    Progress toward goals and other mental status changes:  The patient's progress toward goals is good    Diagnosis:   Anxiety    Plan:  individual psychotherapy    Return to clinic: as scheduled    Length of Service (minutes): 45

## 2023-08-14 NOTE — PROGRESS NOTES
Subjective:       Patient ID: Venkat Jonas    Chief Complaint: h/o RCC    HPI     Venkat Jonas is a 58 y.o. male,  to clinic for evaluation and management of RCC, s/p nephrectomy.      He notes overall feeling well.  Still working with Dr. Virk on some anxiety.  Eating well, exercising.        Oncologic History:    Developed hematuria and on imaging was found to have a renal mass c/w RCC.  Questionable bone lesion.  Underwent nephrectomy October 7, 2020 with below pathology.  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         PATHOLOGY:    1. Lymph nodes, aortic (regional resection):  - 3 lymph nodes negative for tumor (0/3)    2. Left kidney and adrenal gland (radical nephrectomy):  - Renal cell carcinoma, see synoptic report below  - Additional ancillary testing for tumor classification is ordered and results will be issued in a supplemental report    Kidney carcinoma synoptic report  - Procedure: Radical nephrectomy  - Specimen laterality: Left  - Tumor site: Upper pole  - Tumor size: 8.9 x 8.1 x 7.7 cm  - Tumor focality: Renal cell carcinoma, unclassified (see microscopic section)  - Sarcomatoid features: Not identified  - Rhabdoid features: Present  - Histologic grade: WHO/ISUP Nuclear grade 4  - Tumor necrosis: Present, approximately 20% of the tumor  - Tumor extension: Tumor extends into renal vein  - Margins:  - Uninvolved by invasive carcinoma  - Regional lymph nodes:  - Number of lymph nodes involved: 0  - Number of lymph nodes examined: 4 (specimen 1 and specimen 2)  - Pathologic staging: pT3a N0 MX  - Other findings: Separate sclerotic and calcified nodule  - Tumor block for potential studies: 2D, 2E, 2F 2H, 2I, 2J, 2K  - Nontumor block: 2N  - Immunostain panel in microscopic section  - MMR IHC: No loss of expression (see microscopic section)  Sections show an eosinophilic renal cell carcinoma with nuclear inclusions ,cytoplasmic inclusions , and  extracellular  eosinophillic material. Several renal cell carcinoma subtypes are in the differential including  high grade chromophobe, eosinophilic clear cell, SDH deficient, and translocation associated. Subtyping  will be attempted with ancillary testing and results issued in a supplemental report. Selected slides  reviewed by additional pathologists.    Immunostain results:  Positive stains: AMACR, keratin AE1/AE3, CD10,  (weak), vimentin  Negative stains: Keratin 7, keratin 20, HMB45, Mart 1, desmin    Immunohistochemistry (IHC) Testing for Mismatch Repair (MMR) Proteins:  MLH1 - Intact nuclear expression  MSH2 - Intact nuclear expression  MSH6 - Intact nuclear expression  PMS2 - Intact nuclear expression    Review of Systems   Constitutional: Negative for activity change, appetite change, chills, fatigue, fever and unexpected weight change.   HENT: Negative for congestion, hearing loss, mouth sores, sore throat and trouble swallowing.    Eyes: Negative for pain and visual disturbance.   Respiratory: Negative for cough, shortness of breath and wheezing.    Cardiovascular: Negative for chest pain, palpitations and leg swelling.   Gastrointestinal: Negative for abdominal pain, constipation, diarrhea, nausea and vomiting.   Endocrine: Negative for cold intolerance and heat intolerance.   Genitourinary: Negative for difficulty urinating, discharge, dysuria, enuresis, frequency, hematuria, scrotal swelling and testicular pain.   Musculoskeletal: Negative for arthralgias, back pain and myalgias.   Skin: Negative for color change, rash and wound.   Allergic/Immunologic: Negative for environmental allergies and food allergies.   Neurological: Negative for weakness, numbness and headaches.   Hematological: Negative for adenopathy. Does not bruise/bleed easily.   Psychiatric/Behavioral: Negative for confusion, hallucinations and sleep disturbance. The patient is not nervous/anxious.    All other systems reviewed and are  negative.        Allergies:  Review of patient's allergies indicates:  No Known Allergies    Medications:  Current Outpatient Medications   Medication Sig Dispense Refill    albuterol (PROVENTIL/VENTOLIN HFA) 90 mcg/actuation inhaler Inhale 2 puffs into the lungs every 4 (four) hours as needed for Shortness of Breath. 18 g 3    aspirin 81 MG Chew Take 81 mg by mouth once daily.      ezetimibe (ZETIA) 10 mg tablet Take 1 tablet by mouth daily 90 tablet 3    FLUoxetine 10 MG capsule Take 1 capsule (10 mg total) by mouth once daily. 30 capsule 0    [START ON 8/16/2023] FLUoxetine 20 MG capsule Take 1 capsule (20 mg total) by mouth once daily. 30 capsule 2    LORazepam (ATIVAN) 0.5 MG tablet Take 1 tablet (0.5 mg total) by mouth nightly as needed for Anxiety. 30 tablet 0    pantoprazole (PROTONIX) 40 MG tablet TAKE ONE TABLET BY MOUTH TWICE A DAY ON AN EMPTY STOMACH (Patient taking differently: TAKE ONE TABLET BY MOUTH once A DAY ON AN EMPTY STOMACH) 180 tablet 3    rosuvastatin (CRESTOR) 40 MG Tab Take 1 tablet (40 mg total) by mouth once daily. 90 tablet 3    valACYclovir (VALTREX) 1000 MG tablet TAKE 2 TABLETS (2,000 MG TOTAL) BY MOUTH 2 (TWO) TIMES DAILY FOR 2 DAYS and repeat as needed 12 tablet 1    valsartan-hydrochlorothiazide (DIOVAN-HCT) 160-12.5 mg per tablet Take 1 tablet by mouth once daily. 90 tablet 3    zolpidem (AMBIEN) 10 mg Tab Take 1 tablet (10 mg total) by mouth nightly as needed (insomnia). 30 tablet 5     No current facility-administered medications for this visit.       PMH:  Past Medical History:   Diagnosis Date    Allergic sinusitis     Exercise-induced asthma     Hyperlipidemia     Hypertension     Renal cell carcinoma 10/14/2020       PSH:  Past Surgical History:   Procedure Laterality Date    COLONOSCOPY N/A 12/5/2016    Procedure: COLONOSCOPY;  Surgeon: Toni Rivera MD;  Location: [address];  Service: Endoscopy;  Laterality: N/A;  [facility]    COLONOSCOPY N/A 11/3/2020     Procedure: COLONOSCOPY;  Surgeon: Toni Rivera MD;  Location: [facility] ENDO (2ND FLR);  Service: Endoscopy;  Laterality: N/A;  Schedule patient EGD colonoscopy with me next available but as soon as possible    CYSTOSCOPY W/ RETROGRADES Left 10/1/2020    Procedure: CYSTOSCOPY, WITH RETROGRADE PYELOGRAM;  Surgeon: Vega Sears MD;  Location: [facility] 1ST FLR;  Service: Urology;  Laterality: Left;    ESOPHAGOGASTRODUODENOSCOPY N/A 11/3/2020    Procedure: EGD (ESOPHAGOGASTRODUODENOSCOPY);  Surgeon: Toni Rivera MD;  Location: [facility] (2ND FLR);  Service: Endoscopy;  Laterality: N/A;  pt will get rapid on 11/2-MS    LAPAROSCOPIC ROBOT-ASSISTED SURGICAL REMOVAL OF KIDNEY USING DA SONIDO XI Left 10/7/2020    Procedure: XI ROBOTIC NEPHRECTOMY;  Surgeon: Vega Sears MD;  Location: [facility] OR 2ND FLR;  Service: Urology;  Laterality: Left;  gen with regional/ 5hrs    REPAIR OF COLLATERAL LIGAMENT OF THUMB Right 5/14/2021    Procedure: REPAIR, LIGAMENT, COLLATERAL, THUMB, right UCLUCL;  Surgeon: Carolyn Thao MD;  Location: [facility];  Service: Orthopedics;  Laterality: Right;  Regional/MAC, [facility]    SINUS SURGERY         FamHx:  Family History   Problem Relation Age of Onset    Hyperlipidemia Father     Hyperlipidemia Brother     Heart attack Maternal Grandmother     Cancer Maternal Grandfather         throat    Melanoma Neg Hx     Psoriasis Neg Hx     Lupus Neg Hx     Eczema Neg Hx     Acne Neg Hx     Heart disease Neg Hx        SocHx:  Social History     Socioeconomic History    Marital status:      Spouse name: Kamila    Number of children: 3   Occupational History    Occupation:  Marketing     Employer: [employer] ([employer])   Tobacco Use    Smoking status: Never    Smokeless tobacco: Never    Tobacco comments:     The patient exercises regularly at [address].  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   Substance and Sexual Activity    Alcohol use: Yes      "Alcohol/week: 4.0 standard drinks of alcohol     Types: 4 Shots of liquor per week     Comment: socially    Drug use: No    Sexual activity: Yes     Partners: Female   Social History Narrative    Administration at [employer]    , 3 kids (23, 18, 17)         Objective:         BP (!) 150/98 (BP Location: Left arm, Patient Position: Sitting, BP Method: Medium (Automatic))   Pulse 78   Temp 98.2 °F (36.8 °C) (Oral)   Resp 18   Ht 5' 11" (1.803 m)   Wt 86.8 kg (191 lb 5.8 oz)   SpO2 100%   BMI 26.69 kg/m²       Physical Exam  Constitutional:       General: He is not in acute distress.     Appearance: Normal appearance. He is normal weight. He is not ill-appearing, toxic-appearing or diaphoretic.   HENT:      Head: Normocephalic and atraumatic.      Nose: Nose normal.   Eyes:      General: No scleral icterus.        Right eye: No discharge.         Left eye: No discharge.      Conjunctiva/sclera: Conjunctivae normal.   Skin:     General: Skin is warm and dry.      Coloration: Skin is not jaundiced or pale.   Neurological:      General: No focal deficit present.      Mental Status: He is alert and oriented to person, place, and time. Mental status is at baseline.   Psychiatric:         Mood and Affect: Mood normal.         Behavior: Behavior normal.         Thought Content: Thought content normal.         Judgment: Judgment normal.           LABS:  WBC   Date Value Ref Range Status   08/14/2023 4.83 3.90 - 12.70 K/uL Final     Hemoglobin   Date Value Ref Range Status   08/14/2023 14.6 14.0 - 18.0 g/dL Final     Hematocrit   Date Value Ref Range Status   08/14/2023 45.3 40.0 - 54.0 % Final     Platelets   Date Value Ref Range Status   08/14/2023 244 150 - 450 K/uL Final       Chemistry        Component Value Date/Time     08/14/2023 0752    K 5.3 (H) 08/14/2023 0752     08/14/2023 0752    CO2 29 08/14/2023 0752    BUN 16 08/14/2023 0752    CREATININE 1.2 08/14/2023 0752    GLU 99 08/14/2023 0752      "   Component Value Date/Time    CALCIUM 9.7 08/14/2023 0752    ALKPHOS 56 08/14/2023 0752    AST 24 08/14/2023 0752    ALT 23 08/14/2023 0752    BILITOT 0.6 08/14/2023 0752    ESTGFRAFRICA >60.0 05/02/2022 1011    EGFRNONAA >60.0 05/02/2022 1011              Assessment:       1. Renal cell carcinoma, unspecified laterality           Plan:         1. RCC:    Previously reviewed pathology.  I am happy that lymph nodes were clear, and margins were negative.  The fact that there was renal vein involvement, pushes this to a stage III.  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    We are assuming that the questionable bone lesion is benign, especially since it has been stable for years now, watching this carefully.      After surgery, we discussed monitoring this closely or considering adjuvant Sutent    After careful consideration, the patient has decided to monitor this through routine surveillance, which I fully support.    New data from [facility] on adjuvant immunotherapy that was presented only studied patients 12 wks or less from nephrectomy, we did not think that considering immunotherapy 9 mos after surgery would be prudent or c/w supporting data.     Rescanning now stable, with ELLI.     He is now 3 years out, RTC 6 mos to see me with CT CAP, CBC, CMP.   Will plan to go to annual scans at year 4.     Will repeat CMP in two weeks given borderline K+     PSA, CMP, Lipids in 2 weeks.  RTC 6 mos to see me with CT CAP, CBC, CMP    He knows to call us if there are issues or questions in the meantime.    The patient agrees with the plan, and all questions have been answered to their satisfaction.      More than 30 mins were spent during this encounter, greater than 50% was spent in direct counseling and/or coordination of care.     Chandana Carrasco M.D., M.S., F.A.C.P.  Hematology and Oncology Attending  [facility] [employer]  Cancer [place]          Route Chart for Scheduling    Med Onc Chart Routing      Follow up with physician 6 months. PSA, CMP, Lipids in 2 weeks.  RTC 6 mos to see me with CT CAP, CBC, CMP   Follow up with RYNE    Infusion scheduling note    Injection scheduling note    Labs CBC, CMP and PSA   Scheduling:  Preferred lab:  Lab interval:  PSA, CMP, Lipids in 2 weeks.  Then CMP, CBC in 6 mos.   Imaging CT chest abdomen pelvis   6 mos   Pharmacy appointment    Other referrals

## 2023-08-21 ENCOUNTER — OFFICE VISIT (OUTPATIENT)
Dept: PSYCHIATRY | Facility: CLINIC | Age: 58
End: 2023-08-21
Payer: COMMERCIAL

## 2023-08-21 DIAGNOSIS — F41.9 ANXIETY: Primary | ICD-10-CM

## 2023-08-21 PROCEDURE — 90834 PR PSYCHOTHERAPY W/PATIENT, 45 MIN: ICD-10-PCS | Mod: S$GLB,,, | Performed by: SOCIAL WORKER

## 2023-08-21 PROCEDURE — 90834 PSYTX W PT 45 MINUTES: CPT | Mod: S$GLB,,, | Performed by: SOCIAL WORKER

## 2023-08-21 NOTE — PROGRESS NOTES
"  Individual Psychotherapy (PhD/LCSW)    8/21/2023    Site:  [facility]         Therapeutic Intervention: Met with patient.  Outpatient - Insight oriented psychotherapy 45 min - CPT code 26059    Chief complaint/reason for encounter: anxiety     Interval history and content of current session: Discuss pt's sense of repeating patterns in negotiations with wife over reconciliation. Suggest they work on defining their pictures of what reconciliation would look like and examples of what he calls "bumps in the road" in light of M's apparent intolerance of what she calls "inconsistency.", to be sure they are on the same page about what they need and expect.    Treatment plan:  Target symptoms: anxiety   Why chosen therapy is appropriate versus another modality: relevant to diagnosis  Outcome monitoring methods: self-report  Therapeutic intervention type: insight oriented psychotherapy    Risk parameters:  Patient reports no suicidal ideation  Patient reports no homicidal ideation  Patient reports no self-injurious behavior  Patient reports no violent behavior    Verbal deficits: None    Patient's response to intervention:  The patient's response to intervention is motivated.    Progress toward goals and other mental status changes:  The patient's progress toward goals is good    Diagnosis:   Anxiety    Plan:  individual psychotherapy    Return to clinic: as scheduled    Length of Service (minutes): 45    "

## 2023-08-28 ENCOUNTER — OFFICE VISIT (OUTPATIENT)
Dept: PSYCHIATRY | Facility: CLINIC | Age: 58
End: 2023-08-28
Payer: COMMERCIAL

## 2023-08-28 DIAGNOSIS — F41.9 ANXIETY: Primary | ICD-10-CM

## 2023-08-28 PROCEDURE — 90834 PSYTX W PT 45 MINUTES: CPT | Mod: S$GLB,,, | Performed by: SOCIAL WORKER

## 2023-08-28 PROCEDURE — 90834 PR PSYCHOTHERAPY W/PATIENT, 45 MIN: ICD-10-PCS | Mod: S$GLB,,, | Performed by: SOCIAL WORKER

## 2023-08-28 NOTE — PROGRESS NOTES
Individual Psychotherapy (PhD/LCSW)    8/28/2023    Site:  [facility]         Therapeutic Intervention: Met with patient.  Outpatient - Insight oriented psychotherapy 45 min - CPT code 46482    Chief complaint/reason for encounter: anxiety     Interval history and content of current session: Pt seen, discuss my conversation with wife's therapist and plan for pt to meet with wife and the therapist next week. Encourage pt to be clear about what is ok with him, what concerns him, and not make concessions he is uncomfortable with, particularly around parenting issues, which seem to be the main remaining conflict between them. Pt is receptive. Discuss developing ways to self-soothe for times he feels sad or lonely.    Treatment plan:  Target symptoms: anxiety   Why chosen therapy is appropriate versus another modality: relevant to diagnosis  Outcome monitoring methods: self-report  Therapeutic intervention type: insight oriented psychotherapy    Risk parameters:  Patient reports no suicidal ideation  Patient reports no homicidal ideation  Patient reports no self-injurious behavior  Patient reports no violent behavior    Verbal deficits: None    Patient's response to intervention:  The patient's response to intervention is motivated.    Progress toward goals and other mental status changes:  The patient's progress toward goals is good    Diagnosis:   Anxiety    Plan:  individual psychotherapy    Return to clinic: as scheduled    Length of Service (minutes): 45

## 2023-08-29 ENCOUNTER — IMMUNIZATION (OUTPATIENT)
Dept: INTERNAL MEDICINE | Facility: CLINIC | Age: 58
End: 2023-08-29
Payer: COMMERCIAL

## 2023-08-29 DIAGNOSIS — Z23 NEED FOR VACCINATION: Primary | ICD-10-CM

## 2023-08-29 PROCEDURE — 91312 COVID-19, MRNA, LNP-S, BIVALENT BOOSTER, PF, 30 MCG/0.3 ML DOSE: ICD-10-PCS | Mod: S$GLB,,, | Performed by: INTERNAL MEDICINE

## 2023-08-29 PROCEDURE — 0124A COVID-19, MRNA, LNP-S, BIVALENT BOOSTER, PF, 30 MCG/0.3 ML DOSE: CPT | Mod: S$GLB,,, | Performed by: INTERNAL MEDICINE

## 2023-08-29 PROCEDURE — 91312 COVID-19, MRNA, LNP-S, BIVALENT BOOSTER, PF, 30 MCG/0.3 ML DOSE: CPT | Mod: S$GLB,,, | Performed by: INTERNAL MEDICINE

## 2023-08-29 PROCEDURE — 0124A COVID-19, MRNA, LNP-S, BIVALENT BOOSTER, PF, 30 MCG/0.3 ML DOSE: ICD-10-PCS | Mod: S$GLB,,, | Performed by: INTERNAL MEDICINE

## 2023-09-01 RX ORDER — EZETIMIBE 10 MG/1
TABLET ORAL
Qty: 90 TABLET | Refills: 3 | Status: SHIPPED | OUTPATIENT
Start: 2023-09-01

## 2023-09-01 RX ORDER — FLUOXETINE 10 MG/1
10 CAPSULE ORAL DAILY
Qty: 30 CAPSULE | Refills: 0 | Status: CANCELLED | OUTPATIENT
Start: 2023-09-01 | End: 2023-10-02

## 2023-09-05 ENCOUNTER — LAB VISIT (OUTPATIENT)
Dept: LAB | Facility: HOSPITAL | Age: 58
End: 2023-09-05
Payer: COMMERCIAL

## 2023-09-05 DIAGNOSIS — C64.9 RENAL CELL CARCINOMA, UNSPECIFIED LATERALITY: ICD-10-CM

## 2023-09-05 LAB
ALBUMIN SERPL BCP-MCNC: 4.1 G/DL (ref 3.5–5.2)
ALP SERPL-CCNC: 68 U/L (ref 55–135)
ALT SERPL W/O P-5'-P-CCNC: 14 U/L (ref 10–44)
ANION GAP SERPL CALC-SCNC: 12 MMOL/L (ref 8–16)
AST SERPL-CCNC: 26 U/L (ref 10–40)
BILIRUB SERPL-MCNC: 0.5 MG/DL (ref 0.1–1)
BUN SERPL-MCNC: 20 MG/DL (ref 6–20)
CALCIUM SERPL-MCNC: 9.8 MG/DL (ref 8.7–10.5)
CHLORIDE SERPL-SCNC: 102 MMOL/L (ref 95–110)
CHOLEST SERPL-MCNC: 193 MG/DL (ref 120–199)
CHOLEST/HDLC SERPL: 2.4 {RATIO} (ref 2–5)
CO2 SERPL-SCNC: 27 MMOL/L (ref 23–29)
COMPLEXED PSA SERPL-MCNC: 1.6 NG/ML (ref 0–4)
CREAT SERPL-MCNC: 1.6 MG/DL (ref 0.5–1.4)
EST. GFR  (NO RACE VARIABLE): 49.6 ML/MIN/1.73 M^2
GLUCOSE SERPL-MCNC: 194 MG/DL (ref 70–110)
HDLC SERPL-MCNC: 82 MG/DL (ref 40–75)
HDLC SERPL: 42.5 % (ref 20–50)
LDLC SERPL CALC-MCNC: 88 MG/DL (ref 63–159)
NONHDLC SERPL-MCNC: 111 MG/DL
POTASSIUM SERPL-SCNC: 4.5 MMOL/L (ref 3.5–5.1)
PROT SERPL-MCNC: 7.9 G/DL (ref 6–8.4)
SODIUM SERPL-SCNC: 141 MMOL/L (ref 136–145)
TRIGL SERPL-MCNC: 115 MG/DL (ref 30–150)

## 2023-09-05 PROCEDURE — 36415 COLL VENOUS BLD VENIPUNCTURE: CPT | Performed by: INTERNAL MEDICINE

## 2023-09-05 PROCEDURE — 80053 COMPREHEN METABOLIC PANEL: CPT | Performed by: INTERNAL MEDICINE

## 2023-09-05 PROCEDURE — 80061 LIPID PANEL: CPT | Performed by: INTERNAL MEDICINE

## 2023-09-05 PROCEDURE — 84153 ASSAY OF PSA TOTAL: CPT | Performed by: INTERNAL MEDICINE

## 2023-09-05 RX ORDER — FLUOXETINE 10 MG/1
10 CAPSULE ORAL DAILY
Qty: 30 CAPSULE | Refills: 0 | OUTPATIENT
Start: 2023-09-05 | End: 2023-10-06

## 2023-09-06 RX ORDER — FLUOXETINE 10 MG/1
10 CAPSULE ORAL DAILY
Qty: 30 CAPSULE | Refills: 1 | Status: SHIPPED | OUTPATIENT
Start: 2023-09-06 | End: 2023-11-03

## 2023-09-08 DIAGNOSIS — F51.02 ADJUSTMENT INSOMNIA: ICD-10-CM

## 2023-09-09 NOTE — TELEPHONE ENCOUNTER
No care due was identified.  [facility] Embedded Care Due Messages. Reference number: [id number].   9/08/2023 9:28:03 PM CDT

## 2023-09-11 RX ORDER — ZOLPIDEM TARTRATE 10 MG/1
10 TABLET ORAL NIGHTLY PRN
Qty: 30 TABLET | Refills: 5 | Status: SHIPPED | OUTPATIENT
Start: 2023-09-11 | End: 2024-03-09

## 2023-09-15 ENCOUNTER — TELEPHONE (OUTPATIENT)
Dept: INTERNAL MEDICINE | Facility: CLINIC | Age: 58
End: 2023-09-15
Payer: COMMERCIAL

## 2023-09-15 VITALS — SYSTOLIC BLOOD PRESSURE: 134 MMHG | DIASTOLIC BLOOD PRESSURE: 87 MMHG

## 2023-09-26 ENCOUNTER — OFFICE VISIT (OUTPATIENT)
Dept: PSYCHIATRY | Facility: CLINIC | Age: 58
End: 2023-09-26
Payer: COMMERCIAL

## 2023-09-26 DIAGNOSIS — F41.9 ANXIETY: Primary | ICD-10-CM

## 2023-09-26 PROCEDURE — 90834 PR PSYCHOTHERAPY W/PATIENT, 45 MIN: ICD-10-PCS | Mod: S$GLB,,, | Performed by: SOCIAL WORKER

## 2023-09-26 PROCEDURE — 90834 PSYTX W PT 45 MINUTES: CPT | Mod: S$GLB,,, | Performed by: SOCIAL WORKER

## 2023-09-26 NOTE — PROGRESS NOTES
Individual Psychotherapy (PhD/LCSW)    9/26/2023    Site:  [facility]         Therapeutic Intervention: Met with patient.  Outpatient - Insight oriented psychotherapy 45 min - CPT code 62248    Chief complaint/reason for encounter: anxiety     Interval history and content of current session: Pt and wife decided in [facility] to reconcile, wrote a new marriage contract. Pt was elated but sees that the parenting issues remain unresolved. Pt asks about having M come in to a session as he did with wife's therapist,discuss what would be beneficial, agree she might like to tell me her views on the parenting issues.    Treatment plan:  Target symptoms: anxiety   Why chosen therapy is appropriate versus another modality: relevant to diagnosis  Outcome monitoring methods: self-report  Therapeutic intervention type: insight oriented psychotherapy    Risk parameters:  Patient reports no suicidal ideation  Patient reports no homicidal ideation  Patient reports no self-injurious behavior  Patient reports no violent behavior    Verbal deficits: None    Patient's response to intervention:  The patient's response to intervention is motivated.    Progress toward goals and other mental status changes:  The patient's progress toward goals is good    Diagnosis:   Anxiety    Plan:  individual psychotherapy    Return to clinic: as scheduled    Length of Service (minutes): 45

## 2023-10-11 ENCOUNTER — PATIENT MESSAGE (OUTPATIENT)
Dept: OTHER | Facility: OTHER | Age: 58
End: 2023-10-11
Payer: COMMERCIAL

## 2023-10-12 ENCOUNTER — PATIENT MESSAGE (OUTPATIENT)
Dept: INTERNAL MEDICINE | Facility: CLINIC | Age: 58
End: 2023-10-12
Payer: COMMERCIAL

## 2023-10-12 DIAGNOSIS — Z00.00 ROUTINE PHYSICAL EXAMINATION: Primary | ICD-10-CM

## 2023-10-12 DIAGNOSIS — R73.9 HYPERGLYCEMIA: Primary | ICD-10-CM

## 2023-10-12 RX ORDER — SEMAGLUTIDE 0.25 MG/.5ML
0.25 INJECTION, SOLUTION SUBCUTANEOUS
Qty: 2 ML | Refills: 3 | Status: SHIPPED | OUTPATIENT
Start: 2023-10-12 | End: 2023-11-03

## 2023-10-20 RX ORDER — TIRZEPATIDE 2.5 MG/.5ML
2.5 INJECTION, SOLUTION SUBCUTANEOUS
Qty: 4 PEN | Refills: 3 | Status: SHIPPED | OUTPATIENT
Start: 2023-10-20 | End: 2023-12-11

## 2023-10-23 ENCOUNTER — OFFICE VISIT (OUTPATIENT)
Dept: CARDIOLOGY | Facility: CLINIC | Age: 58
End: 2023-10-23
Payer: COMMERCIAL

## 2023-10-23 VITALS
BODY MASS INDEX: 27.36 KG/M2 | SYSTOLIC BLOOD PRESSURE: 138 MMHG | HEIGHT: 70 IN | DIASTOLIC BLOOD PRESSURE: 88 MMHG | WEIGHT: 191.13 LBS | HEART RATE: 86 BPM

## 2023-10-23 DIAGNOSIS — R93.1 AGATSTON CAC SCORE 100-199: ICD-10-CM

## 2023-10-23 DIAGNOSIS — E78.2 MIXED HYPERLIPIDEMIA: Chronic | ICD-10-CM

## 2023-10-23 DIAGNOSIS — I10 ESSENTIAL HYPERTENSION: Primary | Chronic | ICD-10-CM

## 2023-10-23 PROCEDURE — 1159F MED LIST DOCD IN RCRD: CPT | Mod: CPTII,S$GLB,, | Performed by: INTERNAL MEDICINE

## 2023-10-23 PROCEDURE — 1159F PR MEDICATION LIST DOCUMENTED IN MEDICAL RECORD: ICD-10-PCS | Mod: CPTII,S$GLB,, | Performed by: INTERNAL MEDICINE

## 2023-10-23 PROCEDURE — 99999 PR PBB SHADOW E&M-EST. PATIENT-LVL IV: ICD-10-PCS | Mod: PBBFAC,,, | Performed by: INTERNAL MEDICINE

## 2023-10-23 PROCEDURE — 1160F PR REVIEW ALL MEDS BY PRESCRIBER/CLIN PHARMACIST DOCUMENTED: ICD-10-PCS | Mod: CPTII,S$GLB,, | Performed by: INTERNAL MEDICINE

## 2023-10-23 PROCEDURE — 99999 PR PBB SHADOW E&M-EST. PATIENT-LVL IV: CPT | Mod: PBBFAC,,, | Performed by: INTERNAL MEDICINE

## 2023-10-23 PROCEDURE — 99204 PR OFFICE/OUTPT VISIT, NEW, LEVL IV, 45-59 MIN: ICD-10-PCS | Mod: S$GLB,,, | Performed by: INTERNAL MEDICINE

## 2023-10-23 PROCEDURE — 3008F BODY MASS INDEX DOCD: CPT | Mod: CPTII,S$GLB,, | Performed by: INTERNAL MEDICINE

## 2023-10-23 PROCEDURE — 3075F PR MOST RECENT SYSTOLIC BLOOD PRESS GE 130-139MM HG: ICD-10-PCS | Mod: CPTII,S$GLB,, | Performed by: INTERNAL MEDICINE

## 2023-10-23 PROCEDURE — 3008F PR BODY MASS INDEX (BMI) DOCUMENTED: ICD-10-PCS | Mod: CPTII,S$GLB,, | Performed by: INTERNAL MEDICINE

## 2023-10-23 PROCEDURE — 3079F DIAST BP 80-89 MM HG: CPT | Mod: CPTII,S$GLB,, | Performed by: INTERNAL MEDICINE

## 2023-10-23 PROCEDURE — 3075F SYST BP GE 130 - 139MM HG: CPT | Mod: CPTII,S$GLB,, | Performed by: INTERNAL MEDICINE

## 2023-10-23 PROCEDURE — 99204 OFFICE O/P NEW MOD 45 MIN: CPT | Mod: S$GLB,,, | Performed by: INTERNAL MEDICINE

## 2023-10-23 PROCEDURE — 3044F HG A1C LEVEL LT 7.0%: CPT | Mod: CPTII,S$GLB,, | Performed by: INTERNAL MEDICINE

## 2023-10-23 PROCEDURE — 1160F RVW MEDS BY RX/DR IN RCRD: CPT | Mod: CPTII,S$GLB,, | Performed by: INTERNAL MEDICINE

## 2023-10-23 PROCEDURE — 3044F PR MOST RECENT HEMOGLOBIN A1C LEVEL <7.0%: ICD-10-PCS | Mod: CPTII,S$GLB,, | Performed by: INTERNAL MEDICINE

## 2023-10-23 PROCEDURE — 3079F PR MOST RECENT DIASTOLIC BLOOD PRESSURE 80-89 MM HG: ICD-10-PCS | Mod: CPTII,S$GLB,, | Performed by: INTERNAL MEDICINE

## 2023-10-23 NOTE — PROGRESS NOTES
Subjective:   Patient ID:  Venkat Jonas is a 58 y.o. male who presents for evaluation of No chief complaint on file.      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 Looney left our organization.  Mr. Jonas has a history as detailed below.    He is doing well with no new symptoms or cardiovascular complaints and no change in exercise capacity.  He denies chest discomfort, IRWIN, palpitations, PND/orthopnea, lightheadedness and syncope.          Past Medical History:   Diagnosis Date    Allergic sinusitis     Exercise-induced asthma     Hyperlipidemia     Hypertension     Renal cell carcinoma 10/14/2020       Past Surgical History:   Procedure Laterality Date    COLONOSCOPY N/A 12/5/2016    Procedure: COLONOSCOPY;  Surgeon: Toni Rivera MD;  Location: [facility] (4TH FLR);  Service: Endoscopy;  Laterality: N/A;  [facility]    COLONOSCOPY N/A 11/3/2020    Procedure: COLONOSCOPY;  Surgeon: Toni Rivera MD;  Location: [facility] (2ND FLR);  Service: Endoscopy;  Laterality: N/A;  Schedule patient EGD colonoscopy with me next available but as soon as possible    CYSTOSCOPY W/ RETROGRADES Left 10/1/2020    Procedure: CYSTOSCOPY, WITH RETROGRADE PYELOGRAM;  Surgeon: Vega Sears MD;  Location: [facility] OR 1ST FLR;  Service: Urology;  Laterality: Left;    ESOPHAGOGASTRODUODENOSCOPY N/A 11/3/2020    Procedure: EGD (ESOPHAGOGASTRODUODENOSCOPY);  Surgeon: Toni Rivera MD;  Location: [facility] (2ND FLR);  Service: Endoscopy;  Laterality: N/A;  pt will get rapid on 11/2-MS    LAPAROSCOPIC ROBOT-ASSISTED SURGICAL REMOVAL OF KIDNEY USING DA SONIDO XI Left 10/7/2020    Procedure: XI ROBOTIC NEPHRECTOMY;  Surgeon: Vega Sears MD;  Location: [facility] OR 2ND FLR;  Service: Urology;  Laterality: Left;  gen with regional/ 5hrs    REPAIR OF COLLATERAL LIGAMENT OF THUMB Right 5/14/2021    Procedure: REPAIR, LIGAMENT, COLLATERAL, THUMB, right UCLUCL;  Surgeon:  Carolyn Thao MD;  Location: [facility];  Service: Orthopedics;  Laterality: Right;  Regional/MAC, [facility]    SINUS SURGERY         Social History     Tobacco Use    Smoking status: Never    Smokeless tobacco: Never    Tobacco comments:     The patient exercises regularly at [employer].  He works as a  at [employer] over [employer].   Substance Use Topics    Alcohol use: Yes     Alcohol/week: 4.0 standard drinks of alcohol     Types: 4 Shots of liquor per week     Comment: socially    Drug use: No       Family History   Problem Relation Age of Onset    Hyperlipidemia Father     Hyperlipidemia Brother     Heart attack Maternal Grandmother     Cancer Maternal Grandfather         throat    Melanoma Neg Hx     Psoriasis Neg Hx     Lupus Neg Hx     Eczema Neg Hx     Acne Neg Hx     Heart disease Neg Hx        Current Outpatient Medications   Medication Sig    albuterol (PROVENTIL/VENTOLIN HFA) 90 mcg/actuation inhaler Inhale 2 puffs into the lungs every 4 (four) hours as needed for Shortness of Breath.    aspirin 81 MG Chew Take 81 mg by mouth once daily.    ezetimibe (ZETIA) 10 mg tablet Take 1 tablet by mouth daily    FLUoxetine 10 MG capsule Take 1 capsule (10 mg total) by mouth once daily.    LORazepam (ATIVAN) 0.5 MG tablet Take 1 tablet (0.5 mg total) by mouth nightly as needed for Anxiety.    pantoprazole (PROTONIX) 40 MG tablet TAKE ONE TABLET BY MOUTH TWICE A DAY ON AN EMPTY STOMACH (Patient taking differently: TAKE ONE TABLET BY MOUTH once A DAY ON AN EMPTY STOMACH)    rosuvastatin (CRESTOR) 40 MG Tab Take 1 tablet (40 mg total) by mouth once daily.    semaglutide, weight loss, (WEGOVY) 0.25 mg/0.5 mL PnIj Inject 0.25 mg into the skin every 7 days.  NOT TAKING    tirzepatide (MOUNJARO) 2.5 mg/0.5 mL PnIj Inject 2.5 mg into the skin every 7 days.    valACYclovir (VALTREX) 1000 MG tablet TAKE 2 TABLETS (2,000 MG TOTAL) BY MOUTH 2 (TWO) TIMES DAILY FOR 2 DAYS and repeat as needed     valsartan-hydrochlorothiazide (DIOVAN-HCT) 160-12.5 mg per tablet Take 1 tablet by mouth once daily.    zolpidem (AMBIEN) 10 mg Tab Take 1 tablet (10 mg total) by mouth nightly as needed (insomnia).     No current facility-administered medications for this visit.       Review of patient's allergies indicates:  No Known Allergies    ROS  The review of systems is negative except as above.    Objective:   Physical Exam  Vitals reviewed.   Constitutional:       Appearance: He is well-developed.   HENT:      Head: Normocephalic and atraumatic.   Eyes:      General: No scleral icterus.     Conjunctiva/sclera: Conjunctivae normal.   Neck:      Vascular: No JVD.   Cardiovascular:      Rate and Rhythm: Normal rate and regular rhythm.      Pulses: Intact distal pulses.      Heart sounds: Normal heart sounds. No murmur heard.     No friction rub. No gallop.   Pulmonary:      Effort: Pulmonary effort is normal.      Breath sounds: Normal breath sounds. No wheezing or rales.   Abdominal:      General: Bowel sounds are normal. There is no distension.      Palpations: Abdomen is soft.      Tenderness: There is no abdominal tenderness.   Musculoskeletal:         General: Normal range of motion.      Cervical back: Normal range of motion and neck supple.   Skin:     General: Skin is warm and dry.      Findings: No erythema or rash.   Neurological:      Mental Status: He is alert and oriented to person, place, and time.   Psychiatric:         Behavior: Behavior normal.         Thought Content: Thought content normal.         Judgment: Judgment normal.     Lab Results   Component Value Date    WBC 4.83 08/14/2023    HGB 14.6 08/14/2023    HCT 45.3 08/14/2023    MCV 99 (H) 08/14/2023     08/14/2023         Chemistry        Component Value Date/Time     09/05/2023 1422    K 4.5 09/05/2023 1422     09/05/2023 1422    CO2 27 09/05/2023 1422    BUN 20 09/05/2023 1422    CREATININE 1.6 (H) 09/05/2023 1422     (H)  09/05/2023 1422        Component Value Date/Time    CALCIUM 9.8 09/05/2023 1422    ALKPHOS 68 09/05/2023 1422    AST 26 09/05/2023 1422    ALT 14 09/05/2023 1422    BILITOT 0.5 09/05/2023 1422    ESTGFRAFRICA >60.0 05/02/2022 1011    EGFRNONAA >60.0 05/02/2022 1011            Lab Results   Component Value Date    CHOL 193 09/05/2023    CHOL 138 09/12/2022    CHOL 132 10/15/2021     Lab Results   Component Value Date    HDL 82 (H) 09/05/2023    HDL 54 09/12/2022    HDL 55 10/15/2021     Lab Results   Component Value Date    LDLCALC 88.0 09/05/2023    LDLCALC 70.8 09/12/2022    LDLCALC 67.6 10/15/2021     Lab Results   Component Value Date    TRIG 115 09/05/2023    TRIG 66 09/12/2022    TRIG 47 10/15/2021     Lab Results   Component Value Date    CHOLHDL 42.5 09/05/2023    CHOLHDL 39.1 09/12/2022    CHOLHDL 41.7 10/15/2021       Lab Results   Component Value Date    TSH 1.927 05/24/2019       Lab Results   Component Value Date    HGBA1C 5.2 05/24/2019         Assessment:     1. Essential hypertension    2. Agatston CAC score 100-199    3. Mixed hyperlipidemia        Plan:     Continue current medicines.    Work with PCP (Dr. Garcia) on hyperglycemia.    Diet/exercise goals reinforced.    F/U 12 months

## 2023-10-31 ENCOUNTER — OFFICE VISIT (OUTPATIENT)
Dept: PSYCHIATRY | Facility: CLINIC | Age: 58
End: 2023-10-31
Payer: COMMERCIAL

## 2023-10-31 DIAGNOSIS — F41.9 ANXIETY: Primary | ICD-10-CM

## 2023-10-31 PROCEDURE — 3044F HG A1C LEVEL LT 7.0%: CPT | Mod: CPTII,S$GLB,, | Performed by: SOCIAL WORKER

## 2023-10-31 PROCEDURE — 90834 PR PSYCHOTHERAPY W/PATIENT, 45 MIN: ICD-10-PCS | Mod: S$GLB,,, | Performed by: SOCIAL WORKER

## 2023-10-31 PROCEDURE — 3044F PR MOST RECENT HEMOGLOBIN A1C LEVEL <7.0%: ICD-10-PCS | Mod: CPTII,S$GLB,, | Performed by: SOCIAL WORKER

## 2023-10-31 PROCEDURE — 99999 PR PBB SHADOW E&M-EST. PATIENT-LVL I: ICD-10-PCS | Mod: PBBFAC,,, | Performed by: SOCIAL WORKER

## 2023-10-31 PROCEDURE — 99999 PR PBB SHADOW E&M-EST. PATIENT-LVL I: CPT | Mod: PBBFAC,,, | Performed by: SOCIAL WORKER

## 2023-10-31 PROCEDURE — 90834 PSYTX W PT 45 MINUTES: CPT | Mod: S$GLB,,, | Performed by: SOCIAL WORKER

## 2023-11-01 ENCOUNTER — LAB VISIT (OUTPATIENT)
Dept: LAB | Facility: HOSPITAL | Age: 58
End: 2023-11-01
Attending: INTERNAL MEDICINE
Payer: COMMERCIAL

## 2023-11-01 DIAGNOSIS — Z00.00 ROUTINE PHYSICAL EXAMINATION: ICD-10-CM

## 2023-11-01 LAB
ANION GAP SERPL CALC-SCNC: 11 MMOL/L (ref 8–16)
BUN SERPL-MCNC: 18 MG/DL (ref 6–20)
CALCIUM SERPL-MCNC: 10.2 MG/DL (ref 8.7–10.5)
CHLORIDE SERPL-SCNC: 102 MMOL/L (ref 95–110)
CHOLEST SERPL-MCNC: 167 MG/DL (ref 120–199)
CO2 SERPL-SCNC: 26 MMOL/L (ref 23–29)
CREAT SERPL-MCNC: 1.7 MG/DL (ref 0.5–1.4)
EST. GFR  (NO RACE VARIABLE): 46.2 ML/MIN/1.73 M^2
ESTIMATED AVG GLUCOSE: 105 MG/DL (ref 68–131)
GLUCOSE SERPL-MCNC: 77 MG/DL (ref 70–110)
HBA1C MFR BLD: 5.3 % (ref 4–5.6)
POTASSIUM SERPL-SCNC: 4.5 MMOL/L (ref 3.5–5.1)
SODIUM SERPL-SCNC: 139 MMOL/L (ref 136–145)
TSH SERPL DL<=0.005 MIU/L-ACNC: 2.49 UIU/ML (ref 0.4–4)

## 2023-11-01 PROCEDURE — 83036 HEMOGLOBIN GLYCOSYLATED A1C: CPT | Performed by: INTERNAL MEDICINE

## 2023-11-01 PROCEDURE — 82465 ASSAY BLD/SERUM CHOLESTEROL: CPT | Performed by: INTERNAL MEDICINE

## 2023-11-01 PROCEDURE — 36415 COLL VENOUS BLD VENIPUNCTURE: CPT | Performed by: INTERNAL MEDICINE

## 2023-11-01 PROCEDURE — 80048 BASIC METABOLIC PNL TOTAL CA: CPT | Performed by: INTERNAL MEDICINE

## 2023-11-01 PROCEDURE — 84443 ASSAY THYROID STIM HORMONE: CPT | Performed by: INTERNAL MEDICINE

## 2023-11-01 NOTE — PROGRESS NOTES
"  Individual Psychotherapy (PhD/LCSW)    10/31/2023    Site:  [facility]         Therapeutic Intervention: Met with patient.  Outpatient - Insight oriented psychotherapy 45 min - CPT code 06982    Chief complaint/reason for encounter: anxiety     Interval history and content of current session: Pt reports wife has moved back in, they are managing disagreements without escalating, using Tx tools. Wife is somewhat more trusting, and has resumed intimacy. She becomes anxious about issues with daughter Mandi but tolerates pt's boundary setting better, and pt feels O is doing very well. Pt asks again about bringing M to session, but has no particular agenda or issue to address, sees that he and M can negotiate things on their own. Pt resisting M's strong emphasis on Protestant practices and advocates for some "down time" for himself ie watching football. Discuss desire to heal rift with son and new DIL.    Treatment plan:  Target symptoms: anxiety   Why chosen therapy is appropriate versus another modality: relevant to diagnosis  Outcome monitoring methods: self-report  Therapeutic intervention type: insight oriented psychotherapy    Risk parameters:  Patient reports no suicidal ideation  Patient reports no homicidal ideation  Patient reports no self-injurious behavior  Patient reports no violent behavior    Verbal deficits: None    Patient's response to intervention:  The patient's response to intervention is motivated.    Progress toward goals and other mental status changes:  The patient's progress toward goals is good    Diagnosis:   Anxiety    Plan:  individual psychotherapy    Return to clinic: as scheduled    Length of Service (minutes): 45    "

## 2023-11-02 NOTE — PROGRESS NOTES
Subjective:       Patient ID: Venkat Jonas is a 58 y.o. male.    Chief Complaint: Annual Exam    HPI: Patient comes in for annual exam and follow-up of medical problems.  He is doing much much better from an anxiety and social standpoint.  He has seen Cardiology Oncology and his specialists for follow-up kidney cancer.  He had an elevated glucose a few months ago but it was a nonfasting sample.  He is on semaglutide but for weight loss, not related to sugar.  He did this a few years ago and it worked well.  Endocrinology is assisting him.  His CT scan showed stable abdomen and kidney in August.  It was a contrast scan.  He did however have a slightly elevated creatinine in September and again this week.  We reviewed everything in detail.  Fluoxetine was stopped before the September lab but I do not think that is related.  Thinks he hydrates well.  Alcohol use is down, general health is stable.  Using a decongestant for the last 6-8 weeks which could be playing a role.  His ARB with diuretic is not new.  Semaglutide was only started 2-3 weeks ago which would have been after the 1st elevated creatinine.  The only pre this went up when he had it nephrectomy 3 years ago.  Creatinine went to 1.6 then improved.  He otherwise is feeling great, keeping up with his fluid and other than the lab being out of range he has felt fine.      Review of Systems   Constitutional:  Negative for fatigue, fever and unexpected weight change.   Respiratory:  Negative for cough and shortness of breath.    Cardiovascular:  Negative for chest pain.   Gastrointestinal:  Negative for abdominal pain.   Genitourinary:  Negative for difficulty urinating and hematuria.   Musculoskeletal:  Negative for arthralgias, back pain and gait problem.   Neurological:  Negative for weakness.   Psychiatric/Behavioral:  Negative for dysphoric mood. The patient is not nervous/anxious.            Past Medical History:   Diagnosis Date    Allergic  sinusitis     Exercise-induced asthma     Hyperlipidemia     Hypertension     Renal cell carcinoma 10/14/2020     Past Surgical History:   Procedure Laterality Date    COLONOSCOPY N/A 12/5/2016    Procedure: COLONOSCOPY;  Surgeon: Toni Rivera MD;  Location: [facility] (4TH FLR);  Service: Endoscopy;  Laterality: N/A;  [facility]    COLONOSCOPY N/A 11/3/2020    Procedure: COLONOSCOPY;  Surgeon: Toni Rivera MD;  Location: [facility] (2ND FLR);  Service: Endoscopy;  Laterality: N/A;  Schedule patient EGD colonoscopy with me next available but as soon as possible    CYSTOSCOPY W/ RETROGRADES Left 10/1/2020    Procedure: CYSTOSCOPY, WITH RETROGRADE PYELOGRAM;  Surgeon: Vega Sears MD;  Location: [facility] 1ST FLR;  Service: Urology;  Laterality: Left;    ESOPHAGOGASTRODUODENOSCOPY N/A 11/3/2020    Procedure: EGD (ESOPHAGOGASTRODUODENOSCOPY);  Surgeon: Toni Rivera MD;  Location: [facility] (2ND FLR);  Service: Endoscopy;  Laterality: N/A;  pt will get rapid on 11/2-MS    LAPAROSCOPIC ROBOT-ASSISTED SURGICAL REMOVAL OF KIDNEY USING DA SONIDO XI Left 10/7/2020    Procedure: XI ROBOTIC NEPHRECTOMY;  Surgeon: Vega Sears MD;  Location: [facility] OR 2ND FLR;  Service: Urology;  Laterality: Left;  gen with regional/ 5hrs    REPAIR OF COLLATERAL LIGAMENT OF THUMB Right 5/14/2021    Procedure: REPAIR, LIGAMENT, COLLATERAL, THUMB, right UCLUCL;  Surgeon: Carolyn Thao MD;  Location: [facility];  Service: Orthopedics;  Laterality: Right;  Regional/MAC, [facility]    SINUS SURGERY        Patient Active Problem List   Diagnosis    Hyperlipidemia    Verruca plantaris    AR (allergic rhinitis)    Sinusitis    Essential hypertension    Agatston CAC score 100-199    AK (actinic keratosis)    History of renal cell carcinoma    Anxiety about health    Colon adenomas    History of left nephrectomy    Overweight (BMI 25.0-29.9)    Rupture of ulnar collateral ligament (UCL) of thumb    Rupture of UCL of right thumb    BPPV  (benign paroxysmal positional vertigo), left    Neck pain    Neck stiffness    Posture abnormality    Dizziness        Objective:      Physical Exam  Constitutional:       General: He is not in acute distress.     Appearance: He is well-developed.   HENT:      Head: Normocephalic and atraumatic.      Right Ear: Tympanic membrane, ear canal and external ear normal.      Left Ear: Tympanic membrane, ear canal and external ear normal.      Mouth/Throat:      Pharynx: No oropharyngeal exudate or posterior oropharyngeal erythema.   Eyes:      General: No scleral icterus.     Conjunctiva/sclera: Conjunctivae normal.      Pupils: Pupils are equal, round, and reactive to light.   Neck:      Thyroid: No thyromegaly.      Comments: No supraclavicular nodes palpated  Cardiovascular:      Rate and Rhythm: Normal rate and regular rhythm.      Pulses: Normal pulses.      Heart sounds: Normal heart sounds. No murmur heard.  Pulmonary:      Effort: Pulmonary effort is normal.      Breath sounds: Normal breath sounds. No wheezing.   Abdominal:      General: Bowel sounds are normal.      Palpations: Abdomen is soft. There is no mass.      Tenderness: There is no abdominal tenderness.   Musculoskeletal:         General: No tenderness.      Cervical back: Normal range of motion and neck supple.      Right lower leg: No edema.      Left lower leg: No edema.   Lymphadenopathy:      Cervical: No cervical adenopathy.   Skin:     Coloration: Skin is not jaundiced or pale.   Neurological:      General: No focal deficit present.      Mental Status: He is alert and oriented to person, place, and time.   Psychiatric:         Mood and Affect: Mood normal.         Behavior: Behavior normal.         Assessment:       Problem List Items Addressed This Visit          ENT    AR (allergic rhinitis)       Cardiac/Vascular    Essential hypertension (Chronic)       Renal/    History of left nephrectomy (Chronic)       Oncology    History of renal cell  "carcinoma (Chronic)     Other Visit Diagnoses       Routine physical examination    -  Primary    Renal insufficiency        Relevant Orders    Basic Metabolic Panel            Plan:         Venkat was seen today for annual exam.    Diagnoses and all orders for this visit:    Routine physical examination    Renal insufficiency  -     Basic Metabolic Panel; Future    Essential hypertension    History of renal cell carcinoma    History of left nephrectomy    Seasonal allergic rhinitis, unspecified trigger       After lengthy discussion we decided to stop the decongestant, hydrate normally, reduce alcohol a little and reassess lab in a few weeks.  If creatinine is back to normal, continue as planned.    If not improved we will consider discussing with Nephrology, Urology and consider stopping the HCTZ.          Patient is in agreement with this plan    Portions of this note may have been created with voice recognition software. Occasional "wrong-word" or "sound-a-like" substitutions may have occurred due to the inherent limitations of voice recognition software. Please, read the note carefully and recognize, using context, where substitutions have occurred.  "

## 2023-11-03 ENCOUNTER — OFFICE VISIT (OUTPATIENT)
Dept: INTERNAL MEDICINE | Facility: CLINIC | Age: 58
End: 2023-11-03
Payer: COMMERCIAL

## 2023-11-03 VITALS
DIASTOLIC BLOOD PRESSURE: 74 MMHG | HEIGHT: 70 IN | SYSTOLIC BLOOD PRESSURE: 120 MMHG | HEART RATE: 87 BPM | WEIGHT: 190.25 LBS | OXYGEN SATURATION: 98 % | BODY MASS INDEX: 27.24 KG/M2

## 2023-11-03 DIAGNOSIS — I10 ESSENTIAL HYPERTENSION: Chronic | ICD-10-CM

## 2023-11-03 DIAGNOSIS — N28.9 RENAL INSUFFICIENCY: ICD-10-CM

## 2023-11-03 DIAGNOSIS — Z85.528 HISTORY OF RENAL CELL CARCINOMA: Chronic | ICD-10-CM

## 2023-11-03 DIAGNOSIS — Z00.00 ROUTINE PHYSICAL EXAMINATION: Primary | ICD-10-CM

## 2023-11-03 DIAGNOSIS — J30.2 SEASONAL ALLERGIC RHINITIS, UNSPECIFIED TRIGGER: ICD-10-CM

## 2023-11-03 DIAGNOSIS — Z90.5 HISTORY OF LEFT NEPHRECTOMY: Chronic | ICD-10-CM

## 2023-11-03 PROCEDURE — 3008F BODY MASS INDEX DOCD: CPT | Mod: CPTII,S$GLB,, | Performed by: INTERNAL MEDICINE

## 2023-11-03 PROCEDURE — 3078F PR MOST RECENT DIASTOLIC BLOOD PRESSURE < 80 MM HG: ICD-10-PCS | Mod: CPTII,S$GLB,, | Performed by: INTERNAL MEDICINE

## 2023-11-03 PROCEDURE — 99999 PR PBB SHADOW E&M-EST. PATIENT-LVL IV: CPT | Mod: PBBFAC,,, | Performed by: INTERNAL MEDICINE

## 2023-11-03 PROCEDURE — 3008F PR BODY MASS INDEX (BMI) DOCUMENTED: ICD-10-PCS | Mod: CPTII,S$GLB,, | Performed by: INTERNAL MEDICINE

## 2023-11-03 PROCEDURE — 3074F SYST BP LT 130 MM HG: CPT | Mod: CPTII,S$GLB,, | Performed by: INTERNAL MEDICINE

## 2023-11-03 PROCEDURE — 1160F RVW MEDS BY RX/DR IN RCRD: CPT | Mod: CPTII,S$GLB,, | Performed by: INTERNAL MEDICINE

## 2023-11-03 PROCEDURE — 1159F MED LIST DOCD IN RCRD: CPT | Mod: CPTII,S$GLB,, | Performed by: INTERNAL MEDICINE

## 2023-11-03 PROCEDURE — 99396 PREV VISIT EST AGE 40-64: CPT | Mod: S$GLB,,, | Performed by: INTERNAL MEDICINE

## 2023-11-03 PROCEDURE — 3044F HG A1C LEVEL LT 7.0%: CPT | Mod: CPTII,S$GLB,, | Performed by: INTERNAL MEDICINE

## 2023-11-03 PROCEDURE — 1160F PR REVIEW ALL MEDS BY PRESCRIBER/CLIN PHARMACIST DOCUMENTED: ICD-10-PCS | Mod: CPTII,S$GLB,, | Performed by: INTERNAL MEDICINE

## 2023-11-03 PROCEDURE — 3044F PR MOST RECENT HEMOGLOBIN A1C LEVEL <7.0%: ICD-10-PCS | Mod: CPTII,S$GLB,, | Performed by: INTERNAL MEDICINE

## 2023-11-03 PROCEDURE — 99999 PR PBB SHADOW E&M-EST. PATIENT-LVL IV: ICD-10-PCS | Mod: PBBFAC,,, | Performed by: INTERNAL MEDICINE

## 2023-11-03 PROCEDURE — 3078F DIAST BP <80 MM HG: CPT | Mod: CPTII,S$GLB,, | Performed by: INTERNAL MEDICINE

## 2023-11-03 PROCEDURE — 3074F PR MOST RECENT SYSTOLIC BLOOD PRESSURE < 130 MM HG: ICD-10-PCS | Mod: CPTII,S$GLB,, | Performed by: INTERNAL MEDICINE

## 2023-11-03 PROCEDURE — 99396 PR PREVENTIVE VISIT,EST,40-64: ICD-10-PCS | Mod: S$GLB,,, | Performed by: INTERNAL MEDICINE

## 2023-11-03 PROCEDURE — 1159F PR MEDICATION LIST DOCUMENTED IN MEDICAL RECORD: ICD-10-PCS | Mod: CPTII,S$GLB,, | Performed by: INTERNAL MEDICINE

## 2023-11-28 ENCOUNTER — OFFICE VISIT (OUTPATIENT)
Dept: PSYCHIATRY | Facility: CLINIC | Age: 58
End: 2023-11-28
Payer: COMMERCIAL

## 2023-11-28 DIAGNOSIS — F41.9 ANXIETY: Primary | ICD-10-CM

## 2023-11-28 PROCEDURE — 3044F HG A1C LEVEL LT 7.0%: CPT | Mod: CPTII,S$GLB,, | Performed by: SOCIAL WORKER

## 2023-11-28 PROCEDURE — 90834 PSYTX W PT 45 MINUTES: CPT | Mod: S$GLB,,, | Performed by: SOCIAL WORKER

## 2023-11-28 PROCEDURE — 3044F PR MOST RECENT HEMOGLOBIN A1C LEVEL <7.0%: ICD-10-PCS | Mod: CPTII,S$GLB,, | Performed by: SOCIAL WORKER

## 2023-11-28 PROCEDURE — 90834 PR PSYCHOTHERAPY W/PATIENT, 45 MIN: ICD-10-PCS | Mod: S$GLB,,, | Performed by: SOCIAL WORKER

## 2023-11-28 NOTE — PROGRESS NOTES
Individual Psychotherapy (PhD/LCSW)    11/28/2023    Site:  [facility]         Therapeutic Intervention: Met with patient.  Outpatient - Insight oriented psychotherapy 45 min - CPT code 65758    Chief complaint/reason for encounter: anxiety     Interval history and content of current session: Pt and wife doing fairly well with reconciliation. Son and new wife came for Thanksgiving. Daughters doing well. Pt and wife worked out compromise on money issues with daughters. Pt working at asserting himself in a calm and loving way and this is working well. Discuss change at work, feelings about this, ways to look at it.    Treatment plan:  Target symptoms: anxiety   Why chosen therapy is appropriate versus another modality: relevant to diagnosis  Outcome monitoring methods: self-report  Therapeutic intervention type: insight oriented psychotherapy    Risk parameters:  Patient reports no suicidal ideation  Patient reports no homicidal ideation  Patient reports no self-injurious behavior  Patient reports no violent behavior    Verbal deficits: None    Patient's response to intervention:  The patient's response to intervention is motivated.    Progress toward goals and other mental status changes:  The patient's progress toward goals is good    Diagnosis:   Anxiety    Plan:  individual psychotherapy    Return to clinic: as scheduled    Length of Service (minutes): 45

## 2023-12-08 DIAGNOSIS — C64.9 RENAL CELL CARCINOMA, UNSPECIFIED LATERALITY: Primary | ICD-10-CM

## 2023-12-12 ENCOUNTER — TELEPHONE (OUTPATIENT)
Dept: INFECTIOUS DISEASES | Facility: CLINIC | Age: 58
End: 2023-12-12
Payer: COMMERCIAL

## 2023-12-12 RX ORDER — NIRMATRELVIR AND RITONAVIR 300-100 MG
KIT ORAL
Qty: 30 TABLET | Refills: 0 | Status: SHIPPED | OUTPATIENT
Start: 2023-12-12 | End: 2023-12-17

## 2023-12-12 NOTE — TELEPHONE ENCOUNTER
Covid positive.  Nasal congestion, sneezing.  Took paxlovid in the past and no difficulty.  Hold crestor.

## 2024-01-12 RX ORDER — PANTOPRAZOLE SODIUM 40 MG/1
TABLET, DELAYED RELEASE ORAL
Qty: 180 TABLET | Refills: 3 | Status: SHIPPED | OUTPATIENT
Start: 2024-01-12

## 2024-01-12 NOTE — TELEPHONE ENCOUNTER
Refill Routing Note   Medication(s) are not appropriate for processing by [facility] for the following reason(s):        Outside of protocol    [facility] action(s):  Route        Medication Therapy Plan: PER [facility] PROTOCOL,  MAX DAILY AMOUNT IS 40MG PER DAY      Appointments  past 12m or future 3m with PCP    Date Provider   Last Visit   11/3/2023 Del Garcia MD   Next Visit   Visit date not found Del Garcia MD   ED visits in past 90 days: 0        Note composed:9:57 AM 01/12/2024

## 2024-01-16 ENCOUNTER — OFFICE VISIT (OUTPATIENT)
Dept: PSYCHIATRY | Facility: CLINIC | Age: 59
End: 2024-01-16
Payer: COMMERCIAL

## 2024-01-16 DIAGNOSIS — F41.9 ANXIETY: Primary | ICD-10-CM

## 2024-01-16 PROCEDURE — 90834 PSYTX W PT 45 MINUTES: CPT | Mod: S$GLB,,, | Performed by: SOCIAL WORKER

## 2024-01-16 PROCEDURE — 99999 PR PBB SHADOW E&M-EST. PATIENT-LVL I: CPT | Mod: PBBFAC,,, | Performed by: SOCIAL WORKER

## 2024-01-16 NOTE — PROGRESS NOTES
Individual Psychotherapy (PhD/LCSW)    1/16/2024    Site:  [facility]         Therapeutic Intervention: Met with patient.  Outpatient - Insight oriented psychotherapy 45 min - CPT code 31090    Chief complaint/reason for encounter: anxiety     Interval history and content of current session: Pt dealing with mixed feelings about change in job, how it was handled, and how different this is from his plan for his career. Also dealing with ongoing issues with wife about finances and parenting. Couple are overall still together and doing well, but pt sees that if these issues aren't resolved conflicts may recur as before. Discuss efforts he is making to work through to solutions that M can accept. He states M would like to  come in and meet with us as he did with her therapist, agree that this can happen next time.    Treatment plan:  Target symptoms: anxiety   Why chosen therapy is appropriate versus another modality: relevant to diagnosis  Outcome monitoring methods: self-report  Therapeutic intervention type: insight oriented psychotherapy    Risk parameters:  Patient reports no suicidal ideation  Patient reports no homicidal ideation  Patient reports no self-injurious behavior  Patient reports no violent behavior    Verbal deficits: None    Patient's response to intervention:  The patient's response to intervention is motivated.    Progress toward goals and other mental status changes:  The patient's progress toward goals is good    Diagnosis:   Anxiety    Plan:  individual psychotherapy    Return to clinic: as scheduled    Length of Service (minutes): 45

## 2024-01-22 ENCOUNTER — OFFICE VISIT (OUTPATIENT)
Dept: PSYCHIATRY | Facility: CLINIC | Age: 59
End: 2024-01-22
Payer: COMMERCIAL

## 2024-01-22 DIAGNOSIS — F41.9 ANXIETY: Primary | ICD-10-CM

## 2024-01-22 PROCEDURE — 90847 FAMILY PSYTX W/PT 50 MIN: CPT | Mod: S$GLB,,, | Performed by: SOCIAL WORKER

## 2024-01-22 NOTE — PROGRESS NOTES
Family Psychotherapy (PhD/LCSW)    1/22/2024    Site: [facility]    Length of service: 60    Therapeutic intervention: 90717-Family therapy with patient; needed because wife would like to meet me and help me understand where she's coming from in couples' issues    Persons present: patient and spouse Kamila    Interval history: Pt and M seen. M explains need to feel emotionally safe in relationship, trust issues, identifies finance and parenting as key issues. She acknowledges positive changes in D which led her to return to the marriage. M is near tears on and off during the session, responsive, open and cooperative. D shares his viewpoint where conflict is identified by M.    Target symptoms: anxiety      Patient's interpersonal/verbal exchanges: 90775-Family therapy with patient:  active listening, frequent questions, and self-disclosure    Progress toward goals: progressing slowly    Diagnosis: anxiety    Plan: individual psychotherapy  family psychotherapy    Return to clinic: as scheduled

## 2024-01-30 ENCOUNTER — OFFICE VISIT (OUTPATIENT)
Dept: PSYCHIATRY | Facility: CLINIC | Age: 59
End: 2024-01-30
Payer: COMMERCIAL

## 2024-01-30 ENCOUNTER — TELEPHONE (OUTPATIENT)
Dept: INFECTIOUS DISEASES | Facility: CLINIC | Age: 59
End: 2024-01-30
Payer: COMMERCIAL

## 2024-01-30 DIAGNOSIS — Z20.822 EXPOSURE TO CONFIRMED CASE OF COVID-19: Primary | ICD-10-CM

## 2024-01-30 DIAGNOSIS — F41.9 ANXIETY: Primary | ICD-10-CM

## 2024-01-30 PROCEDURE — 90834 PSYTX W PT 45 MINUTES: CPT | Mod: S$GLB,,, | Performed by: SOCIAL WORKER

## 2024-01-30 NOTE — PROGRESS NOTES
"  Individual Psychotherapy (PhD/LCSW)    1/30/2024    Site:  [facility]         Therapeutic Intervention: Met with patient.  Outpatient - Insight oriented psychotherapy 45 min - CPT code 62219    Chief complaint/reason for encounter: anxiety     Interval history and content of current session: review joint session last week with pt's wife. Pt working to address M's anxiety and triggers, doing well with vocalizing understanding of her need for "safety" and this seems to be helpful to her. Discuss areas of conflict and ways for pt to express his needs and not just accommodate M's.     Treatment plan:  Target symptoms: anxiety   Why chosen therapy is appropriate versus another modality: relevant to diagnosis  Outcome monitoring methods: self-report  Therapeutic intervention type: insight oriented psychotherapy    Risk parameters:  Patient reports no suicidal ideation  Patient reports no homicidal ideation  Patient reports no self-injurious behavior  Patient reports no violent behavior    Verbal deficits: None    Patient's response to intervention:  The patient's response to intervention is motivated.    Progress toward goals and other mental status changes:  The patient's progress toward goals is good    Diagnosis:   Anxiety    Plan:  individual psychotherapy    Return to clinic: as scheduled    Length of Service (minutes): 45    "

## 2024-01-31 ENCOUNTER — CLINICAL SUPPORT (OUTPATIENT)
Dept: INFECTIOUS DISEASES | Facility: CLINIC | Age: 59
End: 2024-01-31
Payer: COMMERCIAL

## 2024-01-31 DIAGNOSIS — Z20.822 EXPOSURE TO CONFIRMED CASE OF COVID-19: ICD-10-CM

## 2024-01-31 LAB
INFLUENZA A, MOLECULAR: DETECTED
INFLUENZA B, MOLECULAR: NOT DETECTED
RSV AG BY MOLECULAR METHOD: NOT DETECTED
SARS-COV-2 RNA RESP QL NAA+PROBE: NOT DETECTED

## 2024-01-31 PROCEDURE — 0241U SARS-COV2 (COVID) WITH FLU/RSV BY PCR: CPT | Performed by: INTERNAL MEDICINE

## 2024-01-31 RX ORDER — OSELTAMIVIR PHOSPHATE 75 MG/1
75 CAPSULE ORAL 2 TIMES DAILY
Qty: 10 CAPSULE | Refills: 0 | Status: SHIPPED | OUTPATIENT
Start: 2024-01-31 | End: 2024-02-05

## 2024-02-07 ENCOUNTER — HOSPITAL ENCOUNTER (OUTPATIENT)
Dept: RADIOLOGY | Facility: HOSPITAL | Age: 59
Discharge: HOME | End: 2024-02-07
Attending: INTERNAL MEDICINE
Payer: COMMERCIAL

## 2024-02-07 DIAGNOSIS — C64.9 RENAL CELL CARCINOMA, UNSPECIFIED LATERALITY: ICD-10-CM

## 2024-02-07 LAB
CREAT SERPL-MCNC: 1.4 MG/DL (ref 0.5–1.4)
SAMPLE: NORMAL

## 2024-02-07 PROCEDURE — 74177 CT ABD & PELVIS W/CONTRAST: CPT | Mod: TC

## 2024-02-07 PROCEDURE — 25500020 PHARM REV CODE 255: Performed by: INTERNAL MEDICINE

## 2024-02-07 PROCEDURE — 71260 CT THORAX DX C+: CPT | Mod: 26,,, | Performed by: RADIOLOGY

## 2024-02-07 PROCEDURE — 74177 CT ABD & PELVIS W/CONTRAST: CPT | Mod: 26,,, | Performed by: RADIOLOGY

## 2024-02-07 RX ADMIN — IOHEXOL 100 ML: 350 INJECTION, SOLUTION INTRAVENOUS at 08:02

## 2024-02-07 NOTE — PROGRESS NOTES
Subjective:       Patient ID: Venkat Jonas    Chief Complaint: h/o RCC    HPI     Venkat Jonas is a 58 y.o. male,  to clinic for evaluation and management of RCC, s/p nephrectomy October 2020.      He notes overall feeling well.  Eating well, exercising.        Oncologic History:    Developed hematuria and on imaging was found to have a renal mass c/w RCC.  Questionable bone lesion.  Underwent nephrectomy October 7, 2020 with below pathology.  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         PATHOLOGY:    1. Lymph nodes, aortic (regional resection):  - 3 lymph nodes negative for tumor (0/3)    2. Left kidney and adrenal gland (radical nephrectomy):  - Renal cell carcinoma, see synoptic report below  - Additional ancillary testing for tumor classification is ordered and results will be issued in a supplemental report    Kidney carcinoma synoptic report  - Procedure: Radical nephrectomy  - Specimen laterality: Left  - Tumor site: Upper pole  - Tumor size: 8.9 x 8.1 x 7.7 cm  - Tumor focality: Renal cell carcinoma, unclassified (see microscopic section)  - Sarcomatoid features: Not identified  - Rhabdoid features: Present  - Histologic grade: WHO/ISUP Nuclear grade 4  - Tumor necrosis: Present, approximately 20% of the tumor  - Tumor extension: Tumor extends into renal vein  - Margins:  - Uninvolved by invasive carcinoma  - Regional lymph nodes:  - Number of lymph nodes involved: 0  - Number of lymph nodes examined: 4 (specimen 1 and specimen 2)  - Pathologic staging: pT3a N0 MX  - Other findings: Separate sclerotic and calcified nodule  - Tumor block for potential studies: 2D, 2E, 2F 2H, 2I, 2J, 2K  - Nontumor block: 2N  - Immunostain panel in microscopic section  - MMR IHC: No loss of expression (see microscopic section)  Sections show an eosinophilic renal cell carcinoma with nuclear inclusions ,cytoplasmic inclusions , and  extracellular eosinophillic material. Several renal cell  carcinoma subtypes are in the differential including  high grade chromophobe, eosinophilic clear cell, SDH deficient, and translocation associated. Subtyping  will be attempted with ancillary testing and results issued in a supplemental report. Selected slides  reviewed by additional pathologists.    Immunostain results:  Positive stains: AMACR, keratin AE1/AE3, CD10,  (weak), vimentin  Negative stains: Keratin 7, keratin 20, HMB45, Mart 1, desmin    Immunohistochemistry (IHC) Testing for Mismatch Repair (MMR) Proteins:  MLH1 - Intact nuclear expression  MSH2 - Intact nuclear expression  MSH6 - Intact nuclear expression  PMS2 - Intact nuclear expression    Review of Systems   Constitutional: Negative for activity change, appetite change, chills, fatigue, fever and unexpected weight change.   HENT: Negative for congestion, hearing loss, mouth sores, sore throat and trouble swallowing.    Eyes: Negative for pain and visual disturbance.   Respiratory: Negative for cough, shortness of breath and wheezing.    Cardiovascular: Negative for chest pain, palpitations and leg swelling.   Gastrointestinal: Negative for abdominal pain, constipation, diarrhea, nausea and vomiting.   Endocrine: Negative for cold intolerance and heat intolerance.   Genitourinary: Negative for difficulty urinating, discharge, dysuria, enuresis, frequency, hematuria, scrotal swelling and testicular pain.   Musculoskeletal: Negative for arthralgias, back pain and myalgias.   Skin: Negative for color change, rash and wound.   Allergic/Immunologic: Negative for environmental allergies and food allergies.   Neurological: Negative for weakness, numbness and headaches.   Hematological: Negative for adenopathy. Does not bruise/bleed easily.   Psychiatric/Behavioral: Negative for confusion, hallucinations and sleep disturbance. The patient is not nervous/anxious.    All other systems reviewed and are negative.        Allergies:  Review of patient's  allergies indicates:  No Known Allergies    Medications:  Current Outpatient Medications   Medication Sig Dispense Refill    albuterol (PROVENTIL/VENTOLIN HFA) 90 mcg/actuation inhaler Inhale 2 puffs into the lungs every 4 (four) hours as needed for Shortness of Breath. 18 g 3    aspirin 81 MG Chew Take 81 mg by mouth once daily.      ezetimibe (ZETIA) 10 mg tablet Take 1 tablet by mouth daily 90 tablet 3    pantoprazole (PROTONIX) 40 MG tablet TAKE ONE TABLET BY MOUTH TWICE A DAY ON AN EMPTY STOMACH 180 tablet 3    rosuvastatin (CRESTOR) 40 MG Tab Take 1 tablet (40 mg total) by mouth once daily. 90 tablet 3    tirzepatide 10 mg/0.5 mL PnIj Inject 10 mg into the skin every 7 days. 4 Pen 3    valACYclovir (VALTREX) 1000 MG tablet TAKE 2 TABLETS (2,000 MG TOTAL) BY MOUTH 2 (TWO) TIMES DAILY FOR 2 DAYS and repeat as needed 12 tablet 1    valsartan-hydrochlorothiazide (DIOVAN-HCT) 160-12.5 mg per tablet Take 1 tablet by mouth once daily. 90 tablet 3    zolpidem (AMBIEN) 10 mg Tab Take 1 tablet (10 mg total) by mouth nightly as needed (insomnia). 30 tablet 5     No current facility-administered medications for this visit.       PMH:  Past Medical History:   Diagnosis Date    Allergic sinusitis     Exercise-induced asthma     Hyperlipidemia     Hypertension     Renal cell carcinoma 10/14/2020       PSH:  Past Surgical History:   Procedure Laterality Date    COLONOSCOPY N/A 12/5/2016    Procedure: COLONOSCOPY;  Surgeon: Toni Rivera MD;  Location: [facility] (4TH FLR);  Service: Endoscopy;  Laterality: N/A;  [facility]    COLONOSCOPY N/A 11/3/2020    Procedure: COLONOSCOPY;  Surgeon: Toni Rivera MD;  Location: [facility] (2ND FLR);  Service: Endoscopy;  Laterality: N/A;  Schedule patient EGD colonoscopy with me next available but as soon as possible    CYSTOSCOPY W/ RETROGRADES Left 10/1/2020    Procedure: CYSTOSCOPY, WITH RETROGRADE PYELOGRAM;  Surgeon: Vega Sears MD;  Location: [facility] OR [address] [place] [address];  Service:  Urology;  Laterality: Left;    ESOPHAGOGASTRODUODENOSCOPY N/A 11/3/2020    Procedure: EGD (ESOPHAGOGASTRODUODENOSCOPY);  Surgeon: Toni Rivera MD;  Location: [facility] (2ND FLR);  Service: Endoscopy;  Laterality: N/A;  pt will get rapid on 11/2-MS    LAPAROSCOPIC ROBOT-ASSISTED SURGICAL REMOVAL OF KIDNEY USING DA SONIDO XI Left 10/7/2020    Procedure: XI ROBOTIC NEPHRECTOMY;  Surgeon: Vega Sears MD;  Location: [facility] OR 2ND FLR;  Service: Urology;  Laterality: Left;  gen with regional/ 5hrs    REPAIR OF COLLATERAL LIGAMENT OF THUMB Right 5/14/2021    Procedure: REPAIR, LIGAMENT, COLLATERAL, THUMB, right UCLUCL;  Surgeon: Carolyn Thao MD;  Location: [facility];  Service: Orthopedics;  Laterality: Right;  Regional/MAC, [facility]    SINUS SURGERY         FamHx:  Family History   Problem Relation Age of Onset    Hyperlipidemia Father     Hyperlipidemia Brother     Heart attack Maternal Grandmother     Cancer Maternal Grandfather         throat    Melanoma Neg Hx     Psoriasis Neg Hx     Lupus Neg Hx     Eczema Neg Hx     Acne Neg Hx     Heart disease Neg Hx        SocHx:  Social History     Socioeconomic History    Marital status:      Spouse name: Kamila    Number of children: 3   Occupational History    Occupation:  Marketing     Employer: [employer]   Tobacco Use    Smoking status: Never    Smokeless tobacco: Never    Tobacco comments:     The patient exercises regularly at [place].  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   Substance and Sexual Activity    Alcohol use: Yes     Alcohol/week: 4.0 standard drinks of alcohol     Types: 4 Shots of liquor per week     Comment: socially    Drug use: No    Sexual activity: Yes     Partners: Female   Social History Narrative    Administration at [employer]    , 3 kids (23, 18, 17)         Objective:         BP (!) 128/90 (BP Location: Left arm, Patient Position: Sitting, BP Method: Medium (Automatic))    "Pulse 91   Temp 98.1 °F (36.7 °C) (Oral)   Resp 16   Ht 5' 11" (1.803 m)   SpO2 98%   BMI 26.54 kg/m²       Physical Exam  Constitutional:       General: He is not in acute distress.     Appearance: Normal appearance. He is normal weight. He is not ill-appearing, toxic-appearing or diaphoretic.   HENT:      Head: Normocephalic and atraumatic.      Nose: Nose normal.   Eyes:      General: No scleral icterus.        Right eye: No discharge.         Left eye: No discharge.      Conjunctiva/sclera: Conjunctivae normal.   Skin:     General: Skin is warm and dry.      Coloration: Skin is not jaundiced or pale.   Neurological:      General: No focal deficit present.      Mental Status: He is alert and oriented to person, place, and time. Mental status is at baseline.   Psychiatric:         Mood and Affect: Mood normal.         Behavior: Behavior normal.         Thought Content: Thought content normal.         Judgment: Judgment normal.           LABS:  WBC   Date Value Ref Range Status   02/07/2024 4.61 3.90 - 12.70 K/uL Final     Hemoglobin   Date Value Ref Range Status   02/07/2024 14.3 14.0 - 18.0 g/dL Final     Hematocrit   Date Value Ref Range Status   02/07/2024 42.2 40.0 - 54.0 % Final     Platelets   Date Value Ref Range Status   02/07/2024 253 150 - 450 K/uL Final       Chemistry        Component Value Date/Time     02/07/2024 0720    K 4.9 02/07/2024 0720     02/07/2024 0720    CO2 27 02/07/2024 0720    BUN 18 02/07/2024 0720    CREATININE 1.2 02/07/2024 0720    GLU 96 02/07/2024 0720        Component Value Date/Time    CALCIUM 10.0 02/07/2024 0720    ALKPHOS 69 02/07/2024 0720    AST 21 02/07/2024 0720    ALT 20 02/07/2024 0720    BILITOT 0.5 02/07/2024 0720    ESTGFRAFRICA >60.0 05/02/2022 1011    EGFRNONAA >60.0 05/02/2022 1011              Assessment:       1. Renal cell carcinoma, unspecified laterality    2. Renal adenocarcinoma, left             Plan:         1. RCC:    Previously reviewed " pathology.  I am happy that lymph nodes were clear, and margins were negative.  The fact that there was renal vein involvement, pushes this to a stage III.  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    We are assuming that the questionable bone lesion is benign, especially since it has been stable for years now, watching this carefully.      After surgery, we discussed monitoring this closely or considering adjuvant Sutent    After careful consideration, the patient has decided to monitor this through routine surveillance, which I fully support.    New data from [facility] on adjuvant immunotherapy that was presented only studied patients 12 wks or less from nephrectomy, we did not think that considering immunotherapy 9 mos after surgery would be prudent or c/w supporting data.     Rescanning now stable, with ELLI.     He is now 3 years out, RTC 6 mos to see me with CT CAP, CBC, CMP.   Will plan to go to annual scans at year 4.     RTC 6 mos to see me with CT CAP, CBC, CMP    He knows to call us if there are issues or questions in the meantime.    The patient agrees with the plan, and all questions have been answered to their satisfaction.      More than 30 mins were spent during this encounter, greater than 50% was spent in direct counseling and/or coordination of care.     Chandana Carrasco M.D., M.S., F.A.C.P.  Hematology and Oncology Attending  [facility]          Route Chart for Scheduling    Med Onc Chart Routing      Follow up with physician 6 months. RTC 6 mos to see me with CT CAP, CBC, CMP   Follow up with RYNE    Infusion scheduling note    Injection scheduling note    Labs    Imaging    Pharmacy appointment    Other referrals

## 2024-02-08 ENCOUNTER — OFFICE VISIT (OUTPATIENT)
Dept: HEMATOLOGY/ONCOLOGY | Facility: CLINIC | Age: 59
End: 2024-02-08
Payer: COMMERCIAL

## 2024-02-08 VITALS
HEIGHT: 71 IN | HEART RATE: 91 BPM | OXYGEN SATURATION: 98 % | SYSTOLIC BLOOD PRESSURE: 128 MMHG | TEMPERATURE: 98 F | DIASTOLIC BLOOD PRESSURE: 90 MMHG | BODY MASS INDEX: 26.54 KG/M2 | RESPIRATION RATE: 16 BRPM

## 2024-02-08 DIAGNOSIS — C64.2 RENAL ADENOCARCINOMA, LEFT: ICD-10-CM

## 2024-02-08 DIAGNOSIS — I10 ESSENTIAL HYPERTENSION: Chronic | ICD-10-CM

## 2024-02-08 DIAGNOSIS — C64.9 RENAL CELL CARCINOMA, UNSPECIFIED LATERALITY: Primary | ICD-10-CM

## 2024-02-08 PROCEDURE — 3008F BODY MASS INDEX DOCD: CPT | Mod: CPTII,S$GLB,, | Performed by: INTERNAL MEDICINE

## 2024-02-08 PROCEDURE — 3074F SYST BP LT 130 MM HG: CPT | Mod: CPTII,S$GLB,, | Performed by: INTERNAL MEDICINE

## 2024-02-08 PROCEDURE — 99214 OFFICE O/P EST MOD 30 MIN: CPT | Mod: S$GLB,,, | Performed by: INTERNAL MEDICINE

## 2024-02-08 PROCEDURE — 99999 PR PBB SHADOW E&M-EST. PATIENT-LVL III: CPT | Mod: PBBFAC,,, | Performed by: INTERNAL MEDICINE

## 2024-02-08 PROCEDURE — 3080F DIAST BP >= 90 MM HG: CPT | Mod: CPTII,S$GLB,, | Performed by: INTERNAL MEDICINE

## 2024-02-08 PROCEDURE — 1159F MED LIST DOCD IN RCRD: CPT | Mod: CPTII,S$GLB,, | Performed by: INTERNAL MEDICINE

## 2024-02-08 RX ORDER — VALSARTAN AND HYDROCHLOROTHIAZIDE 160; 12.5 MG/1; MG/1
1 TABLET, FILM COATED ORAL DAILY
Qty: 90 TABLET | Refills: 2 | Status: SHIPPED | OUTPATIENT
Start: 2024-02-08 | End: 2025-02-07

## 2024-02-08 NOTE — TELEPHONE ENCOUNTER
Refill Routing Note   Medication(s) are not appropriate for processing by [facility] for the following reason(s):        Required vitals abnormal  No active prescription written by provider  Responsible provider unclear    [facility] action(s):  Defer               Appointments  past 12m or future 3m with PCP    Date Provider   Last Visit   11/3/2023 Del Garcia MD   Next Visit   Visit date not found Del Garcia MD   ED visits in past 90 days: 0        Note composed:1:48 PM 02/08/2024

## 2024-02-08 NOTE — TELEPHONE ENCOUNTER
No care due was identified.  [facility] Embedded Care Due Messages. Reference number: [id number].   2/08/2024 1:37:56 PM CST

## 2024-02-20 DIAGNOSIS — B00.9 HSV INFECTION: ICD-10-CM

## 2024-02-20 DIAGNOSIS — J45.990 BRONCHOSPASM, EXERCISE-INDUCED: ICD-10-CM

## 2024-02-21 RX ORDER — ALBUTEROL SULFATE 90 UG/1
2 AEROSOL, METERED RESPIRATORY (INHALATION) EVERY 4 HOURS PRN
Qty: 54 G | Refills: 0 | Status: SHIPPED | OUTPATIENT
Start: 2024-02-21

## 2024-02-21 RX ORDER — VALACYCLOVIR HYDROCHLORIDE 1 G/1
TABLET, FILM COATED ORAL
Qty: 12 TABLET | Refills: 1 | Status: SHIPPED | OUTPATIENT
Start: 2024-02-21

## 2024-02-21 NOTE — TELEPHONE ENCOUNTER
No care due was identified.  [facility] Embedded Care Due Messages. Reference number: [id number].   2/20/2024 8:40:54 PM CST

## 2024-02-21 NOTE — TELEPHONE ENCOUNTER
Please see the attached refill request.    Last seen by JAKOB 09/2022    Assessment / Plan:         Verruca vulgaris - right toes  Cryosurgery procedure note:     Verbal consent from the patient is obtained including, but not limited to, risk of hypopigmentation/hyperpigmentation, scar, recurrence of lesion. Liquid nitrogen cryosurgery is applied to 2 verruca with prior paring, as detailed in the physical exam, to produce a freeze injury. 3 consecutive freeze thaw cycles are applied to each verruca. The patient is aware that blisters (possibly blood blisters) may form.        Inflamed seborrheic keratosis - upper mid back  Cryosurgery procedure note:     Verbal consent from the patient is obtained including, but not limited to, risk of hypopigmentation/hyperpigmentation, scar, recurrence of lesion. Liquid nitrogen cryosurgery is applied to 1 lesions to produce a freeze injury. The patient is aware that blisters may form and is instructed on wound care with gentle cleansing and use of vaseline ointment to keep moist until healed. The patient is supplied a handout on cryosurgery and is instructed to call if lesions do not completely resolve.        SK (seborrheic keratosis)  These are benign inherited growths without a malignant potential. Reassurance given to patient. No treatment is necessary.         Sebaceous gland hyperplasia  This is a common condition representing benign enlargement of the sebaceous lobule. It typically occurs in adulthood. Reassurance given to patient.         Screening exam for skin cancer  Upper body skin examination performed today including at least 6 points as noted in physical examination. No lesions suspicious for malignancy noted.     Recommend daily sun protection/avoidance and use of at least SPF 30, broad spectrum sunscreen (OTC drug).               Follow up if symptoms worsen or fail to improve.

## 2024-02-21 NOTE — TELEPHONE ENCOUNTER
Refill Routing Note   Medication(s) are not appropriate for processing by [facility] for the following reason(s):        No active prescription written by provider  Due for refill >6 months ago    [facility] action(s):  Defer               Appointments  past 12m or future 3m with PCP    Date Provider   Last Visit   11/3/2023 Del Garcia MD   Next Visit   Visit date not found Del Garcia MD   ED visits in past 90 days: 0        Note composed:8:48 PM 02/20/2024

## 2024-02-26 ENCOUNTER — PATIENT MESSAGE (OUTPATIENT)
Dept: INTERNAL MEDICINE | Facility: CLINIC | Age: 59
End: 2024-02-26
Payer: COMMERCIAL

## 2024-02-26 RX ORDER — BUDESONIDE AND FORMOTEROL FUMARATE DIHYDRATE 80; 4.5 UG/1; UG/1
2 AEROSOL RESPIRATORY (INHALATION) 2 TIMES DAILY PRN
Qty: 10.2 G | Refills: 3 | Status: SHIPPED | OUTPATIENT
Start: 2024-02-26 | End: 2025-02-25

## 2024-02-26 NOTE — TELEPHONE ENCOUNTER
No care due was identified.  [facility] Embedded Care Due Messages. Reference number: [id number].   2/26/2024 11:29:25 AM CST

## 2024-03-08 ENCOUNTER — PATIENT MESSAGE (OUTPATIENT)
Dept: INTERNAL MEDICINE | Facility: CLINIC | Age: 59
End: 2024-03-08
Payer: COMMERCIAL

## 2024-03-25 DIAGNOSIS — E78.00 PURE HYPERCHOLESTEROLEMIA: Chronic | ICD-10-CM

## 2024-03-25 RX ORDER — ROSUVASTATIN CALCIUM 40 MG/1
40 TABLET, COATED ORAL DAILY
Qty: 90 TABLET | Refills: 3 | Status: CANCELLED | OUTPATIENT
Start: 2024-03-20

## 2024-03-25 RX ORDER — ROSUVASTATIN CALCIUM 40 MG/1
40 TABLET, COATED ORAL DAILY
Qty: 90 TABLET | Refills: 3 | Status: SHIPPED | OUTPATIENT
Start: 2024-03-25

## 2024-04-06 RX ORDER — AZITHROMYCIN 250 MG/1
250 TABLET, FILM COATED ORAL DAILY
Qty: 6 TABLET | Refills: 0 | Status: SHIPPED | OUTPATIENT
Start: 2024-04-06

## 2024-04-22 ENCOUNTER — TELEPHONE (OUTPATIENT)
Dept: ENDOCRINOLOGY | Facility: CLINIC | Age: 59
End: 2024-04-22
Payer: COMMERCIAL

## 2024-04-22 ENCOUNTER — OFFICE VISIT (OUTPATIENT)
Dept: ENDOCRINOLOGY | Facility: CLINIC | Age: 59
End: 2024-04-22
Payer: COMMERCIAL

## 2024-04-22 DIAGNOSIS — E66.3 OVERWEIGHT (BMI 25.0-29.9): Primary | ICD-10-CM

## 2024-04-22 PROCEDURE — 99203 OFFICE O/P NEW LOW 30 MIN: CPT | Mod: 95,,, | Performed by: INTERNAL MEDICINE

## 2024-04-22 PROCEDURE — 1159F MED LIST DOCD IN RCRD: CPT | Mod: CPTII,95,, | Performed by: INTERNAL MEDICINE

## 2024-04-22 PROCEDURE — 1160F RVW MEDS BY RX/DR IN RCRD: CPT | Mod: CPTII,95,, | Performed by: INTERNAL MEDICINE

## 2024-04-22 NOTE — PROGRESS NOTES
Subjective:      Patient ID: Venkat Jonas is a 58 y.o.    Chief Complaint:  overweight -- at his start his BMI was 27.2    History of Present Illness      Virtual visit for continuation of Mounjaro    We started the 1st round at the end of 2022.-- on off since   Last restart was at the beginning of 2024, took off a month and restarted again.  I just sent in a prescription for the 12.5 mg.    High 195  low weight  170  Ideal weight 170  Current Wt 180       He is holding himself accountable.  He understands the importance of a healthy lifestyle in addition to medication.  Plans:  Will increase exercise   Intermittent fasting   Mostly fish  diet - occ chicken     Unable to due ketosis due to RCC     No diagnosis of type 2 diabetes, IGT or unexplained hyperglycemia.  The 1 elevated blood glucose was immediately postprandial.    ROS:   As above    Objective:     There were no vitals taken for this visit.    There is no height or weight on file to calculate BMI.      Physical Exam  Constitutional:       Appearance: Normal appearance.   Psychiatric:         Mood and Affect: Mood normal.         Behavior: Behavior normal.         Thought Content: Thought content normal.         Judgment: Judgment normal.                Lab Review:   Lab Results   Component Value Date    HGBA1C 5.3 11/01/2023     Lab Results   Component Value Date    CHOL 167 11/01/2023    HDL 82 (H) 09/05/2023    LDLCALC 88.0 09/05/2023    TRIG 115 09/05/2023    CHOLHDL 42.5 09/05/2023     Lab Results   Component Value Date     02/07/2024    K 4.9 02/07/2024     02/07/2024    CO2 27 02/07/2024    GLU 96 02/07/2024    BUN 18 02/07/2024    CREATININE 1.2 02/07/2024    CALCIUM 10.0 02/07/2024    PROT 7.5 02/07/2024    ALBUMIN 3.8 02/07/2024    BILITOT 0.5 02/07/2024    ALKPHOS 69 02/07/2024    AST 21 02/07/2024    ALT 20 02/07/2024    ANIONGAP 9 02/07/2024    ESTGFRAFRICA >60.0 05/02/2022    EGFRNONAA >60.0 05/02/2022    TSH 2.493  "11/01/2023     No results found for: "[id number]"  Lab Results   Component Value Date    WBC 4.61 02/07/2024    HGB 14.3 02/07/2024    HCT 42.2 02/07/2024    MCV 90 02/07/2024     02/07/2024           Assessment and Plan     Overweight (BMI 25.0-29.9)  He is doing well with Mounjaro.  His preference is to work on healthy lifestyle and continue Mounjaro to achieve the last 10 lb of weight loss.  We did discuss that once goal weight is achieved it will be up to him whether he would like to stay on a maintenance, decrease the dose, or stop the dose.  We did review the data of expected weight regain with cessation over time.      The patient location is: [place]  The chief complaint leading to consultation is: above     Visit type: audiovisual    Level of service is based on medical decision-making and not time      Each patient to whom he or she provides medical services by telemedicine is:  (1) informed of the relationship between the physician and patient and the respective role of any other health care provider with respect to management of the patient; and (2) notified that he or she may decline to receive medical services by telemedicine and may withdraw from such care at any time.      "

## 2024-04-22 NOTE — ASSESSMENT & PLAN NOTE
He is doing well with Mounjaro.  His preference is to work on healthy lifestyle and continue Mounjaro to achieve the last 10 lb of weight loss.  We did discuss that once goal weight is achieved it will be up to him whether he would like to stay on a maintenance, decrease the dose, or stop the dose.  We did review the data of expected weight regain with cessation over time.

## 2024-04-23 DIAGNOSIS — C64.9 RENAL CELL CARCINOMA, UNSPECIFIED LATERALITY: Primary | ICD-10-CM

## 2024-05-24 ENCOUNTER — TELEPHONE (OUTPATIENT)
Dept: INFECTIOUS DISEASES | Facility: CLINIC | Age: 59
End: 2024-05-24
Payer: COMMERCIAL

## 2024-05-24 DIAGNOSIS — Z00.00 HEALTHCARE MAINTENANCE: Primary | ICD-10-CM

## 2024-05-27 ENCOUNTER — CLINICAL SUPPORT (OUTPATIENT)
Dept: INFECTIOUS DISEASES | Facility: CLINIC | Age: 59
End: 2024-05-27
Payer: COMMERCIAL

## 2024-05-27 DIAGNOSIS — Z00.00 HEALTHCARE MAINTENANCE: Primary | ICD-10-CM

## 2024-05-27 PROCEDURE — 90471 IMMUNIZATION ADMIN: CPT | Mod: S$GLB,,, | Performed by: INTERNAL MEDICINE

## 2024-05-27 PROCEDURE — 90739 HEPB VACC 2/4 DOSE ADULT IM: CPT | Mod: S$GLB,,, | Performed by: INTERNAL MEDICINE

## 2024-05-27 PROCEDURE — 99999 PR PBB SHADOW E&M-EST. PATIENT-LVL I: CPT | Mod: PBBFAC,,,

## 2024-06-10 ENCOUNTER — PATIENT MESSAGE (OUTPATIENT)
Dept: INTERNAL MEDICINE | Facility: CLINIC | Age: 59
End: 2024-06-10
Payer: COMMERCIAL

## 2024-06-24 ENCOUNTER — CLINICAL SUPPORT (OUTPATIENT)
Dept: INFECTIOUS DISEASES | Facility: CLINIC | Age: 59
End: 2024-06-24
Payer: COMMERCIAL

## 2024-06-24 DIAGNOSIS — Z00.00 HEALTHCARE MAINTENANCE: Primary | ICD-10-CM

## 2024-06-24 PROCEDURE — 99999 PR PBB SHADOW E&M-EST. PATIENT-LVL I: CPT | Mod: PBBFAC,,,

## 2024-06-24 PROCEDURE — 90739 HEPB VACC 2/4 DOSE ADULT IM: CPT | Mod: S$GLB,,, | Performed by: INTERNAL MEDICINE

## 2024-06-24 PROCEDURE — 90471 IMMUNIZATION ADMIN: CPT | Mod: S$GLB,,, | Performed by: INTERNAL MEDICINE

## 2024-06-24 PROCEDURE — 90472 IMMUNIZATION ADMIN EACH ADD: CPT | Mod: S$GLB,,, | Performed by: INTERNAL MEDICINE

## 2024-06-24 PROCEDURE — 90677 PCV20 VACCINE IM: CPT | Mod: S$GLB,,, | Performed by: INTERNAL MEDICINE

## 2024-06-24 NOTE — PROGRESS NOTES
Patient received 2nd Heplisav B IM to the right deltoid.  And also Prevnar 20 IM to the left deltoid.  Tolerated well and left in NAD

## 2024-07-26 ENCOUNTER — PATIENT MESSAGE (OUTPATIENT)
Dept: ORTHOPEDICS | Facility: CLINIC | Age: 59
End: 2024-07-26
Payer: COMMERCIAL

## 2024-07-26 ENCOUNTER — TELEPHONE (OUTPATIENT)
Dept: ORTHOPEDICS | Facility: CLINIC | Age: 59
End: 2024-07-26
Payer: COMMERCIAL

## 2024-07-26 DIAGNOSIS — M79.641 RIGHT HAND PAIN: Primary | ICD-10-CM

## 2024-07-26 NOTE — TELEPHONE ENCOUNTER
Spoke c pt. Confirmed laterality. Pt obtaining XR tomorrow at [facility]. Spoke c pt. Confirmed appt location & time c Dr. Martin 07/30/24. Pt expressed understanding & was thankful.

## 2024-07-27 ENCOUNTER — HOSPITAL ENCOUNTER (OUTPATIENT)
Dept: RADIOLOGY | Facility: HOSPITAL | Age: 59
Discharge: HOME | End: 2024-07-27
Attending: ORTHOPAEDIC SURGERY
Payer: COMMERCIAL

## 2024-07-27 DIAGNOSIS — M79.641 RIGHT HAND PAIN: ICD-10-CM

## 2024-07-27 PROCEDURE — 73130 X-RAY EXAM OF HAND: CPT | Mod: 26,RT,, | Performed by: RADIOLOGY

## 2024-07-27 PROCEDURE — 73130 X-RAY EXAM OF HAND: CPT | Mod: TC,RT

## 2024-08-05 ENCOUNTER — PATIENT MESSAGE (OUTPATIENT)
Dept: ADMINISTRATIVE | Facility: OTHER | Age: 59
End: 2024-08-05
Payer: COMMERCIAL

## 2024-08-07 ENCOUNTER — HOSPITAL ENCOUNTER (OUTPATIENT)
Dept: RADIOLOGY | Facility: HOSPITAL | Age: 59
Discharge: HOME | End: 2024-08-07
Attending: INTERNAL MEDICINE
Payer: COMMERCIAL

## 2024-08-07 DIAGNOSIS — C64.9 RENAL CELL CARCINOMA, UNSPECIFIED LATERALITY: ICD-10-CM

## 2024-08-07 PROCEDURE — 71270 CT THORAX DX C-/C+: CPT | Mod: TC

## 2024-08-07 PROCEDURE — 74178 CT ABD&PLV WO CNTR FLWD CNTR: CPT | Mod: 26,,, | Performed by: RADIOLOGY

## 2024-08-07 PROCEDURE — 74177 CT ABD & PELVIS W/CONTRAST: CPT | Mod: TC

## 2024-08-07 PROCEDURE — 74178 CT ABD&PLV WO CNTR FLWD CNTR: CPT | Mod: TC

## 2024-08-07 PROCEDURE — 25500020 PHARM REV CODE 255: Performed by: INTERNAL MEDICINE

## 2024-08-07 PROCEDURE — 71260 CT THORAX DX C+: CPT | Mod: TC

## 2024-08-07 PROCEDURE — 71270 CT THORAX DX C-/C+: CPT | Mod: 26,,, | Performed by: RADIOLOGY

## 2024-08-07 RX ADMIN — IOHEXOL 100 ML: 350 INJECTION, SOLUTION INTRAVENOUS at 09:08

## 2024-08-08 ENCOUNTER — OFFICE VISIT (OUTPATIENT)
Dept: HEMATOLOGY/ONCOLOGY | Facility: CLINIC | Age: 59
End: 2024-08-08
Payer: COMMERCIAL

## 2024-08-08 VITALS
BODY MASS INDEX: 26.33 KG/M2 | HEART RATE: 74 BPM | DIASTOLIC BLOOD PRESSURE: 84 MMHG | RESPIRATION RATE: 18 BRPM | OXYGEN SATURATION: 98 % | HEIGHT: 71 IN | SYSTOLIC BLOOD PRESSURE: 122 MMHG | WEIGHT: 188.06 LBS | TEMPERATURE: 98 F

## 2024-08-08 DIAGNOSIS — C64.2 RENAL ADENOCARCINOMA, LEFT: Primary | ICD-10-CM

## 2024-08-08 PROCEDURE — 3079F DIAST BP 80-89 MM HG: CPT | Mod: CPTII,S$GLB,, | Performed by: INTERNAL MEDICINE

## 2024-08-08 PROCEDURE — 99215 OFFICE O/P EST HI 40 MIN: CPT | Mod: S$GLB,,, | Performed by: INTERNAL MEDICINE

## 2024-08-08 PROCEDURE — G2211 COMPLEX E/M VISIT ADD ON: HCPCS | Mod: S$GLB,,, | Performed by: INTERNAL MEDICINE

## 2024-08-08 PROCEDURE — 3074F SYST BP LT 130 MM HG: CPT | Mod: CPTII,S$GLB,, | Performed by: INTERNAL MEDICINE

## 2024-08-08 PROCEDURE — 99999 PR PBB SHADOW E&M-EST. PATIENT-LVL IV: CPT | Mod: PBBFAC,,, | Performed by: INTERNAL MEDICINE

## 2024-08-08 PROCEDURE — 3008F BODY MASS INDEX DOCD: CPT | Mod: CPTII,S$GLB,, | Performed by: INTERNAL MEDICINE

## 2024-08-08 PROCEDURE — 1159F MED LIST DOCD IN RCRD: CPT | Mod: CPTII,S$GLB,, | Performed by: INTERNAL MEDICINE

## 2024-08-09 ENCOUNTER — TELEPHONE (OUTPATIENT)
Dept: ENDOSCOPY | Facility: HOSPITAL | Age: 59
End: 2024-08-09
Payer: COMMERCIAL

## 2024-08-09 VITALS — WEIGHT: 183 LBS | BODY MASS INDEX: 25.62 KG/M2 | HEIGHT: 71 IN

## 2024-08-09 DIAGNOSIS — K21.9 GASTROESOPHAGEAL REFLUX DISEASE, UNSPECIFIED WHETHER ESOPHAGITIS PRESENT: Primary | ICD-10-CM

## 2024-08-09 NOTE — TELEPHONE ENCOUNTER
----- Message from Toni Rivera MD sent at 8/9/2024  4:00 PM CDT -----  Regarding: RE: UGI - Venkat Resendez can you schedule Venkat for his EGD next available referral orders placed    Thank you    Bacilio thanks for the message yes he should have a repeat referral placed    Thank you  ----- Message -----  From: Chandana Carrasco MD  Sent: 8/9/2024   8:41 AM CDT  To: Toni Rivera MD  Subject: [facility] - Venkat Sumi Muñoz,     I hope you are well.  I saw Venkat Jonas yesterday.  He is doing well and is still cancer free.  He got an automated message telling him it was time to repeat an upper endoscopy.  He said he was asymptomatic and asked if it was absolutely necessary.  I told him I would check with you.  Thank you so much.     -Chandana

## 2024-08-09 NOTE — TELEPHONE ENCOUNTER
Spoke to Venkat to schedule procedure(s) Upper Endoscopy (EGD)       Physician to perform procedure(s) Dr. REINA Rivera  Date of Procedure (s) 10/18/24  Arrival Time 8:15 AM  Time of Procedure(s) 9:15 AM   Location of Procedure(s) [address]  Type of Rx Prep sent to patient: Other  Instructions provided to patient via [facility]    Patient was informed on the following information and verbalized understanding. Screening questionnaire reviewed with patient and complete. If procedure requires anesthesia, a responsible adult needs to be present to accompany the patient home, patient cannot drive after receiving anesthesia. Appointment details are tentative, especially check-in time. Patient will receive a prep-op call 7 days prior to confirm check-in time for procedure. If applicable the patient should contact their pharmacy to verify Rx for procedure prep is ready for pick-up. Patient was advised to call the scheduling department at [phone] if pharmacy states no Rx is available. Patient was advised to call the endoscopy scheduling department if any questions or concerns arise.      [facility] Endoscopy Scheduling Department

## 2024-08-09 NOTE — TELEPHONE ENCOUNTER
"----- Message from Adan Duggan MA sent at 2024  4:43 PM CDT -----     ----- Message -----  From: Toni Rivera MD  Sent: 2024   4:00 PM CDT  To: [facility]     Procedure: EGD     Diagnosis: GERD     Procedure Timin-12 weeks     #If within 4 weeks selected, please radha as high priority#     #If greater than 12 weeks, please select "5-12 weeks" and delay sending until 3 months prior to requested date#      Location: Hospital Based ([facility],  endo, [place] [facility])     Additional Scheduling Information:  Rivera only     Prep Specifications:Standard prep     Is the patient taking a GLP-1 Agonist:no     Have you attached a patient to this message: yes  "

## 2024-08-18 ENCOUNTER — PATIENT MESSAGE (OUTPATIENT)
Dept: ENDOCRINOLOGY | Facility: CLINIC | Age: 59
End: 2024-08-18
Payer: COMMERCIAL

## 2024-08-19 ENCOUNTER — PATIENT MESSAGE (OUTPATIENT)
Dept: INTERNAL MEDICINE | Facility: CLINIC | Age: 59
End: 2024-08-19
Payer: COMMERCIAL

## 2024-08-20 RX ORDER — EZETIMIBE 10 MG/1
TABLET ORAL
Qty: 90 TABLET | Refills: 3 | Status: SHIPPED | OUTPATIENT
Start: 2024-08-20

## 2024-08-22 ENCOUNTER — TELEPHONE (OUTPATIENT)
Dept: ENDOSCOPY | Facility: HOSPITAL | Age: 59
End: 2024-08-22
Payer: COMMERCIAL

## 2024-08-22 NOTE — TELEPHONE ENCOUNTER
----- Message from Mal Treviño MA sent at 8/9/2024  6:23 PM CDT -----  Regarding: 10/18 CL  The patient is currently under an internal cardiologist CAsh care and requires a clearance Cardiology for their upcoming scheduled Upper Endoscopy (EGD) on 10/18/24.         Notes: LELA Trevizo

## 2024-10-11 ENCOUNTER — PATIENT MESSAGE (OUTPATIENT)
Dept: ENDOSCOPY | Facility: HOSPITAL | Age: 59
End: 2024-10-11
Payer: COMMERCIAL

## 2024-10-11 ENCOUNTER — TELEPHONE (OUTPATIENT)
Dept: ENDOSCOPY | Facility: HOSPITAL | Age: 59
End: 2024-10-11
Payer: COMMERCIAL

## 2024-10-11 NOTE — TELEPHONE ENCOUNTER
Spoke to pt for pre-call to confirm scheduled Upper Endoscopy (EGD) and patient verbalized understanding of the following:       Date of Procedure (s)  verified 10/18/24  Arrival Time 8:15 AM verified.  Location of Procedure(s) [address] verified.  NPO status reinforced. Ok to continue clear liquids up until 4 hours prior to the Endoscopy procedure.   Confirmed Pt is currently taking Mounjaro (Tirzepatide), Pt instructed to stop stop taking Mounjaro (Tirzepatide)     last dose 9/18/24 and does not plan to resume     Pt confirmed receipt of prep instructions and Rx prep (if applicable).  Instructions provided to patient via [facility]  Pt confirmed ride home after procedure if procedure requires anesthesia.   Pre-call screening questionnaire reviewed and completed with patient.   Appointment details are tentative, including check-in time.  If the patient begins taking any blood thinning medications, injectable weight loss/diabetes medications (other than insulin), or Adipex (phentermine) patient was instructed to contact the endoscopy scheduling department as soon as possible.  Patient was advised to call the endoscopy scheduling department if any questions or concerns arise.       [facility] Endoscopy Scheduling Department

## 2024-10-17 DIAGNOSIS — F51.02 ADJUSTMENT INSOMNIA: ICD-10-CM

## 2024-10-17 RX ORDER — ZOLPIDEM TARTRATE 10 MG/1
10 TABLET ORAL NIGHTLY PRN
Qty: 30 TABLET | Refills: 5 | Status: SHIPPED | OUTPATIENT
Start: 2024-10-17 | End: 2025-04-15

## 2024-10-17 NOTE — TELEPHONE ENCOUNTER
No care due was identified.  [facility] Embedded Care Due Messages. Reference number: [id number].   10/17/2024 7:41:07 AM CDT

## 2024-10-18 ENCOUNTER — PATIENT MESSAGE (OUTPATIENT)
Dept: ENDOSCOPY | Facility: HOSPITAL | Age: 59
End: 2024-10-18
Payer: COMMERCIAL

## 2024-10-18 ENCOUNTER — ANESTHESIA EVENT (OUTPATIENT)
Dept: ENDOSCOPY | Facility: HOSPITAL | Age: 59
End: 2024-10-18
Payer: COMMERCIAL

## 2024-10-18 ENCOUNTER — ANESTHESIA (OUTPATIENT)
Dept: ENDOSCOPY | Facility: HOSPITAL | Age: 59
End: 2024-10-18
Payer: COMMERCIAL

## 2024-10-18 ENCOUNTER — HOSPITAL ENCOUNTER (OUTPATIENT)
Facility: HOSPITAL | Age: 59
Discharge: HOME | End: 2024-10-18
Attending: INTERNAL MEDICINE | Admitting: INTERNAL MEDICINE
Payer: COMMERCIAL

## 2024-10-18 VITALS
HEIGHT: 70 IN | BODY MASS INDEX: 24.34 KG/M2 | WEIGHT: 170 LBS | DIASTOLIC BLOOD PRESSURE: 78 MMHG | TEMPERATURE: 98 F | SYSTOLIC BLOOD PRESSURE: 119 MMHG | RESPIRATION RATE: 17 BRPM | OXYGEN SATURATION: 99 % | HEART RATE: 60 BPM

## 2024-10-18 DIAGNOSIS — K21.9 GERD (GASTROESOPHAGEAL REFLUX DISEASE): ICD-10-CM

## 2024-10-18 PROCEDURE — 43239 EGD BIOPSY SINGLE/MULTIPLE: CPT | Performed by: INTERNAL MEDICINE

## 2024-10-18 PROCEDURE — 88305 TISSUE EXAM BY PATHOLOGIST: CPT | Performed by: PATHOLOGY

## 2024-10-18 PROCEDURE — 37000008 HC ANESTHESIA 1ST 15 MINUTES: Performed by: INTERNAL MEDICINE

## 2024-10-18 PROCEDURE — 27201089 HC SNARE, DISP (ANY): Performed by: INTERNAL MEDICINE

## 2024-10-18 PROCEDURE — 37000009 HC ANESTHESIA EA ADD 15 MINS: Performed by: INTERNAL MEDICINE

## 2024-10-18 PROCEDURE — 43251 EGD REMOVE LESION SNARE: CPT | Performed by: INTERNAL MEDICINE

## 2024-10-18 PROCEDURE — 43239 EGD BIOPSY SINGLE/MULTIPLE: CPT | Mod: ,,, | Performed by: INTERNAL MEDICINE

## 2024-10-18 PROCEDURE — 63600175 PHARM REV CODE 636 W HCPCS: Performed by: NURSE ANESTHETIST, CERTIFIED REGISTERED

## 2024-10-18 PROCEDURE — 88342 IMHCHEM/IMCYTCHM 1ST ANTB: CPT | Performed by: PATHOLOGY

## 2024-10-18 PROCEDURE — 43251 EGD REMOVE LESION SNARE: CPT | Mod: ,,, | Performed by: INTERNAL MEDICINE

## 2024-10-18 RX ORDER — GLUCAGON 1 MG
1 KIT INJECTION
Status: DISCONTINUED | OUTPATIENT
Start: 2024-10-18 | End: 2024-10-18 | Stop reason: HOSPADM

## 2024-10-18 RX ORDER — ONDANSETRON HYDROCHLORIDE 2 MG/ML
INJECTION, SOLUTION INTRAVENOUS
Status: DISCONTINUED | OUTPATIENT
Start: 2024-10-18 | End: 2024-10-18

## 2024-10-18 RX ORDER — LIDOCAINE HYDROCHLORIDE 20 MG/ML
INJECTION, SOLUTION EPIDURAL; INFILTRATION; INTRACAUDAL; PERINEURAL
Status: DISCONTINUED | OUTPATIENT
Start: 2024-10-18 | End: 2024-10-18

## 2024-10-18 RX ORDER — RABEPRAZOLE SODIUM 20 MG/1
20 TABLET, DELAYED RELEASE ORAL
Qty: 90 TABLET | Refills: 3 | Status: SHIPPED | OUTPATIENT
Start: 2024-10-18 | End: 2025-10-18

## 2024-10-18 RX ORDER — PROPOFOL 10 MG/ML
VIAL (ML) INTRAVENOUS
Status: DISCONTINUED | OUTPATIENT
Start: 2024-10-18 | End: 2024-10-18

## 2024-10-18 RX ORDER — SODIUM CHLORIDE 0.9 % (FLUSH) 0.9 %
3 SYRINGE (ML) INJECTION
Status: DISCONTINUED | OUTPATIENT
Start: 2024-10-18 | End: 2024-10-18 | Stop reason: HOSPADM

## 2024-10-18 RX ADMIN — LIDOCAINE HYDROCHLORIDE 50 MG: 20 INJECTION, SOLUTION EPIDURAL; INFILTRATION; INTRACAUDAL at 09:10

## 2024-10-18 RX ADMIN — PROPOFOL 50 MG: 10 INJECTION, EMULSION INTRAVENOUS at 09:10

## 2024-10-18 RX ADMIN — GLYCOPYRROLATE 0.1 MG: 0.2 INJECTION, SOLUTION INTRAMUSCULAR; INTRAVENOUS at 09:10

## 2024-10-18 RX ADMIN — PROPOFOL 30 MG: 10 INJECTION, EMULSION INTRAVENOUS at 10:10

## 2024-10-18 RX ADMIN — PROPOFOL 30 MG: 10 INJECTION, EMULSION INTRAVENOUS at 09:10

## 2024-10-18 RX ADMIN — PROPOFOL 20 MG: 10 INJECTION, EMULSION INTRAVENOUS at 09:10

## 2024-10-18 RX ADMIN — PROPOFOL 100 MG: 10 INJECTION, EMULSION INTRAVENOUS at 10:10

## 2024-10-18 RX ADMIN — PROPOFOL 100 MG: 10 INJECTION, EMULSION INTRAVENOUS at 09:10

## 2024-10-18 RX ADMIN — PROPOFOL 20 MG: 10 INJECTION, EMULSION INTRAVENOUS at 10:10

## 2024-10-18 RX ADMIN — ONDANSETRON 4 MG: 2 INJECTION INTRAMUSCULAR; INTRAVENOUS at 10:10

## 2024-10-18 NOTE — PLAN OF CARE
Patient updated on plan of care for procedure today.  All questions addressed verbalized understanding.       pt triaged, treated and dispo by provider, pt verbalized understanding of discharge instructions, pt medicated prior to discharge, refer to provider notes.

## 2024-10-18 NOTE — PROVATION PATIENT INSTRUCTIONS
Discharge Summary/Instructions after an Endoscopic Procedure  Patient Name: Venkat Jonas  Patient MRN: [id number]  Patient YOB: 1965 Friday, October 18, 2024  Toni Rivera MD  Dear patient,  As a result of recent federal legislation (The Federal Cures Act), you may   receive lab or pathology results from your procedure in your [facility]   account before your physician is able to contact you. Your physician or   their representative will relay the results to you with their   recommendations at their soonest availability.  Thank you,  RESTRICTIONS:  During your procedure today, you received medications for sedation.  These   medications may affect your judgment, balance and coordination.  Therefore,   for 24 hours, you have the following restrictions:   - DO NOT drive a car, operate machinery, make legal/financial decisions,   sign important papers or drink alcohol.    ACTIVITY:  Today: no heavy lifting, straining or running due to procedural   sedation/anesthesia.  The following day: return to full activity including work.  DIET:  Eat and drink normally unless instructed otherwise.     TREATMENT FOR COMMON SIDE EFFECTS:  - Mild abdominal pain, nausea, belching, bloating or excessive gas:  rest,   eat lightly and use a heating pad.  - Sore Throat: treat with throat lozenges and/or gargle with warm salt   water.  - Because air was used during the procedure, expelling large amounts of air   from your rectum or belching is normal.  - If a bowel prep was taken, you may not have a bowel movement for 1-3 days.    This is normal.  SYMPTOMS TO WATCH FOR AND REPORT TO YOUR PHYSICIAN:  1. Abdominal pain or bloating, other than gas cramps.  2. Chest pain.  3. Back pain.  4. Signs of infection such as: chills or fever occurring within 24 hours   after the procedure.  5. Rectal bleeding, which would show as bright red, maroon, or black stools.   (A tablespoon of blood from the rectum is not serious, especially if    hemorrhoids are present.)  6. Vomiting.  7. Weakness or dizziness.  GO DIRECTLY TO THE NEAREST EMERGENCY ROOM IF YOU HAVE ANY OF THE FOLLOWING:      Difficulty breathing              Chills and/or fever over 101 F   Persistent vomiting and/or vomiting blood   Severe abdominal pain   Severe chest pain   Black, tarry stools   Bleeding- more than one tablespoon   Any other symptom or condition that you feel may need urgent attention  Your doctor recommends these additional instructions:  If any biopsies were taken, your doctors clinic will contact you in 1 to 2   weeks with any results.  - Discharge patient to home.   - Follow an antireflux regimen indefinitely.   - Await pathology results.   - Telephone endoscopist for pathology results in 3 weeks.   - Consider avoiding all non-steroidal anti-inflammatory drugs (Mobic,   ibuprofen, naproxen, etc.), unless needed for cardiovascular protection.    Recommend you discuss with your prescribing doctor (of your aspirin) to see   if cardiovascular benefits of your aspirin outweigh the risks of GI   bleeding. O.K. to take aspirin if needed for cardiovascular protection but   please discuss with your prescriber of your aspirin.  - Repeat upper endoscopy in 6 months to check healing and for surveillance.     - Return to GI clinic.   - Use Aciphex (rabeprazole) 20 mg by mouth once daily best taken 60 minutes   before you first protein meal of the day. Once you get this Rx you can stop   your Pantoprazole medicine.  - Return to primary care physician.   - The findings and recommendations were discussed with the patient.  For questions, problems or results please call your physician - Toni Rivera MD at Work:  [phone].  [facility], EMERGENCY ROOM PHONE NUMBER: [phone]  IF A COMPLICATION OR EMERGENCY SITUATION ARISES AND YOU ARE UNABLE TO REACH   YOUR PHYSICIAN - GO DIRECTLY TO THE EMERGENCY ROOM.  Toni Rivera MD  10/18/2024 10:29:54 AM  This  report has been verified and signed electronically.  Dear patient,  As a result of recent federal legislation (The Federal Cures Act), you may   receive lab or pathology results from your procedure in your [facility]   account before your physician is able to contact you. Your physician or   their representative will relay the results to you with their   recommendations at their soonest availability.  Thank you,  PROVATION

## 2024-10-18 NOTE — ANESTHESIA POSTPROCEDURE EVALUATION
Anesthesia Post Evaluation    Patient: Venkat Jonas    Procedure(s) Performed: Procedure(s) (LRB):  EGD (ESOPHAGOGASTRODUODENOSCOPY) (N/A)    Final Anesthesia Type: general      Patient location during evaluation: PACU  Patient participation: Yes- Able to Participate  Level of consciousness: awake and alert and oriented  Post-procedure vital signs: reviewed and stable  Pain management: adequate  Airway patency: patent    PONV status at discharge: No PONV  Anesthetic complications: no      Cardiovascular status: blood pressure returned to baseline  Respiratory status: unassisted, room air and spontaneous ventilation  Hydration status: euvolemic  Follow-up not needed.              Vitals Value Taken Time   /70 10/18/24 1031   Temp 36.6 °C (97.9 °F) 10/18/24 1030   Pulse 69 10/18/24 1042   Resp 15 10/18/24 1042   SpO2 98 % 10/18/24 1042   Vitals shown include unfiled device data.      No case tracking events are documented in the log.      Pain/Laura Score: Laura Score: 9 (10/18/2024 10:30 AM)

## 2024-10-18 NOTE — H&P
[facility] - Endoscopy  History & Physical    Subjective:      Chief Complaint/Reason for Admission:     EGD for Diagnoses  Gastroesophageal reflux disease, unspecified whether esophagitis present [K21.9]     Venkat Jonas is a 59 y.o. male.    Past Medical History:   Diagnosis Date    Allergic sinusitis     Exercise-induced asthma     Hyperlipidemia     Hypertension     Renal cell carcinoma 10/14/2020     Past Surgical History:   Procedure Laterality Date    COLONOSCOPY N/A 12/5/2016    Procedure: COLONOSCOPY;  Surgeon: Toni Rivera MD;  Location: [facility] (4TH FLR);  Service: Endoscopy;  Laterality: N/A;  [facility]    COLONOSCOPY N/A 11/3/2020    Procedure: COLONOSCOPY;  Surgeon: Toni Rivera MD;  Location: [facility] (2ND FLR);  Service: Endoscopy;  Laterality: N/A;  Schedule patient EGD colonoscopy with me next available but as soon as possible    CYSTOSCOPY W/ RETROGRADES Left 10/1/2020    Procedure: CYSTOSCOPY, WITH RETROGRADE PYELOGRAM;  Surgeon: Vega Sears MD;  Location: [facility] 1ST FLR;  Service: Urology;  Laterality: Left;    ESOPHAGOGASTRODUODENOSCOPY N/A 11/3/2020    Procedure: EGD (ESOPHAGOGASTRODUODENOSCOPY);  Surgeon: Toni Rivera MD;  Location: [facility] (2ND FLR);  Service: Endoscopy;  Laterality: N/A;  pt will get rapid on 11/2-MS    LAPAROSCOPIC ROBOT-ASSISTED SURGICAL REMOVAL OF KIDNEY USING DA SONIDO XI Left 10/7/2020    Procedure: XI ROBOTIC NEPHRECTOMY;  Surgeon: Vega Sears MD;  Location: [facility] 2ND FLR;  Service: Urology;  Laterality: Left;  gen with regional/ 5hrs    REPAIR OF COLLATERAL LIGAMENT OF THUMB Right 5/14/2021    Procedure: REPAIR, LIGAMENT, COLLATERAL, THUMB, right UCLUCL;  Surgeon: Carolyn Thao MD;  Location: [facility];  Service: Orthopedics;  Laterality: Right;  Regional/MAC, [facility]    SINUS SURGERY       Social History     Tobacco Use    Smoking status: Never    Smokeless tobacco: Never    Tobacco comments:     The patient exercises  regularly at [employer].  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   Substance Use Topics    Alcohol use: Yes     Alcohol/week: 4.0 standard drinks of alcohol     Types: 4 Shots of liquor per week     Comment: socially    Drug use: No       PTA Medications   Medication Sig    albuterol (PROVENTIL/VENTOLIN HFA) 90 mcg/actuation inhaler Inhale 2 puffs into the lungs every 4 (four) hours as needed for Shortness of Breath.    aspirin 81 MG Chew Take 81 mg by mouth once daily.    ezetimibe (ZETIA) 10 mg tablet Take 1 tablet by mouth daily    pantoprazole (PROTONIX) 40 MG tablet TAKE ONE TABLET BY MOUTH TWICE A DAY ON AN EMPTY STOMACH    rosuvastatin (CRESTOR) 40 MG Tab Take 1 tablet (40 mg total) by mouth once daily.    valsartan-hydrochlorothiazide (DIOVAN-HCT) 160-12.5 mg per tablet Take 1 tablet by mouth once daily.    zolpidem (AMBIEN) 10 mg Tab Take 1 tablet (10 mg total) by mouth nightly as needed (insomnia).    azithromycin (ZITHROMAX) 250 MG tablet Take 1 tablet (250 mg total) by mouth once daily. Take 2 then 1 daily thereafter    budesonide-formoterol 80-4.5 mcg (SYMBICORT) 80-4.5 mcg/actuation HFAA Inhale 2 puffs into the lungs 2 (two) times daily as needed (exercise wheezing). Controller    tirzepatide 10 mg/0.5 mL PnIj Inject 10 mg into the skin every 7 days. (Patient not taking: Reported on 10/11/2024)    valACYclovir (VALTREX) 1000 MG tablet TAKE 2 TABLETS (2,000 MG TOTAL) BY MOUTH 2 (TWO) TIMES DAILY FOR 2 DAYS and repeat as needed     Review of patient's allergies indicates:  No Known Allergies     Review of Systems   Constitutional:  Negative for fever.       Objective:      Vital Signs (Most Recent)  Temp: 98.1 °F (36.7 °C) (10/18/24 0822)  Pulse: 66 (10/18/24 0822)  Resp: 14 (10/18/24 0822)  BP: 133/84 (10/18/24 0822)  SpO2: 97 % (10/18/24 0822)    Vital Signs Range (Last 24H):  Temp:  [98.1 °F (36.7 °C)]   Pulse:  [66]   Resp:  [14]   BP: (133)/(84)   SpO2:  [97 %]     Physical  Exam  Cardiovascular:      Rate and Rhythm: Normal rate.   Pulmonary:      Effort: Pulmonary effort is normal.   Neurological:      Mental Status: He is alert and oriented to person, place, and time.             Assessment:      EGD for Diagnoses  Gastroesophageal reflux disease, unspecified whether esophagitis present [K21.9]       Plan:    EGD for Diagnoses  Gastroesophageal reflux disease, unspecified whether esophagitis present [K21.9]

## 2024-10-18 NOTE — ANESTHESIA PREPROCEDURE EVALUATION
10/18/2024  Venkat Jonas is a 59 y.o., male.      Pre-op Assessment    I have reviewed the Patient Summary Reports.     I have reviewed the Nursing Notes. I have reviewed the NPO Status.   I have reviewed the Medications.     Review of Systems  Anesthesia Hx:  No problems with previous Anesthesia   History of prior surgery of interest to airway management or planning:          Denies Family Hx of Anesthesia complications.    Denies Personal Hx of Anesthesia complications.                    Hematology/Oncology:  Hematology Normal                       --  Cancer in past history:                     EENT/Dental:  chronic allergic rhinitis           Cardiovascular:  Exercise tolerance: good   Hypertension           hyperlipidemia   ECG has been reviewed.                            Pulmonary:    Asthma asymptomatic                   Renal/:  Renal/ Normal                 Hepatic/GI:  Hepatic/GI Normal                    Musculoskeletal:  Musculoskeletal Normal                Neurological:  Neurology Normal                                      Endocrine:  Endocrine Normal            Dermatological:  Skin Normal    Psych:  Psychiatric Normal                    Physical Exam  General: Well nourished, Cooperative, Alert and Oriented    Airway:  Mallampati: II   Mouth Opening: Normal  TM Distance: Normal  Tongue: Normal  Neck ROM: Normal ROM    Dental:  Intact        Anesthesia Plan  Type of Anesthesia, risks & benefits discussed:    Anesthesia Type: Gen Natural Airway  Intra-op Monitoring Plan: Standard ASA Monitors  Induction:  IV  Informed Consent: Informed consent signed with the Patient and all parties understand the risks and agree with anesthesia plan.  All questions answered.   ASA Score: 2  Day of Surgery Review of History & Physical: H&P Update referred to the surgeon/provider.    Ready For  Surgery From Anesthesia Perspective.     .

## 2024-10-18 NOTE — TRANSFER OF CARE
"Anesthesia Transfer of Care Note    Patient: Venkat Jonas    Procedure(s) Performed: Procedure(s) (LRB):  EGD (ESOPHAGOGASTRODUODENOSCOPY) (N/A)    Patient location: [facility]    Anesthesia Type: general    Transport from OR: Transported from OR on room air with adequate spontaneous ventilation    Post pain: adequate analgesia    Post assessment: no apparent anesthetic complications and tolerated procedure well    Post vital signs: stable    Level of consciousness: responds to stimulation    Nausea/Vomiting: no nausea/vomiting    Complications: none    Transfer of care protocol was followed      Last vitals: Visit Vitals  /84 (BP Location: Left arm, Patient Position: Lying)   Pulse 66   Temp 36.7 °C (98.1 °F) (Temporal)   Resp 14   Ht 5' 10" (1.778 m)   Wt 77.1 kg (170 lb)   SpO2 97%   BMI 24.39 kg/m²     "

## 2024-10-22 ENCOUNTER — PATIENT MESSAGE (OUTPATIENT)
Dept: CARDIOLOGY | Facility: CLINIC | Age: 59
End: 2024-10-22
Payer: COMMERCIAL

## 2024-10-22 LAB
FINAL PATHOLOGIC DIAGNOSIS: NORMAL
GROSS: NORMAL
Lab: NORMAL
MICROSCOPIC EXAM: NORMAL

## 2024-10-24 ENCOUNTER — PATIENT MESSAGE (OUTPATIENT)
Dept: GASTROENTEROLOGY | Facility: CLINIC | Age: 59
End: 2024-10-24
Payer: COMMERCIAL

## 2024-10-24 RX ORDER — CLOPIDOGREL BISULFATE 75 MG/1
75 TABLET ORAL DAILY
Qty: 90 TABLET | Refills: 3 | Status: SHIPPED | OUTPATIENT
Start: 2024-10-24 | End: 2025-10-24

## 2024-10-24 RX ORDER — PANTOPRAZOLE SODIUM 40 MG/1
40 TABLET, DELAYED RELEASE ORAL DAILY
Qty: 90 TABLET | Refills: 0 | Status: SHIPPED | OUTPATIENT
Start: 2024-10-24 | End: 2025-01-23

## 2024-10-25 ENCOUNTER — TELEPHONE (OUTPATIENT)
Dept: INFECTIOUS DISEASES | Facility: CLINIC | Age: 59
End: 2024-10-25
Payer: COMMERCIAL

## 2024-10-25 ENCOUNTER — PATIENT MESSAGE (OUTPATIENT)
Dept: GASTROENTEROLOGY | Facility: CLINIC | Age: 59
End: 2024-10-25
Payer: COMMERCIAL

## 2024-10-25 DIAGNOSIS — Z00.00 HEALTHCARE MAINTENANCE: Primary | ICD-10-CM

## 2024-10-25 NOTE — PROGRESS NOTES
Venkat your EGD pathology was benign no H pylori no dysplasia no Barretts esophagus this is all great news    However with you switching from aspirin to Plavix I am not going to be able to change your proton pump inhibitor pantoprazole to AcipHex since pantoprazole is the proton pump inhibitor of choice in a patient who is on Plavix.    So let us keep the on pantoprazole 40 mg once daily best taken 60 minutes before your 1st protein meal of the day breakfast as long as your on Plavix.  If you were not to be on Plavix in just needed to be on aspirin we could switch you to AcipHex 20 mg once daily best taken 60 minutes before your 1st protein meal of the day instead of your pantoprazole.    1. STOMACH, BIOPSY:  - Antral-type mucosa with acute inflammation and reactive/regenerative epithelial changes  - Oxyntic-type mucosa with features consistent with proton pump inhibitor (PPI) effect  - No evidence of Helicobacter-like organisms (an H. pylori immunohistochemical study is negative)  - No evidence of dysplasia    2. STOMACH, GASTRIC POLYP, POLYPECTOMY:  - Fundic gland polyp  - No evidence of dysplasia    3. ESOPHAGUS, DISTAL, BIOPSY:  - Columnar/squamocolumnar mucosa with chronic inflammation and reactive epithelial changes, including focal pancreatic acinar metaplasia  - No evidence of intestinal metaplasia or dysplasia   Comment: Interp By Mick Baird M.D., Signed on 10/22/2024 at 10:30  Microscopic Exam An H. pylori immunohistochemical study was performed on block 1A with an appropriate corresponding control.  Multiple deeper levels examined on blocks 1A and 3A.

## 2024-11-01 ENCOUNTER — PATIENT MESSAGE (OUTPATIENT)
Dept: GASTROENTEROLOGY | Facility: CLINIC | Age: 59
End: 2024-11-01
Payer: COMMERCIAL

## 2024-11-01 ENCOUNTER — TELEPHONE (OUTPATIENT)
Dept: GASTROENTEROLOGY | Facility: CLINIC | Age: 59
End: 2024-11-01
Payer: COMMERCIAL

## 2024-11-01 ENCOUNTER — CLINICAL SUPPORT (OUTPATIENT)
Dept: INFECTIOUS DISEASES | Facility: CLINIC | Age: 59
End: 2024-11-01
Payer: COMMERCIAL

## 2024-11-01 DIAGNOSIS — R49.0 RASPY VOICE: ICD-10-CM

## 2024-11-01 DIAGNOSIS — Z00.00 HEALTHCARE MAINTENANCE: Primary | ICD-10-CM

## 2024-11-01 DIAGNOSIS — R09.89 THROAT CLEARING: Primary | ICD-10-CM

## 2024-11-01 PROCEDURE — 99999 PR PBB SHADOW E&M-EST. PATIENT-LVL I: CPT | Mod: PBBFAC,,,

## 2024-11-03 DIAGNOSIS — I10 ESSENTIAL HYPERTENSION: Chronic | ICD-10-CM

## 2024-11-03 NOTE — TELEPHONE ENCOUNTER
Care Due:                  Date            Visit Type   Department     Provider  --------------------------------------------------------------------------------                                EP -                              PRIMARY      [facility]  Last Visit: 11-      CARE ([facility])   MEDICINE       Del Garcia  Next Visit: None Scheduled  None         None Found                                                            Last  Test          Frequency    Reason                     Performed    Due Date  --------------------------------------------------------------------------------    Office Visit  15 months..  albuterol,                 11- 01-                             valsartan-hydrochlorothia                             herber.....................    [employer] Embedded Care Due Messages. Reference number: [id number].   11/03/2024 10:54:15 AM CST

## 2024-11-04 ENCOUNTER — PATIENT MESSAGE (OUTPATIENT)
Dept: OTOLARYNGOLOGY | Facility: CLINIC | Age: 59
End: 2024-11-04
Payer: COMMERCIAL

## 2024-11-04 RX ORDER — VALSARTAN AND HYDROCHLOROTHIAZIDE 160; 12.5 MG/1; MG/1
1 TABLET, FILM COATED ORAL DAILY
Qty: 90 TABLET | Refills: 0 | Status: SHIPPED | OUTPATIENT
Start: 2024-11-04

## 2024-11-04 NOTE — TELEPHONE ENCOUNTER
Refill Routing Note   Medication(s) are not appropriate for processing by [facility] for the following reason(s):        Required labs abnormal    [facility] action(s):  Defer   Requires appointment : Yes               Appointments  past 12m or future 3m with PCP    Date Provider   Last Visit   11/3/2023 Del Garcia MD   Next Visit   Visit date not found Del Garcia MD   ED visits in past 90 days: 0        Note composed:9:57 AM 11/04/2024

## 2024-11-04 NOTE — TELEPHONE ENCOUNTER
Provider Staff:  Action required for this patient    Requires appointment      Please see care gap opportunities below in Care Due Message.    Thanks!  [facility]     Appointments      Date Provider   Last Visit   11/3/2023 Del Garcia MD   Next Visit   Visit date not found Del Garcia MD     Refill Decision Note   Venkat Jonas  is requesting a refill authorization.  Brief Assessment and Rationale for Refill:  Approve     Medication Therapy Plan:        Pharmacist review requested: Yes   Comments:     Note composed:10:08 AM 11/04/2024

## 2024-11-06 ENCOUNTER — OFFICE VISIT (OUTPATIENT)
Dept: OTOLARYNGOLOGY | Facility: CLINIC | Age: 59
End: 2024-11-06
Payer: COMMERCIAL

## 2024-11-06 VITALS — HEART RATE: 87 BPM | DIASTOLIC BLOOD PRESSURE: 81 MMHG | SYSTOLIC BLOOD PRESSURE: 110 MMHG

## 2024-11-06 DIAGNOSIS — K21.00 GASTROESOPHAGEAL REFLUX DISEASE WITH ESOPHAGITIS WITHOUT HEMORRHAGE: ICD-10-CM

## 2024-11-06 DIAGNOSIS — J34.2 NASAL SEPTAL DEVIATION: ICD-10-CM

## 2024-11-06 DIAGNOSIS — J33.9 MULTIPLE NASAL POLYPS: ICD-10-CM

## 2024-11-06 DIAGNOSIS — R09.89 THROAT CLEARING: ICD-10-CM

## 2024-11-06 DIAGNOSIS — J30.2 SEASONAL ALLERGIC RHINITIS, UNSPECIFIED TRIGGER: Primary | ICD-10-CM

## 2024-11-06 DIAGNOSIS — J39.2 THROAT IRRITATION: ICD-10-CM

## 2024-11-06 DIAGNOSIS — J06.9 UPPER RESPIRATORY TRACT INFECTION, UNSPECIFIED TYPE: ICD-10-CM

## 2024-11-06 DIAGNOSIS — R09.81 NASAL CONGESTION: ICD-10-CM

## 2024-11-06 PROCEDURE — 99204 OFFICE O/P NEW MOD 45 MIN: CPT | Mod: 25,S$GLB,, | Performed by: OTOLARYNGOLOGY

## 2024-11-06 PROCEDURE — 31231 NASAL ENDOSCOPY DX: CPT | Mod: S$GLB,,, | Performed by: OTOLARYNGOLOGY

## 2024-11-06 PROCEDURE — 1160F RVW MEDS BY RX/DR IN RCRD: CPT | Mod: CPTII,S$GLB,, | Performed by: OTOLARYNGOLOGY

## 2024-11-06 PROCEDURE — 3074F SYST BP LT 130 MM HG: CPT | Mod: CPTII,S$GLB,, | Performed by: OTOLARYNGOLOGY

## 2024-11-06 PROCEDURE — 3079F DIAST BP 80-89 MM HG: CPT | Mod: CPTII,S$GLB,, | Performed by: OTOLARYNGOLOGY

## 2024-11-06 PROCEDURE — 99999 PR PBB SHADOW E&M-EST. PATIENT-LVL III: CPT | Mod: PBBFAC,,, | Performed by: OTOLARYNGOLOGY

## 2024-11-06 PROCEDURE — 1159F MED LIST DOCD IN RCRD: CPT | Mod: CPTII,S$GLB,, | Performed by: OTOLARYNGOLOGY

## 2024-11-06 RX ORDER — AZELASTINE 1 MG/ML
2 SPRAY, METERED NASAL 2 TIMES DAILY
Qty: 30 ML | Refills: 3 | Status: SHIPPED | OUTPATIENT
Start: 2024-11-06 | End: 2025-11-06

## 2024-11-06 RX ORDER — BUDESONIDE 0.25 MG/2ML
INHALANT ORAL
Qty: 60 ML | Refills: 2 | Status: SHIPPED | OUTPATIENT
Start: 2024-11-06

## 2024-11-06 NOTE — PROGRESS NOTES
"60 y/o male presents for evaluation of throat irritation and discomfort and throat clearing.  Reports undergoing follow up Upper GI endoscopy a few weeks ago. Recommended he switch to aciphex from pantoprazole due to need for continued ASA (elevated calcium score). After starting aciphex throat became irritated and voice more raspy. Appointment was made and then a day or so later noted some muscle/ body aches and URI symptoms of increase in congestion and PND. No discolored mucus. Feeling a little better the past couple days but still clearing throat some. Overall body aches and other URI symptoms have improved. Had switched back to pantoprazole from Aciphex b/c at first he thought throat issues may have been due to switch but now he feels the throat irritation was prodrome of URI. Taking Mucinex, Claritin & Flonase.  ENT throat check had been recommended.  Tries to follow Mediterranean diet for reflux control.  Upper GI report reviewed. He is due for repeat upper GI endoscopy in 6 months.    History of allergy shots in high school. Had "Sinus windows" surgery when he was in high school. Uses some saline rinse. Has a Navage like device or Neti pot. Does not always use distilled water. Some congestion with URI. Uses decongestants some. Not a lot of recurrent sinus infections recently.  Routinely uses Flonase for nasal allergy symptoms.     Of note - L nephrectomy in 2020 for RCC.     Recalls seeing Dr. Buenrostro years ago for nasal/ sinus symptoms and he dx'd LPR and got him on reflux meds.    PMHx, PSHx, Meds, Allergies, SocHx, FamHx reviewed in EPIC    Review of Systems     Constitutional: Negative for appetite change, chills, fatigue, fever and unexpected weight loss.      HENT: Positive for postnasal drip and sore throat.  Negative for ear discharge, ear pain, hearing loss, runny nose, sinus infection, sinus pressure, stuffy nose, trouble swallowing and voice change.      Eyes:  Positive for eye itching. " Negative for eye drainage.     Respiratory:  Negative for cough, shortness of breath, sleep apnea, snoring and wheezing.      Cardiovascular:  Negative for chest pain, foot swelling, irregular heartbeat and swollen veins.     Gastrointestinal:  Positive for acid reflux. Negative for abdominal pain and heartburn.     Genitourinary: Negative for difficulty urinating, sexual problems and frequent urination.     Musc: Negative for aching joints, aching muscles, back pain and neck pain.     Skin: Negative for rash.     Allergy: Negative for food allergies and seasonal allergies.     Endocrine: Negative for cold intolerance and heat intolerance.      Neurological: Negative for dizziness, headaches, light-headedness, seizures and tremors.      Hematologic: Negative for bruises/bleeds easily and swollen glands.      Psychiatric: Negative for decreased concentration, depression, nervous/anxious and sleep disturbance.        PE: /81 (BP Location: Left arm, Patient Position: Sitting)   Pulse 87    Gen: male, well nourished, well developed, NAD, cooperative, good historian  Ears:  EAC patent & TM translucent with normal bony landmarks bilaterally, minimal scarring ant/inf of TM consistent with PET R more noticeable that left, no DEWEY  Nose: external nose mild deviation of tip, nasal septum to left - spur mid septum, inferior turbinates mild edema, polyp like lesion medial to right middle turbinate, no visible purulence or polyps  OC/OP:  MMM, tongue protrudes midline, palate raises symmetrically, tonsils absent, no erythema or exudate, some cobblestoning of posterior pharynx  Neck: supple, no TTP, no LAD or masses  Face:  no TTP, no erythema or flushing  Respiratory: Breathing comfortably without retractions  Skin: facial skin intact without visible lesions or flushing  Lymph: no neck lympadenopathy  Neuro:  facial movement symmetric, speech fluid, gait stable, tongue protrudes midline  Psych: alert & oriented x 3,  reasonable, normal affect  Voice: mild raspiness, decent volume, no stridor    Procedure: Flexible laryngoscopy/ Nasal endoscopy  In order to fully examine the upper aerodigestive tract, including the larynx and posterior tongue in a patient with a gag reflex, flexible endoscopy required.  After explaining the procedure and obtaining verbal consent, both nasal cavities were anesthetized with 4% Xylocaine spray & Phenylephrine. The flexible laryngoscope (#[id number]) was inserted into the nasal cavity and advanced to visualize the nasal cavity, nasopharynx, the posterior oropharynx, hypopharynx, and the endolarynx with the findings noted below. The nasal cavities were evaluated including septum, inf/ mid & sup turbinates. The scope was removed and the procedure terminated. The patient tolerated this procedure well without apparent complication.      FINDINGS:  Nose - medial right middle turubinate with about 1 cm polyp (pale blue grey), some thick mucus in posterior right NC, posterior medial surface of middle turbinate approx'ly 1 cm - yellow beneath surface ? Mucocele - see photos)  Nasopharynx - the torus is clear. Scarred adenoid pad. Some thick mucus PND. No erythema. There are no lesions of the posterior wall.   Oropharynx - no lesions of the tongue base. Prominent lingual tonsils w/o asymmetry. No erythema or exophytic lesions.  Hypopharynx -  AE folds clear, lateral walls and posterior wall are clear, without any lesions. No cobblestoning.  Larynx - Bilateral vocal cords move symmetrically with mild posterior VC edema, mild edema over arytenoids w/o erythema. No nodules or polyps.     Right middle turbinate      Right medial middle turb polyp (next 2 images)        Oropharynx looking inf'ly    Larynx    Left nasal cavity    Left middle turbinate    Left posterior middle turb (medial surface) - polyp? V. cyst        CT sinus from 2013          Impression:   1. Seasonal allergic rhinitis, unspecified trigger   budesonide (PULMICORT) 0.25 mg/2 mL nebulizer solution    azelastine (ASTELIN) 137 mcg (0.1 %) nasal spray      2. Multiple nasal polyps  budesonide (PULMICORT) 0.25 mg/2 mL nebulizer solution      3. Nasal congestion        4. Nasal septal deviation        5. Throat clearing        6. Throat irritation        7. Gastroesophageal reflux disease with esophagitis without hemorrhage        8. Upper respiratory tract infection, unspecified type            Discussion and Plan:    Reviewed history, symptoms, exam findings and nasal scope and laryngoscopy findings.    For nasal findings of polyps, recommend:  (1) Daily use of budesonide in isotonic saline irrigation.  He was counseled on the off-label nature of this therapy and he consented to its use.  Also counseled to not use tap water for saline irrigations of nose/ sinus. Use distilled water.  For now, will use budesonide irrigations instead of Flonase nasal spray daily.  (2) Astelin nasal spray 2 sprays each nostril twice daily. After a week of use of congestion is better controlled may reduce dose to 1 spray each nostril twice daily.    For throat -   Be mindful and try to reduce or eliminate throat clearing  Follow reflux precautions  Allow upper respiratory symptoms to resolve over the next week and then consider another trial of Aciphex (instead of Pantoprazole).     Follow up in 6 weeks (scheduled for 12/18/24).        Parts or all of this note were created by voice recognition software; typographical errors in translating may be present.

## 2024-11-06 NOTE — PATIENT INSTRUCTIONS
Reviewed history, symptoms, exam findings and nasal scope and laryngoscopy findings.    For nasal findings of polyps, recommend:  (1) Daily use of budesonide in isotonic saline irrigation.  He was counseled on the off-label nature of this therapy and he consented to its use.  Also counseled to not use tap water for saline irrigations of nose/ sinus. Use distilled water.  For now, will use budesonide irrigations instead of Flonase nasal spray daily.  (2) Astelin nasal spray 2 sprays each nostril twice daily. After a week of use of congestion is better controlled may reduce dose to 1 spray each nostril twice daily.    For throat -   Be mindful and try to reduce or eliminate throat clearing  Follow reflux precautions  Allow upper respiratory symptoms to resolve over the next week and then consider another trial of Aciphex (instead of Pantoprazole).     Follow up in 6 weeks (scheduled for 12/18/24).

## 2024-11-26 ENCOUNTER — TELEPHONE (OUTPATIENT)
Dept: INTERNAL MEDICINE | Facility: CLINIC | Age: 59
End: 2024-11-26
Payer: COMMERCIAL

## 2024-11-26 DIAGNOSIS — Z00.00 ROUTINE PHYSICAL EXAMINATION: Primary | ICD-10-CM

## 2024-11-26 NOTE — TELEPHONE ENCOUNTER
Patient sent text message wanting an appointment with Dr Garcia for December 10th---    Please review labs and add if needed

## 2024-12-09 ENCOUNTER — PATIENT MESSAGE (OUTPATIENT)
Dept: ADMINISTRATIVE | Facility: OTHER | Age: 59
End: 2024-12-09
Payer: COMMERCIAL

## 2024-12-09 ENCOUNTER — TELEPHONE (OUTPATIENT)
Dept: HEMATOLOGY/ONCOLOGY | Facility: CLINIC | Age: 59
End: 2024-12-09
Payer: COMMERCIAL

## 2024-12-09 ENCOUNTER — LAB VISIT (OUTPATIENT)
Dept: LAB | Facility: HOSPITAL | Age: 59
End: 2024-12-09
Attending: INTERNAL MEDICINE
Payer: COMMERCIAL

## 2024-12-09 DIAGNOSIS — C64.2 RENAL ADENOCARCINOMA, LEFT: Primary | ICD-10-CM

## 2024-12-09 DIAGNOSIS — Z00.00 ROUTINE PHYSICAL EXAMINATION: ICD-10-CM

## 2024-12-09 DIAGNOSIS — C64.9 RENAL CELL CARCINOMA, UNSPECIFIED LATERALITY: ICD-10-CM

## 2024-12-09 LAB
ALBUMIN SERPL BCP-MCNC: 4.1 G/DL (ref 3.5–5.2)
ALP SERPL-CCNC: 69 U/L (ref 40–150)
ALT SERPL W/O P-5'-P-CCNC: 27 U/L (ref 10–44)
ANION GAP SERPL CALC-SCNC: 11 MMOL/L (ref 8–16)
AST SERPL-CCNC: 26 U/L (ref 10–40)
BASOPHILS # BLD AUTO: 0.04 K/UL (ref 0–0.2)
BASOPHILS NFR BLD: 0.9 % (ref 0–1.9)
BILIRUB SERPL-MCNC: 0.6 MG/DL (ref 0.1–1)
BUN SERPL-MCNC: 16 MG/DL (ref 6–20)
CALCIUM SERPL-MCNC: 10 MG/DL (ref 8.7–10.5)
CHLORIDE SERPL-SCNC: 107 MMOL/L (ref 95–110)
CHOLEST SERPL-MCNC: 171 MG/DL (ref 120–199)
CHOLEST/HDLC SERPL: 2.3 {RATIO} (ref 2–5)
CO2 SERPL-SCNC: 26 MMOL/L (ref 23–29)
COMPLEXED PSA SERPL-MCNC: 1.5 NG/ML (ref 0–4)
CREAT SERPL-MCNC: 1.2 MG/DL (ref 0.5–1.4)
DIFFERENTIAL METHOD BLD: ABNORMAL
EOSINOPHIL # BLD AUTO: 0.3 K/UL (ref 0–0.5)
EOSINOPHIL NFR BLD: 6.9 % (ref 0–8)
ERYTHROCYTE [DISTWIDTH] IN BLOOD BY AUTOMATED COUNT: 12.2 % (ref 11.5–14.5)
EST. GFR  (NO RACE VARIABLE): >60 ML/MIN/1.73 M^2
ESTIMATED AVG GLUCOSE: 105 MG/DL (ref 68–131)
GLUCOSE SERPL-MCNC: 92 MG/DL (ref 70–110)
HBA1C MFR BLD: 5.3 % (ref 4–5.6)
HCT VFR BLD AUTO: 46.5 % (ref 40–54)
HDLC SERPL-MCNC: 74 MG/DL (ref 40–75)
HDLC SERPL: 43.3 % (ref 20–50)
HGB BLD-MCNC: 14.5 G/DL (ref 14–18)
IMM GRANULOCYTES # BLD AUTO: 0.01 K/UL (ref 0–0.04)
IMM GRANULOCYTES NFR BLD AUTO: 0.2 % (ref 0–0.5)
LDLC SERPL CALC-MCNC: 85.4 MG/DL (ref 63–159)
LYMPHOCYTES # BLD AUTO: 1.1 K/UL (ref 1–4.8)
LYMPHOCYTES NFR BLD: 25.1 % (ref 18–48)
MCH RBC QN AUTO: 31.2 PG (ref 27–31)
MCHC RBC AUTO-ENTMCNC: 31.2 G/DL (ref 32–36)
MCV RBC AUTO: 100 FL (ref 82–98)
MONOCYTES # BLD AUTO: 0.4 K/UL (ref 0.3–1)
MONOCYTES NFR BLD: 8.8 % (ref 4–15)
NEUTROPHILS # BLD AUTO: 2.5 K/UL (ref 1.8–7.7)
NEUTROPHILS NFR BLD: 58.1 % (ref 38–73)
NONHDLC SERPL-MCNC: 97 MG/DL
NRBC BLD-RTO: 0 /100 WBC
PLATELET # BLD AUTO: 242 K/UL (ref 150–450)
PMV BLD AUTO: 9.2 FL (ref 9.2–12.9)
POTASSIUM SERPL-SCNC: 5 MMOL/L (ref 3.5–5.1)
PROT SERPL-MCNC: 7.6 G/DL (ref 6–8.4)
RBC # BLD AUTO: 4.65 M/UL (ref 4.6–6.2)
SODIUM SERPL-SCNC: 144 MMOL/L (ref 136–145)
TRIGL SERPL-MCNC: 58 MG/DL (ref 30–150)
WBC # BLD AUTO: 4.34 K/UL (ref 3.9–12.7)

## 2024-12-09 PROCEDURE — 84153 ASSAY OF PSA TOTAL: CPT | Performed by: INTERNAL MEDICINE

## 2024-12-09 PROCEDURE — 36415 COLL VENOUS BLD VENIPUNCTURE: CPT | Performed by: INTERNAL MEDICINE

## 2024-12-09 PROCEDURE — 85025 COMPLETE CBC W/AUTO DIFF WBC: CPT | Performed by: INTERNAL MEDICINE

## 2024-12-09 PROCEDURE — 83036 HEMOGLOBIN GLYCOSYLATED A1C: CPT | Performed by: INTERNAL MEDICINE

## 2024-12-09 PROCEDURE — 80061 LIPID PANEL: CPT | Performed by: INTERNAL MEDICINE

## 2024-12-09 PROCEDURE — 80053 COMPREHEN METABOLIC PANEL: CPT | Performed by: INTERNAL MEDICINE

## 2024-12-09 NOTE — TELEPHONE ENCOUNTER
Spoke to pt to schedule his CT, labs, and Dr. Carrasco f/nabila. Pt preferred to have his labs and CT scan the day before his appt and pt approved all appt times.

## 2024-12-10 ENCOUNTER — TELEPHONE (OUTPATIENT)
Dept: OTOLARYNGOLOGY | Facility: CLINIC | Age: 59
End: 2024-12-10
Payer: COMMERCIAL

## 2024-12-10 ENCOUNTER — OFFICE VISIT (OUTPATIENT)
Dept: INTERNAL MEDICINE | Facility: CLINIC | Age: 59
End: 2024-12-10
Payer: COMMERCIAL

## 2024-12-10 ENCOUNTER — LAB VISIT (OUTPATIENT)
Dept: LAB | Facility: HOSPITAL | Age: 59
End: 2024-12-10
Attending: INTERNAL MEDICINE
Payer: COMMERCIAL

## 2024-12-10 VITALS
SYSTOLIC BLOOD PRESSURE: 121 MMHG | BODY MASS INDEX: 27.75 KG/M2 | DIASTOLIC BLOOD PRESSURE: 79 MMHG | WEIGHT: 193.81 LBS | HEIGHT: 70 IN | OXYGEN SATURATION: 96 % | HEART RATE: 78 BPM | RESPIRATION RATE: 18 BRPM

## 2024-12-10 DIAGNOSIS — D75.89 MACROCYTOSIS: ICD-10-CM

## 2024-12-10 DIAGNOSIS — E78.2 MIXED HYPERLIPIDEMIA: Chronic | ICD-10-CM

## 2024-12-10 DIAGNOSIS — Z00.00 ROUTINE PHYSICAL EXAMINATION: Primary | ICD-10-CM

## 2024-12-10 DIAGNOSIS — Z90.5 HISTORY OF LEFT NEPHRECTOMY: Chronic | ICD-10-CM

## 2024-12-10 DIAGNOSIS — I10 ESSENTIAL HYPERTENSION: Chronic | ICD-10-CM

## 2024-12-10 DIAGNOSIS — J45.990 EXERCISE-INDUCED ASTHMA: ICD-10-CM

## 2024-12-10 LAB
FOLATE SERPL-MCNC: 8.6 NG/ML (ref 4–24)
VIT B12 SERPL-MCNC: 538 PG/ML (ref 210–950)

## 2024-12-10 PROCEDURE — 3074F SYST BP LT 130 MM HG: CPT | Mod: CPTII,S$GLB,, | Performed by: INTERNAL MEDICINE

## 2024-12-10 PROCEDURE — 82607 VITAMIN B-12: CPT | Performed by: INTERNAL MEDICINE

## 2024-12-10 PROCEDURE — 3078F DIAST BP <80 MM HG: CPT | Mod: CPTII,S$GLB,, | Performed by: INTERNAL MEDICINE

## 2024-12-10 PROCEDURE — 3008F BODY MASS INDEX DOCD: CPT | Mod: CPTII,S$GLB,, | Performed by: INTERNAL MEDICINE

## 2024-12-10 PROCEDURE — 99999 PR PBB SHADOW E&M-EST. PATIENT-LVL III: CPT | Mod: PBBFAC,,, | Performed by: INTERNAL MEDICINE

## 2024-12-10 PROCEDURE — 99396 PREV VISIT EST AGE 40-64: CPT | Mod: S$GLB,,, | Performed by: INTERNAL MEDICINE

## 2024-12-10 PROCEDURE — 36415 COLL VENOUS BLD VENIPUNCTURE: CPT | Performed by: INTERNAL MEDICINE

## 2024-12-10 PROCEDURE — 82746 ASSAY OF FOLIC ACID SERUM: CPT | Performed by: INTERNAL MEDICINE

## 2024-12-10 PROCEDURE — 3044F HG A1C LEVEL LT 7.0%: CPT | Mod: CPTII,S$GLB,, | Performed by: INTERNAL MEDICINE

## 2024-12-10 RX ORDER — ASPIRIN 81 MG/1
81 TABLET ORAL DAILY
COMMUNITY

## 2024-12-10 RX ORDER — RABEPRAZOLE SODIUM 20 MG/1
20 TABLET, DELAYED RELEASE ORAL DAILY
COMMUNITY

## 2024-12-10 NOTE — PROGRESS NOTES
HPI  History of Present Illness    CHIEF COMPLAINT:  Venkat presents for an annual wellness visit and to discuss recent lab results.    HPI: Overall doing well, no major complaints. Did want to discuss breathing when playing tennis.   Ride [employer] weekly.   Sees Cardiology, Endocrine, Gastro.   Venkat reports occasional nocturia, waking up at 3:00 AM to urinate, though this is not consistent. He notes sleeping through the night in a hotel room recently. Venkat also has occasional urinary urgency, requiring immediate attention. When he is more deliberate about hydration, urinating every 90 minutes, this sensation diminishes.    Venkat has exercise-induced asthma, particularly noticeable during cold weather and when playing tennis. He feels winded during tennis matches, describing it as reduced pulmonary function. This sensation is less noticeable during Pel[employer] workouts, despite sustaining a higher heart rate for longer periods. He uses Symbicort before exercising, which effectively prevents asthma symptoms during tennis matches.     Venkat recently saw an ENT specialist, Dr. Madeleine Alfaro, regarding throat irritation. He also has an umbilical hernia, which is not currently causing pain or discomfort.    Venkat denies chest pain, shortness of breath at rest, or any cardiovascular symptoms. He denies any current GI symptoms or concerns.    MEDICATIONS:  Venkat is on Rosuvastatin for cholesterol management and takes baby aspirin daily. He is also on Acifex (esomeprazole) for gastric issues. For his exercise-induced asthma, he uses Symbicort as needed before exercise and has Albuterol as a rescue inhaler for asthma symptoms.    MEDICAL HISTORY:  Venkat has a history of hyperlipidemia, which he is genetically predisposed to. He also has exercise-induced asthma. He has an asymptomatic umbilical hernia.    FAMILY HISTORY:  Family history is significant for father with Parkinson's disease, which is medically managed.    SURGICAL  HISTORY:  Venkat has undergone kidney removal. In November 2023, he had an upper endoscopy which revealed a few gastric polyps and gastropathy. Biopsies showed inflammatory changes but no dysplasia or H. pylori. He also had a colonoscopy in November 2020, with a follow-up recommended in 5 years (March 2025).    TEST RESULTS:  Recent lab results show normal white cells, red cells, and platelets in the CBC, with a slightly elevated MCV at 100. The comprehensive metabolic panel shows normal creatinine (previously slightly elevated), normal glucose, and normal liver panel. His recent lipid panel shows a total cholesterol of 171 mg/dL (previously in 140s-150s). Venkat's recent PSA is 1.5 ng/mL (previously 1.6 ng/mL in 2020). His recent A1C is 5.3%, which is the same as the previous year.    IMAGING:  Venkat had a calcium scan in 2016 with a high score of 105. A recent scan of unspecified type showed no remarkable findings as reported by Dr. King.    SOCIAL HISTORY:  Venkat works in [employer] and travels frequently to [facility] and [place].      ROS:  ENT: -sore throat  Cardiovascular: -chest pain  Respiratory: -shortness of breath, +wheezing  Gastrointestinal: -abdominal pain, -change in bowel habits  Genitourinary: +nocturia  Musculoskeletal: -muscle pain             Review of Systems    Past Medical History:   Diagnosis Date    Allergic sinusitis     Exercise-induced asthma     Hyperlipidemia     Hypertension     Renal cell carcinoma 10/14/2020     Past Surgical History:   Procedure Laterality Date    COLONOSCOPY N/A 12/5/2016    Procedure: COLONOSCOPY;  Surgeon: Toni Rivera MD;  Location: [facility] (4TH FLR);  Service: Endoscopy;  Laterality: N/A;  [facility]    COLONOSCOPY N/A 11/3/2020    Procedure: COLONOSCOPY;  Surgeon: Toni Rivera MD;  Location: [facility] (2ND FLR);  Service: Endoscopy;  Laterality: N/A;  Schedule patient EGD colonoscopy with me next available but as soon as possible    CYSTOSCOPY W/  RETROGRADES Left 10/1/2020    Procedure: CYSTOSCOPY, WITH RETROGRADE PYELOGRAM;  Surgeon: Vega Sears MD;  Location: [facility] OR 1ST FLR;  Service: Urology;  Laterality: Left;    ESOPHAGOGASTRODUODENOSCOPY N/A 11/3/2020    Procedure: EGD (ESOPHAGOGASTRODUODENOSCOPY);  Surgeon: Toni Rivera MD;  Location: [facility] ENDO (2ND FLR);  Service: Endoscopy;  Laterality: N/A;  pt will get rapid on 11/2-MS    ESOPHAGOGASTRODUODENOSCOPY N/A 10/18/2024    Procedure: EGD (ESOPHAGOGASTRODUODENOSCOPY);  Surgeon: Toni Rivera MD;  Location: [facility] ENDO (2ND FLR);  Service: Endoscopy;  Laterality: N/A;  maricruz pt / prep instr sent to pt via portal/pa 22  Seen by cardiology on 10/23/24 with return in 1 year-GT  10/11-pre call complete-last mounjaro 9/18/24-does not plan to resume-tb    LAPAROSCOPIC ROBOT-ASSISTED SURGICAL REMOVAL OF KIDNEY USING DA SONIDO XI Left 10/7/2020    Procedure: XI ROBOTIC NEPHRECTOMY;  Surgeon: Vega Sears MD;  Location: [facility] OR 2ND FLR;  Service: Urology;  Laterality: Left;  gen with regional/ 5hrs    REPAIR OF COLLATERAL LIGAMENT OF THUMB Right 5/14/2021    Procedure: REPAIR, LIGAMENT, COLLATERAL, THUMB, right UCLUCL;  Surgeon: Carolyn Thao MD;  Location: [facility] OR;  Service: Orthopedics;  Laterality: Right;  Regional/MAC, [facility]    SINUS SURGERY        Patient Active Problem List   Diagnosis    Hyperlipidemia    Verruca plantaris    AR (allergic rhinitis)    Sinusitis    Essential hypertension    Agatston CAC score 100-199    AK (actinic keratosis)    History of renal cell carcinoma    Anxiety about health    Colon adenomas    History of left nephrectomy    Overweight (BMI 25.0-29.9)    Rupture of ulnar collateral ligament (UCL) of thumb    Rupture of UCL of right thumb    BPPV (benign paroxysmal positional vertigo), left    Neck pain    Neck stiffness    Posture abnormality    Dizziness    Exercise-induced asthma    Macrocytosis          Objective:      Physical Exam    Male  Genitourinary: Mildly enlarged prostate. No lumps, bumps, or nodules on prostate.  Abdomen: Umbilical hernia present.       Physical Exam  Constitutional:       General: He is not in acute distress.     Appearance: He is well-developed.   HENT:      Head: Normocephalic and atraumatic.      Right Ear: Tympanic membrane, ear canal and external ear normal.      Left Ear: Tympanic membrane, ear canal and external ear normal.      Nose: Nose normal. No rhinorrhea.      Mouth/Throat:      Pharynx: No oropharyngeal exudate or posterior oropharyngeal erythema.   Eyes:      Conjunctiva/sclera: Conjunctivae normal.      Pupils: Pupils are equal, round, and reactive to light.   Neck:      Thyroid: No thyromegaly.      Vascular: No JVD.      Comments: No supraclavicular nodes palpated bilaterally.   Cardiovascular:      Rate and Rhythm: Normal rate and regular rhythm.      Pulses: Normal pulses.      Heart sounds: Normal heart sounds. No murmur heard.  Pulmonary:      Effort: Pulmonary effort is normal.      Breath sounds: Normal breath sounds. No wheezing or rales.   Abdominal:      General: Bowel sounds are normal. There is no distension.      Palpations: Abdomen is soft. There is no mass.      Tenderness: There is no abdominal tenderness.   Genitourinary:     Prostate: Enlarged. Not tender.      Rectum: Normal. Guaiac result negative. No tenderness.   Musculoskeletal:         General: No tenderness. Normal range of motion.      Cervical back: Normal range of motion and neck supple.      Right lower leg: No edema.      Left lower leg: No edema.   Lymphadenopathy:      Cervical: No cervical adenopathy.      Upper Body:      Right upper body: No supraclavicular adenopathy.      Left upper body: No supraclavicular adenopathy.   Skin:     General: Skin is warm and dry.      Coloration: Skin is not jaundiced.      Findings: No rash.   Neurological:      General: No focal deficit present.      Mental Status: He is alert and  "oriented to person, place, and time.      Cranial Nerves: No cranial nerve deficit.      Coordination: Coordination normal.      Deep Tendon Reflexes: Reflexes are normal and symmetric.      Reflex Scores:       Bicep reflexes are 2+ on the right side and 2+ on the left side.       Patellar reflexes are 2+ on the right side and 2+ on the left side.  Psychiatric:         Mood and Affect: Mood normal. Mood is not depressed.         Behavior: Behavior normal.         Thought Content: Thought content normal.           Assessment:       Problem List Items Addressed This Visit          Pulmonary    Exercise-induced asthma       Cardiac/Vascular    Hyperlipidemia (Chronic)    Essential hypertension (Chronic)       Renal/    History of left nephrectomy (Chronic)       Oncology    Macrocytosis    Relevant Orders    Vitamin B12    FOLATE     Other Visit Diagnoses       Routine physical examination    -  Primary            Plan:       Diagnoses and all orders for this visit:    Routine physical examination    Essential hypertension    History of left nephrectomy    Mixed hyperlipidemia    Macrocytosis  -     Vitamin B12; Future  -     FOLATE; Future    Exercise-induced asthma           Review labs for elevated MCV. Other labs reviewed and stable.     Continue meds and follow up annually.       Portions of this note may have been created with [employer] voice recognition software. Occasional "wrong-word" or "sound-a-like" substitutions may have occurred due to the inherent limitations of voice recognition software. Please, read the note carefully and recognize, using context, where substitutions have occurred.  "

## 2024-12-12 ENCOUNTER — PATIENT MESSAGE (OUTPATIENT)
Dept: INTERNAL MEDICINE | Facility: CLINIC | Age: 59
End: 2024-12-12
Payer: COMMERCIAL

## 2024-12-12 NOTE — PROGRESS NOTES
Patient ID: Venkat Jonas is a 59 y.o. White male    Subjective  Chief Complaint: patient presents for medical weight loss management.    Contraindications to GLP-1 receptor agonist therapy:   Denies personal or family history of MTC, personal history of MEN2, history of allergic reaction while taking a GLP-1 receptor agonist, and history of pancreatitis while taking a GLP-1 receptor agonist     Co-morbidities: HTN, DLD    History of weight loss therapy:  Pt took last dose of Mounjaro 10 mg in October. Pt experienced fatigue while jumping back to 10 mg. Pt has been able to achieve weight loss goals within 3 months previously, but is interested in a more sustainable, long term plan. Pt has previously received results of elevated CAC scores and may find more benefit from Wegovy than Zepbound. Will restart with Wegovy and transition if tolerability or efficacy are inadequate.     Weight loss history:  Starting weight: 192 - pt reported  Current weight: 188 - pt reported   11/26/2024   Recent Readings    Weight (lbs) 183 lb    BMI 26.25 BMI    % weight loss since GLP-1 initiation: 2.1 %    Objective  Lab Results   Component Value Date     12/09/2024     08/07/2024     02/07/2024     Lab Results   Component Value Date    K 5.0 12/09/2024    K 4.1 08/07/2024    K 4.9 02/07/2024     Lab Results   Component Value Date     12/09/2024     08/07/2024     02/07/2024     Lab Results   Component Value Date    CO2 26 12/09/2024    CO2 25 08/07/2024    CO2 27 02/07/2024     Lab Results   Component Value Date    BUN 16 12/09/2024    BUN 20 08/07/2024    BUN 18 02/07/2024     Lab Results   Component Value Date    GLU 92 12/09/2024    GLU 92 08/07/2024    GLU 96 02/07/2024     Lab Results   Component Value Date    CALCIUM 10.0 12/09/2024    CALCIUM 9.7 08/07/2024    CALCIUM 10.0 02/07/2024     Lab Results   Component Value Date    PROT 7.6 12/09/2024    PROT 7.5 08/07/2024    PROT 7.5  02/07/2024     Lab Results   Component Value Date    ALBUMIN 4.1 12/09/2024    ALBUMIN 3.9 08/07/2024    ALBUMIN 3.8 02/07/2024     Lab Results   Component Value Date    BILITOT 0.6 12/09/2024    BILITOT 0.6 08/07/2024    BILITOT 0.5 02/07/2024     Lab Results   Component Value Date    AST 26 12/09/2024    AST 26 08/07/2024    AST 21 02/07/2024     Lab Results   Component Value Date    ALT 27 12/09/2024    ALT 25 08/07/2024    ALT 20 02/07/2024     Lab Results   Component Value Date    ANIONGAP 11 12/09/2024    ANIONGAP 9 08/07/2024    ANIONGAP 9 02/07/2024     Lab Results   Component Value Date    CREATININE 1.2 12/09/2024    CREATININE 1.5 (H) 08/07/2024    CREATININE 1.2 02/07/2024     Lab Results   Component Value Date    EGFRNORACEVR >60.0 12/09/2024    EGFRNORACEVR 53.3 (A) 08/07/2024    EGFRNORACEVR >60.0 02/07/2024     Assessment/Plan  -Continuation of GLP-1 RA therapy  -Initiate Wegovy 0.25 mg once weekly for 1 month  -Then increase to Wegovy 0.5 mg once weekly for 1 month  -Then increase to Wegovy 1 mg once weekly  -RTC in 3 months    Patient consented to pharmacist management via collaborative practice.

## 2024-12-13 ENCOUNTER — PATIENT MESSAGE (OUTPATIENT)
Dept: INTERNAL MEDICINE | Facility: CLINIC | Age: 59
End: 2024-12-13

## 2024-12-13 ENCOUNTER — OFFICE VISIT (OUTPATIENT)
Dept: INTERNAL MEDICINE | Facility: CLINIC | Age: 59
End: 2024-12-13
Payer: COMMERCIAL

## 2024-12-13 DIAGNOSIS — E66.3 OVERWEIGHT WITH BODY MASS INDEX (BMI) OF 26 TO 26.9 IN ADULT: Primary | ICD-10-CM

## 2024-12-13 RX ORDER — SEMAGLUTIDE 0.5 MG/.5ML
0.5 INJECTION, SOLUTION SUBCUTANEOUS
Qty: 2 ML | Refills: 0 | Status: SHIPPED | OUTPATIENT
Start: 2024-12-13

## 2024-12-13 RX ORDER — SEMAGLUTIDE 0.25 MG/.5ML
0.25 INJECTION, SOLUTION SUBCUTANEOUS
Qty: 2 ML | Refills: 0 | Status: SHIPPED | OUTPATIENT
Start: 2024-12-13

## 2024-12-13 RX ORDER — SEMAGLUTIDE 1 MG/.5ML
1 INJECTION, SOLUTION SUBCUTANEOUS
Qty: 2 ML | Refills: 1 | Status: SHIPPED | OUTPATIENT
Start: 2024-12-13

## 2024-12-17 RX ORDER — RABEPRAZOLE SODIUM 20 MG/1
20 TABLET, DELAYED RELEASE ORAL
Qty: 90 TABLET | Refills: 3 | Status: CANCELLED | OUTPATIENT
Start: 2024-12-17 | End: 2025-12-17

## 2024-12-17 RX ORDER — RABEPRAZOLE SODIUM 20 MG/1
20 TABLET, DELAYED RELEASE ORAL
Qty: 90 TABLET | Refills: 3 | Status: SHIPPED | OUTPATIENT
Start: 2024-12-17 | End: 2025-12-17

## 2024-12-18 ENCOUNTER — OFFICE VISIT (OUTPATIENT)
Dept: OTOLARYNGOLOGY | Facility: CLINIC | Age: 59
End: 2024-12-18
Payer: COMMERCIAL

## 2024-12-18 VITALS — SYSTOLIC BLOOD PRESSURE: 119 MMHG | DIASTOLIC BLOOD PRESSURE: 85 MMHG | HEART RATE: 79 BPM

## 2024-12-18 DIAGNOSIS — J33.9 MULTIPLE NASAL POLYPS: ICD-10-CM

## 2024-12-18 DIAGNOSIS — K21.00 GASTROESOPHAGEAL REFLUX DISEASE WITH ESOPHAGITIS WITHOUT HEMORRHAGE: ICD-10-CM

## 2024-12-18 DIAGNOSIS — H93.8X1 CRACKLING SOUND IN RIGHT EAR: Primary | ICD-10-CM

## 2024-12-18 DIAGNOSIS — J30.2 SEASONAL ALLERGIC RHINITIS, UNSPECIFIED TRIGGER: ICD-10-CM

## 2024-12-18 PROCEDURE — 99214 OFFICE O/P EST MOD 30 MIN: CPT | Mod: 25,S$GLB,, | Performed by: OTOLARYNGOLOGY

## 2024-12-18 PROCEDURE — 3044F HG A1C LEVEL LT 7.0%: CPT | Mod: CPTII,S$GLB,, | Performed by: OTOLARYNGOLOGY

## 2024-12-18 PROCEDURE — 3079F DIAST BP 80-89 MM HG: CPT | Mod: CPTII,S$GLB,, | Performed by: OTOLARYNGOLOGY

## 2024-12-18 PROCEDURE — 1160F RVW MEDS BY RX/DR IN RCRD: CPT | Mod: CPTII,S$GLB,, | Performed by: OTOLARYNGOLOGY

## 2024-12-18 PROCEDURE — 1159F MED LIST DOCD IN RCRD: CPT | Mod: CPTII,S$GLB,, | Performed by: OTOLARYNGOLOGY

## 2024-12-18 PROCEDURE — 99999 PR PBB SHADOW E&M-EST. PATIENT-LVL IV: CPT | Mod: PBBFAC,,, | Performed by: OTOLARYNGOLOGY

## 2024-12-18 PROCEDURE — 3074F SYST BP LT 130 MM HG: CPT | Mod: CPTII,S$GLB,, | Performed by: OTOLARYNGOLOGY

## 2024-12-18 PROCEDURE — 31231 NASAL ENDOSCOPY DX: CPT | Mod: S$GLB,,, | Performed by: OTOLARYNGOLOGY

## 2024-12-18 NOTE — PATIENT INSTRUCTIONS
Reviewed history, symptoms, exam findings and nasal endoscopy findings.    Nasal polyps stable to smaller. Continue with saline sinus irrigation (using distilled water) with budesonide daily. When using budesonide daily, may hold Flonase (fluticasone) nasal spray. If travelling and unable to do sinus rinses, use Flonase daily.  Add Astelin nasal spray (antihistamine) for allergy control whenever symptoms flare up. Add Astelin nasal spray (antihistamine) to either Flonase or budesonide for allergy control whenever symptoms flare up. Astelin nasal spray - 2 sprays each nostril two times per day for treatment of allergy symptoms or astelin 1 spray each nostril daily for maintenance of allergy symptoms.     Continue with Aciphex per GI recommendations.    Continue with Mediterranean diet.     Recommend audiogram for hearing assessment given sound in right ear when in noise.  Will review audiogram when it is forwarded to me.    Follow up in 6 months to re-assess nose/ polyps or sooner for any ENT issues.

## 2024-12-18 NOTE — PROGRESS NOTES
"60 y/o male presents for f/u as scheduled to re-assess nose, nasal polyps, and throat. Reports throat symptoms resolved/ stabilized soon after last appointment. No further throat irritation. Using budesonide saline sinus irrigations which seem to help. Admits was using nearly daily and now more like 3-4 times per week. Wants to know which spray to use when he is not using budesonide rinses. Still having some eye itching at times. Using drops. Also has some sneezing spells at times. Thinks budesonide rinses help that problem some. No sinus pressure or pain or discolored mucus.    For throat - no burning, sour taste or belching.  Using Aciphex.    Reports getting a "crackling" or static like sound in right ear when in loud room and talking loudly. No noticeable hearing loss or other ear symptoms.  Tubes when younger.    PMHx, PSHx, Meds, Allergies, SocHx, FamHx reviewed in EPIC    Review of Systems     Constitutional: Negative for appetite change, chills, fatigue, fever and unexpected weight loss.      HENT: Negative for ear discharge, ear infection, ear pain, facial swelling, hearing loss, mouth sores, nosebleeds, postnasal drip, ringing in the ears, runny nose, sinus infection, sinus pressure, sore throat, stuffy nose, tonsil infection, dental problems, trouble swallowing and voice change.      Eyes:  Positive for eye itching. Negative for eye drainage.     Respiratory:  Negative for cough, shortness of breath, sleep apnea, snoring and wheezing.      Cardiovascular:  Negative for chest pain, foot swelling, irregular heartbeat and swollen veins.     Gastrointestinal:  Negative for abdominal pain, acid reflux, constipation, diarrhea, heartburn and vomiting.     Genitourinary: Negative for difficulty urinating, sexual problems and frequent urination.     Musc: Negative for aching joints, aching muscles, back pain and neck pain.     Skin: Negative for rash.     Allergy: Negative for food allergies and seasonal " allergies.     Endocrine: Negative for cold intolerance and heat intolerance.      Neurological: Negative for dizziness, headaches, light-headedness, seizures and tremors.      Hematologic: Negative for bruises/bleeds easily and swollen glands.      Psychiatric: Negative for decreased concentration, depression, nervous/anxious and sleep disturbance.            PE: /85   Pulse 79    Gen: male, well nourished, well developed, NAD, cooperative, good historian  Ears:  EAC patent & TM translucent with normal bony landmarks bilaterally, minimal scarring ant/inf of TM consistent with PET R more noticeable that left, no DEWEY  Nose: external nose mild deviation of tip, nasal septum to left - spur mid septum, inferior turbinates mild edema, polyp like lesion medial to right middle turbinate, no visible purulence or polyps  OC/OP:  MMM, tongue protrudes midline, palate raises symmetrically, tonsils absent, no erythema or exudate, some cobblestoning of posterior pharynx  Neck: supple, no TTP, no LAD or masses  Face:  no TTP, no erythema or flushing  Respiratory: Breathing comfortably without retractions  Skin: facial skin intact without visible lesions or flushing  Lymph: no neck lympadenopathy  Neuro:  facial movement symmetric, speech fluid, gait stable, tongue protrudes midline  Psych: alert & oriented x 3, reasonable, normal affect  Voice: mild raspiness, decent volume, no stridor    Procedure: Nasal endoscopy  In order to fully examine the nasal cavity to assess polyps, nasal endoscopy was used.  After explaining the procedure and obtaining verbal consent, both nasal cavities were anesthetized with 4% Xylocaine spray & Phenylephrine. The flexible laryngoscope (#04204) was inserted into the nasal cavity and advanced to visualize the nasal cavity and nasopharynx with the findings noted below. The nasal cavities were evaluated including septum, inf/ mid & sup turbinates. The scope was removed and the procedure  terminated. The patient tolerated this procedure well without apparent complication.       FINDINGS:  Nose - medial right middle turubinate with about 1 cm polyp (pale blue grey), less mucus, posterior medial surface of middle turbinate approx'ly 1 cm lobulated polyp pale grey blue  Nasopharynx - the torus is clear. Scarred adenoid pad.  No erythema. There are no lesions of the posterior wall.   R Mid Turb medial polyp    Left middle turb medial polypoid lesion        Impression:   1. Crackling sound in right ear  Comprehensive audiogram      2. Multiple nasal polyps        3. Seasonal allergic rhinitis, unspecified trigger        4. Gastroesophageal reflux disease with esophagitis without hemorrhage            Discussion and Plan:    Reviewed history, symptoms, exam findings and nasal endoscopy findings.    Nasal polyps stable to smaller in size. Continue with saline sinus irrigation (using distilled water) with budesonide daily. When using budesonide daily, may hold Flonase (fluticasone) nasal spray. If travelling and unable to do sinus rinses, use Flonase daily.  Add Astelin nasal spray (antihistamine) to either Flonase or budesonide for allergy control whenever symptoms flare up. Astelin nasal spray - 2 sprays each nostril two times per day for treatment of allergy symptoms or astelin 1 spray each nostril daily for maintenance of allergy symptoms.     Continue with Aciphex per GI recommendations.    Continue with Mediterranean diet.     Recommend audiogram for hearing assessment given sound in right ear when in noise.  Will review audiogram when it is forwarded to me.    Follow up in 6 months to re-assess nose/ polyps or sooner for any ENT issues.      Parts or all of this note were created by voice recognition software; typographical errors in translating may be present.

## 2025-01-08 ENCOUNTER — PATIENT MESSAGE (OUTPATIENT)
Dept: ADMINISTRATIVE | Facility: OTHER | Age: 60
End: 2025-01-08
Payer: COMMERCIAL

## 2025-01-14 ENCOUNTER — PATIENT MESSAGE (OUTPATIENT)
Dept: OTOLARYNGOLOGY | Facility: CLINIC | Age: 60
End: 2025-01-14
Payer: COMMERCIAL

## 2025-01-14 DIAGNOSIS — H10.10 ALLERGIC CONJUNCTIVITIS AND RHINITIS, UNSPECIFIED LATERALITY: Primary | ICD-10-CM

## 2025-01-14 DIAGNOSIS — J30.9 ALLERGIC CONJUNCTIVITIS AND RHINITIS, UNSPECIFIED LATERALITY: Primary | ICD-10-CM

## 2025-01-14 DIAGNOSIS — J30.2 SEASONAL ALLERGIC RHINITIS, UNSPECIFIED TRIGGER: ICD-10-CM

## 2025-01-14 RX ORDER — AZELASTINE HYDROCHLORIDE 0.5 MG/ML
1 SOLUTION/ DROPS OPHTHALMIC 2 TIMES DAILY
Qty: 6 ML | Refills: 5 | Status: SHIPPED | OUTPATIENT
Start: 2025-01-14 | End: 2026-06-14

## 2025-01-17 ENCOUNTER — PATIENT MESSAGE (OUTPATIENT)
Dept: CARDIOLOGY | Facility: CLINIC | Age: 60
End: 2025-01-17
Payer: COMMERCIAL

## 2025-01-19 ENCOUNTER — PATIENT MESSAGE (OUTPATIENT)
Dept: OTHER | Facility: OTHER | Age: 60
End: 2025-01-19
Payer: COMMERCIAL

## 2025-01-19 ENCOUNTER — PATIENT MESSAGE (OUTPATIENT)
Dept: ADMINISTRATIVE | Facility: OTHER | Age: 60
End: 2025-01-19
Payer: COMMERCIAL

## 2025-02-06 ENCOUNTER — PATIENT MESSAGE (OUTPATIENT)
Dept: ADMINISTRATIVE | Facility: OTHER | Age: 60
End: 2025-02-06
Payer: COMMERCIAL

## 2025-02-10 ENCOUNTER — HOSPITAL ENCOUNTER (OUTPATIENT)
Dept: RADIOLOGY | Facility: HOSPITAL | Age: 60
Discharge: HOME | End: 2025-02-10
Attending: INTERNAL MEDICINE
Payer: COMMERCIAL

## 2025-02-10 DIAGNOSIS — C64.2 RENAL ADENOCARCINOMA, LEFT: ICD-10-CM

## 2025-02-10 PROCEDURE — 25500020 PHARM REV CODE 255: Performed by: INTERNAL MEDICINE

## 2025-02-10 PROCEDURE — 74177 CT ABD & PELVIS W/CONTRAST: CPT | Mod: 26,,, | Performed by: RADIOLOGY

## 2025-02-10 PROCEDURE — 71260 CT THORAX DX C+: CPT | Mod: 26,,, | Performed by: RADIOLOGY

## 2025-02-10 PROCEDURE — 71260 CT THORAX DX C+: CPT | Mod: TC

## 2025-02-10 RX ADMIN — IOHEXOL 75 ML: 350 INJECTION, SOLUTION INTRAVENOUS at 12:02

## 2025-02-10 RX ADMIN — IOHEXOL 30 ML: 350 INJECTION, SOLUTION INTRAVENOUS at 10:02

## 2025-02-11 ENCOUNTER — OFFICE VISIT (OUTPATIENT)
Dept: HEMATOLOGY/ONCOLOGY | Facility: CLINIC | Age: 60
End: 2025-02-11
Payer: COMMERCIAL

## 2025-02-11 ENCOUNTER — LAB VISIT (OUTPATIENT)
Dept: LAB | Facility: HOSPITAL | Age: 60
End: 2025-02-11
Attending: INTERNAL MEDICINE
Payer: COMMERCIAL

## 2025-02-11 VITALS
TEMPERATURE: 98 F | DIASTOLIC BLOOD PRESSURE: 80 MMHG | HEIGHT: 70 IN | WEIGHT: 190.06 LBS | BODY MASS INDEX: 27.21 KG/M2 | HEART RATE: 81 BPM | OXYGEN SATURATION: 95 % | SYSTOLIC BLOOD PRESSURE: 121 MMHG | RESPIRATION RATE: 16 BRPM

## 2025-02-11 DIAGNOSIS — C64.2 RENAL ADENOCARCINOMA, LEFT: ICD-10-CM

## 2025-02-11 DIAGNOSIS — C64.2 RENAL ADENOCARCINOMA, LEFT: Primary | ICD-10-CM

## 2025-02-11 PROCEDURE — 3074F SYST BP LT 130 MM HG: CPT | Mod: CPTII,S$GLB,, | Performed by: INTERNAL MEDICINE

## 2025-02-11 PROCEDURE — G2211 COMPLEX E/M VISIT ADD ON: HCPCS | Mod: S$GLB,,, | Performed by: INTERNAL MEDICINE

## 2025-02-11 PROCEDURE — 3008F BODY MASS INDEX DOCD: CPT | Mod: CPTII,S$GLB,, | Performed by: INTERNAL MEDICINE

## 2025-02-11 PROCEDURE — 81479 UNLISTED MOLECULAR PATHOLOGY: CPT | Mod: WS,SP | Performed by: INTERNAL MEDICINE

## 2025-02-11 PROCEDURE — 4010F ACE/ARB THERAPY RXD/TAKEN: CPT | Mod: CPTII,S$GLB,, | Performed by: INTERNAL MEDICINE

## 2025-02-11 PROCEDURE — 99999 PR PBB SHADOW E&M-EST. PATIENT-LVL IV: CPT | Mod: PBBFAC,,, | Performed by: INTERNAL MEDICINE

## 2025-02-11 PROCEDURE — 36415 COLL VENOUS BLD VENIPUNCTURE: CPT | Mod: SP | Performed by: INTERNAL MEDICINE

## 2025-02-11 PROCEDURE — 1159F MED LIST DOCD IN RCRD: CPT | Mod: CPTII,S$GLB,, | Performed by: INTERNAL MEDICINE

## 2025-02-11 PROCEDURE — 3079F DIAST BP 80-89 MM HG: CPT | Mod: CPTII,S$GLB,, | Performed by: INTERNAL MEDICINE

## 2025-02-11 PROCEDURE — 99215 OFFICE O/P EST HI 40 MIN: CPT | Mod: S$GLB,,, | Performed by: INTERNAL MEDICINE

## 2025-02-11 RX ORDER — VALSARTAN 160 MG/1
160 TABLET ORAL DAILY
Qty: 90 TABLET | Refills: 3 | Status: SHIPPED | OUTPATIENT
Start: 2025-02-11 | End: 2026-02-11

## 2025-02-11 NOTE — PROGRESS NOTES
Subjective:       Patient ID: Venkat Jonas    Chief Complaint: h/o RCC    HPI     Venkat Jonas is a 59 y.o. male,  to clinic for evaluation and management of RCC, s/p nephrectomy October 2020.      He notes overall feeling well.  Eating well, exercising, abstaining from alcohol.  Has had some lightheadedness related to low BPs.        Oncologic History:    Developed hematuria and on imaging was found to have a renal mass c/w RCC.  Questionable bone lesion.  Underwent nephrectomy October 7, 2020 with below pathology.  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         PATHOLOGY:    1. Lymph nodes, aortic (regional resection):  - 3 lymph nodes negative for tumor (0/3)    2. Left kidney and adrenal gland (radical nephrectomy):  - Renal cell carcinoma, see synoptic report below  - Additional ancillary testing for tumor classification is ordered and results will be issued in a supplemental report    Kidney carcinoma synoptic report  - Procedure: Radical nephrectomy  - Specimen laterality: Left  - Tumor site: Upper pole  - Tumor size: 8.9 x 8.1 x 7.7 cm  - Tumor focality: Renal cell carcinoma, unclassified (see microscopic section)  - Sarcomatoid features: Not identified  - Rhabdoid features: Present  - Histologic grade: WHO/ISUP Nuclear grade 4  - Tumor necrosis: Present, approximately 20% of the tumor  - Tumor extension: Tumor extends into renal vein  - Margins:  - Uninvolved by invasive carcinoma  - Regional lymph nodes:  - Number of lymph nodes involved: 0  - Number of lymph nodes examined: 4 (specimen 1 and specimen 2)  - Pathologic staging: pT3a N0 MX  - Other findings: Separate sclerotic and calcified nodule  - Tumor block for potential studies: 2D, 2E, 2F 2H, 2I, 2J, 2K  - Nontumor block: 2N  - Immunostain panel in microscopic section  - MMR IHC: No loss of expression (see microscopic section)  Sections show an eosinophilic renal cell carcinoma with nuclear inclusions ,cytoplasmic  inclusions , and  extracellular eosinophillic material. Several renal cell carcinoma subtypes are in the differential including  high grade chromophobe, eosinophilic clear cell, SDH deficient, and translocation associated. Subtyping  will be attempted with ancillary testing and results issued in a supplemental report. Selected slides  reviewed by additional pathologists.    Immunostain results:  Positive stains: AMACR, keratin AE1/AE3, CD10,  (weak), vimentin  Negative stains: Keratin 7, keratin 20, HMB45, Mart 1, desmin    Immunohistochemistry (IHC) Testing for Mismatch Repair (MMR) Proteins:  MLH1 - Intact nuclear expression  MSH2 - Intact nuclear expression  MSH6 - Intact nuclear expression  PMS2 - Intact nuclear expression    Review of Systems   Constitutional: Negative for activity change, appetite change, chills, fatigue, fever and unexpected weight change.   HENT: Negative for congestion, hearing loss, mouth sores, sore throat and trouble swallowing.    Eyes: Negative for pain and visual disturbance.   Respiratory: Negative for cough, shortness of breath and wheezing.    Cardiovascular: Negative for chest pain, palpitations and leg swelling.   Gastrointestinal: Negative for abdominal pain, constipation, diarrhea, nausea and vomiting.   Endocrine: Negative for cold intolerance and heat intolerance.   Genitourinary: Negative for difficulty urinating, discharge, dysuria, enuresis, frequency, hematuria, scrotal swelling and testicular pain.   Musculoskeletal: Negative for arthralgias, back pain and myalgias.   Skin: Negative for color change, rash and wound.   Allergic/Immunologic: Negative for environmental allergies and food allergies.   Neurological: Negative for weakness, numbness and headaches.   Hematological: Negative for adenopathy. Does not bruise/bleed easily.   Psychiatric/Behavioral: Negative for confusion, hallucinations and sleep disturbance. The patient is not nervous/anxious.    All other  systems reviewed and are negative.        Allergies:  Review of patient's allergies indicates:  No Known Allergies    Medications:  Current Outpatient Medications   Medication Sig Dispense Refill    albuterol (PROVENTIL/VENTOLIN HFA) 90 mcg/actuation inhaler Inhale 2 puffs into the lungs every 4 (four) hours as needed for Shortness of Breath. 54 g 0    aspirin (ECOTRIN) 81 MG EC tablet Take 81 mg by mouth once daily.      azelastine (ASTELIN) 137 mcg (0.1 %) nasal spray 2 sprays (274 mcg total) by Nasal route 2 (two) times daily. 30 mL 3    azelastine (OPTIVAR) 0.05 % ophthalmic solution Place 1 drop into both eyes 2 (two) times daily. 6 mL 5    budesonide (PULMICORT) 0.25 mg/2 mL nebulizer solution Use one ampule (2mL) in the saline sinus rinse daily. 60 mL 2    budesonide-formoterol 80-4.5 mcg (SYMBICORT) 80-4.5 mcg/actuation HFAA Inhale 2 puffs into the lungs 2 (two) times daily as needed (exercise wheezing). Controller 10.2 g 3    ezetimibe (ZETIA) 10 mg tablet Take 1 tablet by mouth daily 90 tablet 3    RABEprazole (ACIPHEX) 20 mg tablet Take 1 tablet (20 mg total) by mouth before breakfast. Best taken 60 minutes before your first protein meal of the day - Breakfast. 90 tablet 3    rosuvastatin (CRESTOR) 40 MG Tab Take 1 tablet (40 mg total) by mouth once daily. 90 tablet 3    semaglutide, weight loss, (WEGOVY) 1 mg/0.5 mL PnIj Inject 1 mg into the skin every 7 days. 2 mL 1    valACYclovir (VALTREX) 1000 MG tablet TAKE 2 TABLETS (2,000 MG TOTAL) BY MOUTH 2 (TWO) TIMES DAILY FOR 2 DAYS and repeat as needed 12 tablet 1    valsartan-hydrochlorothiazide (DIOVAN-HCT) 160-12.5 mg per tablet Take 1 tablet by mouth once daily. 90 tablet 0    zolpidem (AMBIEN) 10 mg Tab Take 1 tablet (10 mg total) by mouth nightly as needed (insomnia). 30 tablet 5     No current facility-administered medications for this visit.       PMH:  Past Medical History:   Diagnosis Date    Allergic sinusitis     Exercise-induced asthma      Hyperlipidemia     Hypertension     Renal cell carcinoma 10/14/2020       PSH:  Past Surgical History:   Procedure Laterality Date    COLONOSCOPY N/A 12/5/2016    Procedure: COLONOSCOPY;  Surgeon: Toni Rivera MD;  Location: [facility] ENDO (4TH FLR);  Service: Endoscopy;  Laterality: N/A;  [facility]    COLONOSCOPY N/A 11/3/2020    Procedure: COLONOSCOPY;  Surgeon: Toni Rivera MD;  Location: [facility] ENDO (2ND FLR);  Service: Endoscopy;  Laterality: N/A;  Schedule patient EGD colonoscopy with me next available but as soon as possible    CYSTOSCOPY W/ RETROGRADES Left 10/1/2020    Procedure: CYSTOSCOPY, WITH RETROGRADE PYELOGRAM;  Surgeon: Vega Sears MD;  Location: [facility] OR 1ST FLR;  Service: Urology;  Laterality: Left;    ESOPHAGOGASTRODUODENOSCOPY N/A 11/3/2020    Procedure: EGD (ESOPHAGOGASTRODUODENOSCOPY);  Surgeon: Toni Rivera MD;  Location: [facility] (2ND FLR);  Service: Endoscopy;  Laterality: N/A;  pt will get rapid on 11/2-MS    ESOPHAGOGASTRODUODENOSCOPY N/A 10/18/2024    Procedure: EGD (ESOPHAGOGASTRODUODENOSCOPY);  Surgeon: Toni Rivera MD;  Location: [facility] (2ND FLR);  Service: Endoscopy;  Laterality: N/A;  maricruz pt / prep instr sent to pt via portal/pa 22  Seen by cardiology on 10/23/24 with return in 1 year-GT  10/11-pre call complete-last mounjaro 9/18/24-does not plan to resume-tb    LAPAROSCOPIC ROBOT-ASSISTED SURGICAL REMOVAL OF KIDNEY USING DA SONIDO XI Left 10/7/2020    Procedure: XI ROBOTIC NEPHRECTOMY;  Surgeon: Vega Sears MD;  Location: [facility] OR 2ND FLR;  Service: Urology;  Laterality: Left;  gen with regional/ 5hrs    REPAIR OF COLLATERAL LIGAMENT OF THUMB Right 5/14/2021    Procedure: REPAIR, LIGAMENT, COLLATERAL, THUMB, right UCLUCL;  Surgeon: Carolyn Thao MD;  Location: [facility];  Service: Orthopedics;  Laterality: Right;  Regional/MAC, [facility]    SINUS SURGERY         FamHx:  Family History   Problem Relation Name Age of Onset    Hyperlipidemia Father       Parkinsonism Father      Hyperlipidemia Brother      Heart attack Maternal Grandmother      Cancer Maternal Grandfather          throat    Melanoma Neg Hx      Psoriasis Neg Hx      Lupus Neg Hx      Eczema Neg Hx      Acne Neg Hx      Heart disease Neg Hx         SocHx:  Social History     Socioeconomic History    Marital status:      Spouse name: Kamila    Number of children: 3   Occupational History    Occupation:  Marketing     Employer: [employer]   Tobacco Use    Smoking status: Never    Smokeless tobacco: Never    Tobacco comments:     The patient exercises regularly at [employer].  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   Substance and Sexual Activity    Alcohol use: Yes     Alcohol/week: 4.0 standard drinks of alcohol     Types: 4 Shots of liquor per week     Comment: socially    Drug use: No    Sexual activity: Yes     Partners: Female   Social History Narrative    Administration at [employer]    , 3 kids (23, 18, 17)     Social Drivers of Health     Financial Resource Strain: Low Risk  (4/22/2024)    Overall Financial Resource Strain (CARDIA)     Difficulty of Paying Living Expenses: Not hard at all   Food Insecurity: No Food Insecurity (4/22/2024)    Hunger Vital Sign     Worried About Running Out of Food in the Last Year: Never true     Ran Out of Food in the Last Year: Never true   Transportation Needs: No Transportation Needs (4/22/2024)    PRAPARE - Transportation     Lack of Transportation (Medical): No     Lack of Transportation (Non-Medical): No   Physical Activity: Sufficiently Active (4/22/2024)    Exercise Vital Sign     Days of Exercise per Week: 4 days     Minutes of Exercise per Session: 60 min   Stress: Stress Concern Present (4/22/2024)    [place] [employer] - Occupational Stress Questionnaire     Feeling of Stress : To some extent   Housing Stability: High Risk (4/22/2024)    Housing Stability Vital Sign      "Unable to Pay for Housing in the Last Year: Yes         Objective:         /80 (BP Location: Left arm, Patient Position: Sitting)   Pulse 81   Temp 97.9 °F (36.6 °C) (Oral)   Resp 16   Ht 5' 10" (1.778 m)   Wt 86.2 kg (190 lb 0.6 oz)   SpO2 95%   BMI 27.27 kg/m²       Physical Exam  Constitutional:       General: He is not in acute distress.     Appearance: Normal appearance. He is normal weight. He is not ill-appearing, toxic-appearing or diaphoretic.   HENT:      Head: Normocephalic and atraumatic.      Nose: Nose normal.   Eyes:      General: No scleral icterus.        Right eye: No discharge.         Left eye: No discharge.      Conjunctiva/sclera: Conjunctivae normal.   Skin:     General: Skin is warm and dry.      Coloration: Skin is not jaundiced or pale.   Neurological:      General: No focal deficit present.      Mental Status: He is alert and oriented to person, place, and time. Mental status is at baseline.   Psychiatric:         Mood and Affect: Mood normal.         Behavior: Behavior normal.         Thought Content: Thought content normal.         Judgment: Judgment normal.           LABS:  WBC   Date Value Ref Range Status   02/10/2025 5.43 3.90 - 12.70 K/uL Final   02/10/2025 5.43 3.90 - 12.70 K/uL Final     Hemoglobin   Date Value Ref Range Status   02/10/2025 14.3 14.0 - 18.0 g/dL Final   02/10/2025 14.3 14.0 - 18.0 g/dL Final     Hematocrit   Date Value Ref Range Status   02/10/2025 42.3 40.0 - 54.0 % Final   02/10/2025 42.3 40.0 - 54.0 % Final     Platelets   Date Value Ref Range Status   02/10/2025 245 150 - 450 K/uL Final   02/10/2025 245 150 - 450 K/uL Final       Chemistry        Component Value Date/Time     02/10/2025 0703     02/10/2025 0703    K 4.5 02/10/2025 0703    K 5.0 02/10/2025 0703     02/10/2025 0703     02/10/2025 0703    CO2 25 02/10/2025 0703    CO2 27 02/10/2025 0703    BUN 14 02/10/2025 0703    BUN 14 02/10/2025 0703    CREATININE 1.1 " 02/10/2025 0703    CREATININE 1.2 02/10/2025 0703    GLU 79 02/10/2025 0703    GLU 78 02/10/2025 0703        Component Value Date/Time    CALCIUM 8.9 02/10/2025 0703    CALCIUM 9.2 02/10/2025 0703    ALKPHOS 57 02/10/2025 0703    ALKPHOS 57 02/10/2025 0703    AST 30 02/10/2025 0703    AST 29 02/10/2025 0703    ALT 27 02/10/2025 0703    ALT 30 02/10/2025 0703    BILITOT 0.4 02/10/2025 0703    BILITOT 0.4 02/10/2025 0703    ESTGFRAFRICA >60.0 05/02/2022 1011    EGFRNONAA >60.0 05/02/2022 1011              Assessment:       1. Renal adenocarcinoma, left         Plan:         1. RCC:    Previously reviewed pathology.  I am happy that lymph nodes were clear, and margins were negative.  The fact that there was renal vein involvement, pushes this to a stage III.  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    We are assuming that the questionable bone lesion is benign, especially since it has been stable for years now, watching this carefully.      After surgery, we discussed monitoring this closely or considering adjuvant Sutent.      After careful consideration, the patient has decided to monitor this through routine surveillance, which I fully support.    New data from [facility] on adjuvant immunotherapy that was presented only studied patients 12 wks or less from nephrectomy, we did not think that considering immunotherapy 9 mos after surgery would be prudent or c/w supporting data.     Rescanning now stable, with ELLI.      He is now nearly 4 years out, RTC 6 mos to see me with CT CAP, CBC, CMP.   Will plan to go to annual scans at year 5.     Refilled Diovan 160mg (without HCTZ), per his PCPs suggestion, due to the low BPs he's been experiencing.      RTC 6 mos to see me with CT CAP, CBC, CMP, will go to annual scans thereafter.       He knows to call us if there are issues or questions in the meantime.    The patient agrees with the plan, and all  questions have been answered to their satisfaction.      40 mins were spent during this encounter, greater than 50% was spent in direct counseling and/or coordination of care.     Chandana Carrasco M.D., M.S., F.A.C.P.  Hematology and Oncology Attending  [facility]          Route Chart for Scheduling    Med Onc Chart Routing      Follow up with physician . RTC 6 mos to see me with CT CAP, CBC, CMP   Follow up with RYNE    Infusion scheduling note    Injection scheduling note    Labs    Imaging    Pharmacy appointment    Other referrals

## 2025-02-12 LAB
CREAT SERPL-MCNC: 1.2 MG/DL (ref 0.5–1.4)
SAMPLE: NORMAL

## 2025-02-13 DIAGNOSIS — E78.00 PURE HYPERCHOLESTEROLEMIA: Chronic | ICD-10-CM

## 2025-02-13 RX ORDER — ROSUVASTATIN CALCIUM 40 MG/1
40 TABLET, COATED ORAL DAILY
Qty: 90 TABLET | Refills: 3 | Status: SHIPPED | OUTPATIENT
Start: 2025-02-13

## 2025-02-27 NOTE — PROGRESS NOTES
Colonoscopy Procedure Note     Procedure: Colonoscopy     Indications: constipation, pelvic pain,      Procedure Details      Informed consent was obtained for the procedure. Risks of perforation and hemorrhage were discussed. The patient was placed in the left lateral decubitus position, the anal region was examined, a rectal performed, then the colonoscope was inserted and advanced without difficulty. The prep was excellent. The instrument was withdrawn with good views throughout.     Findings:  Rectal exam revealed diminished tone, otherwise normal. The pediatric colonoscope was inserted and advanced to about 20cm at the distal sigmoid colon just proximal to a few diverticula at which point a non-traversable stricture/turn was encountered despite multiple attempts. Unclear if this is due to adhesive disease versus intrinsic folding due to diverticular disease, but certainly it appeared benign. The colon wall appeared thin, and risk of perforation was too high for aggressive attempts to get beyond this area.       Specimens: none           Complications:  None; patient tolerated the procedure well.           Disposition: PACU - hemodynamically stable.           Condition: stable     Impression:    Impassable distal sigmoid turn/stricture     Recommendations:  Repeat colonoscopy in the future if surgical resection is performed, otherwise no reason for repeated colonoscopy attempts        Last 5 Patient Entered Readings                                                               Current 30 Day Average: 123/83     Recent Readings 2/5/2017 2/5/2017 2/4/2017 2/4/2017 2/1/2017    Systolic BP (mmHg) 108 106 130 138 119    Diastolic BP (mmHg) 67 57 80 86 77    Pulse 91 86 77 77 83

## 2025-03-10 ENCOUNTER — PATIENT MESSAGE (OUTPATIENT)
Dept: ADMINISTRATIVE | Facility: OTHER | Age: 60
End: 2025-03-10
Payer: COMMERCIAL

## 2025-03-10 ENCOUNTER — PATIENT MESSAGE (OUTPATIENT)
Dept: INTERNAL MEDICINE | Facility: CLINIC | Age: 60
End: 2025-03-10
Payer: COMMERCIAL

## 2025-03-10 DIAGNOSIS — E66.3 OVERWEIGHT WITH BODY MASS INDEX (BMI) OF 26 TO 26.9 IN ADULT: Primary | ICD-10-CM

## 2025-03-11 RX ORDER — TIRZEPATIDE 5 MG/.5ML
5 INJECTION, SOLUTION SUBCUTANEOUS
Qty: 2 ML | Refills: 1 | Status: ACTIVE | OUTPATIENT
Start: 2025-03-11

## 2025-03-11 NOTE — TELEPHONE ENCOUNTER
- Pt requested to switch from Wegovy to Zepbound due to lack of efficacy despite not reaching therapeutic dosing  - Transition to therapeutically comparable dose of Zepbound per protocol  - Discontinue Wegovy 1 mg  - Initiate Zepbound 5 mg SQ weekly  - pt instructed to schedule follow-up evaluation

## 2025-04-01 ENCOUNTER — PATIENT MESSAGE (OUTPATIENT)
Dept: OTOLARYNGOLOGY | Facility: CLINIC | Age: 60
End: 2025-04-01
Payer: COMMERCIAL

## 2025-04-02 NOTE — TELEPHONE ENCOUNTER
Called patient on his cell phone. Discussed his symptoms. Viral rhinosinusitis symptoms - treating with saline rinse, Flonase, saline spray. Was using Mucinex. Switched to Mucinex D a day or two ago. Added Allegra. Was up last night with congestion. Feeling drained and symptoms started about 5-6 days ago. Used Afrin overnight at 3 am and was able to fall asleep for a few hours. Mucus slightly discolored. Using Tylenol AM & PM. Possibly a little better this morning.    Discussed OTC treatment regimen (recs/ adjustments: Afrin bid for 3 days, hold Mucinex D at night) going forward and s/s to look for. Hoping slight improvement continues. He knows to call or message if he is not improving. Rest and hydrate.

## 2025-04-05 ENCOUNTER — PATIENT MESSAGE (OUTPATIENT)
Dept: ADMINISTRATIVE | Facility: OTHER | Age: 60
End: 2025-04-05
Payer: COMMERCIAL

## 2025-04-07 DIAGNOSIS — E66.3 OVERWEIGHT WITH BODY MASS INDEX (BMI) OF 26 TO 26.9 IN ADULT: ICD-10-CM

## 2025-04-07 RX ORDER — TIRZEPATIDE 5 MG/.5ML
5 INJECTION, SOLUTION SUBCUTANEOUS
Qty: 2 ML | Refills: 1 | OUTPATIENT
Start: 2025-04-07

## 2025-04-07 RX ORDER — TIRZEPATIDE 5 MG/.5ML
5 INJECTION, SOLUTION SUBCUTANEOUS
Qty: 2 ML | Refills: 1 | Status: CANCELLED | OUTPATIENT
Start: 2025-04-07

## 2025-04-09 NOTE — PROGRESS NOTES
Patient ID: Venkat Jonas is a 59 y.o. White male    Subjective  Chief Complaint: patient presents for medical weight loss management.    Co-morbidities: HTN, DLD    HPI: Patient [facility] Wegovy with [facility] in December 2024 and is currently managed on Zepbound 5 mg. Pt previously used Mounjaro up to 10 mg and wished to try Wegovy when presenting to the [facility], but transitioned back to Zepbound in March 2025 due to patient reported lack of efficacy despite not reaching therapeutic dosing.     Tolerance to current therapy:  Denies nausea, vomiting, diarrhea, constipation, abdominal pain    Weight loss history:  Starting weight: 192 - pt reported  Current weight:    4/8/2025   Recent Readings    Weight (lbs) 176.9 lb    BMI 25.62 BMI    % weight loss since GLP-1 initiation: 7.9 %    Objective  Lab Results   Component Value Date     02/10/2025     02/10/2025     12/09/2024     Lab Results   Component Value Date    K 4.5 02/10/2025    K 5.0 02/10/2025    K 5.0 12/09/2024     Lab Results   Component Value Date     02/10/2025     02/10/2025     12/09/2024     Lab Results   Component Value Date    CO2 25 02/10/2025    CO2 27 02/10/2025    CO2 26 12/09/2024     Lab Results   Component Value Date    BUN 14 02/10/2025    BUN 14 02/10/2025    BUN 16 12/09/2024     Lab Results   Component Value Date    GLU 79 02/10/2025    GLU 78 02/10/2025    GLU 92 12/09/2024     Lab Results   Component Value Date    CALCIUM 8.9 02/10/2025    CALCIUM 9.2 02/10/2025    CALCIUM 10.0 12/09/2024     Lab Results   Component Value Date    PROT 6.9 02/10/2025    PROT 7.0 02/10/2025    PROT 7.6 12/09/2024     Lab Results   Component Value Date    ALBUMIN 3.7 02/10/2025    ALBUMIN 3.8 02/10/2025    ALBUMIN 4.1 12/09/2024     Lab Results   Component Value Date    BILITOT 0.4 02/10/2025    BILITOT 0.4 02/10/2025    BILITOT 0.6 12/09/2024     Lab Results   Component Value Date     AST 30 02/10/2025    AST 29 02/10/2025    AST 26 12/09/2024     Lab Results   Component Value Date    ALT 27 02/10/2025    ALT 30 02/10/2025    ALT 27 12/09/2024     Lab Results   Component Value Date    ANIONGAP 8 02/10/2025    ANIONGAP 6 (L) 02/10/2025    ANIONGAP 11 12/09/2024     Lab Results   Component Value Date    CREATININE 1.1 02/10/2025    CREATININE 1.2 02/10/2025    CREATININE 1.2 12/09/2024     Lab Results   Component Value Date    EGFRNORACEVR >60.0 02/10/2025    EGFRNORACEVR >60.0 02/10/2025    EGFRNORACEVR >60.0 12/09/2024     Assessment/Plan  - Weight loss progress has been above target since starting Zepbound, will continue at current dose to minimize muscle mass loss  - Continue Zepbound 5 mg SQ weekly, pt may reach out before 6 month visit if weight stalls  - RTC in 6 months for follow-up evaluation    Patient consented to pharmacist management via collaborative practice.

## 2025-04-10 ENCOUNTER — PATIENT MESSAGE (OUTPATIENT)
Dept: INTERNAL MEDICINE | Facility: CLINIC | Age: 60
End: 2025-04-10

## 2025-04-10 ENCOUNTER — OFFICE VISIT (OUTPATIENT)
Dept: INTERNAL MEDICINE | Facility: CLINIC | Age: 60
End: 2025-04-10
Payer: COMMERCIAL

## 2025-04-10 RX ORDER — TIRZEPATIDE 5 MG/.5ML
5 INJECTION, SOLUTION SUBCUTANEOUS
Qty: 2 ML | Refills: 5 | Status: ACTIVE | OUTPATIENT
Start: 2025-04-10

## 2025-05-03 ENCOUNTER — PATIENT MESSAGE (OUTPATIENT)
Dept: INTERNAL MEDICINE | Facility: CLINIC | Age: 60
End: 2025-05-03
Payer: COMMERCIAL

## 2025-05-05 RX ORDER — TIRZEPATIDE 7.5 MG/.5ML
7.5 INJECTION, SOLUTION SUBCUTANEOUS
Qty: 2 ML | Refills: 4 | Status: ACTIVE | OUTPATIENT
Start: 2025-05-05

## 2025-05-06 ENCOUNTER — PATIENT MESSAGE (OUTPATIENT)
Dept: ADMINISTRATIVE | Facility: OTHER | Age: 60
End: 2025-05-06
Payer: COMMERCIAL

## 2025-06-01 ENCOUNTER — PATIENT MESSAGE (OUTPATIENT)
Dept: ADMINISTRATIVE | Facility: OTHER | Age: 60
End: 2025-06-01
Payer: COMMERCIAL

## 2025-06-02 ENCOUNTER — PATIENT MESSAGE (OUTPATIENT)
Dept: INTERNAL MEDICINE | Facility: CLINIC | Age: 60
End: 2025-06-02
Payer: COMMERCIAL

## 2025-06-03 ENCOUNTER — PATIENT MESSAGE (OUTPATIENT)
Dept: ADMINISTRATIVE | Facility: OTHER | Age: 60
End: 2025-06-03
Payer: COMMERCIAL

## 2025-06-03 RX ORDER — TIRZEPATIDE 10 MG/.5ML
10 INJECTION, SOLUTION SUBCUTANEOUS
Qty: 2 ML | Refills: 4 | Status: ACTIVE | OUTPATIENT
Start: 2025-06-03

## 2025-06-26 ENCOUNTER — PATIENT MESSAGE (OUTPATIENT)
Dept: ADMINISTRATIVE | Facility: OTHER | Age: 60
End: 2025-06-26
Payer: COMMERCIAL

## 2025-07-01 ENCOUNTER — TELEPHONE (OUTPATIENT)
Dept: HEMATOLOGY/ONCOLOGY | Facility: CLINIC | Age: 60
End: 2025-07-01
Payer: COMMERCIAL

## 2025-07-01 DIAGNOSIS — C64.2 RENAL ADENOCARCINOMA, LEFT: Primary | ICD-10-CM

## 2025-07-24 RX ORDER — EZETIMIBE 10 MG/1
TABLET ORAL
Qty: 90 TABLET | Refills: 0 | Status: CANCELLED | OUTPATIENT
Start: 2025-07-24

## 2025-07-24 RX ORDER — RABEPRAZOLE SODIUM 20 MG/1
20 TABLET, DELAYED RELEASE ORAL
Qty: 90 TABLET | Refills: 3 | Status: SHIPPED | OUTPATIENT
Start: 2025-07-24 | End: 2026-07-24

## 2025-07-24 NOTE — TELEPHONE ENCOUNTER
Refill Routing Note   Medication(s) are not appropriate for processing by [facility] for the following reason(s):        Patient not seen by provider within 15 months    [facility] action(s):  Defer             Appointments  past 12m or future 3m with PCP    Date Provider   Last Visit   10/23/2023 Vega Mendoza MD   Next Visit   Visit date not found Vega Mendoza MD   ED visits in past 90 days: 0        Note composed:1:37 PM 07/24/2025

## 2025-07-24 NOTE — TELEPHONE ENCOUNTER
Refill Routing Note   Medication(s) are not appropriate for processing by [facility] for the following reason(s):        Patient not seen by provider within 15 months    [facility] action(s):  Defer             Appointments  past 12m or future 3m with PCP    Date Provider   Last Visit   10/23/2023 Vega Mendoza MD   Next Visit   Visit date not found Vega Mendoza MD   ED visits in past 90 days: 0        Note composed:11:02 AM 07/24/2025

## 2025-07-31 RX ORDER — EZETIMIBE 10 MG/1
TABLET ORAL
Qty: 90 TABLET | Refills: 3 | Status: SHIPPED | OUTPATIENT
Start: 2025-07-31

## 2025-08-15 ENCOUNTER — PATIENT MESSAGE (OUTPATIENT)
Dept: INTERNAL MEDICINE | Facility: CLINIC | Age: 60
End: 2025-08-15
Payer: COMMERCIAL

## 2025-08-15 RX ORDER — TIRZEPATIDE 12.5 MG/.5ML
12.5 INJECTION, SOLUTION SUBCUTANEOUS
Qty: 2 ML | Refills: 1 | Status: ACTIVE | OUTPATIENT
Start: 2025-08-15

## 2025-08-18 ENCOUNTER — HOSPITAL ENCOUNTER (OUTPATIENT)
Dept: RADIOLOGY | Facility: HOSPITAL | Age: 60
Discharge: HOME | End: 2025-08-18
Attending: INTERNAL MEDICINE
Payer: COMMERCIAL

## 2025-08-18 DIAGNOSIS — C64.2 RENAL ADENOCARCINOMA, LEFT: ICD-10-CM

## 2025-08-18 PROCEDURE — 71260 CT THORAX DX C+: CPT | Mod: 26,,, | Performed by: RADIOLOGY

## 2025-08-18 PROCEDURE — 74177 CT ABD & PELVIS W/CONTRAST: CPT | Mod: 26,,, | Performed by: RADIOLOGY

## 2025-08-18 PROCEDURE — 25500020 PHARM REV CODE 255: Performed by: INTERNAL MEDICINE

## 2025-08-18 PROCEDURE — 71260 CT THORAX DX C+: CPT | Mod: TC

## 2025-08-18 RX ADMIN — IOHEXOL 100 ML: 350 INJECTION, SOLUTION INTRAVENOUS at 01:08

## 2025-08-20 ENCOUNTER — OFFICE VISIT (OUTPATIENT)
Dept: HEMATOLOGY/ONCOLOGY | Facility: CLINIC | Age: 60
End: 2025-08-20
Payer: COMMERCIAL

## 2025-08-20 VITALS
HEART RATE: 84 BPM | HEIGHT: 70 IN | TEMPERATURE: 98 F | OXYGEN SATURATION: 100 % | WEIGHT: 169.06 LBS | RESPIRATION RATE: 18 BRPM | SYSTOLIC BLOOD PRESSURE: 145 MMHG | DIASTOLIC BLOOD PRESSURE: 78 MMHG | BODY MASS INDEX: 24.2 KG/M2

## 2025-08-20 DIAGNOSIS — C64.2 RENAL ADENOCARCINOMA, LEFT: Primary | ICD-10-CM

## 2025-08-20 DIAGNOSIS — C64.9 RENAL CELL CARCINOMA, UNSPECIFIED LATERALITY: ICD-10-CM

## 2025-08-20 PROCEDURE — 3077F SYST BP >= 140 MM HG: CPT | Mod: CPTII,S$GLB,, | Performed by: INTERNAL MEDICINE

## 2025-08-20 PROCEDURE — 1159F MED LIST DOCD IN RCRD: CPT | Mod: CPTII,S$GLB,, | Performed by: INTERNAL MEDICINE

## 2025-08-20 PROCEDURE — 99215 OFFICE O/P EST HI 40 MIN: CPT | Mod: S$GLB,,, | Performed by: INTERNAL MEDICINE

## 2025-08-20 PROCEDURE — 3078F DIAST BP <80 MM HG: CPT | Mod: CPTII,S$GLB,, | Performed by: INTERNAL MEDICINE

## 2025-08-20 PROCEDURE — 4010F ACE/ARB THERAPY RXD/TAKEN: CPT | Mod: CPTII,S$GLB,, | Performed by: INTERNAL MEDICINE

## 2025-08-20 PROCEDURE — G2211 COMPLEX E/M VISIT ADD ON: HCPCS | Mod: S$GLB,,, | Performed by: INTERNAL MEDICINE

## 2025-08-20 PROCEDURE — 99999 PR PBB SHADOW E&M-EST. PATIENT-LVL IV: CPT | Mod: PBBFAC,,, | Performed by: INTERNAL MEDICINE

## 2025-08-20 PROCEDURE — 3008F BODY MASS INDEX DOCD: CPT | Mod: CPTII,S$GLB,, | Performed by: INTERNAL MEDICINE

## (undated) DEVICE — PACK CYSTO

## (undated) DEVICE — Device

## (undated) DEVICE — PAD CAST 2 IN X 4YDS STERILE

## (undated) DEVICE — RELOAD SUREFORM 45 2.5 WHT 6R

## (undated) DEVICE — CORD BIPOLAR 12 FOOT

## (undated) DEVICE — TOURNIQUET SB QC DP 18X4IN

## (undated) DEVICE — BUCKET PLASTER DISPOSABLE

## (undated) DEVICE — FORCEP STRAIGHT DISP

## (undated) DEVICE — SPONGE LAP STRL 4X18 5/PK

## (undated) DEVICE — DRESSING XEROFORM FOIL PK 1X8

## (undated) DEVICE — SYR ONLY LUER LOCK 20CC

## (undated) DEVICE — SOL ELECTROLUBE ANTI-STIC

## (undated) DEVICE — COVER TIP CURVED SCISSORS XI

## (undated) DEVICE — SEAL UNIVERSAL 5MM-8MM XI

## (undated) DEVICE — DRAPE ARM DAVINCI XI

## (undated) DEVICE — SUT FIBERWIRE 4-0 18 BLUE

## (undated) DEVICE — DRAPE ABDOMINAL TIBURON 14X11

## (undated) DEVICE — GOWN SURGICAL X-LARGE

## (undated) DEVICE — ENDOCATCH 15MM

## (undated) DEVICE — SOL IRR WATER STRL 3000 ML

## (undated) DEVICE — SUT 2/0 27IN PDS II VIO MO

## (undated) DEVICE — SPONGE LAP 4X18 PREWASHED

## (undated) DEVICE — GLOVE BIOGEL 7.5

## (undated) DEVICE — SEE MEDLINE ITEM 146308

## (undated) DEVICE — BLADE SURG #15 CARBON STEEL

## (undated) DEVICE — DRAPE C-ARM MINI DISP

## (undated) DEVICE — SOL IRR NACL .9% 3000ML

## (undated) DEVICE — SPLINT PLASTER FAST SET 5X30IN

## (undated) DEVICE — SET IRR URLGY 2LINE UNIV SPIKE

## (undated) DEVICE — SCRUB 10% POVIDONE IODINE 4OZ

## (undated) DEVICE — DRESSING TRANS 4X4 TEGADERM

## (undated) DEVICE — DRESSING ABSRBNT ISLAND 3.6X8

## (undated) DEVICE — SUT 4-0 VICRYL / P-3

## (undated) DEVICE — BANDAGE ELASTIC 2X5 VELCRO ST

## (undated) DEVICE — SUT MCRYL PLUS 4-0 PS2 27IN

## (undated) DEVICE — DRAPE SCOPE PILLOW WARMER

## (undated) DEVICE — GAUZE SPONGE 4X4 12PLY

## (undated) DEVICE — GLOVE BIOGEL ECLIPSE SZ 7

## (undated) DEVICE — BLADE SURG STAINLESS STEEL #15

## (undated) DEVICE — TROCAR ENDOPATH XCEL 5X100MM

## (undated) DEVICE — STAPLER ENDOWRIST 45 WHITE XI

## (undated) DEVICE — CATH 5FR OPEN END URETERAL

## (undated) DEVICE — SUT FIBERWIRE 4-0 18 IN TAP

## (undated) DEVICE — KIT VUETIP TROCAR SWAB

## (undated) DEVICE — SLING ARM LARGE FOAM STRAP

## (undated) DEVICE — COVER LIGHT HANDLE

## (undated) DEVICE — STERILE WATER 1000ML BOTTLE

## (undated) DEVICE — TAPE SILK 3IN

## (undated) DEVICE — SOL 9P NACL IRR PIC IL

## (undated) DEVICE — CLIP HEMO-LOK MLX LARGE LF

## (undated) DEVICE — SOL NS 1000CC

## (undated) DEVICE — TRAY MINOR GEN SURG

## (undated) DEVICE — SET TRI-LUMEN FILTERED TUBE

## (undated) DEVICE — NDL INSUF ULTRA VERESS 120MM

## (undated) DEVICE — SEE MEDLINE ITEM 152651

## (undated) DEVICE — SEE MEDLINE ITEM 157131

## (undated) DEVICE — ELECTRODE REM PLYHSV RETURN 9

## (undated) DEVICE — TRAY FOLEY 16FR INFECTION CONT

## (undated) DEVICE — PAD CAST SPECIALIST STRL 4

## (undated) DEVICE — IRRIGATOR ENDOSCOPY DISP.

## (undated) DEVICE — ADHESIVE DERMABOND ADVANCED

## (undated) DEVICE — KIT PROCEDURE STER INLET CLOSU

## (undated) DEVICE — SYR 10CC LUER LOCK

## (undated) DEVICE — DRAPE COLUMN DAVINCI XI

## (undated) DEVICE — SYR SLIP TIP 20CC

## (undated) DEVICE — SUT MONOCRYL PLUS 4-0 P3

## (undated) DEVICE — SYR 30CC LUER LOCK

## (undated) DEVICE — TRAY CYSTO BASIN

## (undated) DEVICE — PORT AIRSEAL 12/120MM LPI

## (undated) DEVICE — GOWN X-LG STERILE BACK

## (undated) DEVICE — GUIDE WIRE MOTION .035 X 150CM

## (undated) DEVICE — SUT PROLENE 4-0 RB-1 BL MO

## (undated) DEVICE — DRAPE SURG W/TWL 17 5/8X23

## (undated) DEVICE — SCISSOR 5MMX35CM DIRECT DRIVE

## (undated) DEVICE — APPLICATOR CHLORAPREP ORN 26ML

## (undated) DEVICE — STAPLER SUREFORM SGL USE 45

## (undated) DEVICE — NDL SAFETY 22G X 1.5 ECLIPSE

## (undated) DEVICE — DRESSING N ADH OIL EMUL 3X3

## (undated) DEVICE — GLOVE BIOGEL PI MICRO INDIC 7